# Patient Record
Sex: FEMALE | Race: WHITE | NOT HISPANIC OR LATINO
[De-identification: names, ages, dates, MRNs, and addresses within clinical notes are randomized per-mention and may not be internally consistent; named-entity substitution may affect disease eponyms.]

---

## 2017-05-01 ENCOUNTER — TRANSCRIPTION ENCOUNTER (OUTPATIENT)
Age: 67
End: 2017-05-01

## 2018-06-21 VITALS
RESPIRATION RATE: 18 BRPM | TEMPERATURE: 98 F | OXYGEN SATURATION: 80 % | DIASTOLIC BLOOD PRESSURE: 82 MMHG | HEART RATE: 108 BPM | SYSTOLIC BLOOD PRESSURE: 120 MMHG

## 2018-06-21 LAB
ALBUMIN SERPL ELPH-MCNC: 2.5 G/DL — LOW (ref 3.4–5)
ALP SERPL-CCNC: 62 U/L — SIGNIFICANT CHANGE UP (ref 40–120)
ALT FLD-CCNC: 39 U/L — SIGNIFICANT CHANGE UP (ref 12–42)
ANION GAP SERPL CALC-SCNC: 6 MMOL/L — LOW (ref 9–16)
APPEARANCE UR: CLEAR — SIGNIFICANT CHANGE UP
APTT BLD: 27.4 SEC — LOW (ref 27.5–36.5)
AST SERPL-CCNC: 34 U/L — SIGNIFICANT CHANGE UP (ref 15–37)
BASOPHILS NFR BLD AUTO: 0.7 % — SIGNIFICANT CHANGE UP (ref 0–2)
BILIRUB SERPL-MCNC: 0.3 MG/DL — SIGNIFICANT CHANGE UP (ref 0.2–1.2)
BILIRUB UR-MCNC: NEGATIVE — SIGNIFICANT CHANGE UP
BUN SERPL-MCNC: 12 MG/DL — SIGNIFICANT CHANGE UP (ref 7–23)
CALCIUM SERPL-MCNC: 8.5 MG/DL — SIGNIFICANT CHANGE UP (ref 8.5–10.5)
CHLORIDE SERPL-SCNC: 104 MMOL/L — SIGNIFICANT CHANGE UP (ref 96–108)
CO2 SERPL-SCNC: 29 MMOL/L — SIGNIFICANT CHANGE UP (ref 22–31)
COLOR SPEC: YELLOW — SIGNIFICANT CHANGE UP
CREAT SERPL-MCNC: 0.45 MG/DL — LOW (ref 0.5–1.3)
D DIMER BLD IA.RAPID-MCNC: 529 NG/ML DDU — HIGH
DIFF PNL FLD: NEGATIVE — SIGNIFICANT CHANGE UP
EOSINOPHIL NFR BLD AUTO: 2 % — SIGNIFICANT CHANGE UP (ref 0–6)
GLUCOSE SERPL-MCNC: 99 MG/DL — SIGNIFICANT CHANGE UP (ref 70–99)
GLUCOSE UR QL: NEGATIVE — SIGNIFICANT CHANGE UP
HCT VFR BLD CALC: 42.5 % — SIGNIFICANT CHANGE UP (ref 34.5–45)
HGB BLD-MCNC: 13.1 G/DL — SIGNIFICANT CHANGE UP (ref 11.5–15.5)
IMM GRANULOCYTES NFR BLD AUTO: 0.6 % — SIGNIFICANT CHANGE UP (ref 0–1.5)
INR BLD: 1 — SIGNIFICANT CHANGE UP (ref 0.88–1.16)
KETONES UR-MCNC: NEGATIVE — SIGNIFICANT CHANGE UP
LACTATE SERPL-SCNC: 0.4 MMOL/L — SIGNIFICANT CHANGE UP (ref 0.4–2)
LEUKOCYTE ESTERASE UR-ACNC: NEGATIVE — SIGNIFICANT CHANGE UP
LYMPHOCYTES # BLD AUTO: 22.8 % — SIGNIFICANT CHANGE UP (ref 13–44)
MAGNESIUM SERPL-MCNC: 1.6 MG/DL — SIGNIFICANT CHANGE UP (ref 1.6–2.6)
MCHC RBC-ENTMCNC: 27.8 PG — SIGNIFICANT CHANGE UP (ref 27–34)
MCHC RBC-ENTMCNC: 30.8 G/DL — LOW (ref 32–36)
MCV RBC AUTO: 90.2 FL — SIGNIFICANT CHANGE UP (ref 80–100)
MONOCYTES NFR BLD AUTO: 13.4 % — SIGNIFICANT CHANGE UP (ref 2–14)
NEUTROPHILS NFR BLD AUTO: 60.5 % — SIGNIFICANT CHANGE UP (ref 43–77)
NITRITE UR-MCNC: NEGATIVE — SIGNIFICANT CHANGE UP
NT-PROBNP SERPL-SCNC: 2867 PG/ML — HIGH
PCO2 BLDV: 56 MMHG — HIGH (ref 41–51)
PH BLDV: 7.4 — SIGNIFICANT CHANGE UP (ref 7.32–7.43)
PH UR: 6.5 — SIGNIFICANT CHANGE UP (ref 5–8)
PLATELET # BLD AUTO: 93 K/UL — LOW (ref 150–400)
PO2 BLDV: 60 MMHG — HIGH (ref 35–40)
POTASSIUM SERPL-MCNC: 4 MMOL/L — SIGNIFICANT CHANGE UP (ref 3.5–5.3)
POTASSIUM SERPL-SCNC: 4 MMOL/L — SIGNIFICANT CHANGE UP (ref 3.5–5.3)
PROT SERPL-MCNC: 5.8 G/DL — LOW (ref 6.4–8.2)
PROT UR-MCNC: NEGATIVE MG/DL — SIGNIFICANT CHANGE UP
PROTHROM AB SERPL-ACNC: 11 SEC — SIGNIFICANT CHANGE UP (ref 9.8–12.7)
RBC # BLD: 4.71 M/UL — SIGNIFICANT CHANGE UP (ref 3.8–5.2)
RBC # FLD: 14.6 % — SIGNIFICANT CHANGE UP (ref 10.3–16.9)
SAO2 % BLDV: 92 % — SIGNIFICANT CHANGE UP
SODIUM SERPL-SCNC: 139 MMOL/L — SIGNIFICANT CHANGE UP (ref 132–145)
SP GR SPEC: 1.01 — SIGNIFICANT CHANGE UP (ref 1–1.03)
TROPONIN I SERPL-MCNC: 0.09 NG/ML — HIGH (ref 0.02–0.06)
TROPONIN I SERPL-MCNC: 0.09 NG/ML — HIGH (ref 0.02–0.06)
UROBILINOGEN FLD QL: 0.2 E.U./DL — SIGNIFICANT CHANGE UP
WBC # BLD: 8.5 K/UL — SIGNIFICANT CHANGE UP (ref 3.8–10.5)
WBC # FLD AUTO: 8.5 K/UL — SIGNIFICANT CHANGE UP (ref 3.8–10.5)

## 2018-06-21 PROCEDURE — 93010 ELECTROCARDIOGRAM REPORT: CPT

## 2018-06-21 PROCEDURE — 71045 X-RAY EXAM CHEST 1 VIEW: CPT | Mod: 26

## 2018-06-21 PROCEDURE — 99291 CRITICAL CARE FIRST HOUR: CPT

## 2018-06-21 RX ORDER — ACETAMINOPHEN 500 MG
975 TABLET ORAL ONCE
Qty: 0 | Refills: 0 | Status: COMPLETED | OUTPATIENT
Start: 2018-06-21 | End: 2018-06-21

## 2018-06-21 RX ORDER — ALBUTEROL 90 UG/1
2.5 AEROSOL, METERED ORAL
Qty: 0 | Refills: 0 | Status: DISCONTINUED | OUTPATIENT
Start: 2018-06-21 | End: 2018-06-22

## 2018-06-21 RX ORDER — FUROSEMIDE 40 MG
40 TABLET ORAL ONCE
Qty: 0 | Refills: 0 | Status: COMPLETED | OUTPATIENT
Start: 2018-06-21 | End: 2018-06-21

## 2018-06-21 RX ORDER — ONDANSETRON 8 MG/1
4 TABLET, FILM COATED ORAL ONCE
Qty: 0 | Refills: 0 | Status: COMPLETED | OUTPATIENT
Start: 2018-06-21 | End: 2018-06-21

## 2018-06-21 RX ORDER — MORPHINE SULFATE 50 MG/1
4 CAPSULE, EXTENDED RELEASE ORAL ONCE
Qty: 0 | Refills: 0 | Status: DISCONTINUED | OUTPATIENT
Start: 2018-06-21 | End: 2018-06-21

## 2018-06-21 RX ORDER — VANCOMYCIN HCL 1 G
1000 VIAL (EA) INTRAVENOUS ONCE
Qty: 0 | Refills: 0 | Status: COMPLETED | OUTPATIENT
Start: 2018-06-21 | End: 2018-06-21

## 2018-06-21 RX ORDER — AZITHROMYCIN 500 MG/1
500 TABLET, FILM COATED ORAL ONCE
Qty: 0 | Refills: 0 | Status: COMPLETED | OUTPATIENT
Start: 2018-06-21 | End: 2018-06-21

## 2018-06-21 RX ORDER — CEFTRIAXONE 500 MG/1
1 INJECTION, POWDER, FOR SOLUTION INTRAMUSCULAR; INTRAVENOUS ONCE
Qty: 0 | Refills: 0 | Status: COMPLETED | OUTPATIENT
Start: 2018-06-21 | End: 2018-06-21

## 2018-06-21 RX ORDER — IPRATROPIUM/ALBUTEROL SULFATE 18-103MCG
3 AEROSOL WITH ADAPTER (GRAM) INHALATION ONCE
Qty: 0 | Refills: 0 | Status: COMPLETED | OUTPATIENT
Start: 2018-06-21 | End: 2018-06-21

## 2018-06-21 RX ORDER — ENOXAPARIN SODIUM 100 MG/ML
80 INJECTION SUBCUTANEOUS ONCE
Qty: 0 | Refills: 0 | Status: COMPLETED | OUTPATIENT
Start: 2018-06-21 | End: 2018-06-21

## 2018-06-21 RX ADMIN — ONDANSETRON 4 MILLIGRAM(S): 8 TABLET, FILM COATED ORAL at 22:48

## 2018-06-21 RX ADMIN — AZITHROMYCIN 500 MILLIGRAM(S): 500 TABLET, FILM COATED ORAL at 22:46

## 2018-06-21 RX ADMIN — MORPHINE SULFATE 4 MILLIGRAM(S): 50 CAPSULE, EXTENDED RELEASE ORAL at 22:48

## 2018-06-21 RX ADMIN — Medication 125 MILLIGRAM(S): at 19:03

## 2018-06-21 RX ADMIN — CEFTRIAXONE 1 GRAM(S): 500 INJECTION, POWDER, FOR SOLUTION INTRAMUSCULAR; INTRAVENOUS at 22:00

## 2018-06-21 RX ADMIN — Medication 250 MILLIGRAM(S): at 23:55

## 2018-06-21 RX ADMIN — CEFTRIAXONE 100 GRAM(S): 500 INJECTION, POWDER, FOR SOLUTION INTRAMUSCULAR; INTRAVENOUS at 22:48

## 2018-06-21 RX ADMIN — Medication 3 MILLILITER(S): at 19:03

## 2018-06-21 RX ADMIN — Medication 975 MILLIGRAM(S): at 19:02

## 2018-06-21 RX ADMIN — Medication 40 MILLIGRAM(S): at 19:02

## 2018-06-21 NOTE — ED PROVIDER NOTE - DIAGNOSTIC INTERPRETATION
Interpreted by ED Physician:  CXR (1 view): no acute abnormality: no infiltrates, bones appear intact, cardiac silhouette slightly enlarged

## 2018-06-21 NOTE — ED PROVIDER NOTE - OBJECTIVE STATEMENT
67 F visiting from [place], PMHx pneumonia (last in January 2018), COPD, iron deficiency, receives Nplate injections, PSHx: spinal cord stimulator insertion surgery, left knee replacement, recently hospitalized (March 2018, [place]), allergy to iodine, CT contrast, presents to ED from urgent care for SOB and left leg pain and swelling. Pt reports cellulitis and pain in her left leg that began last week (prior to recent travel) as well as SOB with associated back pain today. States she has been traveling by plane frequently between [place] and [place] due to her niece's recent death. Pt presented to urgent care today, where she was told her temperature was 99 degrees F (usually runs at 95 degrees F). Notes she has taken Lasix in the past for leg swelling. Former smoker of 20+ years, but states she has used a vape recently. Denies chest pain, abdominal pain, nausea/vomiting. 67 F visiting from [place], PMHx pneumonia (last in January 2018), COPD, iron deficiency, receives Nplate injections, PSHx: spinal cord stimulator insertion surgery, left knee replacement, recently hospitalized (March 2018, [place]), allergy to iodine, CT contrast, presents to ED from urgent care for SOB and left leg pain and swelling. Pt reports cellulitis and pain in her left leg that began last week (prior to recent travel) as well as SOB with associated back pain today. States she has been traveling by plane frequently between [place] and [place] due to her niece's recent death. Pt presented to urgent care today, where she was told her temperature was 99 degrees F (usually runs at 95 degrees F). Notes she has taken Lasix in the past for leg swelling. Former smoker of 20+ years, but states she has used a vape recently. Denies chest pain, abdominal pain, nausea/vomiting.    Also says that she has had some SOB that has been worsening over the past month. + orthopnea. 10ln weight gain this week with leg edema. Says that legs feel painful from tightness/swelling. L > R but swelling is symmetrical. Slightly more red left lower leg. She also has a lung nodule (that she was told looks benign) that is pending biopsy once platelets are in a better range.

## 2018-06-21 NOTE — ED PROVIDER NOTE - RESPIRATORY, MLM
Markedly decreased air entry bilaterally. Bilateral inspiratory and expiratory wheezes. Markedly decreased air entry bilaterally. Bilateral inspiratory and expiratory wheezes. Moderate resp distress.

## 2018-06-21 NOTE — ED PROVIDER NOTE - MUSCULOSKELETAL, MLM
Spine appears normal, range of motion is not limited, no muscle or joint tenderness Spine appears normal, b/l LE edema to thighs, 1+ pitting.

## 2018-06-21 NOTE — ED PROVIDER NOTE - CRITICAL CARE PROVIDED
direct patient care (not related to procedure)/interpretation of diagnostic studies/consultation with other physicians/conducted a detailed discussion of DNR status/documentation/additional history taking/consult w/ pt's family directly relating to pts condition

## 2018-06-21 NOTE — ED ADULT TRIAGE NOTE - CHIEF COMPLAINT QUOTE
pt c/o left calf pain, site of prior cellulitis. sent by urgent care for US. chronic smoker with copd 20+ years has usual o2 sat 94 on room air today presents 80% but without resp distress.  pt c/o left calf pain, site of prior cellulitis. sent by urgent care for US. chronic smoker with copd 20+ years has usual o2 sat 94 on room air today presents 80% but without resp distress. recurring bouts of pna, last illness jan 2018.

## 2018-06-21 NOTE — ED ADULT NURSE NOTE - CHIEF COMPLAINT QUOTE
pt c/o left calf pain, site of prior cellulitis. sent by urgent care for US. chronic smoker with copd 20+ years has usual o2 sat 94 on room air today presents 80% but without resp distress. recurring bouts of pna, last illness jan 2018.

## 2018-06-21 NOTE — ED PROVIDER NOTE - MEDICAL DECISION MAKING DETAILS
Patient presenting with sob/soboe and new b/l pedal edema/slight redness to LLE, low grade temp at UC and hx PNA, cellulitis and COPD. Wheezing and has peripheral edema on exam. Will send broad aguayo and tx for CHF/COPD. Anticipate admission. Also covered with abx cor CAP and Cellulitis.

## 2018-06-21 NOTE — ED PROVIDER NOTE - PMH
COPD (chronic obstructive pulmonary disease)    Pneumonia Cellulitis    COPD (chronic obstructive pulmonary disease)    Pneumonia    Thrombocytopenia

## 2018-06-21 NOTE — ED ADULT NURSE NOTE - OBJECTIVE STATEMENT
pt c/o b/l lower leg redness, swelling, tightness and warmth with h/o cellulitis. pt clinically upgraded to MD [place] due to low O2 sat in triage. pt placed on O2, in NAD, speaking in full sentences, and will continue to monitor. +edema +erythema. wheezing and diminished BS auscultated.

## 2018-06-21 NOTE — ED PROVIDER NOTE - CONSTITUTIONAL, MLM
normal... Awake, alert, oriented to person, place, time/situation and in no apparent distress. Awake, alert, oriented to person, place, time/situation and Moderate resp distress. Hard ro speak full sentances.

## 2018-06-21 NOTE — ED PROVIDER NOTE - PROGRESS NOTE DETAILS
Good urine output with Lasix, sats up to 89%. Lungs sound better. More comfortable. Trop 0.089, DDimer 500's which is not very elevated once age adjusted. BNP 2800, No hx CHF. Bedside sono done by me showed no obvious clot from groin to mi thigh but eval was very limited. Sats still 86% on 4L. Getting another round of Lasix (put out 1300cc) and nebs, also empiric Lovenox. Case discussed with Dr. Osorio. Will admit to SDU at [facility]. Patient agreeable. Also sending for NC CT Chest

## 2018-06-22 ENCOUNTER — INPATIENT (INPATIENT)
Facility: HOSPITAL | Age: 68
LOS: 9 days | Discharge: HOME | DRG: 291 | End: 2018-07-02
Payer: MEDICARE

## 2018-06-22 DIAGNOSIS — M25.562 PAIN IN LEFT KNEE: ICD-10-CM

## 2018-06-22 DIAGNOSIS — F31.9 BIPOLAR DISORDER, UNSPECIFIED: ICD-10-CM

## 2018-06-22 DIAGNOSIS — L03.119 CELLULITIS OF UNSPECIFIED PART OF LIMB: ICD-10-CM

## 2018-06-22 DIAGNOSIS — J44.1 CHRONIC OBSTRUCTIVE PULMONARY DISEASE WITH (ACUTE) EXACERBATION: ICD-10-CM

## 2018-06-22 DIAGNOSIS — D69.6 THROMBOCYTOPENIA, UNSPECIFIED: ICD-10-CM

## 2018-06-22 DIAGNOSIS — Z98.890 OTHER SPECIFIED POSTPROCEDURAL STATES: Chronic | ICD-10-CM

## 2018-06-22 DIAGNOSIS — R63.8 OTHER SYMPTOMS AND SIGNS CONCERNING FOOD AND FLUID INTAKE: ICD-10-CM

## 2018-06-22 DIAGNOSIS — Z96.652 PRESENCE OF LEFT ARTIFICIAL KNEE JOINT: Chronic | ICD-10-CM

## 2018-06-22 DIAGNOSIS — Z29.9 ENCOUNTER FOR PROPHYLACTIC MEASURES, UNSPECIFIED: ICD-10-CM

## 2018-06-22 DIAGNOSIS — F51.01 PRIMARY INSOMNIA: ICD-10-CM

## 2018-06-22 LAB
ALBUMIN SERPL ELPH-MCNC: 3.3 G/DL — SIGNIFICANT CHANGE UP (ref 3.3–5)
ALP SERPL-CCNC: 63 U/L — SIGNIFICANT CHANGE UP (ref 40–120)
ALT FLD-CCNC: 28 U/L — SIGNIFICANT CHANGE UP (ref 10–45)
ANION GAP SERPL CALC-SCNC: 9 MMOL/L — SIGNIFICANT CHANGE UP (ref 5–17)
AST SERPL-CCNC: 35 U/L — SIGNIFICANT CHANGE UP (ref 10–40)
BILIRUB SERPL-MCNC: 0.2 MG/DL — SIGNIFICANT CHANGE UP (ref 0.2–1.2)
BUN SERPL-MCNC: 13 MG/DL — SIGNIFICANT CHANGE UP (ref 7–23)
CALCIUM SERPL-MCNC: 8.8 MG/DL — SIGNIFICANT CHANGE UP (ref 8.4–10.5)
CHLORIDE SERPL-SCNC: 94 MMOL/L — LOW (ref 96–108)
CHOLEST SERPL-MCNC: 173 MG/DL — SIGNIFICANT CHANGE UP (ref 10–199)
CO2 SERPL-SCNC: 36 MMOL/L — HIGH (ref 22–31)
CREAT SERPL-MCNC: 0.6 MG/DL — SIGNIFICANT CHANGE UP (ref 0.5–1.3)
CULTURE RESULTS: NO GROWTH — SIGNIFICANT CHANGE UP
GLUCOSE BLDC GLUCOMTR-MCNC: 108 MG/DL — HIGH (ref 70–99)
GLUCOSE BLDC GLUCOMTR-MCNC: 171 MG/DL — HIGH (ref 70–99)
GLUCOSE BLDC GLUCOMTR-MCNC: 213 MG/DL — HIGH (ref 70–99)
GLUCOSE BLDC GLUCOMTR-MCNC: 271 MG/DL — HIGH (ref 70–99)
GLUCOSE SERPL-MCNC: 237 MG/DL — HIGH (ref 70–99)
HBA1C BLD-MCNC: 6.2 % — HIGH (ref 4–5.6)
HCT VFR BLD CALC: 44.3 % — SIGNIFICANT CHANGE UP (ref 34.5–45)
HDLC SERPL-MCNC: 78 MG/DL — SIGNIFICANT CHANGE UP (ref 40–125)
HGB BLD-MCNC: 13.4 G/DL — SIGNIFICANT CHANGE UP (ref 11.5–15.5)
LIPID PNL WITH DIRECT LDL SERPL: 75 MG/DL — SIGNIFICANT CHANGE UP
MAGNESIUM SERPL-MCNC: 1.6 MG/DL — SIGNIFICANT CHANGE UP (ref 1.6–2.6)
MCHC RBC-ENTMCNC: 26.7 PG — LOW (ref 27–34)
MCHC RBC-ENTMCNC: 30.2 G/DL — LOW (ref 32–36)
MCV RBC AUTO: 88.2 FL — SIGNIFICANT CHANGE UP (ref 80–100)
PHOSPHATE SERPL-MCNC: 3.4 MG/DL — SIGNIFICANT CHANGE UP (ref 2.5–4.5)
PLATELET # BLD AUTO: 102 K/UL — LOW (ref 150–400)
POTASSIUM SERPL-MCNC: 4.2 MMOL/L — SIGNIFICANT CHANGE UP (ref 3.5–5.3)
POTASSIUM SERPL-SCNC: 4.2 MMOL/L — SIGNIFICANT CHANGE UP (ref 3.5–5.3)
PROT SERPL-MCNC: 6.1 G/DL — SIGNIFICANT CHANGE UP (ref 6–8.3)
RBC # BLD: 5.02 M/UL — SIGNIFICANT CHANGE UP (ref 3.8–5.2)
RBC # FLD: 15.3 % — SIGNIFICANT CHANGE UP (ref 10.3–16.9)
SODIUM SERPL-SCNC: 139 MMOL/L — SIGNIFICANT CHANGE UP (ref 135–145)
SPECIMEN SOURCE: SIGNIFICANT CHANGE UP
T4 AB SER-ACNC: 5.67 UG/DL — SIGNIFICANT CHANGE UP (ref 3.17–11.72)
TOTAL CHOLESTEROL/HDL RATIO MEASUREMENT: 2.2 RATIO — LOW (ref 3.3–7.1)
TRIGL SERPL-MCNC: 100 MG/DL — SIGNIFICANT CHANGE UP (ref 10–149)
TROPONIN T SERPL-MCNC: <0.01 NG/ML — SIGNIFICANT CHANGE UP (ref 0–0.01)
TSH SERPL-MCNC: 0.28 UIU/ML — LOW (ref 0.35–4.94)
WBC # BLD: 6.2 K/UL — SIGNIFICANT CHANGE UP (ref 3.8–10.5)
WBC # FLD AUTO: 6.2 K/UL — SIGNIFICANT CHANGE UP (ref 3.8–10.5)

## 2018-06-22 PROCEDURE — 93010 ELECTROCARDIOGRAM REPORT: CPT

## 2018-06-22 PROCEDURE — 71250 CT THORAX DX C-: CPT | Mod: 26

## 2018-06-22 PROCEDURE — 78580 LUNG PERFUSION IMAGING: CPT | Mod: 26

## 2018-06-22 PROCEDURE — 93970 EXTREMITY STUDY: CPT | Mod: 26

## 2018-06-22 PROCEDURE — 93306 TTE W/DOPPLER COMPLETE: CPT | Mod: 26

## 2018-06-22 RX ORDER — ACETAMINOPHEN 500 MG
975 TABLET ORAL ONCE
Qty: 0 | Refills: 0 | Status: COMPLETED | OUTPATIENT
Start: 2018-06-22 | End: 2018-06-22

## 2018-06-22 RX ORDER — DEXTROSE 50 % IN WATER 50 %
25 SYRINGE (ML) INTRAVENOUS ONCE
Qty: 0 | Refills: 0 | Status: DISCONTINUED | OUTPATIENT
Start: 2018-06-22 | End: 2018-07-02

## 2018-06-22 RX ORDER — HYDROMORPHONE HYDROCHLORIDE 2 MG/ML
2 INJECTION INTRAMUSCULAR; INTRAVENOUS; SUBCUTANEOUS EVERY 6 HOURS
Qty: 0 | Refills: 0 | Status: DISCONTINUED | OUTPATIENT
Start: 2018-06-22 | End: 2018-06-28

## 2018-06-22 RX ORDER — VENLAFAXINE HCL 75 MG
75 CAPSULE, EXT RELEASE 24 HR ORAL DAILY
Qty: 0 | Refills: 0 | Status: DISCONTINUED | OUTPATIENT
Start: 2018-06-22 | End: 2018-06-22

## 2018-06-22 RX ORDER — POLYETHYLENE GLYCOL 3350 17 G/17G
17 POWDER, FOR SOLUTION ORAL DAILY
Qty: 0 | Refills: 0 | Status: DISCONTINUED | OUTPATIENT
Start: 2018-06-22 | End: 2018-07-02

## 2018-06-22 RX ORDER — VENLAFAXINE HCL 75 MG
0 CAPSULE, EXT RELEASE 24 HR ORAL
Qty: 90 | Refills: 0 | COMMUNITY

## 2018-06-22 RX ORDER — LAMOTRIGINE 25 MG/1
100 TABLET, ORALLY DISINTEGRATING ORAL DAILY
Qty: 0 | Refills: 0 | Status: DISCONTINUED | OUTPATIENT
Start: 2018-06-22 | End: 2018-06-22

## 2018-06-22 RX ORDER — GLUCAGON INJECTION, SOLUTION 0.5 MG/.1ML
1 INJECTION, SOLUTION SUBCUTANEOUS ONCE
Qty: 0 | Refills: 0 | Status: DISCONTINUED | OUTPATIENT
Start: 2018-06-22 | End: 2018-07-02

## 2018-06-22 RX ORDER — TRAZODONE HCL 50 MG
50 TABLET ORAL AT BEDTIME
Qty: 0 | Refills: 0 | Status: DISCONTINUED | OUTPATIENT
Start: 2018-06-22 | End: 2018-06-22

## 2018-06-22 RX ORDER — ACETAMINOPHEN 500 MG
975 TABLET ORAL EVERY 8 HOURS
Qty: 0 | Refills: 0 | Status: DISCONTINUED | OUTPATIENT
Start: 2018-06-22 | End: 2018-07-02

## 2018-06-22 RX ORDER — HYDROMORPHONE HYDROCHLORIDE 2 MG/ML
2 INJECTION INTRAMUSCULAR; INTRAVENOUS; SUBCUTANEOUS EVERY 6 HOURS
Qty: 0 | Refills: 0 | Status: DISCONTINUED | OUTPATIENT
Start: 2018-06-22 | End: 2018-06-22

## 2018-06-22 RX ORDER — TRAZODONE HCL 50 MG
100 TABLET ORAL ONCE
Qty: 0 | Refills: 0 | Status: COMPLETED | OUTPATIENT
Start: 2018-06-22 | End: 2018-06-22

## 2018-06-22 RX ORDER — HYDROMORPHONE HYDROCHLORIDE 2 MG/ML
0 INJECTION INTRAMUSCULAR; INTRAVENOUS; SUBCUTANEOUS
Qty: 120 | Refills: 0 | COMMUNITY

## 2018-06-22 RX ORDER — INSULIN LISPRO 100/ML
VIAL (ML) SUBCUTANEOUS
Qty: 0 | Refills: 0 | Status: DISCONTINUED | OUTPATIENT
Start: 2018-06-22 | End: 2018-06-22

## 2018-06-22 RX ORDER — DEXTROSE 50 % IN WATER 50 %
15 SYRINGE (ML) INTRAVENOUS ONCE
Qty: 0 | Refills: 0 | Status: DISCONTINUED | OUTPATIENT
Start: 2018-06-22 | End: 2018-07-02

## 2018-06-22 RX ORDER — INSULIN LISPRO 100/ML
VIAL (ML) SUBCUTANEOUS
Qty: 0 | Refills: 0 | Status: DISCONTINUED | OUTPATIENT
Start: 2018-06-22 | End: 2018-07-02

## 2018-06-22 RX ORDER — LAMOTRIGINE 25 MG/1
0 TABLET, ORALLY DISINTEGRATING ORAL
Qty: 30 | Refills: 0 | COMMUNITY

## 2018-06-22 RX ORDER — SENNA PLUS 8.6 MG/1
2 TABLET ORAL AT BEDTIME
Qty: 0 | Refills: 0 | Status: DISCONTINUED | OUTPATIENT
Start: 2018-06-22 | End: 2018-07-02

## 2018-06-22 RX ORDER — LAMOTRIGINE 25 MG/1
100 TABLET, ORALLY DISINTEGRATING ORAL DAILY
Qty: 0 | Refills: 0 | Status: DISCONTINUED | OUTPATIENT
Start: 2018-06-22 | End: 2018-07-02

## 2018-06-22 RX ORDER — MAGNESIUM SULFATE 500 MG/ML
2 VIAL (ML) INJECTION ONCE
Qty: 0 | Refills: 0 | Status: COMPLETED | OUTPATIENT
Start: 2018-06-22 | End: 2018-06-22

## 2018-06-22 RX ORDER — DEXTROSE 50 % IN WATER 50 %
12.5 SYRINGE (ML) INTRAVENOUS ONCE
Qty: 0 | Refills: 0 | Status: DISCONTINUED | OUTPATIENT
Start: 2018-06-22 | End: 2018-07-02

## 2018-06-22 RX ORDER — TRAZODONE HCL 50 MG
0 TABLET ORAL
Qty: 45 | Refills: 0 | COMMUNITY

## 2018-06-22 RX ORDER — TRAZODONE HCL 50 MG
100 TABLET ORAL AT BEDTIME
Qty: 0 | Refills: 0 | Status: DISCONTINUED | OUTPATIENT
Start: 2018-06-22 | End: 2018-07-02

## 2018-06-22 RX ORDER — IPRATROPIUM/ALBUTEROL SULFATE 18-103MCG
3 AEROSOL WITH ADAPTER (GRAM) INHALATION ONCE
Qty: 0 | Refills: 0 | Status: COMPLETED | OUTPATIENT
Start: 2018-06-22 | End: 2018-06-22

## 2018-06-22 RX ORDER — TRAZODONE HCL 50 MG
TABLET ORAL
Qty: 0 | Refills: 0 | Status: DISCONTINUED | OUTPATIENT
Start: 2018-06-22 | End: 2018-07-02

## 2018-06-22 RX ORDER — ENOXAPARIN SODIUM 100 MG/ML
80 INJECTION SUBCUTANEOUS EVERY 12 HOURS
Qty: 0 | Refills: 0 | Status: DISCONTINUED | OUTPATIENT
Start: 2018-06-22 | End: 2018-06-22

## 2018-06-22 RX ORDER — LAMOTRIGINE 25 MG/1
1 TABLET, ORALLY DISINTEGRATING ORAL
Qty: 0 | Refills: 0 | COMMUNITY

## 2018-06-22 RX ORDER — LAMOTRIGINE 25 MG/1
100 TABLET, ORALLY DISINTEGRATING ORAL ONCE
Qty: 0 | Refills: 0 | Status: COMPLETED | OUTPATIENT
Start: 2018-06-22 | End: 2018-06-22

## 2018-06-22 RX ORDER — LAMOTRIGINE 25 MG/1
200 TABLET, ORALLY DISINTEGRATING ORAL DAILY
Qty: 0 | Refills: 0 | Status: DISCONTINUED | OUTPATIENT
Start: 2018-06-22 | End: 2018-06-22

## 2018-06-22 RX ORDER — ALPRAZOLAM 0.25 MG
0 TABLET ORAL
Qty: 90 | Refills: 0 | COMMUNITY

## 2018-06-22 RX ORDER — SODIUM CHLORIDE 9 MG/ML
1000 INJECTION, SOLUTION INTRAVENOUS
Qty: 0 | Refills: 0 | Status: DISCONTINUED | OUTPATIENT
Start: 2018-06-22 | End: 2018-07-02

## 2018-06-22 RX ORDER — IPRATROPIUM/ALBUTEROL SULFATE 18-103MCG
3 AEROSOL WITH ADAPTER (GRAM) INHALATION EVERY 4 HOURS
Qty: 0 | Refills: 0 | Status: DISCONTINUED | OUTPATIENT
Start: 2018-06-22 | End: 2018-07-02

## 2018-06-22 RX ORDER — HYDROMORPHONE HYDROCHLORIDE 2 MG/ML
4 INJECTION INTRAMUSCULAR; INTRAVENOUS; SUBCUTANEOUS EVERY 6 HOURS
Qty: 0 | Refills: 0 | Status: DISCONTINUED | OUTPATIENT
Start: 2018-06-22 | End: 2018-06-29

## 2018-06-22 RX ORDER — FUROSEMIDE 40 MG
40 TABLET ORAL EVERY 12 HOURS
Qty: 0 | Refills: 0 | Status: DISCONTINUED | OUTPATIENT
Start: 2018-06-22 | End: 2018-06-24

## 2018-06-22 RX ORDER — VENLAFAXINE HCL 75 MG
75 CAPSULE, EXT RELEASE 24 HR ORAL DAILY
Qty: 0 | Refills: 0 | Status: DISCONTINUED | OUTPATIENT
Start: 2018-06-23 | End: 2018-06-29

## 2018-06-22 RX ORDER — HEPARIN SODIUM 5000 [USP'U]/ML
5000 INJECTION INTRAVENOUS; SUBCUTANEOUS EVERY 8 HOURS
Qty: 0 | Refills: 0 | Status: DISCONTINUED | OUTPATIENT
Start: 2018-06-22 | End: 2018-06-24

## 2018-06-22 RX ORDER — INSULIN LISPRO 100/ML
6 VIAL (ML) SUBCUTANEOUS ONCE
Qty: 0 | Refills: 0 | Status: COMPLETED | OUTPATIENT
Start: 2018-06-22 | End: 2018-06-22

## 2018-06-22 RX ADMIN — Medication 40 MILLIGRAM(S): at 01:18

## 2018-06-22 RX ADMIN — MORPHINE SULFATE 4 MILLIGRAM(S): 50 CAPSULE, EXTENDED RELEASE ORAL at 01:21

## 2018-06-22 RX ADMIN — Medication 50 GRAM(S): at 07:43

## 2018-06-22 RX ADMIN — HYDROMORPHONE HYDROCHLORIDE 2 MILLIGRAM(S): 2 INJECTION INTRAMUSCULAR; INTRAVENOUS; SUBCUTANEOUS at 03:49

## 2018-06-22 RX ADMIN — Medication 40 MILLIGRAM(S): at 05:49

## 2018-06-22 RX ADMIN — Medication 3 MILLILITER(S): at 17:10

## 2018-06-22 RX ADMIN — ENOXAPARIN SODIUM 80 MILLIGRAM(S): 100 INJECTION SUBCUTANEOUS at 01:18

## 2018-06-22 RX ADMIN — Medication 3 MILLILITER(S): at 22:04

## 2018-06-22 RX ADMIN — Medication 40 MILLIGRAM(S): at 06:53

## 2018-06-22 RX ADMIN — Medication 3 MILLILITER(S): at 02:52

## 2018-06-22 RX ADMIN — Medication 1000 MILLIGRAM(S): at 01:18

## 2018-06-22 RX ADMIN — HEPARIN SODIUM 5000 UNIT(S): 5000 INJECTION INTRAVENOUS; SUBCUTANEOUS at 22:03

## 2018-06-22 RX ADMIN — Medication 6 UNIT(S): at 12:42

## 2018-06-22 RX ADMIN — Medication 40 MILLIGRAM(S): at 19:08

## 2018-06-22 RX ADMIN — Medication 2: at 22:03

## 2018-06-22 RX ADMIN — Medication 75 MILLIGRAM(S): at 11:11

## 2018-06-22 RX ADMIN — Medication 100 MILLIGRAM(S): at 22:04

## 2018-06-22 RX ADMIN — LAMOTRIGINE 100 MILLIGRAM(S): 25 TABLET, ORALLY DISINTEGRATING ORAL at 11:11

## 2018-06-22 RX ADMIN — Medication 40 MILLIGRAM(S): at 11:10

## 2018-06-22 RX ADMIN — Medication 4: at 06:53

## 2018-06-22 RX ADMIN — Medication 3 MILLILITER(S): at 05:48

## 2018-06-22 RX ADMIN — POLYETHYLENE GLYCOL 3350 17 GRAM(S): 17 POWDER, FOR SOLUTION ORAL at 22:08

## 2018-06-22 RX ADMIN — HYDROMORPHONE HYDROCHLORIDE 4 MILLIGRAM(S): 2 INJECTION INTRAMUSCULAR; INTRAVENOUS; SUBCUTANEOUS at 17:10

## 2018-06-22 RX ADMIN — Medication 40 MILLIGRAM(S): at 02:52

## 2018-06-22 RX ADMIN — SENNA PLUS 2 TABLET(S): 8.6 TABLET ORAL at 22:03

## 2018-06-22 RX ADMIN — HYDROMORPHONE HYDROCHLORIDE 4 MILLIGRAM(S): 2 INJECTION INTRAMUSCULAR; INTRAVENOUS; SUBCUTANEOUS at 11:10

## 2018-06-22 RX ADMIN — ALBUTEROL 2.5 MILLIGRAM(S): 90 AEROSOL, METERED ORAL at 01:19

## 2018-06-22 RX ADMIN — Medication 40 MILLIGRAM(S): at 17:10

## 2018-06-22 RX ADMIN — Medication 3 MILLILITER(S): at 09:30

## 2018-06-22 RX ADMIN — Medication 975 MILLIGRAM(S): at 01:18

## 2018-06-22 RX ADMIN — ALBUTEROL 2.5 MILLIGRAM(S): 90 AEROSOL, METERED ORAL at 01:21

## 2018-06-22 RX ADMIN — LAMOTRIGINE 100 MILLIGRAM(S): 25 TABLET, ORALLY DISINTEGRATING ORAL at 17:10

## 2018-06-22 RX ADMIN — Medication 100 MILLIGRAM(S): at 03:49

## 2018-06-22 RX ADMIN — MORPHINE SULFATE 4 MILLIGRAM(S): 50 CAPSULE, EXTENDED RELEASE ORAL at 01:18

## 2018-06-22 NOTE — H&P ADULT - NSHPSOURCEINFOTX_GEN_ALL_CORE
Patient with tangental speech, history difficult to obtain, patient frequently interrupted by family member at bedside

## 2018-06-22 NOTE — PATIENT PROFILE ADULT. - NS TRANSFER PATIENT BELONGINGS
Cell Phone/PDA (specify)/one ring and 1 pair of ear rings/Clothing/Jewelry/Money (specify)/Wrist Watch

## 2018-06-22 NOTE — PROGRESS NOTE ADULT - SUBJECTIVE AND OBJECTIVE BOX
INTERVAL HPI/OVERNIGHT EVENTS: Admitted overnight for respiratory failure, treated for COPD, HF, and PE    Patient was seen and examined at bedside. Patient respiratory status stable  Patient denies: fever, chills, dizziness, weakness, HA, CP, palpitations, SOB, cough, N/V/D/C, dysuria, changes in bowel movements, LE edema.    ROS: as above    VITAL SIGNS:  T(F): 98.2 (18 @ 10:23)  HR: 90 (18 @ 09:28)  BP: 116/73 (18 @ 09:28)  RR: 18 (18 @ 09:28)  SpO2: 92% (18 @ 09:28)  Wt(kg): --    PHYSICAL EXAM:    Constitutional: NAD, on HFNC  Eyes: PERRL, EOMI, sclera non-icteric  Neck: supple, trachea midline, no masses, no JVD  Respiratory: mild wheezes, mild bibasilar crackles  Cardiovascular: RRR, normal S1S2, no M/R/G  Gastrointestinal: soft, obese, NTND, no masses palpable, BS normal  Extremities: Warm, well perfused, pulses equal bilateral upper and lower extremities, tender below the knee. Left knee with multiple scars, no erythema, Edematous  Neurological: AAOx3, CN Grossly intact  Skin: Normal temperature, warm, dry    MEDICATIONS  (STANDING):  ALBUTerol    0.083%.. 2.5 milliGRAM(s) Nebulizer every 20 minutes  ALBUTerol/ipratropium for Nebulization 3 milliLiter(s) Nebulizer every 4 hours  dextrose 5%. 1000 milliLiter(s) (50 mL/Hr) IV Continuous <Continuous>  dextrose 50% Injectable 12.5 Gram(s) IV Push once  dextrose 50% Injectable 25 Gram(s) IV Push once  dextrose 50% Injectable 25 Gram(s) IV Push once  enoxaparin Injectable 80 milliGRAM(s) SubCutaneous every 12 hours  furosemide   Injectable 40 milliGRAM(s) IV Push every 12 hours  insulin lispro (HumaLOG) corrective regimen sliding scale   SubCutaneous three times a day before meals  lamoTRIgine 100 milliGRAM(s) Oral daily  methylPREDNISolone sodium succinate Injectable 40 milliGRAM(s) IV Push every 6 hours  traZODone      traZODone 100 milliGRAM(s) Oral at bedtime  venlafaxine 75 milliGRAM(s) Oral daily    MEDICATIONS  (PRN):  acetaminophen   Tablet. 975 milliGRAM(s) Oral every 8 hours PRN Moderate Pain (4 - 6)  dextrose 40% Gel 15 Gram(s) Oral once PRN Blood Glucose LESS THAN 70 milliGRAM(s)/deciliter  glucagon  Injectable 1 milliGRAM(s) IntraMuscular once PRN Glucose LESS THAN 70 milligrams/deciliter  HYDROmorphone   Tablet 4 milliGRAM(s) Oral every 6 hours PRN Severe Pain (7 - 10)  HYDROmorphone   Tablet 2 milliGRAM(s) Oral every 6 hours PRN for breakthru pain      Allergies    iodine (Anaphylaxis)    Intolerances        LABS:                        13.4   6.2   )-----------( 102      ( 2018 05:53 )             44.3     -    139  |  94<L>  |  13  ----------------------------<  237<H>  4.2   |  36<H>  |  0.60    Ca    8.8      2018 05:53  Phos  3.4     -  Mg     1.6         TPro  6.1  /  Alb  3.3  /  TBili  0.2  /  DBili  x   /  AST  35  /  ALT  28  /  AlkPhos  63  -    PT/INR - ( 2018 18:37 )   PT: 11.0 sec;   INR: 1.00          PTT - ( 2018 18:37 )  PTT:27.4 sec  Urinalysis Basic - ( 2018 19:03 )    Color: Yellow / Appearance: Clear / S.015 / pH: x  Gluc: x / Ketone: NEGATIVE  / Bili: NEGATIVE / Urobili: 0.2 E.U./dL   Blood: x / Protein: NEGATIVE mg/dL / Nitrite: NEGATIVE   Leuk Esterase: NEGATIVE / RBC: x / WBC x   Sq Epi: x / Non Sq Epi: x / Bacteria: x        RADIOLOGY & ADDITIONAL TESTS:

## 2018-06-22 NOTE — H&P ADULT - NSHPREVIEWOFSYSTEMS_GEN_ALL_CORE
REVIEW OF SYSTEMS:    CONSTITUTIONAL: No weakness, fevers or chills  EYES/ENT: No visual changes;  No vertigo or throat pain   NECK: No pain or stiffness  RESPIRATORY: No cough, wheezing, hemoptysis; No shortness of breath  CARDIOVASCULAR: No chest pain or palpitations  GASTROINTESTINAL: No abdominal or epigastric pain. No nausea, vomiting, or hematemesis; No diarrhea or constipation. No melena or hematochezia.  GENITOURINARY: No dysuria, frequency or hematuria  NEUROLOGICAL: No numbness or weakness  SKIN: No itching, burning, rashes, or lesions   All other review of systems is negative unless indicated above. REVIEW OF SYSTEMS:  CONSTITUTIONAL: No weakness, fevers or chills  EYES/ENT: No visual changes;  No vertigo or throat pain   NECK: No pain or stiffness  RESPIRATORY: No cough, wheezing, hemoptysis; No shortness of breath  CARDIOVASCULAR: No chest pain or palpitations  GASTROINTESTINAL: No abdominal or epigastric pain. No nausea, vomiting, or hematemesis; No diarrhea or constipation. No melena or hematochezia.  GENITOURINARY: No dysuria, frequency or hematuria  NEUROLOGICAL: No numbness or weakness  SKIN: No itching, burning, rashes, or lesions   All other review of systems is negative unless indicated above.

## 2018-06-22 NOTE — PROGRESS NOTE ADULT - PROBLEM SELECTOR PLAN 1
-V/Q Scan in morning  -Pre-medicate with Benadryl and Steroids for possible need for CTA  -Will give Lovenox 80mg BID to treat for suspected PE until proven otherwise  -Echocardiogram  -LE Doppler  -Maintain O2 Sat above 88%  -Continue Lasix as needed for edema, patient without crackles on exam making right sided heart failure more likely than CHF -V/Q Scan in morning  -Will give Lovenox 80mg BID to treat for suspected PE until proven otherwise  -Echocardiogram  -LE Doppler  -Maintain O2 Sat above 88%, currently on HFNC  -Continue Lasix as needed for edema, patient without crackles on exam making right sided heart failure more likely than CHF

## 2018-06-22 NOTE — H&P ADULT - ASSESSMENT
67F with COPD, Iron deficiency anemia, thrombocytopenia requiring Nplate multiple knee surgeries and complications resulting in chronic pain syndrome, recent hospital admissions for cellulitis in March and PNA in Jan, who presented with 1 week of progressively worsening lower leg pain and 24h of worsening SOB.  Patient was initially hypoxic to 80s in ED, was placed on NC and improved to 89%. CTA was unable to be performed due to contrast allergy but patient was started on Lovenox. 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 Other possible causes could be a Cardiomyopathy, but lack of physical exam findings in lung make it less likely.

## 2018-06-22 NOTE — H&P ADULT - NSHPPHYSICALEXAM_GEN_ALL_CORE
VITALS  Vital Signs Last 24 Hrs  T(C): 37.4 (22 Jun 2018 00:49), Max: 37.4 (22 Jun 2018 00:49)  T(F): 99.3 (22 Jun 2018 00:49), Max: 99.3 (22 Jun 2018 00:49)  HR: 102 (22 Jun 2018 01:55) (100 - 108)  BP: 138/85 (22 Jun 2018 01:55) (118/78 - 138/85)  BP(mean): 106 (22 Jun 2018 01:55) (106 - 106)  RR: 18 (22 Jun 2018 01:55) (17 - 18)  SpO2: 96% (22 Jun 2018 01:55) (80% - 96%)    I&O's Summary    21 Jun 2018 07:01  -  22 Jun 2018 02:54  --------------------------------------------------------  IN: 0 mL / OUT: 2600 mL / NET: -2600 mL    CAPILLARY BLOOD GLUCOSE    PHYSICAL EXAM  General: Elderly female lying in bed, speaking in full sentences with NRB  Head: NC/AT; PERRL; EOMI; anicteric sclera  Neck: Supple; no JVD  Respiratory: trace wheezing heard at bases, otherwise good air movement throughout  Cardiovascular: Regular rhythm/rate; S1/S2; no gallops or murmurs auscultated  Gastrointestinal: Soft; NTND w/out rebound tenderness or guarding; bowel sounds normal  Extremities: Presence of multiple scars on LLE, +1 bilateral pitting edema, erythema over left shin, +Homans' Sign   Neurological:  CNII-XII grossly intact; no obvious focal deficits  Psych: Patient with tangental speech

## 2018-06-22 NOTE — PROGRESS NOTE ADULT - ASSESSMENT
67F with COPD, Iron deficiency anemia, thrombocytopenia requiring Nplate (Romiplostim) multiple knee surgeries and complications resulting in chronic pain syndrome, recent hospital admissions for cellulitis in March and PNA in Jan, who presented with 1 week of progressively worsening lower leg pain and 24h of worsening SOB.  Patient was initially hypoxic to 80s in ED, was placed on NC and improved to 89%. CTA was unable to be performed due to contrast allergy but patient was started on Lovenox. 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 Other possible causes could be a Cardiomyopathy, but lack of physical exam findings in lung make it less likely. 67F with COPD, Iron deficiency anemia, thrombocytopenia requiring Nplate (Romiplostim) multiple knee surgeries and complications resulting in chronic pain syndrome, recent hospital admissions for cellulitis in March and PNA in Jan, who presented with 1 week of progressively worsening lower leg pain and 24h of worsening SOB.  Patient was initially hypoxic to 80s in ED, was placed on NC and improved to 89%. CTA was unable to be performed due to contrast allergy but patient was started on therapeutic Lovenox. 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 Other possible causes could be a Cardiomyopathy, but lack of physical exam findings in lung make it less likely.

## 2018-06-22 NOTE — H&P ADULT - PROBLEM SELECTOR PROBLEM 3
Cellulitis of lower extremity, unspecified laterality Chronic obstructive pulmonary disease with acute exacerbation

## 2018-06-22 NOTE — H&P ADULT - PROBLEM SELECTOR PLAN 1
-V/Q Scan in morning  -Pre-medicate with Benadryl and Steroids for possible need for CTA  -Will give Lovenox 80mg BID to treat for suspected PE until proven otherwise  -Echocardiogram  -LE Doppler  -Maintain O2 Sat above 88% -V/Q Scan in morning  -Pre-medicate with Benadryl and Steroids for possible need for CTA  -Will give Lovenox 80mg BID to treat for suspected PE until proven otherwise  -Echocardiogram  -LE Doppler  -Maintain O2 Sat above 88%  -Continue Lasix as needed for edema, patient without crackles on exam making right sided heart failure more likely than CHF

## 2018-06-22 NOTE — H&P ADULT - HISTORY OF PRESENT ILLNESS
67F with COPD, TODD, Spinal Cord Stimulator insertion surgery, Left Knee Replacement who presented with SOB and left leg pain and swelling. She reports cellulitis and pain in her left leg of one week duration. In addition 67F with COPD, Iron deficiency anemia, thrombocytopenia requiring Nplate multiple knee surgeries and complications resulting in chronic pain syndrome, recent hospital admissions for cellulitis in March and PNA in Jan, who presented with 1 week of progressively worsening lower leg pain and 24h of worsening SOB.      In the ED T98.8, , /73, RR17 [id number]%. She was given 125mg Solumedrol, Duonebs, Ceftriaxone, Azithromycin, Vancomycin, Lovenox, Morphine, and Zofran.

## 2018-06-22 NOTE — PROGRESS NOTE ADULT - PROBLEM SELECTOR PLAN 7
-Tylenol 975mg q8h prn moderate pain  -Dilaudid 2mg q6h prn breakthrough pain  -I-STOP in the AM -Tylenol 975mg q8h prn moderate pain  -Dilaudid 2mg q6h prn breakthrough pain  -dilaudid 4mg q4h for severe pain

## 2018-06-22 NOTE — H&P ADULT - NSHPLABSRESULTS_GEN_ALL_CORE
LABS                        13.1   8.5   )-----------( 93       ( 2018 18:37 )             42.5     -    139  |  104  |  12  ----------------------------<  99  4.0   |  29  |  0.45<L>    Ca    8.5      2018 18:37  Mg     1.6         TPro  5.8<L>  /  Alb  2.5<L>  /  TBili  0.3  /  DBili  x   /  AST  34  /  ALT  39  /  AlkPhos  62  06-21    PT/INR - ( 2018 18:37 )   PT: 11.0 sec;   INR: 1.00          PTT - ( 2018 18:37 )  PTT:27.4 sec  Urinalysis Basic - ( 2018 19:03 )    Color: Yellow / Appearance: Clear / S.015 / pH: x  Gluc: x / Ketone: NEGATIVE  / Bili: NEGATIVE / Urobili: 0.2 E.U./dL   Blood: x / Protein: NEGATIVE mg/dL / Nitrite: NEGATIVE   Leuk Esterase: NEGATIVE / RBC: x / WBC x   Sq Epi: x / Non Sq Epi: x / Bacteria: x      CARDIAC MARKERS ( 2018 22:25 )  0.085 ng/mL / x     / x     / x     / x      CARDIAC MARKERS ( 2018 18:37 )  0.089 ng/mL / x     / x     / x     / x

## 2018-06-23 DIAGNOSIS — J18.9 PNEUMONIA, UNSPECIFIED ORGANISM: ICD-10-CM

## 2018-06-23 DIAGNOSIS — I27.20 PULMONARY HYPERTENSION, UNSPECIFIED: ICD-10-CM

## 2018-06-23 DIAGNOSIS — R06.02 SHORTNESS OF BREATH: ICD-10-CM

## 2018-06-23 DIAGNOSIS — J44.9 CHRONIC OBSTRUCTIVE PULMONARY DISEASE, UNSPECIFIED: ICD-10-CM

## 2018-06-23 LAB
ANION GAP SERPL CALC-SCNC: 7 MMOL/L — SIGNIFICANT CHANGE UP (ref 5–17)
ANISOCYTOSIS BLD QL: SLIGHT — SIGNIFICANT CHANGE UP
BUN SERPL-MCNC: 17 MG/DL — SIGNIFICANT CHANGE UP (ref 7–23)
CALCIUM SERPL-MCNC: 9.1 MG/DL — SIGNIFICANT CHANGE UP (ref 8.4–10.5)
CHLORIDE SERPL-SCNC: 96 MMOL/L — SIGNIFICANT CHANGE UP (ref 96–108)
CO2 SERPL-SCNC: 37 MMOL/L — HIGH (ref 22–31)
CREAT SERPL-MCNC: 0.53 MG/DL — SIGNIFICANT CHANGE UP (ref 0.5–1.3)
GLUCOSE BLDC GLUCOMTR-MCNC: 102 MG/DL — HIGH (ref 70–99)
GLUCOSE BLDC GLUCOMTR-MCNC: 153 MG/DL — HIGH (ref 70–99)
GLUCOSE BLDC GLUCOMTR-MCNC: 173 MG/DL — HIGH (ref 70–99)
GLUCOSE BLDC GLUCOMTR-MCNC: 202 MG/DL — HIGH (ref 70–99)
GLUCOSE BLDC GLUCOMTR-MCNC: 253 MG/DL — HIGH (ref 70–99)
GLUCOSE SERPL-MCNC: 205 MG/DL — HIGH (ref 70–99)
HCT VFR BLD CALC: 46.4 % — HIGH (ref 34.5–45)
HGB BLD-MCNC: 13.7 G/DL — SIGNIFICANT CHANGE UP (ref 11.5–15.5)
LG PLATELETS BLD QL AUTO: PRESENT — SIGNIFICANT CHANGE UP
MACROCYTES BLD QL: SLIGHT — SIGNIFICANT CHANGE UP
MAGNESIUM SERPL-MCNC: 2.1 MG/DL — SIGNIFICANT CHANGE UP (ref 1.6–2.6)
MCHC RBC-ENTMCNC: 26.9 PG — LOW (ref 27–34)
MCHC RBC-ENTMCNC: 29.5 G/DL — LOW (ref 32–36)
MCV RBC AUTO: 91 FL — SIGNIFICANT CHANGE UP (ref 80–100)
MICROCYTES BLD QL: SLIGHT — SIGNIFICANT CHANGE UP
PLAT MORPH BLD: ABNORMAL
PLATELET # BLD AUTO: 66 K/UL — LOW (ref 150–400)
POLYCHROMASIA BLD QL SMEAR: SLIGHT — SIGNIFICANT CHANGE UP
POTASSIUM SERPL-MCNC: 4.5 MMOL/L — SIGNIFICANT CHANGE UP (ref 3.5–5.3)
POTASSIUM SERPL-SCNC: 4.5 MMOL/L — SIGNIFICANT CHANGE UP (ref 3.5–5.3)
RBC # BLD: 5.1 M/UL — SIGNIFICANT CHANGE UP (ref 3.8–5.2)
RBC # FLD: 15.9 % — SIGNIFICANT CHANGE UP (ref 10.3–16.9)
RBC BLD AUTO: ABNORMAL
SODIUM SERPL-SCNC: 140 MMOL/L — SIGNIFICANT CHANGE UP (ref 135–145)
WBC # BLD: 9.6 K/UL — SIGNIFICANT CHANGE UP (ref 3.8–10.5)
WBC # FLD AUTO: 9.6 K/UL — SIGNIFICANT CHANGE UP (ref 3.8–10.5)

## 2018-06-23 PROCEDURE — 99233 SBSQ HOSP IP/OBS HIGH 50: CPT | Mod: GC

## 2018-06-23 PROCEDURE — 71045 X-RAY EXAM CHEST 1 VIEW: CPT | Mod: 26

## 2018-06-23 PROCEDURE — 99222 1ST HOSP IP/OBS MODERATE 55: CPT

## 2018-06-23 PROCEDURE — 93010 ELECTROCARDIOGRAM REPORT: CPT

## 2018-06-23 RX ORDER — ALPRAZOLAM 0.25 MG
0.5 TABLET ORAL
Qty: 0 | Refills: 0 | Status: DISCONTINUED | OUTPATIENT
Start: 2018-06-23 | End: 2018-06-30

## 2018-06-23 RX ADMIN — Medication 3 MILLILITER(S): at 22:29

## 2018-06-23 RX ADMIN — HEPARIN SODIUM 5000 UNIT(S): 5000 INJECTION INTRAVENOUS; SUBCUTANEOUS at 06:34

## 2018-06-23 RX ADMIN — Medication 75 MILLIGRAM(S): at 13:06

## 2018-06-23 RX ADMIN — HYDROMORPHONE HYDROCHLORIDE 2 MILLIGRAM(S): 2 INJECTION INTRAMUSCULAR; INTRAVENOUS; SUBCUTANEOUS at 21:45

## 2018-06-23 RX ADMIN — Medication 40 MILLIGRAM(S): at 06:34

## 2018-06-23 RX ADMIN — LAMOTRIGINE 100 MILLIGRAM(S): 25 TABLET, ORALLY DISINTEGRATING ORAL at 13:05

## 2018-06-23 RX ADMIN — Medication 3 MILLILITER(S): at 19:21

## 2018-06-23 RX ADMIN — HYDROMORPHONE HYDROCHLORIDE 4 MILLIGRAM(S): 2 INJECTION INTRAMUSCULAR; INTRAVENOUS; SUBCUTANEOUS at 15:25

## 2018-06-23 RX ADMIN — Medication 0.5 MILLIGRAM(S): at 17:57

## 2018-06-23 RX ADMIN — HEPARIN SODIUM 5000 UNIT(S): 5000 INJECTION INTRAVENOUS; SUBCUTANEOUS at 17:56

## 2018-06-23 RX ADMIN — Medication 6: at 13:04

## 2018-06-23 RX ADMIN — Medication 40 MILLIGRAM(S): at 18:03

## 2018-06-23 RX ADMIN — Medication 0.5 MILLIGRAM(S): at 09:06

## 2018-06-23 RX ADMIN — HYDROMORPHONE HYDROCHLORIDE 2 MILLIGRAM(S): 2 INJECTION INTRAMUSCULAR; INTRAVENOUS; SUBCUTANEOUS at 20:45

## 2018-06-23 RX ADMIN — Medication 3 MILLILITER(S): at 05:56

## 2018-06-23 RX ADMIN — HYDROMORPHONE HYDROCHLORIDE 4 MILLIGRAM(S): 2 INJECTION INTRAMUSCULAR; INTRAVENOUS; SUBCUTANEOUS at 17:23

## 2018-06-23 RX ADMIN — Medication 4: at 22:27

## 2018-06-23 RX ADMIN — Medication 100 MILLIGRAM(S): at 21:10

## 2018-06-23 RX ADMIN — Medication 2: at 07:53

## 2018-06-23 RX ADMIN — Medication 40 MILLIGRAM(S): at 13:06

## 2018-06-23 RX ADMIN — SENNA PLUS 2 TABLET(S): 8.6 TABLET ORAL at 21:10

## 2018-06-23 RX ADMIN — Medication 3 MILLILITER(S): at 11:18

## 2018-06-23 RX ADMIN — Medication 3 MILLILITER(S): at 01:00

## 2018-06-23 RX ADMIN — POLYETHYLENE GLYCOL 3350 17 GRAM(S): 17 POWDER, FOR SOLUTION ORAL at 13:10

## 2018-06-23 RX ADMIN — Medication 3 MILLILITER(S): at 13:08

## 2018-06-23 RX ADMIN — Medication 40 MILLIGRAM(S): at 01:00

## 2018-06-23 RX ADMIN — Medication 40 MILLIGRAM(S): at 18:07

## 2018-06-23 NOTE — PROGRESS NOTE ADULT - SUBJECTIVE AND OBJECTIVE BOX
INTERVAL HPI/OVERNIGHT EVENTS:  Feeling better today; Respiratory status is stable; However still requiring nasal high flow; Cardiology to see patient       MEDICATIONS  (STANDING):  ALBUTerol/ipratropium for Nebulization 3 milliLiter(s) Nebulizer every 4 hours  ALPRAZolam 0.5 milliGRAM(s) Oral two times a day  dextrose 5%. 1000 milliLiter(s) (50 mL/Hr) IV Continuous <Continuous>  dextrose 50% Injectable 12.5 Gram(s) IV Push once  dextrose 50% Injectable 25 Gram(s) IV Push once  dextrose 50% Injectable 25 Gram(s) IV Push once  furosemide   Injectable 40 milliGRAM(s) IV Push every 12 hours  heparin  Injectable 5000 Unit(s) SubCutaneous every 8 hours  insulin lispro (HumaLOG) corrective regimen sliding scale   SubCutaneous Before meals and at bedtime  lamoTRIgine 100 milliGRAM(s) Oral daily  methylPREDNISolone sodium succinate Injectable 40 milliGRAM(s) IV Push every 12 hours  polyethylene glycol 3350 17 Gram(s) Oral daily  senna 2 Tablet(s) Oral at bedtime  traZODone      traZODone 100 milliGRAM(s) Oral at bedtime  venlafaxine XR. 75 milliGRAM(s) Oral daily    MEDICATIONS  (PRN):  acetaminophen   Tablet. 975 milliGRAM(s) Oral every 8 hours PRN Moderate Pain (4 - 6)  dextrose 40% Gel 15 Gram(s) Oral once PRN Blood Glucose LESS THAN 70 milliGRAM(s)/deciliter  glucagon  Injectable 1 milliGRAM(s) IntraMuscular once PRN Glucose LESS THAN 70 milligrams/deciliter  HYDROmorphone   Tablet 4 milliGRAM(s) Oral every 6 hours PRN Severe Pain (7 - 10)  HYDROmorphone   Tablet 2 milliGRAM(s) Oral every 6 hours PRN for breakthru pain      Allergies    iodine (Anaphylaxis)    Intolerances        Vital Signs Last 24 Hrs  T(C): 36.4 (2018 13:53), Max: 37.3 (2018 19:23)  T(F): 97.6 (2018 13:53), Max: 99.2 (2018 21:49)  HR: 108 (2018 14:01) (98 - 108)  BP: 140/90 (2018 13:55) (111/59 - 142/88)  BP(mean): 104 (2018 13:55) (80 - 109)  RR: 18 (2018 12:13) (10 - 20)  SpO2: 93% (2018 14:01) (92% - 95%)          Constitutional:  Awake    Eyes: MILA    ENMT: Negative    Neck: Supple    Back:  no tenderness     Respiratory:  clear    Cardiovascular: S1 S2    Gastrointestinal: soft    Genitourinary:    Extremities: no edema    Vascular:    Neurological:    Skin:    Lymph Nodes:             @ 07:01  -   @ 07:00  --------------------------------------------------------  IN: 100 mL / OUT: 1080 mL / NET: -980 mL     @ 07:01   @ 15:31  --------------------------------------------------------  IN: 0 mL / OUT: 1350 mL / NET: -1350 mL      LABS:                        13.7   9.6   )-----------( 66       ( 2018 06:31 )             46.4     -23    140  |  96  |  17  ----------------------------<  205<H>  4.5   |  37<H>  |  0.53    Ca    9.1      2018 06:31  Phos  3.4     06-  Mg     2.1         TPro  6.1  /  Alb  3.3  /  TBili  0.2  /  DBili  x   /  AST  35  /  ALT  28  /  AlkPhos  63  06-22    PT/INR - ( 2018 18:37 )   PT: 11.0 sec;   INR: 1.00          PTT - ( 2018 18:37 )  PTT:27.4 sec  Urinalysis Basic - ( 2018 19:03 )    Color: Yellow / Appearance: Clear / S.015 / pH: x  Gluc: x / Ketone: NEGATIVE  / Bili: NEGATIVE / Urobili: 0.2 E.U./dL   Blood: x / Protein: NEGATIVE mg/dL / Nitrite: NEGATIVE   Leuk Esterase: NEGATIVE / RBC: x / WBC x   Sq Epi: x / Non Sq Epi: x / Bacteria: x        RADIOLOGY & ADDITIONAL TESTS:

## 2018-06-23 NOTE — PROGRESS NOTE ADULT - PROBLEM SELECTOR PLAN 7
-Tylenol 975mg q8h prn moderate pain  -Dilaudid 2mg q6h prn breakthrough pain  -dilaudid 4mg q6h for severe pain

## 2018-06-23 NOTE — CONSULT NOTE ADULT - ATTENDING COMMENTS
Assessment: Patient personally seen and examined myself during rounds with the Physician Assistant/House Staff/Nurse Practitioner   ON DATE 6/23/18  Physician Assistant/House Staff/Nurse Practitioner note read, including vitals, physical findings, laboratory data, and radiological reports.   Revisions included below.   Direct personal management at bed side and extensive interpretation of the data.    Plan was outlined and discussed in details with the Physician Assistant/House Staff/Nurse Practitioner.    Decision making of high complexity   Risk high of complications, morbidity, and/or mortality  Assessment and Action taken for acute disease activity to reflect the level of care provided:  -Hemodynamic evaluation and support  -Medication reconciliation  -Review laboratory data  -EKG reviewed   -Echo reviewed   -ACS assessment and treatment as applicable  -Heart failure assessment and treatment as applicable  -Cardiac Telemetry reviewed  -Interdisciplinary discussion with IC / EP / HF / CTS teams as needed  TIME SPENT in evaluation and management, reassessments, review and interpretation of labs and x-rays, and hemodynamic management, formulating a plan and coordinating care: ___50____ MIN.  Time does not include procedural time.

## 2018-06-23 NOTE — PROGRESS NOTE ADULT - ASSESSMENT
67F with COPD, Iron deficiency anemia, thrombocytopenia requiring Nplate (Romiplostim) multiple knee surgeries and complications resulting in chronic pain syndrome, recent hospital admissions for cellulitis in March and PNA in Jan, who presented with 1 week of progressively worsening lower leg pain and 24h of worsening SOB.  Patient was initially hypoxic to 80s in ED, was placed on NC and improved to 89%. CTA was unable to be performed due to contrast allergy but patient was started on therapeutic Lovenox. 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 Other possible causes could be a Cardiomyopathy, but lack of physical exam findings in lung make it less likely.

## 2018-06-23 NOTE — PROGRESS NOTE ADULT - SUBJECTIVE AND OBJECTIVE BOX
INTERVAL HPI/OVERNIGHT EVENTS: YUNIOR    Patient was seen and examined at bedside. As per nurse and patient, no o/n events, patient resting comfortably. Patient still with shortness of breath, anxious about her health. Patient denies: fever, chills, dizziness, weakness, HA, CP, palpitations, SOB, cough, N/V/D/C, dysuria, changes in bowel movements, LE edema.    ROS: as above    VITAL SIGNS:  T(F): 99.8 (06-23-18 @ 21:23)  HR: 88 (06-23-18 @ 21:23)  BP: 142/76 (06-23-18 @ 21:23)  RR: 18 (06-23-18 @ 21:23)  SpO2: 92% (06-23-18 @ 21:23)  Wt(kg): --    PHYSICAL EXAM:    Constitutional: NAD, on HFNC  Eyes: PERRL, EOMI, sclera non-icteric  Neck: supple, trachea midline, no masses, no JVD  Respiratory: mild wheezes, mild bibasilar crackles  Cardiovascular: RRR, normal S1S2, no M/R/G  Gastrointestinal: soft, obese, NTND, no masses palpable, BS normal  Extremities: Warm, well perfused, pulses equal bilateral upper and lower extremities, tender below the knee. Left knee with multiple scars, no erythema, Edematous  Neurological: AAOx3, CN Grossly intact  Skin: Normal temperature, warm, dry    MEDICATIONS  (STANDING):  ALBUTerol/ipratropium for Nebulization 3 milliLiter(s) Nebulizer every 4 hours  ALPRAZolam 0.5 milliGRAM(s) Oral two times a day  dextrose 5%. 1000 milliLiter(s) (50 mL/Hr) IV Continuous <Continuous>  dextrose 50% Injectable 12.5 Gram(s) IV Push once  dextrose 50% Injectable 25 Gram(s) IV Push once  dextrose 50% Injectable 25 Gram(s) IV Push once  furosemide   Injectable 40 milliGRAM(s) IV Push every 12 hours  heparin  Injectable 5000 Unit(s) SubCutaneous every 8 hours  insulin lispro (HumaLOG) corrective regimen sliding scale   SubCutaneous Before meals and at bedtime  lamoTRIgine 100 milliGRAM(s) Oral daily  methylPREDNISolone sodium succinate Injectable 40 milliGRAM(s) IV Push every 12 hours  polyethylene glycol 3350 17 Gram(s) Oral daily  senna 2 Tablet(s) Oral at bedtime  traZODone      traZODone 100 milliGRAM(s) Oral at bedtime  venlafaxine XR. 75 milliGRAM(s) Oral daily    MEDICATIONS  (PRN):  acetaminophen   Tablet. 975 milliGRAM(s) Oral every 8 hours PRN Moderate Pain (4 - 6)  dextrose 40% Gel 15 Gram(s) Oral once PRN Blood Glucose LESS THAN 70 milliGRAM(s)/deciliter  glucagon  Injectable 1 milliGRAM(s) IntraMuscular once PRN Glucose LESS THAN 70 milligrams/deciliter  HYDROmorphone   Tablet 4 milliGRAM(s) Oral every 6 hours PRN Severe Pain (7 - 10)  HYDROmorphone   Tablet 2 milliGRAM(s) Oral every 6 hours PRN for breakthru pain      Allergies    iodine (Anaphylaxis)    Intolerances        LABS:                        13.7   9.6   )-----------( 66       ( 23 Jun 2018 06:31 )             46.4     06-23    140  |  96  |  17  ----------------------------<  205<H>  4.5   |  37<H>  |  0.53    Ca    9.1      23 Jun 2018 06:31  Phos  3.4     06-22  Mg     2.1     06-23    TPro  6.1  /  Alb  3.3  /  TBili  0.2  /  DBili  x   /  AST  35  /  ALT  28  /  AlkPhos  63  06-22          RADIOLOGY & ADDITIONAL TESTS:

## 2018-06-23 NOTE — PROGRESS NOTE ADULT - PROBLEM SELECTOR PLAN 1
-V/Q Scan negative  -likely chronic COPD leading to pulm HTN and COPD. Improving with lasix and steroids  -Echocardiogram showing pulm HTN and RA, RV dilation and hypokinesis of the R free wall  -LE Doppler negative  -Maintain O2 Sat above 88%, currently on HFNC  -Continue Lasix as needed for edema, patient without crackles on exam making right sided heart failure more likely than CHF

## 2018-06-23 NOTE — CONSULT NOTE ADULT - SUBJECTIVE AND OBJECTIVE BOX
67F with COPD, Iron deficiency anemia, thrombocytopenia requiring Nplate, multiple knee surgeries and complications resulting in chronic pain syndrome, recent hospital admissions for cellulitis in March and PNA in Jan, who presented with progressive SOB, dyspnea on exertion over last few weeks.  She also states that she feels pleuritic type chest pain intermittently. Patient feels over the last week the symptoms became progressively worse.    Pt is current "vape" daily smoker.  She is not always compliant with her COPD medications.    Unable to tolerate Ventilation component of VQ scan, but perfusion aspect was unremarkable    After steroids, nebulizers and diuresis, patient subjectively feels better than when she arrived.      PAST MEDICAL & SURGICAL HISTORY:  COPD (chronic obstructive pulmonary disease)  Pneumonia  Thrombocytopenia  Cellulitis  S/P insertion of spinal cord stimulator  History of left knee replacement    FAMILY HISTORY:  No pertinent family history in first degree relatives    Social:  Allergies    iodine (Anaphylaxis)    Intolerances    	  MEDICATIONS:  furosemide   Injectable 40 milliGRAM(s) IV Push every 12 hours      ALBUTerol/ipratropium for Nebulization 3 milliLiter(s) Nebulizer every 4 hours    acetaminophen   Tablet. 975 milliGRAM(s) Oral every 8 hours PRN  ALPRAZolam 0.5 milliGRAM(s) Oral two times a day  HYDROmorphone   Tablet 4 milliGRAM(s) Oral every 6 hours PRN  HYDROmorphone   Tablet 2 milliGRAM(s) Oral every 6 hours PRN  lamoTRIgine 100 milliGRAM(s) Oral daily  traZODone      traZODone 100 milliGRAM(s) Oral at bedtime  venlafaxine XR. 75 milliGRAM(s) Oral daily    polyethylene glycol 3350 17 Gram(s) Oral daily  senna 2 Tablet(s) Oral at bedtime    dextrose 40% Gel 15 Gram(s) Oral once PRN  dextrose 50% Injectable 12.5 Gram(s) IV Push once  dextrose 50% Injectable 25 Gram(s) IV Push once  dextrose 50% Injectable 25 Gram(s) IV Push once  glucagon  Injectable 1 milliGRAM(s) IntraMuscular once PRN  insulin lispro (HumaLOG) corrective regimen sliding scale   SubCutaneous Before meals and at bedtime  methylPREDNISolone sodium succinate Injectable 40 milliGRAM(s) IV Push every 12 hours    dextrose 5%. 1000 milliLiter(s) IV Continuous <Continuous>  heparin  Injectable 5000 Unit(s) SubCutaneous every 8 hours            PHYSICAL EXAM:  T(C): 36.4 (06-23-18 @ 13:53), Max: 37.3 (06-22-18 @ 19:23)  HR: 108 (06-23-18 @ 14:01) (98 - 108)  BP: 140/90 (06-23-18 @ 13:55) (111/59 - 142/88)  RR: 18 (06-23-18 @ 12:13) (10 - 20)  SpO2: 93% (06-23-18 @ 14:01) (92% - 95%)  Wt(kg): --  I&O's Summary    22 Jun 2018 07:01  -  23 Jun 2018 07:00  --------------------------------------------------------  IN: 100 mL / OUT: 1080 mL / NET: -980 mL    23 Jun 2018 07:01  -  23 Jun 2018 14:57  --------------------------------------------------------  IN: 0 mL / OUT: 1350 mL / NET: -1350 mL          Gen: Comfortable, anxious  Neck: + JVD 6mm  Cardiovascular: Normal S1 S2, tachycardic, systolic murmur   Respiratory:  mild inspiratory rales  Gastrointestinal:  soft, no rebound,    Ext: warm, b/l pitting edema  Neuro: no  gross focal deficits. answering questions appropriately  Vascular: Peripheral pulses palpable 2+ bilaterally    TELEMETRY:  boarderline sinus tachycardia  ECG:  	sinus, ESTUARDO, poor Rwave progressive, non specific st abnormalities   RADIOLOGY:   DIAGNOSTIC TESTING:  [ ] Echocardiogram:  < from: Echocardiogram (06.22.18 @ 12:06) >  Left Ventricle  Normal left ventricular size and wall thickness.  Abnormal (paradoxical) septal motion consistent with elevated RV  end-diastolic pressure  The left ventricular ejection fraction is estimated to be 60-65%  Left Atrium  The mitral inflow pattern is consistent with impaired left ventricular  relaxation with mildly elevated left atrial pressure (8-14mmHg).  The left atrial size is normal.  Right Atrium  The right atrium is dilated.  Right Ventricle  The right ventricle is severely dilated.  The right ventricular free wall is hypokintic.  Aortic Valve  There is mild aortic valve thickening.  No aortic regurgitation noted.  Mitral Valve  Structurally normal mitral valve.  No mitral regurgitation noted.  Tricuspid Valve  Structurally normal tricuspid valve.  There is mild to moderate tricuspid regurgitation.  The pulmonary artery systolic pressure is estimated to be 58 mmHg.  There is moderate pulmonary hypertension.  Pulmonic Valve  Structurally normal pulmonic valve.  No pulmonic regurgitation noted.  Arteries and Venous System  No aortic root dilatation.  The IVC is dilated (>2.1 cm) however with normal inspiratory collapse   (>50%)  consistent with mildly elevated right atrial pressure (  8mmHg, range  5-10mmHg).    < end of copied text >    [ ]  Catheterization:  [ ] Stress Test:    OTHER: 	    LABS:	 	    CARDIAC MARKERS:  Troponin T, Serum in AM (06.22.18 @ 05:53)    Troponin T, Serum: <0.01: Reference interval for troponin T is </= 0.01 ng/mL which includes the  99th percentile of a healthy population. Troponin T results are not  interchangeable with troponin I results. ng/mL                                      13.7   9.6   )-----------( 66       ( 23 Jun 2018 06:31 )             46.4     06-23    140  |  96  |  17  ----------------------------<  205<H>  4.5   |  37<H>  |  0.53    Ca    9.1      23 Jun 2018 06:31  Phos  3.4     06-22  Mg     2.1     06-23    TPro  6.1  /  Alb  3.3  /  TBili  0.2  /  DBili  x   /  AST  35  /  ALT  28  /  AlkPhos  63  06-22    proBNP:   Lipid Profile:   HgA1c:   TSH:     ASSESSMENT/PLAN: 	    67 y.o female pmh of COPD, 67F with COPD, Iron deficiency anemia, thrombocytopenia requiring Nplate, multiple knee surgeries and complications resulting in chronic pain syndrome, recent hospital admissions for cellulitis in March and PNA in Jan, who presented with progressive SOB, dyspnea on exertion over last few weeks.  She also states that she feels pleuritic type chest pain intermittently. Patient feels over the last week the symptoms became progressively worse.  Pt reports she sleeps on 1 pillow, does not get SOB while laying completely flat.  Pt is current "vape" daily smoker.  She is not always compliant with her COPD medications.    Unable to tolerate Ventilation component of VQ scan, but perfusion aspect was unremarkable    After steroids, nebulizers and diuresis, patient subjectively feels better than when she arrived.  She reports continued widespread pain she attributed to her chronic pain syndrome.    PAST MEDICAL & SURGICAL HISTORY:  COPD (chronic obstructive pulmonary disease)  Pneumonia  Thrombocytopenia  Cellulitis  S/P insertion of spinal cord stimulator  History of left knee replacement    FAMILY HISTORY:  No pertinent family history in first degree relatives    Social:  Allergies    iodine (Anaphylaxis)    Intolerances    	  MEDICATIONS:  furosemide   Injectable 40 milliGRAM(s) IV Push every 12 hours      ALBUTerol/ipratropium for Nebulization 3 milliLiter(s) Nebulizer every 4 hours    acetaminophen   Tablet. 975 milliGRAM(s) Oral every 8 hours PRN  ALPRAZolam 0.5 milliGRAM(s) Oral two times a day  HYDROmorphone   Tablet 4 milliGRAM(s) Oral every 6 hours PRN  HYDROmorphone   Tablet 2 milliGRAM(s) Oral every 6 hours PRN  lamoTRIgine 100 milliGRAM(s) Oral daily  traZODone      traZODone 100 milliGRAM(s) Oral at bedtime  venlafaxine XR. 75 milliGRAM(s) Oral daily    polyethylene glycol 3350 17 Gram(s) Oral daily  senna 2 Tablet(s) Oral at bedtime    dextrose 40% Gel 15 Gram(s) Oral once PRN  dextrose 50% Injectable 12.5 Gram(s) IV Push once  dextrose 50% Injectable 25 Gram(s) IV Push once  dextrose 50% Injectable 25 Gram(s) IV Push once  glucagon  Injectable 1 milliGRAM(s) IntraMuscular once PRN  insulin lispro (HumaLOG) corrective regimen sliding scale   SubCutaneous Before meals and at bedtime  methylPREDNISolone sodium succinate Injectable 40 milliGRAM(s) IV Push every 12 hours    dextrose 5%. 1000 milliLiter(s) IV Continuous <Continuous>  heparin  Injectable 5000 Unit(s) SubCutaneous every 8 hours            PHYSICAL EXAM:  T(C): 36.4 (06-23-18 @ 13:53), Max: 37.3 (06-22-18 @ 19:23)  HR: 108 (06-23-18 @ 14:01) (98 - 108)  BP: 140/90 (06-23-18 @ 13:55) (111/59 - 142/88)  RR: 18 (06-23-18 @ 12:13) (10 - 20)  SpO2: 93% (06-23-18 @ 14:01) (92% - 95%)  Wt(kg): --  I&O's Summary    22 Jun 2018 07:01  -  23 Jun 2018 07:00  --------------------------------------------------------  IN: 100 mL / OUT: 1080 mL / NET: -980 mL    23 Jun 2018 07:01  -  23 Jun 2018 14:57  --------------------------------------------------------  IN: 0 mL / OUT: 1350 mL / NET: -1350 mL          Gen: Comfortable, patient is extremely anxious  Neck: + JVD 6mm  Cardiovascular: Normal S1 S2, tachycardic, systolic murmur   Respiratory:  mild inspiratory rales  Gastrointestinal:  soft, no rebound,    Ext: warm, b/l pitting edema  Neuro: no  gross focal deficits. answering questions appropriately  Vascular: Peripheral pulses palpable 2+ bilaterally    TELEMETRY:  boarderline sinus tachycardia  ECG:  	sinus, ESTUARDO, poor Rwave progressive, non specific st abnormalities   RADIOLOGY:   DIAGNOSTIC TESTING:  [ ] Echocardiogram:  < from: Echocardiogram (06.22.18 @ 12:06) >  Left Ventricle  Normal left ventricular size and wall thickness.  Abnormal (paradoxical) septal motion consistent with elevated RV  end-diastolic pressure  The left ventricular ejection fraction is estimated to be 60-65%  Left Atrium  The mitral inflow pattern is consistent with impaired left ventricular  relaxation with mildly elevated left atrial pressure (8-14mmHg).  The left atrial size is normal.  Right Atrium  The right atrium is dilated.  Right Ventricle  The right ventricle is severely dilated.  The right ventricular free wall is hypokintic.  Aortic Valve  There is mild aortic valve thickening.  No aortic regurgitation noted.  Mitral Valve  Structurally normal mitral valve.  No mitral regurgitation noted.  Tricuspid Valve  Structurally normal tricuspid valve.  There is mild to moderate tricuspid regurgitation.  The pulmonary artery systolic pressure is estimated to be 58 mmHg.  There is moderate pulmonary hypertension.  Pulmonic Valve  Structurally normal pulmonic valve.  No pulmonic regurgitation noted.  Arteries and Venous System  No aortic root dilatation.  The IVC is dilated (>2.1 cm) however with normal inspiratory collapse   (>50%)  consistent with mildly elevated right atrial pressure (  8mmHg, range  5-10mmHg).    < end of copied text >    [ ]  Catheterization:  [ ] Stress Test:    OTHER: 	    LABS:	 	    CARDIAC MARKERS:  Troponin T, Serum in AM (06.22.18 @ 05:53)    Troponin T, Serum: <0.01: Reference interval for troponin T is </= 0.01 ng/mL which includes the  99th percentile of a healthy population. Troponin T results are not  interchangeable with troponin I results. ng/mL                                      13.7   9.6   )-----------( 66       ( 23 Jun 2018 06:31 )             46.4     06-23    140  |  96  |  17  ----------------------------<  205<H>  4.5   |  37<H>  |  0.53    Ca    9.1      23 Jun 2018 06:31  Phos  3.4     06-22  Mg     2.1     06-23    TPro  6.1  /  Alb  3.3  /  TBili  0.2  /  DBili  x   /  AST  35  /  ALT  28  /  AlkPhos  63  06-22    proBNP:   Lipid Profile:   HgA1c:   TSH:     ASSESSMENT/PLAN: 	    67 y.o female pmh of COPD, current smoker, anemia, thrombocytopenia, chronic pain syndrome presents with progressive SOB with symptoms of Right sided heart failure    Right sided Heart Failure - Etiology not fully clear  Patient with evidence of elevated pulmonary pressures found on echo in addition to RA enlargement, severe RV dilation, RV free wall hypokinesis.  Inferior wall is not found to be hypokinetic which along with patients EKG findings and negative cardiac enzymes, makes RV infarction less likely.  In addition, patient denies orthopneic symptoms, is able to lay completely flat on exam, with grossly normal LV function, no LA dilatation, CT scan with out pulmonary edema or effusions makes left sided CHF less likely the cause of her SOB/Right sided HF.  Patient did not have adequate VQ scan as unable to tolerate full Ventilation component of her test, and cardiology team cannot comment on sensitivity or specificity of diagnosing VTE in a suboptimal study.    Etiology and chronicity of her elevated pulmonary pressures should be further discussed in this current smoker.  I have asked the family to get echo results from her last hospitalization in [place] which will give insight into the chronicity of her elevated pulmonary pressures and will help for further evaluation of her elevated pulmonary pressures.  OK with diuresis as patient is still full body overloaded and subjectively feels better.  Consider re-evaluating VQ scan if primary team deems initial scan inadequate  If considered adequate would consider alternative pulmonary or primary etiologies of her elevated pulmonary pressures.    Case to be discussed with Attending Cardiologist 67F with COPD, Iron deficiency anemia, thrombocytopenia requiring Nplate, multiple knee surgeries and complications resulting in chronic pain syndrome, recent hospital admissions for cellulitis in March and PNA in Jan, who presented with progressive SOB, dyspnea on exertion over last few weeks.  She also states that she feels pleuritic type chest pain intermittently. Patient feels over the last week the symptoms became progressively worse.  Pt reports she sleeps on 1 pillow, does not get SOB while laying completely flat.  Pt is current "vape" daily smoker.  She is not always compliant with her COPD medications.    Unable to tolerate Ventilation component of VQ scan, but perfusion aspect was unremarkable    After steroids, nebulizers and diuresis, patient subjectively feels better than when she arrived.  She reports continued widespread pain she attributed to her chronic pain syndrome.    PAST MEDICAL & SURGICAL HISTORY:  COPD (chronic obstructive pulmonary disease)  Pneumonia  Thrombocytopenia  Cellulitis  S/P insertion of spinal cord stimulator  History of left knee replacement    FAMILY HISTORY:  No pertinent family history in first degree relatives    Social:  Allergies    iodine (Anaphylaxis)    Intolerances    	  MEDICATIONS:  furosemide   Injectable 40 milliGRAM(s) IV Push every 12 hours      ALBUTerol/ipratropium for Nebulization 3 milliLiter(s) Nebulizer every 4 hours    acetaminophen   Tablet. 975 milliGRAM(s) Oral every 8 hours PRN  ALPRAZolam 0.5 milliGRAM(s) Oral two times a day  HYDROmorphone   Tablet 4 milliGRAM(s) Oral every 6 hours PRN  HYDROmorphone   Tablet 2 milliGRAM(s) Oral every 6 hours PRN  lamoTRIgine 100 milliGRAM(s) Oral daily  traZODone      traZODone 100 milliGRAM(s) Oral at bedtime  venlafaxine XR. 75 milliGRAM(s) Oral daily    polyethylene glycol 3350 17 Gram(s) Oral daily  senna 2 Tablet(s) Oral at bedtime    dextrose 40% Gel 15 Gram(s) Oral once PRN  dextrose 50% Injectable 12.5 Gram(s) IV Push once  dextrose 50% Injectable 25 Gram(s) IV Push once  dextrose 50% Injectable 25 Gram(s) IV Push once  glucagon  Injectable 1 milliGRAM(s) IntraMuscular once PRN  insulin lispro (HumaLOG) corrective regimen sliding scale   SubCutaneous Before meals and at bedtime  methylPREDNISolone sodium succinate Injectable 40 milliGRAM(s) IV Push every 12 hours    dextrose 5%. 1000 milliLiter(s) IV Continuous <Continuous>  heparin  Injectable 5000 Unit(s) SubCutaneous every 8 hours            PHYSICAL EXAM:  T(C): 36.4 (06-23-18 @ 13:53), Max: 37.3 (06-22-18 @ 19:23)  HR: 108 (06-23-18 @ 14:01) (98 - 108)  BP: 140/90 (06-23-18 @ 13:55) (111/59 - 142/88)  RR: 18 (06-23-18 @ 12:13) (10 - 20)  SpO2: 93% (06-23-18 @ 14:01) (92% - 95%)  Wt(kg): --  I&O's Summary    22 Jun 2018 07:01  -  23 Jun 2018 07:00  --------------------------------------------------------  IN: 100 mL / OUT: 1080 mL / NET: -980 mL    23 Jun 2018 07:01  -  23 Jun 2018 14:57  --------------------------------------------------------  IN: 0 mL / OUT: 1350 mL / NET: -1350 mL          Gen: Comfortable, patient is extremely anxious  Neck: + JVD 6mm  Cardiovascular: Normal S1 S2, tachycardic, systolic murmur   Respiratory:  mild inspiratory rales  Gastrointestinal:  soft, no rebound,    Ext: warm, b/l pitting edema  Neuro: no  gross focal deficits. answering questions appropriately  Vascular: Peripheral pulses palpable 2+ bilaterally    TELEMETRY:  boarderline sinus tachycardia  ECG:  	sinus, ESTUARDO, poor Rwave progressive, non specific st abnormalities   RADIOLOGY:   DIAGNOSTIC TESTING:  [ ] Echocardiogram:  < from: Echocardiogram (06.22.18 @ 12:06) >  Left Ventricle  Normal left ventricular size and wall thickness.  Abnormal (paradoxical) septal motion consistent with elevated RV  end-diastolic pressure  The left ventricular ejection fraction is estimated to be 60-65%  Left Atrium  The mitral inflow pattern is consistent with impaired left ventricular  relaxation with mildly elevated left atrial pressure (8-14mmHg).  The left atrial size is normal.  Right Atrium  The right atrium is dilated.  Right Ventricle  The right ventricle is severely dilated.  The right ventricular free wall is hypokintic.  Aortic Valve  There is mild aortic valve thickening.  No aortic regurgitation noted.  Mitral Valve  Structurally normal mitral valve.  No mitral regurgitation noted.  Tricuspid Valve  Structurally normal tricuspid valve.  There is mild to moderate tricuspid regurgitation.  The pulmonary artery systolic pressure is estimated to be 58 mmHg.  There is moderate pulmonary hypertension.  Pulmonic Valve  Structurally normal pulmonic valve.  No pulmonic regurgitation noted.  Arteries and Venous System  No aortic root dilatation.  The IVC is dilated (>2.1 cm) however with normal inspiratory collapse   (>50%)  consistent with mildly elevated right atrial pressure (  8mmHg, range  5-10mmHg).    < end of copied text >    [ ]  Catheterization:  [ ] Stress Test:    OTHER: 	    LABS:	 	    CARDIAC MARKERS:  Troponin T, Serum in AM (06.22.18 @ 05:53)    Troponin T, Serum: <0.01: Reference interval for troponin T is </= 0.01 ng/mL which includes the  99th percentile of a healthy population. Troponin T results are not  interchangeable with troponin I results. ng/mL                                      13.7   9.6   )-----------( 66       ( 23 Jun 2018 06:31 )             46.4     06-23    140  |  96  |  17  ----------------------------<  205<H>  4.5   |  37<H>  |  0.53    Ca    9.1      23 Jun 2018 06:31  Phos  3.4     06-22  Mg     2.1     06-23    TPro  6.1  /  Alb  3.3  /  TBili  0.2  /  DBili  x   /  AST  35  /  ALT  28  /  AlkPhos  63  06-22    proBNP:   Lipid Profile:   HgA1c:   TSH:     ASSESSMENT/PLAN: 	    67 y.o female pmh of COPD, current smoker, anemia, thrombocytopenia, chronic pain syndrome presents with progressive SOB with symptoms of Right sided heart failure    Right sided Heart Failure - Etiology not fully clear  Patient with evidence of elevated pulmonary pressures found on echo in addition to RA enlargement, severe RV dilation, RV free wall hypokinesis.  Inferior wall is not found to be hypokinetic which along with patients EKG findings and negative cardiac enzymes, makes RV infarction less likely.  In addition, patient denies orthopneic symptoms, is able to lay completely flat on exam, with grossly normal LV function, no LA dilatation, CT scan with out pulmonary edema or effusions makes left sided CHF less likely the cause of her SOB/Right sided HF.  Patient did not have adequate VQ scan as unable to tolerate full Ventilation component of her test, and cardiology team cannot comment on sensitivity or specificity of diagnosing VTE in a suboptimal study.    Etiology and chronicity of her elevated pulmonary pressures should be further discussed in this current smoker.  I have asked the family to get echo results from her last hospitalization in [place] which will give insight into the chronicity of her elevated pulmonary pressures and will help for further evaluation of her elevated pulmonary pressures.  OK with diuresis as patient is still full body overloaded, however would monitor BMP as bicarb rising, would decreased diuretics.  Consider re-evaluating VQ scan if primary team deems initial scan inadequate.  If considered adequate would consider alternative pulmonary or primary etiologies of her elevated pulmonary pressures.      Case discussed with Cardiology Attending and Primary Team 67F with COPD, Iron deficiency anemia, thrombocytopenia requiring Nplate, multiple knee surgeries and complications resulting in chronic pain syndrome, recent hospital admissions for cellulitis in March and PNA in Jan, who presented with progressive SOB, dyspnea on exertion over last few weeks.  She also states that she feels pleuritic type chest pain intermittently. Patient feels over the last week the symptoms became progressively worse.  Pt reports she sleeps on 1 pillow, does not get SOB while laying completely flat.  Pt is current "vape" daily smoker.  She is not always compliant with her COPD medications.    Unable to tolerate Ventilation component of VQ scan, but perfusion aspect was unremarkable    After steroids, nebulizers and diuresis, patient subjectively feels better than when she arrived.  She reports continued widespread pain she attributed to her chronic pain syndrome.    PAST MEDICAL & SURGICAL HISTORY:  COPD (chronic obstructive pulmonary disease)  Pneumonia  Thrombocytopenia  Cellulitis  S/P insertion of spinal cord stimulator  History of left knee replacement    FAMILY HISTORY:  No pertinent family history in first degree relatives    Social:  Allergies    iodine (Anaphylaxis)    Intolerances    	  MEDICATIONS:  furosemide   Injectable 40 milliGRAM(s) IV Push every 12 hours      ALBUTerol/ipratropium for Nebulization 3 milliLiter(s) Nebulizer every 4 hours    acetaminophen   Tablet. 975 milliGRAM(s) Oral every 8 hours PRN  ALPRAZolam 0.5 milliGRAM(s) Oral two times a day  HYDROmorphone   Tablet 4 milliGRAM(s) Oral every 6 hours PRN  HYDROmorphone   Tablet 2 milliGRAM(s) Oral every 6 hours PRN  lamoTRIgine 100 milliGRAM(s) Oral daily  traZODone      traZODone 100 milliGRAM(s) Oral at bedtime  venlafaxine XR. 75 milliGRAM(s) Oral daily    polyethylene glycol 3350 17 Gram(s) Oral daily  senna 2 Tablet(s) Oral at bedtime    dextrose 40% Gel 15 Gram(s) Oral once PRN  dextrose 50% Injectable 12.5 Gram(s) IV Push once  dextrose 50% Injectable 25 Gram(s) IV Push once  dextrose 50% Injectable 25 Gram(s) IV Push once  glucagon  Injectable 1 milliGRAM(s) IntraMuscular once PRN  insulin lispro (HumaLOG) corrective regimen sliding scale   SubCutaneous Before meals and at bedtime  methylPREDNISolone sodium succinate Injectable 40 milliGRAM(s) IV Push every 12 hours    dextrose 5%. 1000 milliLiter(s) IV Continuous <Continuous>  heparin  Injectable 5000 Unit(s) SubCutaneous every 8 hours            PHYSICAL EXAM:  T(C): 36.4 (06-23-18 @ 13:53), Max: 37.3 (06-22-18 @ 19:23)  HR: 108 (06-23-18 @ 14:01) (98 - 108)  BP: 140/90 (06-23-18 @ 13:55) (111/59 - 142/88)  RR: 18 (06-23-18 @ 12:13) (10 - 20)  SpO2: 93% (06-23-18 @ 14:01) (92% - 95%)  Wt(kg): --  I&O's Summary    22 Jun 2018 07:01  -  23 Jun 2018 07:00  --------------------------------------------------------  IN: 100 mL / OUT: 1080 mL / NET: -980 mL    23 Jun 2018 07:01  -  23 Jun 2018 14:57  --------------------------------------------------------  IN: 0 mL / OUT: 1350 mL / NET: -1350 mL          Gen: Comfortable, patient is extremely anxious  Neck: + JVD 6mm  Cardiovascular: Normal S1 S2, tachycardic, systolic murmur   Respiratory:  mild inspiratory rales  Gastrointestinal:  soft, no rebound,    Ext: warm, b/l pitting edema  Neuro: no  gross focal deficits. answering questions appropriately  Vascular: Peripheral pulses palpable 2+ bilaterally    TELEMETRY:  boarderline sinus tachycardia  ECG:  	sinus, ESTUARDO, poor Rwave progressive, non specific st abnormalities   RADIOLOGY:   DIAGNOSTIC TESTING:  [ ] Echocardiogram:  < from: Echocardiogram (06.22.18 @ 12:06) >  Left Ventricle  Normal left ventricular size and wall thickness.  Abnormal (paradoxical) septal motion consistent with elevated RV  end-diastolic pressure  The left ventricular ejection fraction is estimated to be 60-65%  Left Atrium  The mitral inflow pattern is consistent with impaired left ventricular  relaxation with mildly elevated left atrial pressure (8-14mmHg).  The left atrial size is normal.  Right Atrium  The right atrium is dilated.  Right Ventricle  The right ventricle is severely dilated.  The right ventricular free wall is hypokintic.  Aortic Valve  There is mild aortic valve thickening.  No aortic regurgitation noted.  Mitral Valve  Structurally normal mitral valve.  No mitral regurgitation noted.  Tricuspid Valve  Structurally normal tricuspid valve.  There is mild to moderate tricuspid regurgitation.  The pulmonary artery systolic pressure is estimated to be 58 mmHg.  There is moderate pulmonary hypertension.  Pulmonic Valve  Structurally normal pulmonic valve.  No pulmonic regurgitation noted.  Arteries and Venous System  No aortic root dilatation.  The IVC is dilated (>2.1 cm) however with normal inspiratory collapse   (>50%)  consistent with mildly elevated right atrial pressure (  8mmHg, range  5-10mmHg).    < end of copied text >    [ ]  Catheterization:  [ ] Stress Test:    OTHER: 	    LABS:	 	    CARDIAC MARKERS:  Troponin T, Serum in AM (06.22.18 @ 05:53)    Troponin T, Serum: <0.01: Reference interval for troponin T is </= 0.01 ng/mL which includes the  99th percentile of a healthy population. Troponin T results are not  interchangeable with troponin I results. ng/mL                                      13.7   9.6   )-----------( 66       ( 23 Jun 2018 06:31 )             46.4     06-23    140  |  96  |  17  ----------------------------<  205<H>  4.5   |  37<H>  |  0.53    Ca    9.1      23 Jun 2018 06:31  Phos  3.4     06-22  Mg     2.1     06-23    TPro  6.1  /  Alb  3.3  /  TBili  0.2  /  DBili  x   /  AST  35  /  ALT  28  /  AlkPhos  63  06-22    proBNP:   Lipid Profile:   HgA1c:   TSH:     ASSESSMENT/PLAN: 	    67 y.o female pmh of COPD, current smoker, anemia, thrombocytopenia, chronic pain syndrome presents with progressive SOB with symptoms of Right sided heart failure    Right sided Heart Failure - Etiology not fully clear  Patient with evidence of elevated pulmonary pressures found on echo in addition to RA enlargement, severe RV dilation, RV free wall hypokinesis.  Inferior wall is not found to be hypokinetic which along with patients EKG findings and negative cardiac enzymes, makes RV infarction less likely.  In addition, patient denies orthopneic symptoms, is able to lay completely flat on exam, with grossly normal LV function, no LA dilatation, CT scan with out pulmonary edema or effusions makes left sided CHF less likely the cause of her SOB/Right sided HF.  Patient did not have adequate VQ scan as unable to tolerate full Ventilation component of her test, and cardiology team cannot comment on sensitivity or specificity of diagnosing VTE in a suboptimal study.    Etiology and chronicity of her elevated pulmonary pressures should be further discussed in this current smoker.  I have asked the family to obtain echo results from her last hospitalization in [place] which will give insight into the chronicity of her elevated pulmonary pressures and will help for further evaluation of her elevated pulmonary pressures.  OK with diuresis as patient is still full body overloaded, however would monitor BMP as bicarb rising, would decrease diuretics.  Consider re-evaluating VQ scan if primary team deems initial scan inadequate.  If considered adequate would consider alternative pulmonary or primary etiologies of her elevated pulmonary pressures.      Case discussed with Cardiology Attending and Primary Team

## 2018-06-24 LAB
ANION GAP SERPL CALC-SCNC: 6 MMOL/L — SIGNIFICANT CHANGE UP (ref 5–17)
BUN SERPL-MCNC: 26 MG/DL — HIGH (ref 7–23)
CALCIUM SERPL-MCNC: 9.2 MG/DL — SIGNIFICANT CHANGE UP (ref 8.4–10.5)
CHLORIDE SERPL-SCNC: 96 MMOL/L — SIGNIFICANT CHANGE UP (ref 96–108)
CO2 SERPL-SCNC: 40 MMOL/L — HIGH (ref 22–31)
CREAT SERPL-MCNC: 0.5 MG/DL — SIGNIFICANT CHANGE UP (ref 0.5–1.3)
GLUCOSE BLDC GLUCOMTR-MCNC: 110 MG/DL — HIGH (ref 70–99)
GLUCOSE BLDC GLUCOMTR-MCNC: 140 MG/DL — HIGH (ref 70–99)
GLUCOSE BLDC GLUCOMTR-MCNC: 179 MG/DL — HIGH (ref 70–99)
GLUCOSE BLDC GLUCOMTR-MCNC: 97 MG/DL — SIGNIFICANT CHANGE UP (ref 70–99)
GLUCOSE SERPL-MCNC: 108 MG/DL — HIGH (ref 70–99)
HCT VFR BLD CALC: 43.4 % — SIGNIFICANT CHANGE UP (ref 34.5–45)
HGB BLD-MCNC: 13.1 G/DL — SIGNIFICANT CHANGE UP (ref 11.5–15.5)
LAMOTRIGINE SERPL-MCNC: 1.7 MCG/ML — LOW (ref 2.5–15)
MAGNESIUM SERPL-MCNC: 2.1 MG/DL — SIGNIFICANT CHANGE UP (ref 1.6–2.6)
MCHC RBC-ENTMCNC: 27.5 PG — SIGNIFICANT CHANGE UP (ref 27–34)
MCHC RBC-ENTMCNC: 30.2 G/DL — LOW (ref 32–36)
MCV RBC AUTO: 91 FL — SIGNIFICANT CHANGE UP (ref 80–100)
PHOSPHATE SERPL-MCNC: 4.1 MG/DL — SIGNIFICANT CHANGE UP (ref 2.5–4.5)
PLATELET # BLD AUTO: 42 K/UL — LOW (ref 150–400)
POTASSIUM SERPL-MCNC: 4.3 MMOL/L — SIGNIFICANT CHANGE UP (ref 3.5–5.3)
POTASSIUM SERPL-SCNC: 4.3 MMOL/L — SIGNIFICANT CHANGE UP (ref 3.5–5.3)
RBC # BLD: 4.77 M/UL — SIGNIFICANT CHANGE UP (ref 3.8–5.2)
RBC # FLD: 15.9 % — SIGNIFICANT CHANGE UP (ref 10.3–16.9)
SODIUM SERPL-SCNC: 142 MMOL/L — SIGNIFICANT CHANGE UP (ref 135–145)
WBC # BLD: 10.5 K/UL — SIGNIFICANT CHANGE UP (ref 3.8–10.5)
WBC # FLD AUTO: 10.5 K/UL — SIGNIFICANT CHANGE UP (ref 3.8–10.5)

## 2018-06-24 PROCEDURE — 99233 SBSQ HOSP IP/OBS HIGH 50: CPT | Mod: GC

## 2018-06-24 RX ORDER — FUROSEMIDE 40 MG
80 TABLET ORAL
Qty: 0 | Refills: 0 | Status: DISCONTINUED | OUTPATIENT
Start: 2018-06-24 | End: 2018-06-25

## 2018-06-24 RX ADMIN — Medication 3 MILLILITER(S): at 17:12

## 2018-06-24 RX ADMIN — HYDROMORPHONE HYDROCHLORIDE 4 MILLIGRAM(S): 2 INJECTION INTRAMUSCULAR; INTRAVENOUS; SUBCUTANEOUS at 09:00

## 2018-06-24 RX ADMIN — Medication 40 MILLIGRAM(S): at 06:21

## 2018-06-24 RX ADMIN — SENNA PLUS 2 TABLET(S): 8.6 TABLET ORAL at 21:46

## 2018-06-24 RX ADMIN — Medication 3 MILLILITER(S): at 03:00

## 2018-06-24 RX ADMIN — Medication 2: at 22:18

## 2018-06-24 RX ADMIN — Medication 3 MILLILITER(S): at 10:18

## 2018-06-24 RX ADMIN — HYDROMORPHONE HYDROCHLORIDE 4 MILLIGRAM(S): 2 INJECTION INTRAMUSCULAR; INTRAVENOUS; SUBCUTANEOUS at 08:24

## 2018-06-24 RX ADMIN — LAMOTRIGINE 100 MILLIGRAM(S): 25 TABLET, ORALLY DISINTEGRATING ORAL at 11:08

## 2018-06-24 RX ADMIN — Medication 100 MILLIGRAM(S): at 21:46

## 2018-06-24 RX ADMIN — Medication 80 MILLIGRAM(S): at 17:57

## 2018-06-24 RX ADMIN — Medication 0.5 MILLIGRAM(S): at 17:56

## 2018-06-24 RX ADMIN — POLYETHYLENE GLYCOL 3350 17 GRAM(S): 17 POWDER, FOR SOLUTION ORAL at 11:08

## 2018-06-24 RX ADMIN — Medication 40 MILLIGRAM(S): at 17:53

## 2018-06-24 RX ADMIN — HEPARIN SODIUM 5000 UNIT(S): 5000 INJECTION INTRAVENOUS; SUBCUTANEOUS at 09:56

## 2018-06-24 RX ADMIN — Medication 3 MILLILITER(S): at 13:54

## 2018-06-24 RX ADMIN — Medication 3 MILLILITER(S): at 06:21

## 2018-06-24 RX ADMIN — Medication 0.5 MILLIGRAM(S): at 06:24

## 2018-06-24 RX ADMIN — Medication 75 MILLIGRAM(S): at 11:08

## 2018-06-24 RX ADMIN — Medication 3 MILLILITER(S): at 21:47

## 2018-06-24 NOTE — PROGRESS NOTE ADULT - SUBJECTIVE AND OBJECTIVE BOX
OVERNIGHT EVENTS: some difficulty urinating, was scanned and had 300cc in bladder, straight cath with 480cc output    SUBJECTIVE / INTERVAL HPI: Patient seen and examined at bedside. Patient notes that she feels like she has to urinate but when she does, she doesn't feel like she completely empties her bladder. Martínez catheter was removed yesterday per the patient. Patient also notes she is constipated as well and feels like she needs more laxatives to help her go to the bathroom. Patient otherwise denies any fevers, chills, chest pain, shortness of breath, cough, nausea/vomiting. Remainder of ROS negative.     VITAL SIGNS:  Vital Signs Last 24 Hrs  T(C): 37.5 (24 Jun 2018 10:00), Max: 37.7 (23 Jun 2018 21:23)  T(F): 99.5 (24 Jun 2018 10:00), Max: 99.8 (23 Jun 2018 21:23)  HR: 103 (24 Jun 2018 13:17) (88 - 108)  BP: 163/92 (24 Jun 2018 12:30) (114/63 - 164/103)  BP(mean): 121 (24 Jun 2018 12:30) (82 - 126)  RR: 22 (24 Jun 2018 13:17) (11 - 25)  SpO2: 95% (24 Jun 2018 13:17) (91% - 96%)    PHYSICAL EXAM:    Constitutional: NAD, on NC  Eyes: PERRL, EOMI, sclera non-icteric  Neck: supple, trachea midline, no masses, no JVD  Respiratory: CTA b/l, no w/r/r appreciated, non-labored breathing  Cardiovascular: RRR, normal S1S2, no M/R/G  Gastrointestinal: soft, obese, NTND, no masses palpable, BS normal  Extremities: Warm, well perfused, pulses equal bilateral upper and lower extremities, tender below the knee. Left knee with multiple scars, no erythema, Edematous  Neurological: AAOx3, CN Grossly intact  Skin: Normal temperature, warm, dry    MEDICATIONS:  MEDICATIONS  (STANDING):  ALBUTerol/ipratropium for Nebulization 3 milliLiter(s) Nebulizer every 4 hours  ALPRAZolam 0.5 milliGRAM(s) Oral two times a day  dextrose 5%. 1000 milliLiter(s) (50 mL/Hr) IV Continuous <Continuous>  dextrose 50% Injectable 12.5 Gram(s) IV Push once  dextrose 50% Injectable 25 Gram(s) IV Push once  dextrose 50% Injectable 25 Gram(s) IV Push once  furosemide   Injectable 40 milliGRAM(s) IV Push every 12 hours  insulin lispro (HumaLOG) corrective regimen sliding scale   SubCutaneous Before meals and at bedtime  lamoTRIgine 100 milliGRAM(s) Oral daily  methylPREDNISolone sodium succinate Injectable 40 milliGRAM(s) IV Push every 12 hours  polyethylene glycol 3350 17 Gram(s) Oral daily  senna 2 Tablet(s) Oral at bedtime  traZODone      traZODone 100 milliGRAM(s) Oral at bedtime  venlafaxine XR. 75 milliGRAM(s) Oral daily    MEDICATIONS  (PRN):  acetaminophen   Tablet. 975 milliGRAM(s) Oral every 8 hours PRN Moderate Pain (4 - 6)  dextrose 40% Gel 15 Gram(s) Oral once PRN Blood Glucose LESS THAN 70 milliGRAM(s)/deciliter  glucagon  Injectable 1 milliGRAM(s) IntraMuscular once PRN Glucose LESS THAN 70 milligrams/deciliter  HYDROmorphone   Tablet 4 milliGRAM(s) Oral every 6 hours PRN Severe Pain (7 - 10)  HYDROmorphone   Tablet 2 milliGRAM(s) Oral every 6 hours PRN for breakthru pain      ALLERGIES:  Allergies    iodine (Anaphylaxis)    Intolerances        LABS:                        13.1   10.5  )-----------( 42       ( 24 Jun 2018 05:53 )             43.4     06-24    142  |  96  |  26<H>  ----------------------------<  108<H>  4.3   |  40<H>  |  0.50    Ca    9.2      24 Jun 2018 05:53  Phos  4.1     06-24  Mg     2.1     06-24          CAPILLARY BLOOD GLUCOSE      POCT Blood Glucose.: 140 mg/dL (24 Jun 2018 11:56)      RADIOLOGY & ADDITIONAL TESTS: Reviewed.    ASSESSMENT:    PLAN:

## 2018-06-24 NOTE — PROGRESS NOTE ADULT - PROBLEM SELECTOR PLAN 4
-Continue to trend PLT, patient previously on Nplate Platelets continue to downtrend, per patient platelets first noted to be low in Dec 2017, follows with outpatient physician for this and previously on Nplate  -Unlikely HIT however will discontinue HepSubQ today, 6/24  -Continue to monitor PLTs with daily CBCs

## 2018-06-24 NOTE — PROGRESS NOTE ADULT - PROBLEM SELECTOR PLAN 3
-Solumedrol 40mg q12   -Duonebs q4h   -COPD bundle -Transition from Solumedrol 40mg q12 to Prednisone 40mg daily starting today  -Duonebs q4h   -COPD bundle

## 2018-06-24 NOTE — PROGRESS NOTE ADULT - PROBLEM SELECTOR PLAN 2
-LE Doppler negative  -Lovenox 80mg BID Patient with LE pain with some asymetry noted on exam, LE Doppler negative, discontinued therapeutic Lovenox as PE/DVT workup negative

## 2018-06-24 NOTE — PROGRESS NOTE ADULT - PROBLEM SELECTOR PLAN 1
-V/Q Scan negative  -likely chronic COPD leading to pulm HTN and COPD. Improving with lasix and steroids  -Echocardiogram showing pulm HTN and RA, RV dilation and hypokinesis of the R free wall  -LE Doppler negative  -Maintain O2 Sat above 88%, currently on HFNC  -Continue Lasix as needed for edema, patient without crackles on exam making right sided heart failure more likely than CHF Patient presented to [facility] with signs/symptoms suggestive of possible PE, V/Q Scan negative however suboptimal exam as patient unable to complete perfusion portion of study. LE dopplers negative. Symptoms likely chronic COPD leading to PH and COPD. Improving with lasix and steroids  -Echocardiogram showing pulm HTN and RA, RV dilation and hypokinesis of the R free wall  -Maintain O2 Sat above 88%, currently on NC and tolerating well, wean off as tolerated  -Continue Lasix for edema, will switch to PO Lasix 80 mg BID (from Lasix 40 mg IVP BID)  -Cardio following given concern for R sided heart failure, recommending family obtain outside ECHO records to compare with inpatient one

## 2018-06-24 NOTE — PROVIDER CONTACT NOTE (OTHER) - SITUATION
MD made aware patient has difficulty urinating and has urinated only 100ml, during this shift. Bladder scan showed approximately 300ml.   Straight cath was 500ml. MD notified pt c/o not feeling

## 2018-06-25 LAB
ANION GAP SERPL CALC-SCNC: 4 MMOL/L — LOW (ref 5–17)
APTT BLD: 25.8 SEC — LOW (ref 27.5–37.4)
BUN SERPL-MCNC: 22 MG/DL — SIGNIFICANT CHANGE UP (ref 7–23)
CALCIUM SERPL-MCNC: 9.5 MG/DL — SIGNIFICANT CHANGE UP (ref 8.4–10.5)
CHLORIDE SERPL-SCNC: 92 MMOL/L — LOW (ref 96–108)
CO2 SERPL-SCNC: 44 MMOL/L — HIGH (ref 22–31)
CREAT SERPL-MCNC: 0.52 MG/DL — SIGNIFICANT CHANGE UP (ref 0.5–1.3)
GLUCOSE BLDC GLUCOMTR-MCNC: 117 MG/DL — HIGH (ref 70–99)
GLUCOSE BLDC GLUCOMTR-MCNC: 138 MG/DL — HIGH (ref 70–99)
GLUCOSE BLDC GLUCOMTR-MCNC: 165 MG/DL — HIGH (ref 70–99)
GLUCOSE BLDC GLUCOMTR-MCNC: 90 MG/DL — SIGNIFICANT CHANGE UP (ref 70–99)
GLUCOSE SERPL-MCNC: 124 MG/DL — HIGH (ref 70–99)
HCT VFR BLD CALC: 46.9 % — HIGH (ref 34.5–45)
HGB BLD-MCNC: 14.3 G/DL — SIGNIFICANT CHANGE UP (ref 11.5–15.5)
INR BLD: 0.94 — SIGNIFICANT CHANGE UP (ref 0.88–1.16)
MAGNESIUM SERPL-MCNC: 2.1 MG/DL — SIGNIFICANT CHANGE UP (ref 1.6–2.6)
MCHC RBC-ENTMCNC: 27.2 PG — SIGNIFICANT CHANGE UP (ref 27–34)
MCHC RBC-ENTMCNC: 30.5 G/DL — LOW (ref 32–36)
MCV RBC AUTO: 89.3 FL — SIGNIFICANT CHANGE UP (ref 80–100)
PLATELET # BLD AUTO: 48 K/UL — LOW (ref 150–400)
POTASSIUM SERPL-MCNC: 5 MMOL/L — SIGNIFICANT CHANGE UP (ref 3.5–5.3)
POTASSIUM SERPL-SCNC: 5 MMOL/L — SIGNIFICANT CHANGE UP (ref 3.5–5.3)
PROTHROM AB SERPL-ACNC: 10.4 SEC — SIGNIFICANT CHANGE UP (ref 9.8–12.7)
RBC # BLD: 5.25 M/UL — HIGH (ref 3.8–5.2)
RBC # FLD: 15.5 % — SIGNIFICANT CHANGE UP (ref 10.3–16.9)
SODIUM SERPL-SCNC: 140 MMOL/L — SIGNIFICANT CHANGE UP (ref 135–145)
WBC # BLD: 9.9 K/UL — SIGNIFICANT CHANGE UP (ref 3.8–10.5)
WBC # FLD AUTO: 9.9 K/UL — SIGNIFICANT CHANGE UP (ref 3.8–10.5)

## 2018-06-25 PROCEDURE — 93306 TTE W/DOPPLER COMPLETE: CPT | Mod: 26

## 2018-06-25 PROCEDURE — 99232 SBSQ HOSP IP/OBS MODERATE 35: CPT

## 2018-06-25 PROCEDURE — 99233 SBSQ HOSP IP/OBS HIGH 50: CPT | Mod: GC

## 2018-06-25 RX ORDER — HEPARIN SODIUM 5000 [USP'U]/ML
1400 INJECTION INTRAVENOUS; SUBCUTANEOUS
Qty: 25000 | Refills: 0 | Status: DISCONTINUED | OUTPATIENT
Start: 2018-06-25 | End: 2018-06-26

## 2018-06-25 RX ORDER — TIOTROPIUM BROMIDE AND OLODATEROL 3.124; 2.736 UG/1; UG/1
2 SPRAY, METERED RESPIRATORY (INHALATION) DAILY
Qty: 0 | Refills: 0 | Status: DISCONTINUED | OUTPATIENT
Start: 2018-06-25 | End: 2018-07-02

## 2018-06-25 RX ADMIN — Medication 3 MILLILITER(S): at 06:36

## 2018-06-25 RX ADMIN — Medication 3 MILLILITER(S): at 09:21

## 2018-06-25 RX ADMIN — LAMOTRIGINE 100 MILLIGRAM(S): 25 TABLET, ORALLY DISINTEGRATING ORAL at 11:58

## 2018-06-25 RX ADMIN — Medication 40 MILLIGRAM(S): at 11:52

## 2018-06-25 RX ADMIN — Medication 2: at 17:36

## 2018-06-25 RX ADMIN — HYDROMORPHONE HYDROCHLORIDE 4 MILLIGRAM(S): 2 INJECTION INTRAMUSCULAR; INTRAVENOUS; SUBCUTANEOUS at 11:57

## 2018-06-25 RX ADMIN — Medication 100 MILLIGRAM(S): at 21:54

## 2018-06-25 RX ADMIN — Medication 0.5 MILLIGRAM(S): at 17:37

## 2018-06-25 RX ADMIN — TIOTROPIUM BROMIDE AND OLODATEROL 2 PUFF(S): 3.124; 2.736 SPRAY, METERED RESPIRATORY (INHALATION) at 21:54

## 2018-06-25 RX ADMIN — HYDROMORPHONE HYDROCHLORIDE 4 MILLIGRAM(S): 2 INJECTION INTRAMUSCULAR; INTRAVENOUS; SUBCUTANEOUS at 12:47

## 2018-06-25 RX ADMIN — POLYETHYLENE GLYCOL 3350 17 GRAM(S): 17 POWDER, FOR SOLUTION ORAL at 11:53

## 2018-06-25 RX ADMIN — Medication 80 MILLIGRAM(S): at 06:36

## 2018-06-25 RX ADMIN — Medication 3 MILLILITER(S): at 12:47

## 2018-06-25 RX ADMIN — Medication 75 MILLIGRAM(S): at 11:58

## 2018-06-25 RX ADMIN — Medication 3 MILLILITER(S): at 21:54

## 2018-06-25 RX ADMIN — Medication 3 MILLILITER(S): at 17:36

## 2018-06-25 RX ADMIN — Medication 0.5 MILLIGRAM(S): at 06:36

## 2018-06-25 RX ADMIN — HEPARIN SODIUM 14 UNIT(S)/HR: 5000 INJECTION INTRAVENOUS; SUBCUTANEOUS at 22:31

## 2018-06-25 NOTE — PROGRESS NOTE ADULT - SUBJECTIVE AND OBJECTIVE BOX
INTERVAL HPI/OVERNIGHT EVENTS: YUNIOR    Patient was seen and examined at bedside. As per nurse and patient, no o/n events, patient resting comfortably. Patient breathing is comfortable, no other complaints. Patient denies: fever, chills, dizziness, weakness, HA, CP, palpitations, SOB, N/V/D/C, dysuria, changes in bowel movements, LE edema.    ROS: as above    VITAL SIGNS:  T(F): 98.2 (06-25-18 @ 14:23)  HR: 102 (06-25-18 @ 11:50)  BP: 160/97 (06-25-18 @ 11:50)  RR: 18 (06-25-18 @ 11:50)  SpO2: 89% (06-25-18 @ 11:50)  Wt(kg): --    PHYSICAL EXAM:    Constitutional: NAD, on NC  Eyes: PERRL, EOMI, sclera non-icteric  Neck: supple, trachea midline, no masses, no JVD  Respiratory: CTA b/l, no w/r/r appreciated, non-labored breathing  Cardiovascular: RRR, normal S1S2, no M/R/G  Gastrointestinal: soft, obese, NTND, no masses palpable, BS normal  Extremities: Warm, well perfused, pulses equal bilateral upper and lower extremities, tender below the knee. Left knee with multiple scars, no erythema, Edematous  Neurological: AAOx3, CN Grossly intact  Skin: Normal temperature, warm, dry    MEDICATIONS  (STANDING):  ALBUTerol/ipratropium for Nebulization 3 milliLiter(s) Nebulizer every 4 hours  ALPRAZolam 0.5 milliGRAM(s) Oral two times a day  dextrose 5%. 1000 milliLiter(s) (50 mL/Hr) IV Continuous <Continuous>  dextrose 50% Injectable 12.5 Gram(s) IV Push once  dextrose 50% Injectable 25 Gram(s) IV Push once  dextrose 50% Injectable 25 Gram(s) IV Push once  insulin lispro (HumaLOG) corrective regimen sliding scale   SubCutaneous Before meals and at bedtime  lamoTRIgine 100 milliGRAM(s) Oral daily  polyethylene glycol 3350 17 Gram(s) Oral daily  predniSONE   Tablet 40 milliGRAM(s) Oral daily  senna 2 Tablet(s) Oral at bedtime  tiotropium 2.5 MICROgram(s)/olodaterol 2.5 MICROgram(s) Inhaler 2 Puff(s) Inhalation daily  traZODone      traZODone 100 milliGRAM(s) Oral at bedtime  venlafaxine XR. 75 milliGRAM(s) Oral daily    MEDICATIONS  (PRN):  acetaminophen   Tablet. 975 milliGRAM(s) Oral every 8 hours PRN Moderate Pain (4 - 6)  bisacodyl Suppository 10 milliGRAM(s) Rectal daily PRN Constipation  dextrose 40% Gel 15 Gram(s) Oral once PRN Blood Glucose LESS THAN 70 milliGRAM(s)/deciliter  glucagon  Injectable 1 milliGRAM(s) IntraMuscular once PRN Glucose LESS THAN 70 milligrams/deciliter  HYDROmorphone   Tablet 4 milliGRAM(s) Oral every 6 hours PRN Severe Pain (7 - 10)  HYDROmorphone   Tablet 2 milliGRAM(s) Oral every 6 hours PRN for breakthru pain      Allergies    iodine (Anaphylaxis)    Intolerances        LABS:                        14.3   9.9   )-----------( 48       ( 25 Jun 2018 06:21 )             46.9     06-25    140  |  92<L>  |  22  ----------------------------<  124<H>  5.0   |  44<H>  |  0.52    Ca    9.5      25 Jun 2018 06:21  Phos  4.1     06-24  Mg     2.1     06-25            RADIOLOGY & ADDITIONAL TESTS:

## 2018-06-25 NOTE — PROGRESS NOTE ADULT - PROBLEM SELECTOR PLAN 3
-Transition from Solumedrol 40mg q12 to Prednisone 40mg daily starting today  -Duonebs q4h   -COPD bundle

## 2018-06-25 NOTE — PROGRESS NOTE ADULT - SUBJECTIVE AND OBJECTIVE BOX
No events overnight    PAST MEDICAL & SURGICAL HISTORY:  COPD (chronic obstructive pulmonary disease)  Pneumonia  Thrombocytopenia  Cellulitis  S/P insertion of spinal cord stimulator  History of left knee replacement    FAMILY HISTORY:  No pertinent family history in first degree relatives    Social:  Allergies    iodine (Anaphylaxis)    Intolerances    	  MEDICATIONS:      ALBUTerol/ipratropium for Nebulization 3 milliLiter(s) Nebulizer every 4 hours  tiotropium 2.5 MICROgram(s)/olodaterol 2.5 MICROgram(s) Inhaler 2 Puff(s) Inhalation daily    acetaminophen   Tablet. 975 milliGRAM(s) Oral every 8 hours PRN  ALPRAZolam 0.5 milliGRAM(s) Oral two times a day  HYDROmorphone   Tablet 4 milliGRAM(s) Oral every 6 hours PRN  HYDROmorphone   Tablet 2 milliGRAM(s) Oral every 6 hours PRN  lamoTRIgine 100 milliGRAM(s) Oral daily  traZODone      traZODone 100 milliGRAM(s) Oral at bedtime  venlafaxine XR. 75 milliGRAM(s) Oral daily    bisacodyl Suppository 10 milliGRAM(s) Rectal daily PRN  polyethylene glycol 3350 17 Gram(s) Oral daily  senna 2 Tablet(s) Oral at bedtime    dextrose 40% Gel 15 Gram(s) Oral once PRN  dextrose 50% Injectable 12.5 Gram(s) IV Push once  dextrose 50% Injectable 25 Gram(s) IV Push once  dextrose 50% Injectable 25 Gram(s) IV Push once  glucagon  Injectable 1 milliGRAM(s) IntraMuscular once PRN  insulin lispro (HumaLOG) corrective regimen sliding scale   SubCutaneous Before meals and at bedtime  predniSONE   Tablet 40 milliGRAM(s) Oral daily    dextrose 5%. 1000 milliLiter(s) IV Continuous <Continuous>            PHYSICAL EXAM:  T(C): 37.1 (06-25-18 @ 09:35), Max: 37.2 (06-24-18 @ 21:30)  HR: 102 (06-25-18 @ 11:50) (82 - 108)  BP: 160/97 (06-25-18 @ 11:50) (141/91 - 165/99)  RR: 18 (06-25-18 @ 11:50) (14 - 24)  SpO2: 89% (06-25-18 @ 11:50) (89% - 97%)  Wt(kg): --  I&O's Summary    24 Jun 2018 07:01  -  25 Jun 2018 07:00  --------------------------------------------------------  IN: 0 mL / OUT: 2700 mL / NET: -2700 mL    25 Jun 2018 07:01  -  25 Jun 2018 13:32  --------------------------------------------------------  IN: 0 mL / OUT: 600 mL / NET: -600 mL          Gen: aaox3, anxious  Neck: +JVD  Cardiovascular: Normal S1 S2, No murmurs,    Respiratory: mild inspiratory rales b/l  Gastrointestinal:  Soft, Non-tender, + BS	   Ext: b/l LE edema  Neuro: no focal deficits.  Vascular: Peripheral pulses palpable 2+ bilaterally    TELEMETRY:   sinus/sinus tach 	    ECG:  	    Echo:           < from: Echocardiogram (06.22.18 @ 12:06) >  Study Quality: Good.  Left Ventricle  Normal left ventricular size and wall thickness.  Abnormal (paradoxical) septal motion consistent with elevated RV  end-diastolic pressure  The left ventricular ejection fraction is estimated to be 60-65%  Left Atrium  The mitral inflow pattern is consistent with impaired left ventricular  relaxation with mildly elevated left atrial pressure (8-14mmHg).  The left atrial size is normal.  Right Atrium  The right atrium is dilated.  Right Ventricle  The right ventricle is severely dilated.  The right ventricular free wall is hypokintic.  Aortic Valve  There is mild aortic valve thickening.  No aortic regurgitation noted.  Mitral Valve  Structurally normal mitral valve.  No mitral regurgitation noted.  Tricuspid Valve  Structurally normal tricuspid valve.  There is mild to moderate tricuspid regurgitation.  The pulmonary artery systolic pressure is estimated to be 58 mmHg.  There is moderate pulmonary hypertension.  Pulmonic Valve  Structurally normal pulmonic valve.  No pulmonic regurgitation noted.  Arteries and Venous System  No aortic root dilatation.  The IVC is dilated (>2.1 cm) however with normal inspiratory collapse   (>50%)  consistent with mildly elevated right atrial pressure (  8mmHg, range  5-10mmHg).    < end of copied text >             14.3   9.9   )-----------( 48       ( 25 Jun 2018 06:21 )             46.9     06-25    140  |  92<L>  |  22  ----------------------------<  124<H>  5.0   |  44<H>  |  0.52    Ca    9.5      25 Jun 2018 06:21  Phos  4.1     06-24  Mg     2.1     06-25      proBNP:   Lipid Profile:   HgA1c:   TSH:     ASSESSMENT/PLAN: 	  Right sided Heart Failure - Etiology likely 2/2 Primary lung pathology/Pulmonary Hypertension and less likely from RV infarct or left sided CHF as outlined in prior note  Would check focused TTE with bubble for eval of shunt  Recheck CXR in setting of hypoxemia, incentive spirometry and encourage patient to get out of bed.  Hold diuresis in setting of rising bicarb.   If consider acetazolamide, would check ABG for eval of acid base status and A-a gradient  Consider repeating VQ scan after explaining the importance of test to patient and how she would need to attempt to complete ventilation component  Would not recommend Nuclear stress test in setting of acute decompensated respiratory status  Please obtain prior echo report from outside hospital  Case discussed with Cardiology Attending and Primary team

## 2018-06-25 NOTE — PROGRESS NOTE ADULT - PROBLEM SELECTOR PLAN 1
Patient presented to [facility] with signs/symptoms suggestive of possible PE, V/Q Scan negative however suboptimal exam as patient unable to complete perfusion portion of study. LE dopplers negative. Symptoms likely chronic COPD leading to PH and COPD. Improving with lasix and steroids  -Echocardiogram showing pulm HTN and RA, RV dilation and hypokinesis of the R free wall  -Maintain O2 Sat above 88%, currently on NC and tolerating well, wean off as tolerated  -Continue Lasix for edema, will switch to PO Lasix 80 mg BID (from Lasix 40 mg IVP BID)  -Cardio following given concern for R sided heart failure, recommending family obtain outside ECHO records to compare with inpatient one

## 2018-06-25 NOTE — PROGRESS NOTE ADULT - PROBLEM SELECTOR PLAN 2
Patient with LE pain with some asymetry noted on exam, LE Doppler negative, discontinued therapeutic Lovenox as PE/DVT workup negative

## 2018-06-25 NOTE — PROGRESS NOTE ADULT - PROBLEM SELECTOR PLAN 4
Platelets continue to downtrend, per patient platelets first noted to be low in Dec 2017, follows with outpatient physician for this and previously on Nplate  -Unlikely HIT however will discontinue HepSubQ today, 6/24  -Continue to monitor PLTs with daily CBCs

## 2018-06-26 DIAGNOSIS — I50.810 RIGHT HEART FAILURE, UNSPECIFIED: ICD-10-CM

## 2018-06-26 LAB
ANION GAP SERPL CALC-SCNC: 9 MMOL/L — SIGNIFICANT CHANGE UP (ref 5–17)
APTT BLD: 128.9 SEC — CRITICAL HIGH (ref 27.5–37.4)
APTT BLD: 33.4 SEC — SIGNIFICANT CHANGE UP (ref 27.5–37.4)
APTT BLD: 72.2 SEC — HIGH (ref 27.5–37.4)
BASOPHILS NFR BLD AUTO: 0.1 % — SIGNIFICANT CHANGE UP (ref 0–2)
BUN SERPL-MCNC: 19 MG/DL — SIGNIFICANT CHANGE UP (ref 7–23)
CALCIUM SERPL-MCNC: 9.3 MG/DL — SIGNIFICANT CHANGE UP (ref 8.4–10.5)
CHLORIDE SERPL-SCNC: 93 MMOL/L — LOW (ref 96–108)
CO2 SERPL-SCNC: 38 MMOL/L — HIGH (ref 22–31)
CREAT SERPL-MCNC: 0.52 MG/DL — SIGNIFICANT CHANGE UP (ref 0.5–1.3)
CRP SERPL-MCNC: 0.22 MG/DL — SIGNIFICANT CHANGE UP (ref 0–0.4)
DAT POLY-SP REAG RBC QL: NEGATIVE — SIGNIFICANT CHANGE UP
EOSINOPHIL NFR BLD AUTO: 1.1 % — SIGNIFICANT CHANGE UP (ref 0–6)
ERYTHROCYTE [SEDIMENTATION RATE] IN BLOOD: 2 MM/HR — SIGNIFICANT CHANGE UP
FERRITIN SERPL-MCNC: 51 NG/ML — SIGNIFICANT CHANGE UP (ref 15–150)
FIBRINOGEN PPP-MCNC: 315 MG/DL — SIGNIFICANT CHANGE UP (ref 258–438)
GLUCOSE BLDC GLUCOMTR-MCNC: 103 MG/DL — HIGH (ref 70–99)
GLUCOSE BLDC GLUCOMTR-MCNC: 112 MG/DL — HIGH (ref 70–99)
GLUCOSE BLDC GLUCOMTR-MCNC: 141 MG/DL — HIGH (ref 70–99)
GLUCOSE BLDC GLUCOMTR-MCNC: 150 MG/DL — HIGH (ref 70–99)
GLUCOSE SERPL-MCNC: 96 MG/DL — SIGNIFICANT CHANGE UP (ref 70–99)
HAV IGM SER-ACNC: SIGNIFICANT CHANGE UP
HBV CORE IGM SER-ACNC: SIGNIFICANT CHANGE UP
HBV SURFACE AG SER-ACNC: SIGNIFICANT CHANGE UP
HCT VFR BLD CALC: 48.5 % — HIGH (ref 34.5–45)
HCV AB S/CO SERPL IA: 0.06 S/CO — SIGNIFICANT CHANGE UP
HCV AB SERPL-IMP: SIGNIFICANT CHANGE UP
HGB BLD-MCNC: 15.1 G/DL — SIGNIFICANT CHANGE UP (ref 11.5–15.5)
HIV 1+2 AB+HIV1 P24 AG SERPL QL IA: SIGNIFICANT CHANGE UP
IRON SATN MFR SERPL: 391 UG/DL — HIGH (ref 30–160)
IRON SATN MFR SERPL: 93 % — HIGH (ref 14–50)
LYMPHOCYTES # BLD AUTO: 19.3 % — SIGNIFICANT CHANGE UP (ref 13–44)
MAGNESIUM SERPL-MCNC: 2.1 MG/DL — SIGNIFICANT CHANGE UP (ref 1.6–2.6)
MCHC RBC-ENTMCNC: 27.3 PG — SIGNIFICANT CHANGE UP (ref 27–34)
MCHC RBC-ENTMCNC: 31.1 G/DL — LOW (ref 32–36)
MCV RBC AUTO: 87.5 FL — SIGNIFICANT CHANGE UP (ref 80–100)
MONOCYTES NFR BLD AUTO: 14.2 % — HIGH (ref 2–14)
NEUTROPHILS NFR BLD AUTO: 65.3 % — SIGNIFICANT CHANGE UP (ref 43–77)
PLATELET # BLD AUTO: 46 K/UL — LOW (ref 150–400)
POTASSIUM SERPL-MCNC: 3.9 MMOL/L — SIGNIFICANT CHANGE UP (ref 3.5–5.3)
POTASSIUM SERPL-SCNC: 3.9 MMOL/L — SIGNIFICANT CHANGE UP (ref 3.5–5.3)
RBC # BLD: 5.54 M/UL — HIGH (ref 3.8–5.2)
RBC # FLD: 15.5 % — SIGNIFICANT CHANGE UP (ref 10.3–16.9)
SODIUM SERPL-SCNC: 140 MMOL/L — SIGNIFICANT CHANGE UP (ref 135–145)
TIBC SERPL-MCNC: 419 UG/DL — SIGNIFICANT CHANGE UP (ref 220–430)
UIBC SERPL-MCNC: 28 UG/DL — LOW (ref 110–370)
WBC # BLD: 10.9 K/UL — HIGH (ref 3.8–10.5)
WBC # FLD AUTO: 10.9 K/UL — HIGH (ref 3.8–10.5)

## 2018-06-26 PROCEDURE — 94010 BREATHING CAPACITY TEST: CPT | Mod: 26

## 2018-06-26 PROCEDURE — 99223 1ST HOSP IP/OBS HIGH 75: CPT

## 2018-06-26 PROCEDURE — 78582 LUNG VENTILAT&PERFUS IMAGING: CPT | Mod: 26

## 2018-06-26 PROCEDURE — 99232 SBSQ HOSP IP/OBS MODERATE 35: CPT

## 2018-06-26 PROCEDURE — 99233 SBSQ HOSP IP/OBS HIGH 50: CPT | Mod: GC

## 2018-06-26 RX ORDER — HEPARIN SODIUM 5000 [USP'U]/ML
1200 INJECTION INTRAVENOUS; SUBCUTANEOUS
Qty: 25000 | Refills: 0 | Status: DISCONTINUED | OUTPATIENT
Start: 2018-06-26 | End: 2018-06-26

## 2018-06-26 RX ORDER — HEPARIN SODIUM 5000 [USP'U]/ML
12 INJECTION INTRAVENOUS; SUBCUTANEOUS
Qty: 25000 | Refills: 0 | Status: DISCONTINUED | OUTPATIENT
Start: 2018-06-26 | End: 2018-06-26

## 2018-06-26 RX ADMIN — HYDROMORPHONE HYDROCHLORIDE 4 MILLIGRAM(S): 2 INJECTION INTRAMUSCULAR; INTRAVENOUS; SUBCUTANEOUS at 06:27

## 2018-06-26 RX ADMIN — HYDROMORPHONE HYDROCHLORIDE 4 MILLIGRAM(S): 2 INJECTION INTRAMUSCULAR; INTRAVENOUS; SUBCUTANEOUS at 08:36

## 2018-06-26 RX ADMIN — HYDROMORPHONE HYDROCHLORIDE 4 MILLIGRAM(S): 2 INJECTION INTRAMUSCULAR; INTRAVENOUS; SUBCUTANEOUS at 12:39

## 2018-06-26 RX ADMIN — SENNA PLUS 2 TABLET(S): 8.6 TABLET ORAL at 21:23

## 2018-06-26 RX ADMIN — Medication 40 MILLIGRAM(S): at 06:20

## 2018-06-26 RX ADMIN — Medication 0.5 MILLIGRAM(S): at 06:20

## 2018-06-26 RX ADMIN — POLYETHYLENE GLYCOL 3350 17 GRAM(S): 17 POWDER, FOR SOLUTION ORAL at 11:53

## 2018-06-26 RX ADMIN — Medication 3 MILLILITER(S): at 19:09

## 2018-06-26 RX ADMIN — Medication 3 MILLILITER(S): at 06:21

## 2018-06-26 RX ADMIN — Medication 3 MILLILITER(S): at 10:34

## 2018-06-26 RX ADMIN — Medication 100 MILLIGRAM(S): at 21:20

## 2018-06-26 RX ADMIN — Medication 3 MILLILITER(S): at 14:28

## 2018-06-26 RX ADMIN — LAMOTRIGINE 100 MILLIGRAM(S): 25 TABLET, ORALLY DISINTEGRATING ORAL at 11:51

## 2018-06-26 RX ADMIN — HYDROMORPHONE HYDROCHLORIDE 4 MILLIGRAM(S): 2 INJECTION INTRAMUSCULAR; INTRAVENOUS; SUBCUTANEOUS at 13:26

## 2018-06-26 RX ADMIN — Medication 3 MILLILITER(S): at 21:20

## 2018-06-26 RX ADMIN — Medication 0.5 MILLIGRAM(S): at 19:09

## 2018-06-26 RX ADMIN — Medication 75 MILLIGRAM(S): at 11:51

## 2018-06-26 NOTE — CONSULT NOTE ADULT - PROBLEM SELECTOR RECOMMENDATION 9
- Per Dr. Chao (fellow working with Dr. Horta), recommending CT to r/o PE.  - Must r/o PE prior to RHC. Consider pre-medicating with steroids and Benadryl prior to test, consider allergy consult.   - Bubble study positive for PFO with right to left shunt.   - RHC needed to further work up pulmonary hypertension and to provide further recs.  - Will continue to follow. Please feel free to call [phone] with any further questions. - Per Dr. Chao (fellow working with Dr. Horta), recommending CT to r/o PE.  - Must r/o PE prior to RHC. Consider pre-medicating with steroids and Benadryl prior to test, consider allergy consult.    - Bubble study positive for PFO with right to left shunt.   - RHC needed to further work up pulmonary hypertension and to provide further recs. Would not do RHC until PE is r/o, and patient exhibiting thrombocytopenia.  - Would recommend getting records from prior hospitalization in [place] in December for her SOB and PNA.  - Will continue to follow. Please feel free to call [phone] with any further questions.

## 2018-06-26 NOTE — PROGRESS NOTE ADULT - SUBJECTIVE AND OBJECTIVE BOX
Patient is a 67y old  Female who presents with a chief complaint of Acute Hypoxic Respiratory Failure (22 Jun 2018 01:43)        HPI:  67F with COPD, Iron deficiency anemia, thrombocytopenia requiring Nplate multiple knee surgeries and complications resulting in chronic pain syndrome, recent hospital admissions for cellulitis in March and PNA in Jan, who presented with 1 week of progressively worsening lower leg pain and 24h of worsening SOB.      In the ED T98.8, , /73, RR17 C9Ost22%. She was given 125mg Solumedrol, Duonebs, Ceftriaxone, Azithromycin, Vancomycin, Lovenox, Morphine, and Zofran. (22 Jun 2018 01:43)    PAST MEDICAL & SURGICAL HISTORY:  COPD (chronic obstructive pulmonary disease)  Pneumonia  Thrombocytopenia  Cellulitis  S/P insertion of spinal cord stimulator  History of left knee replacement    FAMILY HISTORY:  No pertinent family history in first degree relatives    Social:  Allergies    iodine (Anaphylaxis)    Intolerances    	  MEDICATIONS:      ALBUTerol/ipratropium for Nebulization 3 milliLiter(s) Nebulizer every 4 hours  tiotropium 2.5 MICROgram(s)/olodaterol 2.5 MICROgram(s) Inhaler 2 Puff(s) Inhalation daily    acetaminophen   Tablet. 975 milliGRAM(s) Oral every 8 hours PRN  ALPRAZolam 0.5 milliGRAM(s) Oral two times a day  HYDROmorphone   Tablet 4 milliGRAM(s) Oral every 6 hours PRN  HYDROmorphone   Tablet 2 milliGRAM(s) Oral every 6 hours PRN  lamoTRIgine 100 milliGRAM(s) Oral daily  traZODone      traZODone 100 milliGRAM(s) Oral at bedtime  venlafaxine XR. 75 milliGRAM(s) Oral daily    bisacodyl Suppository 10 milliGRAM(s) Rectal daily PRN  polyethylene glycol 3350 17 Gram(s) Oral daily  senna 2 Tablet(s) Oral at bedtime    dextrose 40% Gel 15 Gram(s) Oral once PRN  dextrose 50% Injectable 12.5 Gram(s) IV Push once  dextrose 50% Injectable 25 Gram(s) IV Push once  dextrose 50% Injectable 25 Gram(s) IV Push once  glucagon  Injectable 1 milliGRAM(s) IntraMuscular once PRN  insulin lispro (HumaLOG) corrective regimen sliding scale   SubCutaneous Before meals and at bedtime  predniSONE   Tablet 40 milliGRAM(s) Oral daily    dextrose 5%. 1000 milliLiter(s) IV Continuous <Continuous>  heparin  Infusion 1200 Unit(s)/Hr IV Continuous <Continuous>            PHYSICAL EXAM:  T(C): 37.8 (06-26-18 @ 08:58), Max: 37.8 (06-26-18 @ 08:58)  HR: 92 (06-26-18 @ 08:33) (82 - 106)  BP: 156/80 (06-26-18 @ 08:32) (112/59 - 160/97)  RR: 12 (06-26-18 @ 08:33) (12 - 21)  SpO2: 93% (06-26-18 @ 08:33) (89% - 97%)  Wt(kg): --  I&O's Summary    25 Jun 2018 07:01  -  26 Jun 2018 07:00  --------------------------------------------------------  IN: 110 mL / OUT: 600 mL / NET: -490 mL          Gen: NAD, AAOx3  Neck: no JVD  Cardiovascular: Normal S1 S2, No murmurs,    Respiratory: Lungs clear to auscultation	  Gastrointestinal:  Soft, Non-tender, + BS	   Ext: no edema  Neuro: no focal deficits.  Vascular: Peripheral pulses palpable 2+ bilaterally      Gen: aaox3, anxious  Neck: +JVD  Cardiovascular: Normal S1 S2, No murmurs,    Respiratory: mild inspiratory rales b/l  Gastrointestinal:  Soft, Non-tender, + BS	   Ext: b/l LE edema  Neuro: no focal deficits.  Vascular: Peripheral pulses palpable 2+ bilaterally    TELEMETRY:   sinus/sinus tach 	    ECG:  	    Echo:           < from: Echocardiogram (06.22.18 @ 12:06) >  Study Quality: Good.  Left Ventricle  Normal left ventricular size and wall thickness.  Abnormal (paradoxical) septal motion consistent with elevated RV  end-diastolic pressure  The left ventricular ejection fraction is estimated to be 60-65%  Left Atrium  The mitral inflow pattern is consistent with impaired left ventricular  relaxation with mildly elevated left atrial pressure (8-14mmHg).  The left atrial size is normal.  Right Atrium  The right atrium is dilated.  Right Ventricle  The right ventricle is severely dilated.  The right ventricular free wall is hypokintic.  Aortic Valve  There is mild aortic valve thickening.  No aortic regurgitation noted.  Mitral Valve  Structurally normal mitral valve.  No mitral regurgitation noted.  Tricuspid Valve  Structurally normal tricuspid valve.  There is mild to moderate tricuspid regurgitation.  The pulmonary artery systolic pressure is estimated to be 58 mmHg.  There is moderate pulmonary hypertension.  Pulmonic Valve  Structurally normal pulmonic valve.  No pulmonic regurgitation noted.  Arteries and Venous System  No aortic root dilatation.  The IVC is dilated (>2.1 cm) however with normal inspiratory collapse   (>50%)  consistent with mildly elevated right atrial pressure (  8mmHg, range  5-10mmHg).                              15.1   10.9  )-----------( 46       ( 26 Jun 2018 06:36 )             48.5     06-26    140  |  93<L>  |  19  ----------------------------<  96  3.9   |  38<H>  |  0.52    Ca    9.3      26 Jun 2018 06:36  Mg     2.1     06-26      proBNP:   Lipid Profile:   HgA1c:   TSH:     ASSESSMENT/PLAN: 	    ASSESSMENT/PLAN: 	  Right sided Heart Failure - Etiology likely 2/2 Primary lung pathology/Pulmonary Hypertension and less likely from RV infarct or left sided CHF as outlined in prior note  Bubble study + for PFO with R to L shunt which is likely contributing to hypoxia.  However main focus should be on diagnosing eitiology of elevated pulmonary pressures.  Acute PE should be ruled out prior to RHC.  Agree with Allergy consultation with possible history of allergy to contrast.  Recheck CXR in setting of hypoxemia, incentive spirometry and encourage patient to get out of bed.  Hold diuresis in setting of rising bicarb.   If consider acetazolamide, would check ABG for eval of acid base status and A-a gradient  Consider repeating VQ scan after explaining the importance of test to patient and how she would need to attempt to complete ventilation component  Would not recommend Nuclear stress test in setting of acute decompensated respiratory status  Please obtain prior echo and radiographic imaging reports from outside hospital  Case discussed with Cardiology Attending and Primary team

## 2018-06-26 NOTE — PROGRESS NOTE ADULT - PROBLEM SELECTOR PLAN 7
Hx of chronci L knee pain. With current regimen: Tylenol 975mg q8h prn moderate pain, Dilaudid 2mg q6h prn breakthrough pain, dilaudid 4mg q6h for severe pain

## 2018-06-26 NOTE — PROGRESS NOTE ADULT - PROBLEM SELECTOR PLAN 9
VTE: Hep gtt    DNR/DNI    Dispo:Admitted to [facility] for further management of RHF

## 2018-06-26 NOTE — PROGRESS NOTE ADULT - PROBLEM SELECTOR PLAN 4
Noted thrombocytopenia for which heme/ onc consulted. Smear with large platelets consistent with platelet destructive process. Heme/ onc recommending autoimmune work up. Noted thrombocytopenia for which heme/ onc consulted. Smear with large platelets consistent with platelet destructive process. Heme/ onc recommending autoimmune and HIT work up.  -f/u heme/onc recs and work up

## 2018-06-26 NOTE — PROGRESS NOTE ADULT - PROBLEM SELECTOR PLAN 2
p/w shortness of breath.  V/Q Scan negative however suboptimal exam as patient unable to complete perfusion portion of study. LE dopplers negative. Concern for PFO on echocardiogram. Pending repeat V/Q.   -Echocardiogram showing pulm HTN and RA, RV dilation and hypokinesis of the R free wall  -Currently on NC, c/w monitoring respiratory status.   -Holding lasix in setting of rising bicarb and not currently appearing overloaded.   -As above, cardio following in setting of R side heart failure, but likely pulmonary etiology

## 2018-06-26 NOTE — PROGRESS NOTE ADULT - PROBLEM SELECTOR PLAN 1
P/w RHF with noted LE edema, abdominal distension and JVD on presentation s/p diuresis.  -Echocardiogram notable pulm HTN and RA, RV dilation and hypokinesis of the R free wall  -Lasix held in setting of rising bicarb and patient not significantly overloaded on exam.  -F/u Cardio recs.

## 2018-06-26 NOTE — PROGRESS NOTE ADULT - ASSESSMENT
67F with COPD, Iron deficiency anemia, thrombocytopenia requiring Nplate (Romiplostim) multiple knee surgeries and complications resulting in chronic pain syndrome, recent hospital admissions for cellulitis in March and PNA in Jan, who p/w LE edema and shortness of breath. Admitted to [facility] in a

## 2018-06-26 NOTE — CONSULT NOTE ADULT - ASSESSMENT
67 year old Female with COPD, Iron deficiency anemia, thrombocytopenia requiring Nplate, multiple knee surgeries and complications resulting in chronic pain syndrome, recent hospital admissions for cellulitis in March and PNA in December, who presented with progressive SOB, dyspnea on exertion over last few weeks with accompanied chest pressure and lower extremity swelling and pain. Patient did not have adequate VQ scan secondary to not being able to tolerate full ventilation component. Echo showing severely dilated right ventricle and dilated right atrium, moderate pulmonary hypertension with pulmonary artery systolic pressure estimated to be 57mmHg, and a large patent foramen ovale.

## 2018-06-26 NOTE — CONSULT NOTE ADULT - SUBJECTIVE AND OBJECTIVE BOX
Surgeon: Dr. Horta     Requesting Physician: Dr. Matthews    HISTORY OF PRESENT ILLNESS:  67 year old Female with COPD, Iron deficiency anemia, thrombocytopenia requiring Nplate, multiple knee surgeries and complications resulting in chronic pain syndrome, recent hospital admissions for cellulitis in March and PNA in December, who presented with progressive SOB, dyspnea on exertion over last few weeks with accompanied chest pressure and lower extremity swelling and pain.  Patient feels over the last week the symptoms became progressively worse.  Pt reports she sleeps on 1 pillow, does not get SOB while laying completely flat.   Pt is current "vape" daily smoker.  She is not always compliant with her COPD medications. Denies chest pain, palpitations, hemopytsis, N/V/D, abdominal pain, dizziness, fever or chills. Patient has never seen a cardiologist.     Unable to tolerate Ventilation component of VQ scan, but perfusion aspect was unremarkable    After steroids, nebulizers and diuresis, patient subjectively feels better than when she arrived.  She reports continued widespread pain she attributed to her chronic pain syndrome.      PAST MEDICAL & SURGICAL HISTORY:  COPD (chronic obstructive pulmonary disease)  Pneumonia  Thrombocytopenia  Cellulitis  S/P insertion of spinal cord stimulator  History of left knee replacement      MEDICATIONS  (STANDING):  ALBUTerol/ipratropium for Nebulization 3 milliLiter(s) Nebulizer every 4 hours  ALPRAZolam 0.5 milliGRAM(s) Oral two times a day  dextrose 5%. 1000 milliLiter(s) (50 mL/Hr) IV Continuous <Continuous>  dextrose 50% Injectable 12.5 Gram(s) IV Push once  dextrose 50% Injectable 25 Gram(s) IV Push once  dextrose 50% Injectable 25 Gram(s) IV Push once  heparin  Infusion 1200 Unit(s)/Hr (12 mL/Hr) IV Continuous <Continuous>  insulin lispro (HumaLOG) corrective regimen sliding scale   SubCutaneous Before meals and at bedtime  lamoTRIgine 100 milliGRAM(s) Oral daily  polyethylene glycol 3350 17 Gram(s) Oral daily  predniSONE   Tablet 40 milliGRAM(s) Oral daily  senna 2 Tablet(s) Oral at bedtime  tiotropium 2.5 MICROgram(s)/olodaterol 2.5 MICROgram(s) Inhaler 2 Puff(s) Inhalation daily  traZODone      traZODone 100 milliGRAM(s) Oral at bedtime  venlafaxine XR. 75 milliGRAM(s) Oral daily    MEDICATIONS  (PRN):  acetaminophen   Tablet. 975 milliGRAM(s) Oral every 8 hours PRN Moderate Pain (4 - 6)  bisacodyl Suppository 10 milliGRAM(s) Rectal daily PRN Constipation  dextrose 40% Gel 15 Gram(s) Oral once PRN Blood Glucose LESS THAN 70 milliGRAM(s)/deciliter  glucagon  Injectable 1 milliGRAM(s) IntraMuscular once PRN Glucose LESS THAN 70 milligrams/deciliter  HYDROmorphone   Tablet 4 milliGRAM(s) Oral every 6 hours PRN Severe Pain (7 - 10)  HYDROmorphone   Tablet 2 milliGRAM(s) Oral every 6 hours PRN for breakthru pain      Allergies    iodine (Anaphylaxis)    Intolerances        SOCIAL HISTORY:  Smoker:  YES, uses e-cigarettes currently.  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 parathesias, syncope, confusion                                            POSITIVE  EYES:  Blurry vision, discharge, pain, loss of vision                                                                                  NEGATIVE  ENMT:  Difficulty hearing, vertigo, dysphagia, epistaxis, recent dental work                                     NEGATIVE  CV:  +Chest pressure, BIRD, denies palpitations, orthopnea                                                                             POSITIVE  RESPIRATORY: + SOB, denies cough / sputum, hemoptysis                                                            POSITIVE  GI:  Nausea, vomiting, diarrhea, constipation, melena                                                                       NEGATIVE  : Hematuria, dysuria, urgency, incontinence                                                                                       NEGATIVE  MUSKULOSKELETAL: +Left knee and leg pain after knee surgery, denies arthritis, joint swelling, muscle weakness   POSITIVE                                          SKIN/BREAST:  rash, itching, hair loss, masses                                                                                            NEGATIVE  PSYCH: +anxiety, denies depression, suicidal ideation                                                                POSITIVE  HEME/LYMPH:  bruises easily, enlarged lymph nodes, tender lymph nodes                                        NEGATIVE  ENDOCRINE:  cold intolerance, heat intolerance, polydipsia                                                                   NEGATIVE    PHYSICAL EXAM  Vital Signs Last 24 Hrs  T(C): 37.1 (26 Jun 2018 13:41), Max: 37.8 (26 Jun 2018 08:58)  T(F): 98.7 (26 Jun 2018 13:41), Max: 100.1 (26 Jun 2018 08:58)  HR: 96 (26 Jun 2018 12:00) (82 - 106)  BP: 142/96 (26 Jun 2018 12:00) (112/59 - 156/80)  BP(mean): 114 (26 Jun 2018 12:00) (81 - 117)  RR: 20 (26 Jun 2018 12:00) (12 - 21)  SpO2: 92% (26 Jun 2018 12:00) (90% - 97%)      CONSTITUTIONAL: Patient sitting in chair, with nasal canula, eating lunch, with daughter Bisi at bedside, tearful at times, circumlocution, no acute distress.  NEURO: A&Ox3, no focal deficits.                    CV: S1S2, RRR, no murmurs appreciated on exam  RESPIRATORY: Clear to auscultation b/l  GI: Abdomen soft, nontender, non distended  : deferred  EXTREMITIES: +1 pitting peripheral edema, warm and well perfused, left knee with old healed scars, +calf tenderness to palpation. Peripheral pulses 2+ b/l. Strength 5/5 bilaterally lower and upper extremities.                                                           LABS:                        15.1   10.9  )-----------( 46       ( 26 Jun 2018 06:36 )             48.5     06-26    140  |  93<L>  |  19  ----------------------------<  96  3.9   |  38<H>  |  0.52    Ca    9.3      26 Jun 2018 06:36  Mg     2.1     06-26      PT/INR - ( 25 Jun 2018 20:39 )   PT: 10.4 sec;   INR: 0.94          PTT - ( 26 Jun 2018 12:41 )  PTT:72.2 sec            RADIOLOGY & ADDITIONAL STUDIES:  CAROTID U/S:     CXR: < from: Xray Chest 1 View- PORTABLE-Routine (06.23.18 @ 06:01) >      INTERPRETATION:  CLINICAL INFORMATION:  Shortness of Breath.    TECHNIQUE: A frontal view of the chest is dated 6/23/2018 6:01 AM.    COMPARISON:  Radiographs of the chest dated 6/21/2018    FINDINGS: There is mild pulmonary vascular congestion..  There is no   focal consolidation, pneumothorax, or pleural effusion.  Although   evaluation is limited on AP view, the cardiac silhouette appears   unchanged.      IMPRESSION: Mild pulmonary vascular congestion.      < end of copied text >      CT Scan: < from: CT Chest No Cont (06.22.18 @ 00:49) >    INTERPRETATION:  CT of the CHEST without intravenous contrast    INDICATION: Shortness of breath    TECHNIQUE: CT of the chest was performed without intravenous contrast.   Axial, sagittal and coronal images were produced and reviewed. MIP images   of the chest were also obtained.    PRIOR STUDIES: None.    FINDINGS: The heart is normal in size.  There is a small pericardial   effusion. The main pulmonary artery is mildly dilated measuring 3.4 cm.   There is mild calcified plaque in the aorta. There is a prominent   precarinal node. No pathologically enlarged mediastinal nodes are seen.   No hilar or axillary lymphadenopathy is identified.    Evaluation of the pulmonary parenchyma demonstrates mild paraseptal   emphysema in the upper lobes. There is a 14 mm nodule in the posterior   segment of the right upper lobe. No consolidation. No pleural effusions   are seen.    Limited evaluation of the upper abdomen demonstrates a subcentimeter   hypodensity in the left hepatic lobe, hepatic segment 2, too small to   characterize. There is a 1.7 cm round hypodensity in the upper pole of   the right kidney, most likely a partially imaged parapelvic cyst.    A flat metallic device is noted within the midthoracic spinal canal with   a wire entering the subcutaneous tissues of the posterior lower thorax.   Evaluation of the osseous structures demonstrates mild degenerative   changes of the spine.      IMPRESSION:    1. 14 mm nodule in the right upper lobe. Further evaluation with PET/CT,   CT, or tissue sampling is recommended. If prior outside studies available   for comparison that flow be of help.  2. Small pericardial effusion.  3. Mildly dilated main pulmonary artery suggestive of pulmonary   hypertension.    < end of copied text >      EKG: < from: 12 Lead ECG (06.22.18 @ 02:43) >    Ventricular Rate 96 BPM    Atrial Rate 96 BPM    P-R Interval 122 ms    QRS Duration 88 ms    Q-T Interval 374 ms    QTC Calculation(Bezet) 472 ms    P Axis 76 degrees    R Axis 21 degrees    T Axis 265 degrees    Diagnosis Line Normal sinus rhythm  Anterior infarct , age undetermined  T wave abnormality, consider inferolateral ischemia  Abnormal ECG    Confirmed by LACEY MATTHEWS MDCALVIN ([id number]) on 6/23/2018 2:06:58 PM    < end of copied text >      TTE / GURPREET: < from: Echocardiogram w/ Bubble and Doppler (06.25.18 @ 15:11) >      INTERPRETATION:  Patient Height: 162.0 cm  Patient Weight: 83.0 kg  Systolic Pressure: 130 mmHg  Diastolic Pressure: 70 mmHg  BSA: 1.9 m^2  Interpretation Summary  Limited TTE with bubbles to assess for an inter-atrial shunt. Agitated   saline bubbles reveals bubbles in the left heart, consistent with a large patent   foramen ovale with right to left shunting.  There is moderate pulmonary   hypertension.The pulmonary artery systolic pressure is estimated to be   57 mmHg.    < end of copied text >    < from: Echocardiogram (06.22.18 @ 12:06) >    EXAM:  ECHOCARDIOGRAM (CARDIOL)                          PROCEDURE DATE:  06/22/2018          INTERPRETATION:  Patient Height: 165.0 cm  Patient Weight: 81.0 kg  Systolic Pressure: 113 mmHg  Diastolic Pressure: 67 mmHg  BSA: 1.9 m^2  InterpretationSummary  Normal left ventricular size and wall thickness.Abnormal (paradoxical)   septal   motion consistent with elevated RV end-diastolic pressure The left   ventricular   ejection fraction is estimated to be 60-65%  The left atrial size is   normal.The mitral inflow pattern is consistent with impaired left   ventricular   relaxation with mildly elevated left atrial pressure (8-14mmHg).  The   right   atrium is dilated.The right ventricle is severely dilated. The right   ventricular free wall is hypokinetic.  There is mild aortic valve   thickening.There is mild to moderate tricuspid regurgitation.There is   moderate   pulmonary hypertension. The pulmonary artery systolic pressure is   estimated to   be 58 mmHg.  No aortic root dilatation.The IVC is dilated (>2.1 cm)   however   with normal inspiratory collapse (>50%) consistent with mildly elevated   right   atrial pressure (  8mmHg, range 5-10mmHg).  There is no pericardial   effusion.    < end of copied text >      Cardiac cath: not yet performed

## 2018-06-26 NOTE — PROGRESS NOTE ADULT - SUBJECTIVE AND OBJECTIVE BOX
PROGRESS NOTE    CC:  Overnight Events:  Interval History:   ROS:    OBJECTIVE  Vitals:  T(C): 37.1 (06-26-18 @ 13:41), Max: 37.8 (06-26-18 @ 08:58)  HR: 96 (06-26-18 @ 12:00) (82 - 106)  BP: 142/96 (06-26-18 @ 12:00) (112/59 - 156/80)  RR: 20 (06-26-18 @ 12:00) (12 - 21)  SpO2: 92% (06-26-18 @ 12:00) (90% - 97%)  Wt(kg): --    I/O:  I&O's Summary    25 Jun 2018 07:01  -  26 Jun 2018 07:00  --------------------------------------------------------  IN: 110 mL / OUT: 600 mL / NET: -490 mL    26 Jun 2018 07:01  -  26 Jun 2018 16:23  --------------------------------------------------------  IN: 120 mL / OUT: 225 mL / NET: -105 mL        PHYSICAL EXAM:  Appearance: NAD. Speaking in full sentences.   HEENT: PERRL. No pallor noted.  Conjunctiva clear b/l. Moist oral mucosa.  Cardiovascular: RRR with no murmurs.  Respiratory: Lungs CTAB.   Gastrointestinal:  Soft, nontender. Non-distended. Non-rigid.	  Extremities: No edema b/l. No erythema b/l. LE WWP b/l.  Vascular: DP present.  Neurologic:  Alert and awake. Moving all extremities. Following commands. Making eye contact.  	  LABS:                        15.1   10.9  )-----------( 46       ( 26 Jun 2018 06:36 )             48.5     06-26    140  |  93<L>  |  19  ----------------------------<  96  3.9   |  38<H>  |  0.52    Ca    9.3      26 Jun 2018 06:36  Mg     2.1     06-26      PT/INR - ( 25 Jun 2018 20:39 )   PT: 10.4 sec;   INR: 0.94          PTT - ( 26 Jun 2018 12:41 )  PTT:72.2 sec      RADIOLOGY & ADDITIONAL TESTS:  Reviewed .    MEDICATIONS  (STANDING):  ALBUTerol/ipratropium for Nebulization 3 milliLiter(s) Nebulizer every 4 hours  ALPRAZolam 0.5 milliGRAM(s) Oral two times a day  dextrose 5%. 1000 milliLiter(s) (50 mL/Hr) IV Continuous <Continuous>  dextrose 50% Injectable 12.5 Gram(s) IV Push once  dextrose 50% Injectable 25 Gram(s) IV Push once  dextrose 50% Injectable 25 Gram(s) IV Push once  heparin  Infusion 1200 Unit(s)/Hr (12 mL/Hr) IV Continuous <Continuous>  insulin lispro (HumaLOG) corrective regimen sliding scale   SubCutaneous Before meals and at bedtime  lamoTRIgine 100 milliGRAM(s) Oral daily  polyethylene glycol 3350 17 Gram(s) Oral daily  senna 2 Tablet(s) Oral at bedtime  tiotropium 2.5 MICROgram(s)/olodaterol 2.5 MICROgram(s) Inhaler 2 Puff(s) Inhalation daily  traZODone      traZODone 100 milliGRAM(s) Oral at bedtime  venlafaxine XR. 75 milliGRAM(s) Oral daily    MEDICATIONS  (PRN):  acetaminophen   Tablet. 975 milliGRAM(s) Oral every 8 hours PRN Moderate Pain (4 - 6)  bisacodyl Suppository 10 milliGRAM(s) Rectal daily PRN Constipation  dextrose 40% Gel 15 Gram(s) Oral once PRN Blood Glucose LESS THAN 70 milliGRAM(s)/deciliter  glucagon  Injectable 1 milliGRAM(s) IntraMuscular once PRN Glucose LESS THAN 70 milligrams/deciliter  HYDROmorphone   Tablet 4 milliGRAM(s) Oral every 6 hours PRN Severe Pain (7 - 10)  HYDROmorphone   Tablet 2 milliGRAM(s) Oral every 6 hours PRN for breakthru pain      ASSESSMENT:    PLAN: PROGRESS NOTE    CC: Shortness of breath, LE edema  Overnight Events: YUNIOR. On HFNC.   Interval History:  Improved shortness of breath. no chest pain. No palpitations.   ROS: Neg as above.    OBJECTIVE  Vitals:  T(C): 37.1 (06-26-18 @ 13:41), Max: 37.8 (06-26-18 @ 08:58)  HR: 96 (06-26-18 @ 12:00) (82 - 106)  BP: 142/96 (06-26-18 @ 12:00) (112/59 - 156/80)  RR: 20 (06-26-18 @ 12:00) (12 - 21)  SpO2: 92% (06-26-18 @ 12:00) (90% - 97%)  Wt(kg): --    I/O:  I&O's Summary    25 Jun 2018 07:01  -  26 Jun 2018 07:00  --------------------------------------------------------  IN: 110 mL / OUT: 600 mL / NET: -490 mL    26 Jun 2018 07:01  -  26 Jun 2018 16:23  --------------------------------------------------------  IN: 120 mL / OUT: 225 mL / NET: -105 mL        PHYSICAL EXAM:  Appearance: NAD. Speaking in full sentences.   HEENT: PERRL. No pallor noted.  Conjunctiva clear b/l. Moist oral mucosa.  Neck: JVP 7-8  Cardiovascular: RRR, S1, S2 with no murmurs.  Respiratory: Lungs CTAB.   Gastrointestinal:  Soft, nontender. Non-distended. Non-rigid.	  Extremities: Trace edema b/l. No erythema b/l. LE WWP b/l.  Vascular: DP present.  Neurologic:  Alert and awake. Moving all extremities. Following commands. Making eye contact.  	  LABS:                        15.1   10.9  )-----------( 46       ( 26 Jun 2018 06:36 )             48.5     06-26    140  |  93<L>  |  19  ----------------------------<  96  3.9   |  38<H>  |  0.52    Ca    9.3      26 Jun 2018 06:36  Mg     2.1     06-26      PT/INR - ( 25 Jun 2018 20:39 )   PT: 10.4 sec;   INR: 0.94          PTT - ( 26 Jun 2018 12:41 )  PTT:72.2 sec      RADIOLOGY & ADDITIONAL TESTS:  Reviewed .    MEDICATIONS  (STANDING):  ALBUTerol/ipratropium for Nebulization 3 milliLiter(s) Nebulizer every 4 hours  ALPRAZolam 0.5 milliGRAM(s) Oral two times a day  dextrose 5%. 1000 milliLiter(s) (50 mL/Hr) IV Continuous <Continuous>  dextrose 50% Injectable 12.5 Gram(s) IV Push once  dextrose 50% Injectable 25 Gram(s) IV Push once  dextrose 50% Injectable 25 Gram(s) IV Push once  heparin  Infusion 1200 Unit(s)/Hr (12 mL/Hr) IV Continuous <Continuous>  insulin lispro (HumaLOG) corrective regimen sliding scale   SubCutaneous Before meals and at bedtime  lamoTRIgine 100 milliGRAM(s) Oral daily  polyethylene glycol 3350 17 Gram(s) Oral daily  senna 2 Tablet(s) Oral at bedtime  tiotropium 2.5 MICROgram(s)/olodaterol 2.5 MICROgram(s) Inhaler 2 Puff(s) Inhalation daily  traZODone      traZODone 100 milliGRAM(s) Oral at bedtime  venlafaxine XR. 75 milliGRAM(s) Oral daily    MEDICATIONS  (PRN):  acetaminophen   Tablet. 975 milliGRAM(s) Oral every 8 hours PRN Moderate Pain (4 - 6)  bisacodyl Suppository 10 milliGRAM(s) Rectal daily PRN Constipation  dextrose 40% Gel 15 Gram(s) Oral once PRN Blood Glucose LESS THAN 70 milliGRAM(s)/deciliter  glucagon  Injectable 1 milliGRAM(s) IntraMuscular once PRN Glucose LESS THAN 70 milligrams/deciliter  HYDROmorphone   Tablet 4 milliGRAM(s) Oral every 6 hours PRN Severe Pain (7 - 10)  HYDROmorphone   Tablet 2 milliGRAM(s) Oral every 6 hours PRN for breakthru pain      ASSESSMENT:    PLAN:

## 2018-06-26 NOTE — CONSULT NOTE ADULT - SUBJECTIVE AND OBJECTIVE BOX
Hematology Oncology Consult Note (Dr. Costello )    67F with COPD, tobacco dependence, Iron deficiency anemia, bipolar, thrombocytopenia requiring Nplate (Romiplostim) multiple knee surgeries and complications resulting in chronic pain syndrome, recent hospital admissions for cellulitis in March and PNA in Jan, who presented with 1 week of progressively worsening lower leg pain and 24h of worsening SOB.  Patient was initially hypoxic to 80s in ED, was placed on NC and improved to 89%. CTA was unable to be performed due to contrast allergy but patient was started on therapeutic Lovenox. 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 Recent hospital admissions for cellulitis in March and PNA in Jan. In the ED T98.8, , /73, RR17 Q1Beu97%. She was given 125mg Solumedrol, Duonebs, Ceftriaxone, Azithromycin, Vancomycin, Lovenox, Morphine, and Zofran. Patient w/ hx of negative mammograms and colonoscopy -per patient.       PAST MEDICAL & SURGICAL HISTORY:  COPD (chronic obstructive pulmonary disease)  Pneumonia  Thrombocytopenia  Cellulitis  S/P insertion of spinal cord stimulator  History of left knee replacement      Allergies:  iodine (Anaphylaxis)      Medications: MEDICATIONS  (STANDING):  ALBUTerol/ipratropium for Nebulization 3 milliLiter(s) Nebulizer every 4 hours  ALPRAZolam 0.5 milliGRAM(s) Oral two times a day  dextrose 5%. 1000 milliLiter(s) (50 mL/Hr) IV Continuous <Continuous>  dextrose 50% Injectable 12.5 Gram(s) IV Push once  dextrose 50% Injectable 25 Gram(s) IV Push once  dextrose 50% Injectable 25 Gram(s) IV Push once  heparin  Infusion 1200 Unit(s)/Hr (12 mL/Hr) IV Continuous <Continuous>  insulin lispro (HumaLOG) corrective regimen sliding scale   SubCutaneous Before meals and at bedtime  lamoTRIgine 100 milliGRAM(s) Oral daily  polyethylene glycol 3350 17 Gram(s) Oral daily  senna 2 Tablet(s) Oral at bedtime  tiotropium 2.5 MICROgram(s)/olodaterol 2.5 MICROgram(s) Inhaler 2 Puff(s) Inhalation daily  traZODone      traZODone 100 milliGRAM(s) Oral at bedtime  venlafaxine XR. 75 milliGRAM(s) Oral daily    MEDICATIONS  (PRN):  acetaminophen   Tablet. 975 milliGRAM(s) Oral every 8 hours PRN Moderate Pain (4 - 6)  bisacodyl Suppository 10 milliGRAM(s) Rectal daily PRN Constipation  dextrose 40% Gel 15 Gram(s) Oral once PRN Blood Glucose LESS THAN 70 milliGRAM(s)/deciliter  glucagon  Injectable 1 milliGRAM(s) IntraMuscular once PRN Glucose LESS THAN 70 milligrams/deciliter  HYDROmorphone   Tablet 4 milliGRAM(s) Oral every 6 hours PRN Severe Pain (7 - 10)  HYDROmorphone   Tablet 2 milliGRAM(s) Oral every 6 hours PRN for breakthru pain    heparin  Infusion 1200 Unit(s)/Hr IV Continuous <Continuous>        Social History: Current Smoker, uses e-cig currently  	Former EtOH, 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  Denies toxic substances     FAMILY HISTORY:  Niece breast cancer, brother lung cancer       PHYSICAL EXAM:    T(F): 98.7 (06-26-18 @ 13:41), Max: 100.1 (06-26-18 @ 08:58)  HR: 96 (06-26-18 @ 12:00) (82 - 106)  BP: 142/96 (06-26-18 @ 12:00) (112/59 - 156/80)  RR: 20 (06-26-18 @ 12:00) (12 - 21)  SpO2: 92% (06-26-18 @ 12:00) (90% - 97%)  Wt(kg): --    Daily     Daily     GEN: NAD   HEENT: AT/NC, EOMI, no oral lesions  NECK: supple  CVS: +S1S2 rrr  LUNG: CTA BL  ABD: +BS, NT  : no dimitri tenderness  EXT: mild tenderness b/, no edema  NEURO: AAox3         Labs:                          15.1   10.9  )-----------( 46       ( 26 Jun 2018 06:36 )             48.5     CBC Full  -  ( 26 Jun 2018 06:36 )  WBC Count : 10.9 K/uL  Hemoglobin : 15.1 g/dL  Hematocrit : 48.5 %  Platelet Count - Automated : 46 K/uL  Mean Cell Volume : 87.5 fL  Mean Cell Hemoglobin : 27.3 pg  Mean Cell Hemoglobin Concentration : 31.1 g/dL  Auto Neutrophil # : x  Auto Lymphocyte # : x  Auto Monocyte # : x  Auto Eosinophil # : x  Auto Basophil # : x  Auto Neutrophil % : x  Auto Lymphocyte % : x  Auto Monocyte % : x  Auto Eosinophil % : x  Auto Basophil % : x    PT/INR - ( 25 Jun 2018 20:39 )   PT: 10.4 sec;   INR: 0.94          PTT - ( 26 Jun 2018 12:41 )  PTT:72.2 sec    06-26    140  |  93<L>  |  19  ----------------------------<  96  3.9   |  38<H>  |  0.52    Ca    9.3      26 Jun 2018 06:36  Mg     2.1     06-26        Echo w/ bubble study - Interpretation Summary  Limited TTE with bubbles to assess for an inter-atrial shunt. Agitated   saline   bubbles reveals bubbles in the left heart, consistent with a large patent   foramen ovale with right to left shunting.  There is moderate pulmonary   hypertension.The pulmonary artery systolic pressure is estimated to be   57 mmHg.  Procedure Details  A two-dimensional transthoracic echocardiogram with color flow and Doppler  was performed in limited views only.  Study Quality: Good.  Contrast injection with agilated saline performed  Left Atrium  Agitated saline bubbles reveals bubbles in the left heart, consistent   with a  large patent foramen ovale with right to left shunting.  Tricuspid Valve  Structurally normal tricuspid valve.  There is moderate pulmonary hypertension.  The pulmonary artery systolic pressure is estimated to be 57 mmHg.  Pericardium / Pleura  There is no pericardial effusion.      Lower Ext U/S -   IMPRESSION:  No deep vein thrombosis seen.    Bilateral calf edema.      CXR -   IMPRESSION: Mild pulmonary vascular congestion.      VQ -   Findings:  No perfusion defects were seen in either lung    Impression: Normal perfusion lung scan.  There is no evidence of   pulmonary embolism.        CT Chest non con  IMPRESSION:    1. 14 mm nodule in the right upper lobe. Further evaluation with PET/CT,   CT, or tissue sampling is recommended. If prior outside studies available   for comparison that flow be of help.  2. Small pericardial effusion.  3. Mildly dilated main pulmonary artery suggestive of pulmonary   hypertension.

## 2018-06-26 NOTE — CONSULT NOTE ADULT - ASSESSMENT
67F with COPD, tobacco dependence, bipolar, thrombocytopenia requiring Nplate (Romiplostim) last admission in March for cellulitis, presenting now w/ COPD exacerbation w/ cor pulmonale, pulm HTN with progressive thrombocytopenia.       Peripheral smear reviewed - polysegmented neutrophils present, few hypersegmented neutrophils, red cells homogenous, no fragmented cells present, notable large platelets seen *** c/w plt destructive process, roughly 5 per HPF correlating to  platelet count     #Thrombocytopenia - history of ITP per patient & documentation, refractory to steroids, unclear if patient received IVIG/Rituximab previously, patient denies splenectomy and admits to therapy w/ Nplate, patient states she has received at least 8 doses in the past, non within the past 3 weeks. At this time no additional cytopenias present, no concern for TMA, normal renal function, and bilirubin. Clinically no signs of sepsis or significant coagulopathy, less likely antiphospholipid syndrome. Upon this admission patient refractory to steroids w/ downtrending platelets (given for resp failure). No signs of gross bleeding, ecchymosis on extremities.     -autoimmune w/u including MARISA, RF, SLE w/u   -hepatitis panel, HIV testing  -exposure to abx, including vancomycin - obtain misael test   -obtain B12, folate, iron levels (including ferritin)   -dedicated ultrasound/CT of abdomen to assess for splenomegaly  -given clinical concern for thrombosis likely component of consumption (elevated D-dimer) (no previous hx of thrombosis & age >50, would hold off hypercoag w/u during acute setting)   -will obtain hematologic collateral -[facility] -[phone] (Dr. Garcia/Felix)  -send pf4 ab, intermediate HIT score   -no documented thrombosis however per primary team, however clinical concern, if AC considered agree w/ hep gtt rapid onset/offset of action, ideally would hold AC w/ plts <50, consider rate reduction, weight risk/benefit mortality of thrombosis vs. cns or major bleed     To be discussed w/ Dr. Costello 67F with COPD, tobacco dependence, bipolar, thrombocytopenia requiring Nplate (Romiplostim) last admission in March for cellulitis, presenting now w/ COPD exacerbation w/ cor pulmonale, pulm HTN with progressive thrombocytopenia.       Peripheral smear reviewed - polysegmented neutrophils present, few hypersegmented neutrophils, red cells homogenous, no fragmented cells present, notable large platelets seen *** c/w plt destructive process, roughly 5 per HPF correlating to  platelet count     #Thrombocytopenia - history of ITP per patient & documentation, refractory to steroids, unclear if patient received IVIG/Rituximab previously, patient denies splenectomy and admits to therapy w/ Nplate, patient states she has received at least 8 doses in the past, non within the past 3 weeks. At this time no additional cytopenias present, no concern for TMA, normal renal function, and bilirubin. Clinically no signs of sepsis or significant coagulopathy, less likely antiphospholipid syndrome. Upon this admission patient refractory to steroids w/ downtrending platelets (given for resp failure). No signs of gross bleeding, ecchymosis on extremities.     -autoimmune w/u including MARISA, RF, SLE w/u   -hepatitis panel, HIV testing  -exposure to abx, including vancomycin - obtain misael test   -obtain B12, folate, iron levels (including ferritin)   -dedicated ultrasound/CT of abdomen to assess for splenomegaly  -given clinical concern for thrombosis likely component of consumption (elevated D-dimer) (no previous hx of thrombosis & age >50, would hold off hypercoag w/u during acute setting)   -will obtain hematologic collateral -[facility] -[phone] (Dr. Garcia/Felix)  -send pf4 ab, intermediate HIT score       To be discussed w/ Dr. Costello 67F with COPD, tobacco dependence, bipolar, thrombocytopenia requiring Nplate (Romiplostim) last admission in March for cellulitis, presenting now w/ COPD exacerbation w/ cor pulmonale, pulm HTN with progressive thrombocytopenia.       Peripheral smear reviewed - polysegmented neutrophils present, few hypersegmented neutrophils, red cells homogenous, no fragmented cells present, notable large platelets seen *** c/w plt destructive process, roughly 5 per HPF correlating to  platelet count     #Thrombocytopenia - history of ITP per patient & documentation, refractory to steroids, unclear if patient received IVIG/Rituximab previously, patient denies splenectomy and admits to therapy w/ Nplate, patient states she has received at least 8 doses in the past, non within the past 3 weeks. At this time no additional cytopenias present, no concern for TMA, normal renal function, and bilirubin. Clinically no signs of sepsis or significant coagulopathy, less likely antiphospholipid syndrome. Upon this admission patient refractory to steroids w/ downtrending platelets (given for resp failure). No signs of gross bleeding, ecchymosis on extremities.     -autoimmune w/u including MARISA, RF, SLE w/u   -send fibrinogen/factor VIII  -hepatitis panel, HIV testing  -exposure to abx, including vancomycin - obtain misael test   -obtain B12, folate, iron levels (including ferritin)   -dedicated ultrasound/CT of abdomen to assess for splenomegaly  -given clinical concern for thrombosis likely component of consumption (elevated D-dimer) (no previous hx of thrombosis & age >50, would hold off hypercoag w/u during acute setting)   -will obtain hematologic collateral -[facility] -[phone] (Dr. Garcia/Felix)  -send pf4 ab, intermediate HIT score       To be discussed w/ Dr. Costello 67F with COPD, tobacco dependence, bipolar, thrombocytopenia requiring Nplate (Romiplostim) last admission in March for cellulitis, presenting now w/ COPD exacerbation w/ cor pulmonale, pulm HTN with progressive thrombocytopenia.       Peripheral smear reviewed - polysegmented neutrophils present, few hypersegmented neutrophils, red cells homogenous, no fragmented cells present, notable large platelets seen *** c/w plt destructive process, roughly 5 per HPF correlating to  platelet count     #Thrombocytopenia - history of ITP per patient & documentation, refractory to steroids, unclear if patient received IVIG/Rituximab previously, patient denies splenectomy and admits to therapy w/ Nplate, patient states she has received at least 8 doses in the past, non within the past 3 weeks. At this time no additional cytopenias present, no concern for TMA, normal renal function, and bilirubin. Clinically no signs of sepsis or significant coagulopathy, less likely antiphospholipid syndrome. Upon this admission patient refractory to steroids w/ downtrending platelets (given for resp failure). No signs of gross bleeding, ecchymosis on extremities.     -autoimmune w/u including MARISA, RF, SLE w/u   -send fibrinogen/factor VIII  -hepatitis panel, HIV testing  -exposure to abx, including vancomycin - obtain misael test   -obtain B12, folate, iron levels (including ferritin)   -dedicated ultrasound/CT of abdomen to assess for splenomegaly  -given clinical concern for thrombosis likely component of consumption (elevated D-dimer) (no previous hx of thrombosis & age >50, would hold off hypercoag w/u during acute setting)   -will obtain hematologic collateral -[facility] -[phone] (Dr. Garcia/Felix)  -send pf4 ab, intermediate HIT score   -CT c/w 1.4 cm pulm lesion ,(outpt planned for bx)      To be discussed w/ Dr. Costello 67F with COPD, tobacco dependence, bipolar, thrombocytopenia requiring Nplate (Romiplostim) last admission in March for cellulitis, presenting now w/ COPD exacerbation w/ cor pulmonale, pulm HTN with progressive thrombocytopenia.       Peripheral smear reviewed - polysegmented neutrophils present, few hypersegmented neutrophils, red cells homogenous, no fragmented cells present, notable large platelets seen *** c/w plt destructive process, roughly 5 per HPF correlating to  platelet count     #Thrombocytopenia - history of ITP per patient & documentation, refractory to steroids, unclear if patient received IVIG/Rituximab previously, patient denies splenectomy and admits to therapy w/ Nplate, patient states she has received at least 8 doses in the past, non within the past 3 weeks. At this time no additional cytopenias present, no concern for TMA, normal renal function, and bilirubin. Clinically no signs of sepsis or significant coagulopathy, less likely antiphospholipid syndrome. Upon this admission patient refractory to steroids w/ downtrending platelets (given for resp failure). No signs of gross bleeding, ecchymosis on extremities.     -autoimmune w/u including MARISA, RF, SLE w/u   -send fibrinogen/factor VIII, ESR/CRP  -hepatitis panel, HIV testing  -exposure to abx, including vancomycin - obtain misael test   -obtain B12, folate, iron levels (including ferritin)   -dedicated ultrasound/CT of abdomen to assess for splenomegaly  -given clinical concern for thrombosis likely component of consumption (elevated D-dimer) (no previous hx of thrombosis & age >50, would hold off hypercoag w/u during acute setting)   -will obtain hematologic collateral -[facility] -[phone] (Dr. Garcia/Felix)  -send pf4 ab, intermediate HIT score   -CT c/w 1.4 cm pulm lesion ,(outpt planned for bx)      To be discussed w/ Dr. Costello

## 2018-06-27 LAB
ANION GAP SERPL CALC-SCNC: 9 MMOL/L — SIGNIFICANT CHANGE UP (ref 5–17)
BUN SERPL-MCNC: 29 MG/DL — HIGH (ref 7–23)
CALCIUM SERPL-MCNC: 9.2 MG/DL — SIGNIFICANT CHANGE UP (ref 8.4–10.5)
CHLORIDE SERPL-SCNC: 96 MMOL/L — SIGNIFICANT CHANGE UP (ref 96–108)
CO2 SERPL-SCNC: 34 MMOL/L — HIGH (ref 22–31)
CREAT SERPL-MCNC: 0.63 MG/DL — SIGNIFICANT CHANGE UP (ref 0.5–1.3)
CULTURE RESULTS: SIGNIFICANT CHANGE UP
CULTURE RESULTS: SIGNIFICANT CHANGE UP
FACT VIII ACT/NOR PPP: 178 % — HIGH (ref 51–138)
FOLATE SERPL-MCNC: 17.5 NG/ML — SIGNIFICANT CHANGE UP
GLUCOSE BLDC GLUCOMTR-MCNC: 104 MG/DL — HIGH (ref 70–99)
GLUCOSE BLDC GLUCOMTR-MCNC: 117 MG/DL — HIGH (ref 70–99)
GLUCOSE BLDC GLUCOMTR-MCNC: 126 MG/DL — HIGH (ref 70–99)
GLUCOSE BLDC GLUCOMTR-MCNC: 149 MG/DL — HIGH (ref 70–99)
GLUCOSE SERPL-MCNC: 117 MG/DL — HIGH (ref 70–99)
HCT VFR BLD CALC: 46.6 % — HIGH (ref 34.5–45)
HGB BLD-MCNC: 14.3 G/DL — SIGNIFICANT CHANGE UP (ref 11.5–15.5)
MAGNESIUM SERPL-MCNC: 2.2 MG/DL — SIGNIFICANT CHANGE UP (ref 1.6–2.6)
MCHC RBC-ENTMCNC: 27.3 PG — SIGNIFICANT CHANGE UP (ref 27–34)
MCHC RBC-ENTMCNC: 30.7 G/DL — LOW (ref 32–36)
MCV RBC AUTO: 88.9 FL — SIGNIFICANT CHANGE UP (ref 80–100)
PLATELET # BLD AUTO: 31 K/UL — LOW (ref 150–400)
POTASSIUM SERPL-MCNC: 4.4 MMOL/L — SIGNIFICANT CHANGE UP (ref 3.5–5.3)
POTASSIUM SERPL-SCNC: 4.4 MMOL/L — SIGNIFICANT CHANGE UP (ref 3.5–5.3)
RBC # BLD: 5.24 M/UL — HIGH (ref 3.8–5.2)
RBC # FLD: 15.5 % — SIGNIFICANT CHANGE UP (ref 10.3–16.9)
SODIUM SERPL-SCNC: 139 MMOL/L — SIGNIFICANT CHANGE UP (ref 135–145)
SPECIMEN SOURCE: SIGNIFICANT CHANGE UP
SPECIMEN SOURCE: SIGNIFICANT CHANGE UP
VIT B12 SERPL-MCNC: 1118 PG/ML — SIGNIFICANT CHANGE UP (ref 232–1245)
WBC # BLD: 8.4 K/UL — SIGNIFICANT CHANGE UP (ref 3.8–10.5)
WBC # FLD AUTO: 8.4 K/UL — SIGNIFICANT CHANGE UP (ref 3.8–10.5)

## 2018-06-27 PROCEDURE — 99232 SBSQ HOSP IP/OBS MODERATE 35: CPT

## 2018-06-27 PROCEDURE — 99233 SBSQ HOSP IP/OBS HIGH 50: CPT | Mod: GC

## 2018-06-27 RX ORDER — FUROSEMIDE 40 MG
40 TABLET ORAL DAILY
Qty: 0 | Refills: 0 | Status: DISCONTINUED | OUTPATIENT
Start: 2018-06-28 | End: 2018-07-02

## 2018-06-27 RX ORDER — DEXAMETHASONE 0.5 MG/5ML
40 ELIXIR ORAL EVERY 24 HOURS
Qty: 0 | Refills: 0 | Status: DISCONTINUED | OUTPATIENT
Start: 2018-06-27 | End: 2018-06-28

## 2018-06-27 RX ADMIN — Medication 3 MILLILITER(S): at 22:59

## 2018-06-27 RX ADMIN — LAMOTRIGINE 100 MILLIGRAM(S): 25 TABLET, ORALLY DISINTEGRATING ORAL at 12:17

## 2018-06-27 RX ADMIN — Medication 0.5 MILLIGRAM(S): at 05:58

## 2018-06-27 RX ADMIN — Medication 100 MILLIGRAM(S): at 23:00

## 2018-06-27 RX ADMIN — POLYETHYLENE GLYCOL 3350 17 GRAM(S): 17 POWDER, FOR SOLUTION ORAL at 12:17

## 2018-06-27 RX ADMIN — Medication 3 MILLILITER(S): at 11:17

## 2018-06-27 RX ADMIN — SENNA PLUS 2 TABLET(S): 8.6 TABLET ORAL at 23:00

## 2018-06-27 RX ADMIN — HYDROMORPHONE HYDROCHLORIDE 4 MILLIGRAM(S): 2 INJECTION INTRAMUSCULAR; INTRAVENOUS; SUBCUTANEOUS at 14:00

## 2018-06-27 RX ADMIN — Medication 3 MILLILITER(S): at 18:51

## 2018-06-27 RX ADMIN — Medication 3 MILLILITER(S): at 19:22

## 2018-06-27 RX ADMIN — TIOTROPIUM BROMIDE AND OLODATEROL 2 PUFF(S): 3.124; 2.736 SPRAY, METERED RESPIRATORY (INHALATION) at 12:15

## 2018-06-27 RX ADMIN — Medication 75 MILLIGRAM(S): at 12:17

## 2018-06-27 RX ADMIN — Medication 0.5 MILLIGRAM(S): at 19:23

## 2018-06-27 RX ADMIN — Medication 120 MILLIGRAM(S): at 19:43

## 2018-06-27 NOTE — DIETITIAN INITIAL EVALUATION ADULT. - PROBLEM SELECTOR PLAN 1
-V/Q Scan in morning  -Pre-medicate with Benadryl and Steroids for possible need for CTA  -Will give Lovenox 80mg BID to treat for suspected PE until proven otherwise  -Echocardiogram  -LE Doppler  -Maintain O2 Sat above 88%  -Continue Lasix as needed for edema, patient without crackles on exam making right sided heart failure more likely than CHF

## 2018-06-27 NOTE — DIETITIAN INITIAL EVALUATION ADULT. - ENERGY NEEDS
Height 64"; #; #; 153%IBW  BMI 31.4  Ideal body weight used for calculations as pt >120% of IBW.   Needs estimated for maintenance in older adults; fluids per team

## 2018-06-27 NOTE — PROGRESS NOTE ADULT - ASSESSMENT
67 year old Female with COPD, Iron deficiency anemia, thrombocytopenia (ITP) requiring Nplate in which patient stopped taking 2/2 insurance issues, multiple knee surgeries and complications resulting in chronic pain syndrome, who presented with progressive SOB, dyspnea on exertion over last few weeks with accompanied chest pressure and lower extremity swelling and pain. Patient did not have adequate VQ scan secondary to not being able to tolerate full ventilation component. However, repeat VQ on 6/26/18 suggested normal perfusion with grossly irregular ventilation.  Echo showing severely dilated right ventricle and dilated right atrium, moderate pulmonary hypertension with pulmonary artery systolic pressure estimated to be 57mmHg, and a large patent foramen ovale.

## 2018-06-27 NOTE — PROGRESS NOTE ADULT - PROBLEM SELECTOR PLAN 1
Case discussed with Dr. Horta  - Bubble study positive for PFO with right to left shunt.   - RHC needed to further work up pulmonary hypertension and to provide further recs.  Consider pre-medicating with steroids and Benadryl prior to test, consider allergy consult. Would not do RHC until medically optimized from a heme and pulm standpoint.  - Will continue to follow. Please feel free to call [phone] with any further questions.  - Recommending patient to follow up with Dr. Horta outpatient within 1-2 weeks of discharge. Please call our office at [phone] to schedule an appointment.

## 2018-06-27 NOTE — PROGRESS NOTE ADULT - SUBJECTIVE AND OBJECTIVE BOX
Patient discussed on morning rounds with Dr. Horta    SUBJECTIVE ASSESSMENT:  67y Female seen and examined bedside. Patient states that her breathing feels improved from yesterday. Denies chest pain, palpitations, hemopytsis, N/V/D, abdominal pain, dizziness, fever or chills. (Note from 6/27/18 due to Sunrise down).      Vital Signs Last 24 Hrs  T(C): 37.2 (27 Jun 2018 14:00), Max: 37.3 (27 Jun 2018 05:58)  T(F): 98.9 (27 Jun 2018 14:00), Max: 99.1 (27 Jun 2018 05:58)  HR: 86 (27 Jun 2018 12:12) (84 - 104)  BP: 126/73 (27 Jun 2018 12:12) (121/86 - 157/90)  BP(mean): 93 (27 Jun 2018 12:12) (85 - 115)  RR: 15 (27 Jun 2018 12:12) (11 - 26)  SpO2: 98% (27 Jun 2018 12:12) (90% - 100%)  I&O's Detail    26 Jun 2018 07:01  -  27 Jun 2018 07:00  --------------------------------------------------------  IN:    heparin Infusion: 132 mL  Total IN: 132 mL    OUT:    Voided: 225 mL  Total OUT: 225 mL    Total NET: -93 mL          PHYSICAL EXAM:    CONSTITUTIONAL: Patient lying in bed, with nasal canula, no acute distress.  NEURO: A&Ox3, no focal deficits.                    CV: S1S2, RRR, no murmurs appreciated on exam  RESPIRATORY: Clear to auscultation b/l  GI: Abdomen soft, nontender, non distended  EXTREMITIES: +1 pitting peripheral edema, warm and well perfused, left knee with old healed scars, +calf tenderness to palpation. Peripheral pulses 2+ b/l. Strength 5/5 bilaterally lower and upper extremities.         LABS:                        14.3   8.4   )-----------( 31       ( 27 Jun 2018 07:44 )             46.6         PT/INR - ( 25 Jun 2018 20:39 )   PT: 10.4 sec;   INR: 0.94          PTT - ( 26 Jun 2018 20:50 )  PTT:33.4 sec    06-27    139  |  96  |  29<H>  ----------------------------<  117<H>  4.4   |  34<H>  |  0.63    Ca    9.2      27 Jun 2018 07:44  Mg     2.2     06-27            MEDICATIONS  (STANDING):  ALBUTerol/ipratropium for Nebulization 3 milliLiter(s) Nebulizer every 4 hours  ALPRAZolam 0.5 milliGRAM(s) Oral two times a day  dextrose 5%. 1000 milliLiter(s) (50 mL/Hr) IV Continuous <Continuous>  dextrose 50% Injectable 12.5 Gram(s) IV Push once  dextrose 50% Injectable 25 Gram(s) IV Push once  dextrose 50% Injectable 25 Gram(s) IV Push once  insulin lispro (HumaLOG) corrective regimen sliding scale   SubCutaneous Before meals and at bedtime  lamoTRIgine 100 milliGRAM(s) Oral daily  polyethylene glycol 3350 17 Gram(s) Oral daily  senna 2 Tablet(s) Oral at bedtime  tiotropium 2.5 MICROgram(s)/olodaterol 2.5 MICROgram(s) Inhaler 2 Puff(s) Inhalation daily  traZODone      traZODone 100 milliGRAM(s) Oral at bedtime  venlafaxine XR. 75 milliGRAM(s) Oral daily    MEDICATIONS  (PRN):  acetaminophen   Tablet. 975 milliGRAM(s) Oral every 8 hours PRN Moderate Pain (4 - 6)  bisacodyl Suppository 10 milliGRAM(s) Rectal daily PRN Constipation  dextrose 40% Gel 15 Gram(s) Oral once PRN Blood Glucose LESS THAN 70 milliGRAM(s)/deciliter  glucagon  Injectable 1 milliGRAM(s) IntraMuscular once PRN Glucose LESS THAN 70 milligrams/deciliter  HYDROmorphone   Tablet 4 milliGRAM(s) Oral every 6 hours PRN Severe Pain (7 - 10)  HYDROmorphone   Tablet 2 milliGRAM(s) Oral every 6 hours PRN for breakthru pain        RADIOLOGY & ADDITIONAL TESTS:    < from: NM Pulmonary Ventilation/Perfusion Scan (06.26.18 @ 18:36) >  EXAM:  NM PULM VENTILATION PERFUS IMG                          PROCEDURE DATE:  06/26/2018          INTERPRETATION:    VENTILATION - PERFUSION LUNG SCAN    Indication: Shortness of breath; hypoxia.    Procedure:  The patient inhaled technetium 99m labeled DTPA aerosol from   a reservoir containing approximately 30 mCi and SPECT imaging was   performed with reconstruction in the coronal, sagittal and axial planes.    The patient was then injected with 4 mCi of technetium 99m labeled   macroaggregated albumin and SPECT imaging was repeated.  The images were   aligned using fusion software and analyzed concurrently.    Findings:  There is grossly irregular ventilation with large areas of   increased activity in the bronchial tree in large portions of both lungs,   particularly in the left upper lobe.    Perfusion imaging is normal.    The gross discrepancy between ventilation and perfusion is extremely   unusual since ventilatory disease normally creates matching defects with   vasoconstriction in the areas of poor ventilation.    The fact that this phenomenon does not occur in this patient raises the   possibility of a toxic reaction, possibly due to components of   e-cigarettes.    Impression: Normal perfusion with grossly irregular ventilation in parts   of both lungs. This phenomenon would explain hypoxia on the basis of   significant shunting of blood to areas of poor ventilation and raises the   possibility of a toxic reaction, possibly due to some component of   electronic cigarettes.        < end of copied text >      < from: Xray Chest 1 View- PORTABLE-Routine (06.23.18 @ 06:01) >    EXAM:  XR CHEST PORTABLE ROUTINE 1V                          PROCEDURE DATE:  06/23/2018          INTERPRETATION:  CLINICAL INFORMATION:  Shortness of Breath.    TECHNIQUE: A frontal view of the chest is dated 6/23/2018 6:01 AM.    COMPARISON:  Radiographs of the chest dated 6/21/2018    FINDINGS: There is mild pulmonary vascular congestion..  There is no   focal consolidation, pneumothorax, or pleural effusion.  Although   evaluation is limited on AP view, the cardiac silhouette appears   unchanged.      IMPRESSION: Mild pulmonary vascular congestion.        < end of copied text > Patient discussed on morning rounds with Dr. Horta    SUBJECTIVE ASSESSMENT:  67y Female seen and examined bedside. Patient states that her breathing feels improved from yesterday. Denies chest pain, palpitations, hemopytsis, N/V/D, abdominal pain, dizziness, fever or chills.      Vital Signs Last 24 Hrs  T(C): 37.2 (27 Jun 2018 14:00), Max: 37.3 (27 Jun 2018 05:58)  T(F): 98.9 (27 Jun 2018 14:00), Max: 99.1 (27 Jun 2018 05:58)  HR: 86 (27 Jun 2018 12:12) (84 - 104)  BP: 126/73 (27 Jun 2018 12:12) (121/86 - 157/90)  BP(mean): 93 (27 Jun 2018 12:12) (85 - 115)  RR: 15 (27 Jun 2018 12:12) (11 - 26)  SpO2: 98% (27 Jun 2018 12:12) (90% - 100%)  I&O's Detail    26 Jun 2018 07:01  -  27 Jun 2018 07:00  --------------------------------------------------------  IN:    heparin Infusion: 132 mL  Total IN: 132 mL    OUT:    Voided: 225 mL  Total OUT: 225 mL    Total NET: -93 mL          PHYSICAL EXAM:    CONSTITUTIONAL: Patient lying in bed, with nasal canula, no acute distress.  NEURO: A&Ox3, no focal deficits.                    CV: S1S2, RRR, no murmurs appreciated on exam  RESPIRATORY: Clear to auscultation b/l  GI: Abdomen soft, nontender, non distended  EXTREMITIES: +1 pitting peripheral edema, warm and well perfused, left knee with old healed scars, +calf tenderness to palpation. Peripheral pulses 2+ b/l. Strength 5/5 bilaterally lower and upper extremities.         LABS:                        14.3   8.4   )-----------( 31       ( 27 Jun 2018 07:44 )             46.6         PT/INR - ( 25 Jun 2018 20:39 )   PT: 10.4 sec;   INR: 0.94          PTT - ( 26 Jun 2018 20:50 )  PTT:33.4 sec    06-27    139  |  96  |  29<H>  ----------------------------<  117<H>  4.4   |  34<H>  |  0.63    Ca    9.2      27 Jun 2018 07:44  Mg     2.2     06-27            MEDICATIONS  (STANDING):  ALBUTerol/ipratropium for Nebulization 3 milliLiter(s) Nebulizer every 4 hours  ALPRAZolam 0.5 milliGRAM(s) Oral two times a day  dextrose 5%. 1000 milliLiter(s) (50 mL/Hr) IV Continuous <Continuous>  dextrose 50% Injectable 12.5 Gram(s) IV Push once  dextrose 50% Injectable 25 Gram(s) IV Push once  dextrose 50% Injectable 25 Gram(s) IV Push once  insulin lispro (HumaLOG) corrective regimen sliding scale   SubCutaneous Before meals and at bedtime  lamoTRIgine 100 milliGRAM(s) Oral daily  polyethylene glycol 3350 17 Gram(s) Oral daily  senna 2 Tablet(s) Oral at bedtime  tiotropium 2.5 MICROgram(s)/olodaterol 2.5 MICROgram(s) Inhaler 2 Puff(s) Inhalation daily  traZODone      traZODone 100 milliGRAM(s) Oral at bedtime  venlafaxine XR. 75 milliGRAM(s) Oral daily    MEDICATIONS  (PRN):  acetaminophen   Tablet. 975 milliGRAM(s) Oral every 8 hours PRN Moderate Pain (4 - 6)  bisacodyl Suppository 10 milliGRAM(s) Rectal daily PRN Constipation  dextrose 40% Gel 15 Gram(s) Oral once PRN Blood Glucose LESS THAN 70 milliGRAM(s)/deciliter  glucagon  Injectable 1 milliGRAM(s) IntraMuscular once PRN Glucose LESS THAN 70 milligrams/deciliter  HYDROmorphone   Tablet 4 milliGRAM(s) Oral every 6 hours PRN Severe Pain (7 - 10)  HYDROmorphone   Tablet 2 milliGRAM(s) Oral every 6 hours PRN for breakthru pain        RADIOLOGY & ADDITIONAL TESTS:    < from: NM Pulmonary Ventilation/Perfusion Scan (06.26.18 @ 18:36) >  EXAM:  NM PULM VENTILATION PERFUS IMG                          PROCEDURE DATE:  06/26/2018          INTERPRETATION:    VENTILATION - PERFUSION LUNG SCAN    Indication: Shortness of breath; hypoxia.    Procedure:  The patient inhaled technetium 99m labeled DTPA aerosol from   a reservoir containing approximately 30 mCi and SPECT imaging was   performed with reconstruction in the coronal, sagittal and axial planes.    The patient was then injected with 4 mCi of technetium 99m labeled   macroaggregated albumin and SPECT imaging was repeated.  The images were   aligned using fusion software and analyzed concurrently.    Findings:  There is grossly irregular ventilation with large areas of   increased activity in the bronchial tree in large portions of both lungs,   particularly in the left upper lobe.    Perfusion imaging is normal.    The gross discrepancy between ventilation and perfusion is extremely   unusual since ventilatory disease normally creates matching defects with   vasoconstriction in the areas of poor ventilation.    The fact that this phenomenon does not occur in this patient raises the   possibility of a toxic reaction, possibly due to components of   e-cigarettes.    Impression: Normal perfusion with grossly irregular ventilation in parts   of both lungs. This phenomenon would explain hypoxia on the basis of   significant shunting of blood to areas of poor ventilation and raises the   possibility of a toxic reaction, possibly due to some component of   electronic cigarettes.        < end of copied text >      < from: Xray Chest 1 View- PORTABLE-Routine (06.23.18 @ 06:01) >    EXAM:  XR CHEST PORTABLE ROUTINE 1V                          PROCEDURE DATE:  06/23/2018          INTERPRETATION:  CLINICAL INFORMATION:  Shortness of Breath.    TECHNIQUE: A frontal view of the chest is dated 6/23/2018 6:01 AM.    COMPARISON:  Radiographs of the chest dated 6/21/2018    FINDINGS: There is mild pulmonary vascular congestion..  There is no   focal consolidation, pneumothorax, or pleural effusion.  Although   evaluation is limited on AP view, the cardiac silhouette appears   unchanged.      IMPRESSION: Mild pulmonary vascular congestion.        < end of copied text >

## 2018-06-27 NOTE — PROGRESS NOTE ADULT - PROBLEM SELECTOR PLAN 1
P/w RHF with noted LE edema, abdominal distension and JVD on presentation s/p diuresis.  -Echocardiogram notable pulm HTN and RA, RV dilation and hypokinesis of the R free wall  -Lasix held in setting of rising bicarb and patient not significantly overloaded on exam.  -F/u Cardio recs. P/w RHF with noted LE edema, abdominal distension and JVD on presentation s/p diuresis.  -Echocardiogram notable pulm HTN and RA, RV dilation and hypokinesis of the R free wall  -Patient more euvolemic on exam-   -F/u Cardio recs. Found to have pulm HTN- 58mmHg- given work up with V/Q- perfusion WNL but with grossly irregular ventilation- more suggestive of pulmonary etiology. Echocardiogram notable for RA enlargement, severe RV dilation and free wall hypokinesis- based in on EKG findings, negative enzyme and no inferior wall involvement- makes RV infarct unlikely. Noted PFO on GURPREET with Right to left shunt..  - Pending RHC for which will be performed outpatient  - Will need home O2 and pending authorization/services

## 2018-06-27 NOTE — PROGRESS NOTE ADULT - SUBJECTIVE AND OBJECTIVE BOX
No acute complaints    PAST MEDICAL & SURGICAL HISTORY:  COPD (chronic obstructive pulmonary disease)  Pneumonia  Thrombocytopenia  Cellulitis  S/P insertion of spinal cord stimulator  History of left knee replacement    FAMILY HISTORY:  No pertinent family history in first degree relatives    Social:  Allergies    iodine (Anaphylaxis)    Intolerances    	  MEDICATIONS:      ALBUTerol/ipratropium for Nebulization 3 milliLiter(s) Nebulizer every 4 hours  tiotropium 2.5 MICROgram(s)/olodaterol 2.5 MICROgram(s) Inhaler 2 Puff(s) Inhalation daily    acetaminophen   Tablet. 975 milliGRAM(s) Oral every 8 hours PRN  ALPRAZolam 0.5 milliGRAM(s) Oral two times a day  HYDROmorphone   Tablet 4 milliGRAM(s) Oral every 6 hours PRN  HYDROmorphone   Tablet 2 milliGRAM(s) Oral every 6 hours PRN  lamoTRIgine 100 milliGRAM(s) Oral daily  traZODone      traZODone 100 milliGRAM(s) Oral at bedtime  venlafaxine XR. 75 milliGRAM(s) Oral daily    bisacodyl Suppository 10 milliGRAM(s) Rectal daily PRN  polyethylene glycol 3350 17 Gram(s) Oral daily  senna 2 Tablet(s) Oral at bedtime    dextrose 40% Gel 15 Gram(s) Oral once PRN  dextrose 50% Injectable 12.5 Gram(s) IV Push once  dextrose 50% Injectable 25 Gram(s) IV Push once  dextrose 50% Injectable 25 Gram(s) IV Push once  glucagon  Injectable 1 milliGRAM(s) IntraMuscular once PRN  insulin lispro (HumaLOG) corrective regimen sliding scale   SubCutaneous Before meals and at bedtime    dextrose 5%. 1000 milliLiter(s) IV Continuous <Continuous>            PHYSICAL EXAM:  T(C): 36.4 (06-27-18 @ 09:23), Max: 37.3 (06-27-18 @ 05:58)  HR: 104 (06-27-18 @ 08:16) (84 - 104)  BP: 157/90 (06-27-18 @ 08:16) (121/86 - 157/90)  RR: 12 (06-27-18 @ 10:06) (11 - 26)  SpO2: 97% (06-27-18 @ 10:06) (90% - 100%)  Wt(kg): --  I&O's Summary    26 Jun 2018 07:01  -  27 Jun 2018 07:00  --------------------------------------------------------  IN: 132 mL / OUT: 225 mL / NET: -93 mL          Gen: NAD, AAOx3  Neck: no JVD  Cardiovascular: Normal S1 S2, No murmurs,    Respiratory: Lungs clear to auscultation	  Gastrointestinal:  Soft, Non-tender, + BS	   Ext: + edema  Neuro: no focal deficits.  Vascular: Peripheral pulses palpable 2+ bilaterally      < from: Echocardiogram (06.22.18 @ 12:06) >  Study Quality: Good.  Left Ventricle  Normal left ventricular size and wall thickness.  Abnormal (paradoxical) septal motion consistent with elevated RV  end-diastolic pressure  The left ventricular ejection fraction is estimated to be 60-65%  Left Atrium  The mitral inflow pattern is consistent with impaired left ventricular  relaxation with mildly elevated left atrial pressure (8-14mmHg).  The left atrial size is normal.  Right Atrium  The right atrium is dilated.  Right Ventricle  The right ventricle is severely dilated.  The right ventricular free wall is hypokintic.  Aortic Valve  There is mild aortic valve thickening.  No aortic regurgitation noted.  Mitral Valve  Structurally normal mitral valve.  No mitral regurgitation noted.  Tricuspid Valve  Structurally normal tricuspid valve.  There is mild to moderate tricuspid regurgitation.  The pulmonary artery systolic pressure is estimated to be 58 mmHg.  There is moderate pulmonary hypertension.  Pulmonic Valve  Structurally normal pulmonic valve.  No pulmonic regurgitation noted.  Arteries and Venous System  No aortic root dilatation.  The IVC is dilated (>2.1 cm) however with normal inspiratory collapse   (>50%)  consistent with mildly elevated right atrial pressure (  8mmHg, range    < end of copied text >      LABS:	 	    CARDIAC MARKERS:                                  14.3   8.4   )-----------( 31       ( 27 Jun 2018 07:44 )             46.6     06-27    139  |  96  |  29<H>  ----------------------------<  117<H>  4.4   |  34<H>  |  0.63    Ca    9.2      27 Jun 2018 07:44  Mg     2.2     06-27        ASSESSMENT/PLAN: 	  67 y.o female pmh of COPD, current smoker, anemia, thrombocytopenia, chronic pain syndrome presents with progressive SOB with symptoms of Right sided heart failure    Right sided Heart Failure - Etiology likely 2/2 Primary lung pathology/Pulmonary Hypertension and less likely from RV infarct or left sided CHF as outlined in prior notes  Bubble study + for PFO with R to L shunt which is likely contributing to hypoxia.  However main focus should be on diagnosing etiology of elevated pulmonary pressures.  Acute PE should be ruled out prior to RHC.  Agree with Allergy consultation with possible history of allergy to contrast.  Recheck CXR in setting of hypoxemia, incentive spirometry and encourage patient to get out of bed.  V/Q Scan with normal perfusion but irregular ventilation.  Primary team to weigh in on differential for abnormality  Please obtain prior echo and radiographic imaging reports from outside hospital  Case discussed with Cardiology Attending and Primary team

## 2018-06-27 NOTE — PROGRESS NOTE ADULT - ASSESSMENT
67F with COPD, Iron deficiency anemia, thrombocytopenia requiring Nplate (Romiplostim) multiple knee surgeries and complications resulting in chronic pain syndrome, recent hospital admissions for cellulitis in March and PNA in Jan, who p/w LE edema and shortness of breath. Admitted to [facility] in a 67F with COPD, Iron deficiency anemia, thrombocytopenia requiring Nplate (Romiplostim) multiple knee surgeries and complications resulting in chronic pain syndrome, recent hospital admissions for cellulitis in March and PNA in Jan, who p/w LE edema and shortness of breath. Admitted to [facility] with noted RHF with noted pulmonary HTN. Noted PFO on echo and V/q without clot.

## 2018-06-27 NOTE — CHART NOTE - NSCHARTNOTEFT_GEN_A_CORE
PGY-1 Off service note    Hospital course PGY-1 Off service note    Hospital course    67F with COPD, Iron deficiency anemia, thrombocytopenia requiring Nplate multiple knee surgeries and complications resulting in chronic pain syndrome, recent hospital admissions for cellulitis in March and PNA in Jan, who presented with 1 week of progressively worsening lower leg pain and 24h of worsening SOB.  Patient was initially hypoxic to 80s in ED, was placed on NC and improved to 89%. CTA was unable to be performed due to contrast allergy but patient was started on Lovenox. 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 Other possible causes could be a Cardiomyopathy, but lack of physical exam findings in lung make it less likely. PGY-1 Off service note    Hospital course    67F with COPD, Iron deficiency anemia, thrombocytopenia requiring Nplate multiple knee surgeries and complications resulting in chronic pain syndrome, recent hospital admissions for cellulitis in March and PNA in Jan, who presented with 1 week of progressively worsening lower leg pain and 24h of worsening SOB.  Patient was initially hypoxic to 80s in ED, was placed on NC and improved to 89%. PGY-1 Off service note    Hospital course    67F with COPD, Iron deficiency anemia, thrombocytopenia requiring Nplate multiple knee surgeries and complications resulting in chronic pain syndrome presented with 1 week of progressively worsening lower leg pain and 24h of worsening SOB. Also noted pleuritic type chest pain intermittently. Patient feels over the last week the symptoms became progressively worse.  Pt reports she sleeps on 1 pillow, does not get SOB while laying completely flat. Patient was initially hypoxic to 80s in ED, was placed on NC and improved to 89%. Initial concern for PE for which CTA unable to be performed 2/2 to hx of allergy to contrast. Given Lovenox and admitted to [facility] for concern of hypoxic respiratory failure with noted RHF. Echocardiogram demonstrated preserved LV function, noted RV and RA dilation RV free wall hypokinesis.  Inferior wall is not found to be hypokinetic. In the setting of EKG without ischemic changes and neg cardiac enzymes low suspicion for ischemia. Noted on echocardiogram with bubble to have PFO. Patient undergoing work up for etiology of pulmonary hypertension/cor pulmonale. V/Q scan initially performed but patient unable to tolerate ventilation. Unable to allergy test for contrast given recent steroid use in setting of tx for COPD during course. Therefore V/Q repeated and noted perfusion intact but with ventilation irregularities- suggesting underlying pulmonary disease. Patient to have further work up with RHC. Patient also of note worked up for thrombocytopenia w/ heme/onc. 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 Undergoing autoimmune, HIT work up that is pending and initiated on decadron. Pending set up of home O2 and outpatient RHC.

## 2018-06-27 NOTE — DIETITIAN INITIAL EVALUATION ADULT. - OTHER INFO
68 yo/female with PMHx COPD, anemia, thrombocytopenia, current smoker, presents with LE pain and SOB. Admitted with COPD exacerbation vs. CHF. Negative for PE. Pt seen in room, awake, alert, pleasant. Endorses good appetite at home PTA; does not use salt, consumes lots of fruits. Currently w/good intake- ~75% intake per meal. No N/V/C/D reported at this time. Last BM 6/26. NKFA. No difficulty chewing or swallowing endorsed. No pain. Breathing comfortably on NC. Pending cardiac cath. Discussed DASH diet, pt understanding.

## 2018-06-27 NOTE — PROGRESS NOTE ADULT - PROBLEM SELECTOR PLAN 9
VTE: Hep gtt    DNR/DNI    Dispo:Admitted to [facility] for further management of RHF VTE: d/c hep gtt,     DNR/DNI    Dispo:Admitted to [facility] for further management of RHF and work up for pulmonary HTN. VTE: d/c hep gtt, Concern for HIT. SCDs.     DNR/DNI    Dispo:Admitted to [facility] for further management of RHF and work up for pulmonary HTN.

## 2018-06-27 NOTE — PROGRESS NOTE ADULT - SUBJECTIVE AND OBJECTIVE BOX
PROGRESS NOTE    CC:  Overnight Events:  Interval History:   ROS:    OBJECTIVE  Vitals:  T(C): 36.3 (06-27-18 @ 01:41), Max: 37.8 (06-26-18 @ 08:58)  HR: 104 (06-27-18 @ 04:15) (84 - 104)  BP: 151/69 (06-27-18 @ 04:15) (121/86 - 156/80)  RR: 11 (06-27-18 @ 04:15) (11 - 20)  SpO2: 94% (06-27-18 @ 04:15) (90% - 95%)  Wt(kg): --    I/O:  I&O's Summary    25 Jun 2018 07:01  -  26 Jun 2018 07:00  --------------------------------------------------------  IN: 110 mL / OUT: 600 mL / NET: -490 mL    26 Jun 2018 07:01  -  27 Jun 2018 05:48  --------------------------------------------------------  IN: 132 mL / OUT: 225 mL / NET: -93 mL        PHYSICAL EXAM:  Appearance: NAD. Speaking in full sentences.   HEENT: PERRL. No pallor noted.  Conjunctiva clear b/l. Moist oral mucosa.  Cardiovascular: RRR with no murmurs.  Respiratory: Lungs CTAB.   Gastrointestinal:  Soft, nontender. Non-distended. Non-rigid.	  Extremities: No edema b/l. No erythema b/l. LE WWP b/l.  Vascular: DP present.  Neurologic:  Alert and awake. Moving all extremities. Following commands. Making eye contact.  	  LABS:                        15.1   10.9  )-----------( 46       ( 26 Jun 2018 06:36 )             48.5     06-26    140  |  93<L>  |  19  ----------------------------<  96  3.9   |  38<H>  |  0.52    Ca    9.3      26 Jun 2018 06:36  Mg     2.1     06-26      PT/INR - ( 25 Jun 2018 20:39 )   PT: 10.4 sec;   INR: 0.94          PTT - ( 26 Jun 2018 20:50 )  PTT:33.4 sec      RADIOLOGY & ADDITIONAL TESTS:  Reviewed .    MEDICATIONS  (STANDING):  ALBUTerol/ipratropium for Nebulization 3 milliLiter(s) Nebulizer every 4 hours  ALPRAZolam 0.5 milliGRAM(s) Oral two times a day  dextrose 5%. 1000 milliLiter(s) (50 mL/Hr) IV Continuous <Continuous>  dextrose 50% Injectable 12.5 Gram(s) IV Push once  dextrose 50% Injectable 25 Gram(s) IV Push once  dextrose 50% Injectable 25 Gram(s) IV Push once  insulin lispro (HumaLOG) corrective regimen sliding scale   SubCutaneous Before meals and at bedtime  lamoTRIgine 100 milliGRAM(s) Oral daily  polyethylene glycol 3350 17 Gram(s) Oral daily  senna 2 Tablet(s) Oral at bedtime  tiotropium 2.5 MICROgram(s)/olodaterol 2.5 MICROgram(s) Inhaler 2 Puff(s) Inhalation daily  traZODone      traZODone 100 milliGRAM(s) Oral at bedtime  venlafaxine XR. 75 milliGRAM(s) Oral daily    MEDICATIONS  (PRN):  acetaminophen   Tablet. 975 milliGRAM(s) Oral every 8 hours PRN Moderate Pain (4 - 6)  bisacodyl Suppository 10 milliGRAM(s) Rectal daily PRN Constipation  dextrose 40% Gel 15 Gram(s) Oral once PRN Blood Glucose LESS THAN 70 milliGRAM(s)/deciliter  glucagon  Injectable 1 milliGRAM(s) IntraMuscular once PRN Glucose LESS THAN 70 milligrams/deciliter  HYDROmorphone   Tablet 4 milliGRAM(s) Oral every 6 hours PRN Severe Pain (7 - 10)  HYDROmorphone   Tablet 2 milliGRAM(s) Oral every 6 hours PRN for breakthru pain      ASSESSMENT:    PLAN: PROGRESS NOTE    CC: Shortness of breath, LE edema  Overnight Events: YUNIOR. Repeat V/Q performed.   Interval History:  Reports improved shortness of breath. REports some abdominal pain with deep breathing. Denies chest pain. Denies headaches, dizziness, nausea, vomiting.   ROS: As above.    OBJECTIVE  Vitals:  T(C): 36.3 (06-27-18 @ 01:41), Max: 37.8 (06-26-18 @ 08:58)  HR: 104 (06-27-18 @ 04:15) (84 - 104)  BP: 151/69 (06-27-18 @ 04:15) (121/86 - 156/80)  RR: 11 (06-27-18 @ 04:15) (11 - 20)  SpO2: 94% (06-27-18 @ 04:15) (90% - 95%)  Wt(kg): --    I/O:  I&O's Summary    25 Jun 2018 07:01  -  26 Jun 2018 07:00  --------------------------------------------------------  IN: 110 mL / OUT: 600 mL / NET: -490 mL    26 Jun 2018 07:01  -  27 Jun 2018 05:48  --------------------------------------------------------  IN: 132 mL / OUT: 225 mL / NET: -93 mL        PHYSICAL EXAM:  Appearance: NAD, no accessory muscle use. Speaking in full sentences.   HEENT: PERRL. No pallor noted.  Conjunctiva clear b/l. Moist oral mucosa.  Neck: JVP 6-8  Cardiovascular: Tachycardia with regular rhythm. S1, S2 with no murmurs.  Respiratory: Lungs CTAB.   Gastrointestinal:  Soft, nontender to palpation. Mild distension. Non-rigid.	  Extremities: Trace edema b/l. No erythema b/l. LE WWP b/l.  Vascular: DP present.  Neurologic:  Alert and awake. Moving all extremities. Following commands. Making eye contact.  	  LABS:                        15.1   10.9  )-----------( 46       ( 26 Jun 2018 06:36 )             48.5     06-26    140  |  93<L>  |  19  ----------------------------<  96  3.9   |  38<H>  |  0.52    Ca    9.3      26 Jun 2018 06:36  Mg     2.1     06-26      PT/INR - ( 25 Jun 2018 20:39 )   PT: 10.4 sec;   INR: 0.94          PTT - ( 26 Jun 2018 20:50 )  PTT:33.4 sec      RADIOLOGY & ADDITIONAL TESTS:  Reviewed .    MEDICATIONS  (STANDING):  ALBUTerol/ipratropium for Nebulization 3 milliLiter(s) Nebulizer every 4 hours  ALPRAZolam 0.5 milliGRAM(s) Oral two times a day  dextrose 5%. 1000 milliLiter(s) (50 mL/Hr) IV Continuous <Continuous>  dextrose 50% Injectable 12.5 Gram(s) IV Push once  dextrose 50% Injectable 25 Gram(s) IV Push once  dextrose 50% Injectable 25 Gram(s) IV Push once  insulin lispro (HumaLOG) corrective regimen sliding scale   SubCutaneous Before meals and at bedtime  lamoTRIgine 100 milliGRAM(s) Oral daily  polyethylene glycol 3350 17 Gram(s) Oral daily  senna 2 Tablet(s) Oral at bedtime  tiotropium 2.5 MICROgram(s)/olodaterol 2.5 MICROgram(s) Inhaler 2 Puff(s) Inhalation daily  traZODone      traZODone 100 milliGRAM(s) Oral at bedtime  venlafaxine XR. 75 milliGRAM(s) Oral daily    MEDICATIONS  (PRN):  acetaminophen   Tablet. 975 milliGRAM(s) Oral every 8 hours PRN Moderate Pain (4 - 6)  bisacodyl Suppository 10 milliGRAM(s) Rectal daily PRN Constipation  dextrose 40% Gel 15 Gram(s) Oral once PRN Blood Glucose LESS THAN 70 milliGRAM(s)/deciliter  glucagon  Injectable 1 milliGRAM(s) IntraMuscular once PRN Glucose LESS THAN 70 milligrams/deciliter  HYDROmorphone   Tablet 4 milliGRAM(s) Oral every 6 hours PRN Severe Pain (7 - 10)  HYDROmorphone   Tablet 2 milliGRAM(s) Oral every 6 hours PRN for breakthru pain

## 2018-06-27 NOTE — PROGRESS NOTE ADULT - PROBLEM SELECTOR PLAN 2
p/w shortness of breath.  V/Q Scan negative however suboptimal exam as patient unable to complete perfusion portion of study. LE dopplers negative. Concern for PFO on echocardiogram. Pending repeat V/Q.   -Echocardiogram showing pulm HTN and RA, RV dilation and hypokinesis of the R free wall  -Currently on NC, c/w monitoring respiratory status.   -Holding lasix in setting of rising bicarb and not currently appearing overloaded.   -As above, cardio following in setting of R side heart failure, but likely pulmonary etiology P/w RHF with noted LE edema, abdominal distension and JVD on presentation s/p diuresis.  -Echocardiogram notable pulm HTN and RA, RV dilation and hypokinesis of the R free wall  -Patient more euvolemic on exam- lasix remains held and bicarb downtrending- at 34 today (From 38 yesterday) will initiate PO before discharge  -F/u Cardio recs.  - Pending RHC, discussed with structural heart for which discussion if patient needs CTA in setting of negative V/Q for PE, also will need optimization for thrombocytopenia.

## 2018-06-27 NOTE — PROGRESS NOTE ADULT - PROBLEM SELECTOR PLAN 4
Noted thrombocytopenia for which heme/ onc consulted. Smear with large platelets consistent with platelet destructive process. Heme/ onc recommending autoimmune and HIT work up.  -f/u heme/onc recs and work up Noted thrombocytopenia for which heme/ onc consulted. Smear with large platelets consistent with platelet destructive process- noted hx of ITP for which has been refractory to steroids, IVIG/rituximab- has been on Nplate at home. Plt at 31 that has remained stable.  -f/u heme/onc recs and work up Noted thrombocytopenia for which heme/ onc consulted. Smear with large platelets consistent with platelet destructive process- noted hx of ITP for which has been refractory to steroids, IVIG/rituximab- has been on Nplate at home. Plt at 31 that has remained stable. hep held in setting of r/o hit and autoimmune work up.  -f/u heme/onc recs and work up with autoimmune, HIT work up

## 2018-06-28 DIAGNOSIS — I50.811 ACUTE RIGHT HEART FAILURE: ICD-10-CM

## 2018-06-28 LAB
ANION GAP SERPL CALC-SCNC: 11 MMOL/L — SIGNIFICANT CHANGE UP (ref 5–17)
BUN SERPL-MCNC: 21 MG/DL — SIGNIFICANT CHANGE UP (ref 7–23)
CALCIUM SERPL-MCNC: 9.8 MG/DL — SIGNIFICANT CHANGE UP (ref 8.4–10.5)
CHLORIDE SERPL-SCNC: 95 MMOL/L — LOW (ref 96–108)
CO2 SERPL-SCNC: 31 MMOL/L — SIGNIFICANT CHANGE UP (ref 22–31)
CREAT SERPL-MCNC: 0.46 MG/DL — LOW (ref 0.5–1.3)
DSDNA AB SER-ACNC: <12 IU/ML — SIGNIFICANT CHANGE UP
GLUCOSE BLDC GLUCOMTR-MCNC: 150 MG/DL — HIGH (ref 70–99)
GLUCOSE BLDC GLUCOMTR-MCNC: 166 MG/DL — HIGH (ref 70–99)
GLUCOSE BLDC GLUCOMTR-MCNC: 192 MG/DL — HIGH (ref 70–99)
GLUCOSE BLDC GLUCOMTR-MCNC: 85 MG/DL — SIGNIFICANT CHANGE UP (ref 70–99)
GLUCOSE SERPL-MCNC: 178 MG/DL — HIGH (ref 70–99)
HCT VFR BLD CALC: 46.6 % — HIGH (ref 34.5–45)
HGB BLD-MCNC: 14.4 G/DL — SIGNIFICANT CHANGE UP (ref 11.5–15.5)
MAGNESIUM SERPL-MCNC: 2.1 MG/DL — SIGNIFICANT CHANGE UP (ref 1.6–2.6)
MCHC RBC-ENTMCNC: 27.1 PG — SIGNIFICANT CHANGE UP (ref 27–34)
MCHC RBC-ENTMCNC: 30.9 G/DL — LOW (ref 32–36)
MCV RBC AUTO: 87.6 FL — SIGNIFICANT CHANGE UP (ref 80–100)
PF4 HEPARIN CMPLX AB SER-ACNC: NEGATIVE — SIGNIFICANT CHANGE UP
PF4 HEPARIN CMPLX AB SERPL QL IA: 0.12 ABS — SIGNIFICANT CHANGE UP
PLATELET # BLD AUTO: 39 K/UL — LOW (ref 150–400)
POTASSIUM SERPL-MCNC: 4.9 MMOL/L — SIGNIFICANT CHANGE UP (ref 3.5–5.3)
POTASSIUM SERPL-SCNC: 4.9 MMOL/L — SIGNIFICANT CHANGE UP (ref 3.5–5.3)
RBC # BLD: 5.32 M/UL — HIGH (ref 3.8–5.2)
RBC # FLD: 15.2 % — SIGNIFICANT CHANGE UP (ref 10.3–16.9)
SODIUM SERPL-SCNC: 137 MMOL/L — SIGNIFICANT CHANGE UP (ref 135–145)
WBC # BLD: 6.8 K/UL — SIGNIFICANT CHANGE UP (ref 3.8–10.5)
WBC # FLD AUTO: 6.8 K/UL — SIGNIFICANT CHANGE UP (ref 3.8–10.5)

## 2018-06-28 PROCEDURE — 94010 BREATHING CAPACITY TEST: CPT | Mod: 26

## 2018-06-28 PROCEDURE — 99232 SBSQ HOSP IP/OBS MODERATE 35: CPT

## 2018-06-28 PROCEDURE — 76700 US EXAM ABDOM COMPLETE: CPT | Mod: 26

## 2018-06-28 PROCEDURE — 99233 SBSQ HOSP IP/OBS HIGH 50: CPT | Mod: GC

## 2018-06-28 RX ORDER — DEXAMETHASONE 0.5 MG/5ML
40 ELIXIR ORAL EVERY 24 HOURS
Qty: 0 | Refills: 0 | Status: COMPLETED | OUTPATIENT
Start: 2018-06-28 | End: 2018-06-30

## 2018-06-28 RX ADMIN — LAMOTRIGINE 100 MILLIGRAM(S): 25 TABLET, ORALLY DISINTEGRATING ORAL at 12:09

## 2018-06-28 RX ADMIN — Medication 0.5 MILLIGRAM(S): at 07:18

## 2018-06-28 RX ADMIN — Medication 3 MILLILITER(S): at 16:17

## 2018-06-28 RX ADMIN — Medication 40 MILLIGRAM(S): at 05:27

## 2018-06-28 RX ADMIN — Medication 2: at 11:50

## 2018-06-28 RX ADMIN — Medication 3 MILLILITER(S): at 21:28

## 2018-06-28 RX ADMIN — HYDROMORPHONE HYDROCHLORIDE 2 MILLIGRAM(S): 2 INJECTION INTRAMUSCULAR; INTRAVENOUS; SUBCUTANEOUS at 05:27

## 2018-06-28 RX ADMIN — Medication 3 MILLILITER(S): at 09:37

## 2018-06-28 RX ADMIN — Medication 2: at 07:17

## 2018-06-28 RX ADMIN — Medication 75 MILLIGRAM(S): at 12:09

## 2018-06-28 RX ADMIN — Medication 3 MILLILITER(S): at 05:27

## 2018-06-28 RX ADMIN — Medication 100 MILLIGRAM(S): at 21:28

## 2018-06-28 RX ADMIN — Medication 0.5 MILLIGRAM(S): at 18:43

## 2018-06-28 RX ADMIN — HYDROMORPHONE HYDROCHLORIDE 4 MILLIGRAM(S): 2 INJECTION INTRAMUSCULAR; INTRAVENOUS; SUBCUTANEOUS at 20:01

## 2018-06-28 RX ADMIN — POLYETHYLENE GLYCOL 3350 17 GRAM(S): 17 POWDER, FOR SOLUTION ORAL at 12:09

## 2018-06-28 RX ADMIN — SENNA PLUS 2 TABLET(S): 8.6 TABLET ORAL at 21:28

## 2018-06-28 RX ADMIN — Medication 3 MILLILITER(S): at 18:43

## 2018-06-28 RX ADMIN — TIOTROPIUM BROMIDE AND OLODATEROL 2 PUFF(S): 3.124; 2.736 SPRAY, METERED RESPIRATORY (INHALATION) at 16:16

## 2018-06-28 RX ADMIN — Medication 40 MILLIGRAM(S): at 18:42

## 2018-06-28 RX ADMIN — HYDROMORPHONE HYDROCHLORIDE 4 MILLIGRAM(S): 2 INJECTION INTRAMUSCULAR; INTRAVENOUS; SUBCUTANEOUS at 15:32

## 2018-06-28 NOTE — PROGRESS NOTE ADULT - PROBLEM SELECTOR PLAN 2
P/w RHF with noted LE edema, abdominal distension and JVD on presentation s/p diuresis.  -Echocardiogram notable pulm HTN and RA, RV dilation and hypokinesis of the R free wall  -Patient remains euvolemic on exam- lasix remains held and bicarb return to normal at 31 from 34 yesterday.  Will initiate PO before discharge  -F/u Cardio recs.  - Pending RHC, discussed with structural heart for which discussion if patient needs CTA in setting of negative V/Q for PE, also will need optimization for thrombocytopenia. P/w RHF with noted LE edema, abdominal distension and JVD on presentation s/p diuresis.  -Echocardiogram notable pulm HTN and RA, RV dilation and hypokinesis of the R free wall  -Patient remains euvolemic on exam, bicarb return to normal at 31 from 34 yesterday.  -Lasix 40mg daily  -F/u Cardio recs.  - Pending RHC, discussed with structural heart for which discussion if patient needs CTA in setting of negative V/Q for PE, also will need optimization for thrombocytopenia.

## 2018-06-28 NOTE — GOALS OF CARE CONVERSATION - PERSONAL ADVANCE DIRECTIVE - CONVERSATION DETAILS
Goals of care discussed with patient. She would like to be fully resuscitated if need be. She is agreeable to chest compressions and intubations if necessary. DNR/ DNI status rescinded

## 2018-06-28 NOTE — PROGRESS NOTE ADULT - SUBJECTIVE AND OBJECTIVE BOX
Heme/Onc Progress Note (Dr. Costello)     Interval History: S/p 1st dose of decadron overnight. Patient with mild bruising of extremity, denies gross bleeding including brbpr, melena or hematuria. Patient denies acute shortness of breath off hiflow, denies chest pain, nausea, vomiting, bowel changes, fevers or chills.         ROS is otherwise negative.    iodine (Anaphylaxis)    Allergies    iodine (Anaphylaxis)    Intolerances        Medications:  MEDICATIONS  (STANDING):  ALBUTerol/ipratropium for Nebulization 3 milliLiter(s) Nebulizer every 4 hours  ALPRAZolam 0.5 milliGRAM(s) Oral two times a day  dextrose 5%. 1000 milliLiter(s) (50 mL/Hr) IV Continuous <Continuous>  dextrose 50% Injectable 12.5 Gram(s) IV Push once  dextrose 50% Injectable 25 Gram(s) IV Push once  dextrose 50% Injectable 25 Gram(s) IV Push once  furosemide    Tablet 40 milliGRAM(s) Oral daily  insulin lispro (HumaLOG) corrective regimen sliding scale   SubCutaneous Before meals and at bedtime  lamoTRIgine 100 milliGRAM(s) Oral daily  polyethylene glycol 3350 17 Gram(s) Oral daily  senna 2 Tablet(s) Oral at bedtime  tiotropium 2.5 MICROgram(s)/olodaterol 2.5 MICROgram(s) Inhaler 2 Puff(s) Inhalation daily  traZODone      traZODone 100 milliGRAM(s) Oral at bedtime  venlafaxine XR. 75 milliGRAM(s) Oral daily    MEDICATIONS  (PRN):  acetaminophen   Tablet. 975 milliGRAM(s) Oral every 8 hours PRN Moderate Pain (4 - 6)  bisacodyl Suppository 10 milliGRAM(s) Rectal daily PRN Constipation  dextrose 40% Gel 15 Gram(s) Oral once PRN Blood Glucose LESS THAN 70 milliGRAM(s)/deciliter  glucagon  Injectable 1 milliGRAM(s) IntraMuscular once PRN Glucose LESS THAN 70 milligrams/deciliter  HYDROmorphone   Tablet 4 milliGRAM(s) Oral every 6 hours PRN Severe Pain (7 - 10)  HYDROmorphone   Tablet 2 milliGRAM(s) Oral every 6 hours PRN for breakthru pain            PHYSICAL EXAM:    T(F): 99.2 (18 @ 09:35), Max: 99.2 (18 @ 09:35)  HR: 98 (18 @ 10:58) (80 - 98)  BP: 133/94 (18 @ 10:58) (120/69 - 165/76)  RR: 14 (18 @ 10:58) (14 - 21)  SpO2: 95% (18 @ 10:58) (93% - 96%)  Wt(kg): --    Daily     Daily Weight in k.4 (2018 15:49)      GEN: NAD   HEENT: AT/NC, EOMI, no oral lesions  NECK: supple  CVS: +S1S2 rrr  LUNG: CTA BL  ABD: +BS, NT  : no dimitri tenderness  EXT: mild tenderness, b/l ecchymosis, no edema  NEURO: AAox3         Labs:                          14.4   6.8   )-----------( 39       ( 2018 07:54 )             46.6     CBC Full  -  ( 2018 07:54 )  WBC Count : 6.8 K/uL  Hemoglobin : 14.4 g/dL  Hematocrit : 46.6 %  Platelet Count - Automated : 39 K/uL  Mean Cell Volume : 87.6 fL  Mean Cell Hemoglobin : 27.1 pg  Mean Cell Hemoglobin Concentration : 30.9 g/dL  Auto Neutrophil # : x  Auto Lymphocyte # : x  Auto Monocyte # : x  Auto Eosinophil # : x  Auto Basophil # : x  Auto Neutrophil % : x  Auto Lymphocyte % : x  Auto Monocyte % : x  Auto Eosinophil % : x  Auto Basophil % : x    PTT - ( 2018 20:50 )  PTT:33.4 sec        137  |  95<L>  |  21  ----------------------------<  178<H>  4.9   |  31  |  0.46<L>    Ca    9.8      2018 07:54  Mg     2.1             V/Q -   Impression: Normal perfusion with grossly irregular ventilation in parts   of both lungs. This phenomenon would explain hypoxia on the basis of   significant shunting of blood to areas of poor ventilation and raises the   possibility of a toxic reaction, possibly due to some component of   electronic cigarettes.

## 2018-06-28 NOTE — PROGRESS NOTE ADULT - SUBJECTIVE AND OBJECTIVE BOX
Patient discussed on morning rounds with Dr. Ribeiro    SUBJECTIVE ASSESSMENT:  67y Female with no current complaints.  Seen lying in bed. No CP, SOB, palpitations.     Vital Signs Last 24 Hrs  T(C): 36.4 (28 Jun 2018 14:05), Max: 37.3 (28 Jun 2018 09:35)  T(F): 97.6 (28 Jun 2018 14:05), Max: 99.2 (28 Jun 2018 09:35)  HR: 88 (28 Jun 2018 12:20) (80 - 98)  BP: 136/80 (28 Jun 2018 12:20) (120/69 - 165/76)  BP(mean): 101 (28 Jun 2018 12:20) (82 - 111)  RR: 18 (28 Jun 2018 12:20) (14 - 21)  SpO2: 95% (28 Jun 2018 12:20) (93% - 96%)  I&O's Detail    CONSTITUTIONAL: Patient sitting in bed, no acute distress.  NEURO: A&Ox3, no focal deficits.                    CV: S1S2, RRR, no murmurs appreciated on exam  RESPIRATORY: Clear to auscultation b/l  GI: soft, NTND  EXTREMITIES: +1 pitting peripheral edema, warm and well perfused, left knee with old healed scars, +calf tenderness to palpation. Peripheral pulses 2+ b/l. Strength 5/5 bilaterally lower and upper extremities.    LABS:             14.4   6.8   )-----------( 39       ( 28 Jun 2018 07:54 )             46.6     PTT - ( 26 Jun 2018 20:50 )  PTT:33.4 sec    06-28    137  |  95<L>  |  21  ----------------------------<  178<H>  4.9   |  31  |  0.46<L>    Ca    9.8      28 Jun 2018 07:54  Mg     2.1     06-28    MEDICATIONS  (STANDING):  ALBUTerol/ipratropium for Nebulization 3 milliLiter(s) Nebulizer every 4 hours  ALPRAZolam 0.5 milliGRAM(s) Oral two times a day  dextrose 5%. 1000 milliLiter(s) (50 mL/Hr) IV Continuous <Continuous>  dextrose 50% Injectable 12.5 Gram(s) IV Push once  dextrose 50% Injectable 25 Gram(s) IV Push once  dextrose 50% Injectable 25 Gram(s) IV Push once  furosemide    Tablet 40 milliGRAM(s) Oral daily  insulin lispro (HumaLOG) corrective regimen sliding scale   SubCutaneous Before meals and at bedtime  lamoTRIgine 100 milliGRAM(s) Oral daily  polyethylene glycol 3350 17 Gram(s) Oral daily  senna 2 Tablet(s) Oral at bedtime  tiotropium 2.5 MICROgram(s)/olodaterol 2.5 MICROgram(s) Inhaler 2 Puff(s) Inhalation daily  traZODone      traZODone 100 milliGRAM(s) Oral at bedtime  venlafaxine XR. 75 milliGRAM(s) Oral daily    MEDICATIONS  (PRN):  acetaminophen   Tablet. 975 milliGRAM(s) Oral every 8 hours PRN Moderate Pain (4 - 6)  bisacodyl Suppository 10 milliGRAM(s) Rectal daily PRN Constipation  dextrose 40% Gel 15 Gram(s) Oral once PRN Blood Glucose LESS THAN 70 milliGRAM(s)/deciliter  glucagon  Injectable 1 milliGRAM(s) IntraMuscular once PRN Glucose LESS THAN 70 milligrams/deciliter  HYDROmorphone   Tablet 4 milliGRAM(s) Oral every 6 hours PRN Severe Pain (7 - 10)  HYDROmorphone   Tablet 2 milliGRAM(s) Oral every 6 hours PRN for breakthru pain        RADIOLOGY & ADDITIONAL TESTS:

## 2018-06-28 NOTE — PROGRESS NOTE ADULT - ASSESSMENT
67 year old Female with COPD, Iron deficiency anemia, thrombocytopenia requiring Nplate, multiple knee surgeries and complications resulting in chronic pain syndrome, recent hospital admissions for cellulitis in March and PNA in December, who presented with progressive SOB, dyspnea on exertion over last few weeks with accompanied chest pressure and lower extremity swelling and pain. Patient did not have adequate VQ scan secondary to not being able to tolerate full ventilation component. Echo showing severely dilated right ventricle and dilated right atrium, moderate pulmonary hypertension with pulmonary artery systolic pressure estimated to be 57mmHg, and a large patent foramen ovale.      Problem/Recommendation - 1:  Problem: Pulmonary hypertension. Recommendation:   - Per Dr. Chao (fellow working with Dr. Horta), recommending CT to r/o PE.  - Must r/o PE prior to RHC, consider pre-medicating with steroids and Benadryl prior to test, consider allergy consult.    - Bubble study positive for PFO with right to left shunt.   - RHC needed to further work up pulmonary hypertension and to provide further recs. Would not do RHC until PE is r/o, and patient exhibiting thrombocytopenia.  - Would recommend getting records from prior hospitalization in [place] in December for her SOB and PNA.  - Will continue to follow. Please feel free to call [phone] with any further questions.

## 2018-06-28 NOTE — PROGRESS NOTE ADULT - ASSESSMENT
67F with COPD, tobacco dependence, bipolar, thrombocytopenia requiring Nplate (Romiplostim) last admission in March for cellulitis, presenting now w/ COPD exacerbation w/ cor pulmonale, pulm HTN with progressive thrombocytopenia.       Peripheral smear reviewed - polysegmented neutrophils present, few hypersegmented neutrophils, red cells homogenous, no fragmented cells present, notable large platelets seen *** c/w plt destructive process, roughly 5 per HPF correlating to  platelet count     #Thrombocytopenia - per outpatient records patient follows at [facility] since 2016, patient initially found to have platelet count of 63K and easy bruising. Regarding therapy for documented ITP, patient was receiving Nplate therapy weekly, which was changed to every other week due to cost of co-pay. Patient was also given prescription for Promacta however unable to be filled due to cost. Patient was recommended to reapply for assistance. Patient has not had bone marrow biopsy. Last dose of Nplate was 5/9 per primary oncologist. Primary oncologist stated patient was also treated w/ steroids however no therapy with IVIG or Ritux at this time, no hx of splenectomy. At this time, V/Q consistent with areas of poor ventilation however no documented thrombosis, patient remains off anticoagulation at this time. Initiated on high dose steriods at this time. Current auto immune, inflammatory, infectious w/u negative, B12/Folate supplemented.     -c/w Decadron 40mg x 4 days, if severe agitation (given mental dusty), will switch to IVIG   -off hep at this time, awaiting tai, pf4 keep venodynes for dvt ppx   -Iron panel c/w elevated sat% and total serum levels, however normal ferritin, consider HFE testing  -dedicated ultrasound/CT of abdomen to assess for splenomegaly  -CT c/w 1.4 cm pulm lesion ,(outpt planned for bx or once plts at least >50k, of note not active on outpatient PET/CT serially monitor)      Discussed w/ Dr. Costello   Will set up patient for outpatient follow up with Dr. Blount

## 2018-06-28 NOTE — PROGRESS NOTE ADULT - SUBJECTIVE AND OBJECTIVE BOX
OVERNIGHT EVENTS:    SUBJECTIVE:    Vital Signs Last 12 Hrs  T(F): 98.7 (06-28-18 @ 05:13), Max: 98.7 (06-28-18 @ 05:13)  HR: 80 (06-28-18 @ 04:58) (80 - 84)  BP: 165/76 (06-28-18 @ 04:58) (128/73 - 165/76)  BP(mean): 109 (06-28-18 @ 04:58) (82 - 111)  RR: 14 (06-28-18 @ 04:58) (14 - 18)  SpO2: 96% (06-28-18 @ 04:58) (95% - 96%)  I&O's Summary    26 Jun 2018 07:01  -  27 Jun 2018 07:00  --------------------------------------------------------  IN: 132 mL / OUT: 225 mL / NET: -93 mL        PHYSICAL EXAM:  Constitutional: NAD, comfortable in bed.  HEENT: NC/AT, PERRLA, EOMI, no conjunctival pallor or scleral icterus, MMM  Neck: Supple, no JVD  Respiratory: Normal rate, rhythm, depth, effort. CTAB. No w/r/r.   Cardiovascular: RRR, normal S1 and S2, no m/r/g.   Gastrointestinal: +BS, soft NTND, no guarding or rebound tenderness, no palpable masses   Extremities: wwp; no cyanosis, clubbing or edema.   Vascular: Pulses equal and strong throughout.   Neurological: AAOx3, no CN deficits, strength and sensation intact throughout.   Skin: No gross skin abnormalities or rashes        LABS:                        14.3   8.4   )-----------( 31       ( 27 Jun 2018 07:44 )             46.6     06-27    139  |  96  |  29<H>  ----------------------------<  117<H>  4.4   |  34<H>  |  0.63    Ca    9.2      27 Jun 2018 07:44  Mg     2.2     06-27      PTT - ( 26 Jun 2018 20:50 )  PTT:33.4 sec      RADIOLOGY & ADDITIONAL TESTS:    MEDICATIONS  (STANDING):  ALBUTerol/ipratropium for Nebulization 3 milliLiter(s) Nebulizer every 4 hours  ALPRAZolam 0.5 milliGRAM(s) Oral two times a day  dexamethasone  IVPB 40 milliGRAM(s) IV Intermittent every 24 hours  dextrose 5%. 1000 milliLiter(s) (50 mL/Hr) IV Continuous <Continuous>  dextrose 50% Injectable 12.5 Gram(s) IV Push once  dextrose 50% Injectable 25 Gram(s) IV Push once  dextrose 50% Injectable 25 Gram(s) IV Push once  furosemide    Tablet 40 milliGRAM(s) Oral daily  insulin lispro (HumaLOG) corrective regimen sliding scale   SubCutaneous Before meals and at bedtime  lamoTRIgine 100 milliGRAM(s) Oral daily  polyethylene glycol 3350 17 Gram(s) Oral daily  senna 2 Tablet(s) Oral at bedtime  tiotropium 2.5 MICROgram(s)/olodaterol 2.5 MICROgram(s) Inhaler 2 Puff(s) Inhalation daily  traZODone      traZODone 100 milliGRAM(s) Oral at bedtime  venlafaxine XR. 75 milliGRAM(s) Oral daily    MEDICATIONS  (PRN):  acetaminophen   Tablet. 975 milliGRAM(s) Oral every 8 hours PRN Moderate Pain (4 - 6)  bisacodyl Suppository 10 milliGRAM(s) Rectal daily PRN Constipation  dextrose 40% Gel 15 Gram(s) Oral once PRN Blood Glucose LESS THAN 70 milliGRAM(s)/deciliter  glucagon  Injectable 1 milliGRAM(s) IntraMuscular once PRN Glucose LESS THAN 70 milligrams/deciliter  HYDROmorphone   Tablet 4 milliGRAM(s) Oral every 6 hours PRN Severe Pain (7 - 10)  HYDROmorphone   Tablet 2 milliGRAM(s) Oral every 6 hours PRN for breakthru pain OVERNIGHT EVENTS:  No acute events overnight.    SUBJECTIVE:  Denies chest pain or shortness of breath.  Feels that ankles are mildly more swollen today    Vital Signs Last 12 Hrs  T(F): 98.7 (06-28-18 @ 05:13), Max: 98.7 (06-28-18 @ 05:13)  HR: 80 (06-28-18 @ 04:58) (80 - 84)  BP: 165/76 (06-28-18 @ 04:58) (128/73 - 165/76)  BP(mean): 109 (06-28-18 @ 04:58) (82 - 111)  RR: 14 (06-28-18 @ 04:58) (14 - 18)  SpO2: 96% (06-28-18 @ 04:58) (95% - 96%)  I&O's Summary    26 Jun 2018 07:01  -  27 Jun 2018 07:00  --------------------------------------------------------  IN: 132 mL / OUT: 225 mL / NET: -93 mL        PHYSICAL EXAM:  Constitutional: NAD, comfortable in bed.  HEENT: NC/AT, PERRLA, EOMI, no conjunctival pallor or scleral icterus, MMM  Neck: Supple   Respiratory: Normal rate, rhythm, depth, effort. CTAB. No w/r/r.   Cardiovascular: RRR, normal S1 and S2, no m/r/g.   Gastrointestinal: +BS, soft nontender, mild distention, no guarding or rebound tenderness, no palpable masses   Extremities: wwp; no cyanosis or clubbing.  Trace ankle edema bilaterally.  Vascular: Pulses equal and strong throughout.   Neurological: Alert and oriented, follows commands and 5/5 strength in all extremities.    Skin: No gross skin abnormalities or rashes        LABS:                        14.3   8.4   )-----------( 31 ( 27 Jun 2018 07:44 )             46.6     06-27    139  |  96  |  29<H>  ----------------------------<  117<H>  4.4   |  34<H>  |  0.63    Ca    9.2      27 Jun 2018 07:44  Mg     2.2     06-27      PTT - ( 26 Jun 2018 20:50 )  PTT:33.4 sec    RADIOLOGY & ADDITIONAL TESTS:    MEDICATIONS  (STANDING):  ALBUTerol/ipratropium for Nebulization 3 milliLiter(s) Nebulizer every 4 hours  ALPRAZolam 0.5 milliGRAM(s) Oral two times a day  dexamethasone  IVPB 40 milliGRAM(s) IV Intermittent every 24 hours  dextrose 5%. 1000 milliLiter(s) (50 mL/Hr) IV Continuous <Continuous>  dextrose 50% Injectable 12.5 Gram(s) IV Push once  dextrose 50% Injectable 25 Gram(s) IV Push once  dextrose 50% Injectable 25 Gram(s) IV Push once  furosemide    Tablet 40 milliGRAM(s) Oral daily  insulin lispro (HumaLOG) corrective regimen sliding scale   SubCutaneous Before meals and at bedtime  lamoTRIgine 100 milliGRAM(s) Oral daily  polyethylene glycol 3350 17 Gram(s) Oral daily  senna 2 Tablet(s) Oral at bedtime  tiotropium 2.5 MICROgram(s)/olodaterol 2.5 MICROgram(s) Inhaler 2 Puff(s) Inhalation daily  traZODone      traZODone 100 milliGRAM(s) Oral at bedtime  venlafaxine XR. 75 milliGRAM(s) Oral daily    MEDICATIONS  (PRN):  acetaminophen   Tablet. 975 milliGRAM(s) Oral every 8 hours PRN Moderate Pain (4 - 6)  bisacodyl Suppository 10 milliGRAM(s) Rectal daily PRN Constipation  dextrose 40% Gel 15 Gram(s) Oral once PRN Blood Glucose LESS THAN 70 milliGRAM(s)/deciliter  glucagon  Injectable 1 milliGRAM(s) IntraMuscular once PRN Glucose LESS THAN 70 milligrams/deciliter  HYDROmorphone   Tablet 4 milliGRAM(s) Oral every 6 hours PRN Severe Pain (7 - 10)  HYDROmorphone   Tablet 2 milliGRAM(s) Oral every 6 hours PRN for breakthru pain OVERNIGHT EVENTS:  No acute events overnight.    SUBJECTIVE:  Denies chest pain or shortness of breath.  Feels that ankles are mildly more swollen today    Vital Signs Last 12 Hrs  T(F): 98.7 (06-28-18 @ 05:13), Max: 98.7 (06-28-18 @ 05:13)  HR: 80 (06-28-18 @ 04:58) (80 - 84)  BP: 165/76 (06-28-18 @ 04:58) (128/73 - 165/76)  BP(mean): 109 (06-28-18 @ 04:58) (82 - 111)  RR: 14 (06-28-18 @ 04:58) (14 - 18)  SpO2: 96% (06-28-18 @ 04:58) (95% - 96%)  I&O's Summary    26 Jun 2018 07:01  -  27 Jun 2018 07:00  --------------------------------------------------------  IN: 132 mL / OUT: 225 mL / NET: -93 mL        PHYSICAL EXAM:  Constitutional: NAD, comfortable in bed.  HEENT: NC/AT, PERRLA, EOMI, no conjunctival pallor or scleral icterus, MMM  Neck: Supple   Respiratory: Normal rate, rhythm, depth, effort. CTAB. No w/r/r.   Cardiovascular: RRR, normal S1 and S2, no m/r/g.   Gastrointestinal: +BS, soft nontender, mild distention, no guarding or rebound tenderness, no palpable masses   Extremities: wwp; no cyanosis or clubbing.  Trace ankle edema bilaterally.  Vascular: Pulses equal and strong throughout.   Neurological: Alert and oriented, follows commands and 5/5 strength in all extremities.    Skin: No gross skin abnormalities or rashes        LABS:                        14.3   8.4   )-----------( 31 ( 27 Jun 2018 07:44 )             46.6     06-27    139  |  96  |  29<H>  ----------------------------<  117<H>  4.4   |  34<H>  |  0.63    Ca    9.2      27 Jun 2018 07:44  Mg     2.2     06-27      PTT - ( 26 Jun 2018 20:50 )  PTT:33.4 sec    RADIOLOGY & ADDITIONAL TESTS:   from: US Abdomen Complete (06.28.18 @ 16:22)   IMPRESSION:  Hepatomegaly. Normal size spleen.        MEDICATIONS  (STANDING):  ALBUTerol/ipratropium for Nebulization 3 milliLiter(s) Nebulizer every 4 hours  ALPRAZolam 0.5 milliGRAM(s) Oral two times a day  dexamethasone  IVPB 40 milliGRAM(s) IV Intermittent every 24 hours  dextrose 5%. 1000 milliLiter(s) (50 mL/Hr) IV Continuous <Continuous>  dextrose 50% Injectable 12.5 Gram(s) IV Push once  dextrose 50% Injectable 25 Gram(s) IV Push once  dextrose 50% Injectable 25 Gram(s) IV Push once  furosemide    Tablet 40 milliGRAM(s) Oral daily  insulin lispro (HumaLOG) corrective regimen sliding scale   SubCutaneous Before meals and at bedtime  lamoTRIgine 100 milliGRAM(s) Oral daily  polyethylene glycol 3350 17 Gram(s) Oral daily  senna 2 Tablet(s) Oral at bedtime  tiotropium 2.5 MICROgram(s)/olodaterol 2.5 MICROgram(s) Inhaler 2 Puff(s) Inhalation daily  traZODone      traZODone 100 milliGRAM(s) Oral at bedtime  venlafaxine XR. 75 milliGRAM(s) Oral daily    MEDICATIONS  (PRN):  acetaminophen   Tablet. 975 milliGRAM(s) Oral every 8 hours PRN Moderate Pain (4 - 6)  bisacodyl Suppository 10 milliGRAM(s) Rectal daily PRN Constipation  dextrose 40% Gel 15 Gram(s) Oral once PRN Blood Glucose LESS THAN 70 milliGRAM(s)/deciliter  glucagon  Injectable 1 milliGRAM(s) IntraMuscular once PRN Glucose LESS THAN 70 milligrams/deciliter  HYDROmorphone   Tablet 4 milliGRAM(s) Oral every 6 hours PRN Severe Pain (7 - 10)  HYDROmorphone   Tablet 2 milliGRAM(s) Oral every 6 hours PRN for breakthru pain HOSPITAL COURSE:    67F with COPD, Iron deficiency anemia, thrombocytopenia requiring Nplate multiple knee surgeries and complications resulting in chronic pain syndrome presented on 6/21 with 1 week of progressively worsening lower leg pain and 24h of worsening SOB. Also noted pleuritic type chest pain intermittently. Patient feels over the last week the symptoms became progressively worse.  Pt reports she sleeps on 1 pillow, does not get SOB while laying completely flat. Patient was initially hypoxic to 80s in ED, was placed on NC and improved to 89%. Initial concern for PE for which CTA unable to be performed 2/2 to hx of allergy to contrast. Given Lovenox and admitted to [facility] for concern of hypoxic respiratory failure with noted RHF. Echocardiogram demonstrated preserved LV function, noted RV and RA dilation RV free wall hypokinesis.  Inferior wall is not found to be hypokinetic. In the setting of EKG without ischemic changes and neg cardiac enzymes low suspicion for ischemia. Noted on echocardiogram with bubble to have PFO. Patient undergoing work up for etiology of pulmonary hypertension/cor pulmonale. V/Q scan initially performed but patient unable to tolerate ventilation. Unable to allergy test for contrast given recent steroid use in setting of tx for COPD during course. Therefore V/Q repeated and noted perfusion intact but with ventilation irregularities- suggesting underlying pulmonary disease. Patient to have further work up with RHC. Patient also of note worked up for thrombocytopenia w/ heme/onc. 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 Undergoing autoimmune, HIT work up that is pending and initiated on decadron. On 6/28 Decadron was transitioned from IV to 40mg PO on for a 4 day course (on day 2 of 4, end date 6/30).  Abdominal ultrasound did not demonstrate splenomegaly.  She had PT walk test and was desating to the 80s and will need home oxygen therapy.      OVERNIGHT EVENTS:  No acute events overnight.    SUBJECTIVE:  Denies chest pain or shortness of breath.  Feels that ankles are mildly more swollen today    Vital Signs Last 12 Hrs  T(F): 98.7 (06-28-18 @ 05:13), Max: 98.7 (06-28-18 @ 05:13)  HR: 80 (06-28-18 @ 04:58) (80 - 84)  BP: 165/76 (06-28-18 @ 04:58) (128/73 - 165/76)  BP(mean): 109 (06-28-18 @ 04:58) (82 - 111)  RR: 14 (06-28-18 @ 04:58) (14 - 18)  SpO2: 96% (06-28-18 @ 04:58) (95% - 96%)  I&O's Summary    26 Jun 2018 07:01  -  27 Jun 2018 07:00  --------------------------------------------------------  IN: 132 mL / OUT: 225 mL / NET: -93 mL        PHYSICAL EXAM:  Constitutional: NAD, comfortable in bed.  HEENT: NC/AT, PERRLA, EOMI, no conjunctival pallor or scleral icterus, MMM  Neck: Supple   Respiratory: Normal rate, rhythm, depth, effort. CTAB. No w/r/r.   Cardiovascular: RRR, normal S1 and S2, no m/r/g.   Gastrointestinal: +BS, soft nontender, mild distention, no guarding or rebound tenderness, no palpable masses   Extremities: wwp; no cyanosis or clubbing.  Trace ankle edema bilaterally.  Vascular: Pulses equal and strong throughout.   Neurological: Alert and oriented, follows commands and 5/5 strength in all extremities.    Skin: No gross skin abnormalities or rashes        LABS:                        14.3   8.4   )-----------( 31       ( 27 Jun 2018 07:44 )             46.6     06-27    139  |  96  |  29<H>  ----------------------------<  117<H>  4.4   |  34<H>  |  0.63    Ca    9.2      27 Jun 2018 07:44  Mg     2.2     06-27      PTT - ( 26 Jun 2018 20:50 )  PTT:33.4 sec    RADIOLOGY & ADDITIONAL TESTS:   from: US Abdomen Complete (06.28.18 @ 16:22)   IMPRESSION:  Hepatomegaly. Normal size spleen.        MEDICATIONS  (STANDING):  ALBUTerol/ipratropium for Nebulization 3 milliLiter(s) Nebulizer every 4 hours  ALPRAZolam 0.5 milliGRAM(s) Oral two times a day  dexamethasone  IVPB 40 milliGRAM(s) IV Intermittent every 24 hours  dextrose 5%. 1000 milliLiter(s) (50 mL/Hr) IV Continuous <Continuous>  dextrose 50% Injectable 12.5 Gram(s) IV Push once  dextrose 50% Injectable 25 Gram(s) IV Push once  dextrose 50% Injectable 25 Gram(s) IV Push once  furosemide    Tablet 40 milliGRAM(s) Oral daily  insulin lispro (HumaLOG) corrective regimen sliding scale   SubCutaneous Before meals and at bedtime  lamoTRIgine 100 milliGRAM(s) Oral daily  polyethylene glycol 3350 17 Gram(s) Oral daily  senna 2 Tablet(s) Oral at bedtime  tiotropium 2.5 MICROgram(s)/olodaterol 2.5 MICROgram(s) Inhaler 2 Puff(s) Inhalation daily  traZODone      traZODone 100 milliGRAM(s) Oral at bedtime  venlafaxine XR. 75 milliGRAM(s) Oral daily    MEDICATIONS  (PRN):  acetaminophen   Tablet. 975 milliGRAM(s) Oral every 8 hours PRN Moderate Pain (4 - 6)  bisacodyl Suppository 10 milliGRAM(s) Rectal daily PRN Constipation  dextrose 40% Gel 15 Gram(s) Oral once PRN Blood Glucose LESS THAN 70 milliGRAM(s)/deciliter  glucagon  Injectable 1 milliGRAM(s) IntraMuscular once PRN Glucose LESS THAN 70 milligrams/deciliter  HYDROmorphone   Tablet 4 milliGRAM(s) Oral every 6 hours PRN Severe Pain (7 - 10)  HYDROmorphone   Tablet 2 milliGRAM(s) Oral every 6 hours PRN for breakthru pain

## 2018-06-28 NOTE — PROGRESS NOTE ADULT - PROBLEM SELECTOR PLAN 1
Found to have pulm HTN- 58mmHg- given work up with V/Q- perfusion WNL but with grossly irregular ventilation- more suggestive of pulmonary etiology. Group 1 vs group 3 pulmonary HTN.  Echocardiogram notable for RA enlargement, severe RV dilation and free wall hypokinesis- based in on EKG findings, negative enzyme and no inferior wall involvement- makes RV infarct unlikely. Noted PFO on GURPREET with Right to left shunt.  - Pending RHC for which will be performed outpatient  - Will need home O2 and pending authorization/services, PT to evaluate with walk test today Found to have pulm HTN- 58mmHg- given work up with V/Q- perfusion WNL but with grossly irregular ventilation- more suggestive of pulmonary etiology. Group 1 vs group 3 pulmonary HTN.  Echocardiogram notable for RA enlargement, severe RV dilation and free wall hypokinesis- based in on EKG findings, negative enzyme and no inferior wall involvement- makes RV infarct unlikely. Noted PFO on GURPREET with Right to left shunt.  - Pending RHC for which will be performed outpatient  - Will need home O2 and pending authorization/services, PT to evaluate with walk test today  -CT surgery signed off

## 2018-06-28 NOTE — PROGRESS NOTE ADULT - PROBLEM SELECTOR PLAN 9
VTE: d/c hep gtt, Concern for HIT. SCDs.     DNR/DNI    Dispo:Admitted to [facility] for further management of RHF and work up for pulmonary HTN.

## 2018-06-28 NOTE — PROGRESS NOTE ADULT - SUBJECTIVE AND OBJECTIVE BOX
Overnight patient titrated off nasal cannula and tolerating it well this morning.  Feeling well    PAST MEDICAL & SURGICAL HISTORY:  COPD (chronic obstructive pulmonary disease)  Pneumonia  Thrombocytopenia  Cellulitis  S/P insertion of spinal cord stimulator  History of left knee replacement    FAMILY HISTORY:  No pertinent family history in first degree relatives    Social:  Allergies    iodine (Anaphylaxis)    Intolerances    	  MEDICATIONS:      ALBUTerol/ipratropium for Nebulization 3 milliLiter(s) Nebulizer every 4 hours  tiotropium 2.5 MICROgram(s)/olodaterol 2.5 MICROgram(s) Inhaler 2 Puff(s) Inhalation daily    acetaminophen   Tablet. 975 milliGRAM(s) Oral every 8 hours PRN  ALPRAZolam 0.5 milliGRAM(s) Oral two times a day  HYDROmorphone   Tablet 4 milliGRAM(s) Oral every 6 hours PRN  HYDROmorphone   Tablet 2 milliGRAM(s) Oral every 6 hours PRN  lamoTRIgine 100 milliGRAM(s) Oral daily  traZODone      traZODone 100 milliGRAM(s) Oral at bedtime  venlafaxine XR. 75 milliGRAM(s) Oral daily    bisacodyl Suppository 10 milliGRAM(s) Rectal daily PRN  polyethylene glycol 3350 17 Gram(s) Oral daily  senna 2 Tablet(s) Oral at bedtime    dextrose 40% Gel 15 Gram(s) Oral once PRN  dextrose 50% Injectable 12.5 Gram(s) IV Push once  dextrose 50% Injectable 25 Gram(s) IV Push once  dextrose 50% Injectable 25 Gram(s) IV Push once  glucagon  Injectable 1 milliGRAM(s) IntraMuscular once PRN  insulin lispro (HumaLOG) corrective regimen sliding scale   SubCutaneous Before meals and at bedtime    dextrose 5%. 1000 milliLiter(s) IV Continuous <Continuous>            PHYSICAL EXAM:  T(C): 36.4 (06-27-18 @ 09:23), Max: 37.3 (06-27-18 @ 05:58)  HR: 104 (06-27-18 @ 08:16) (84 - 104)  BP: 157/90 (06-27-18 @ 08:16) (121/86 - 157/90)  RR: 12 (06-27-18 @ 10:06) (11 - 26)  SpO2: 97% (06-27-18 @ 10:06) (90% - 100%)  Wt(kg): --  I&O's Summary    26 Jun 2018 07:01  -  27 Jun 2018 07:00  --------------------------------------------------------  IN: 132 mL / OUT: 225 mL / NET: -93 mL          Gen: NAD, AAOx3  Neck: no JVD  Cardiovascular: Normal S1 S2, No murmurs,    Respiratory: decreased rales	  Gastrointestinal:  Soft, Non-tender, + BS	   Ext: + edema L>R  Neuro: no focal deficits.  Vascular: Peripheral pulses palpable 2+ bilaterally      < from: Echocardiogram (06.22.18 @ 12:06) >  Study Quality: Good.  Left Ventricle  Normal left ventricular size and wall thickness.  Abnormal (paradoxical) septal motion consistent with elevated RV  end-diastolic pressure  The left ventricular ejection fraction is estimated to be 60-65%  Left Atrium  The mitral inflow pattern is consistent with impaired left ventricular  relaxation with mildly elevated left atrial pressure (8-14mmHg).  The left atrial size is normal.  Right Atrium  The right atrium is dilated.  Right Ventricle  The right ventricle is severely dilated.  The right ventricular free wall is hypokintic.  Aortic Valve  There is mild aortic valve thickening.  No aortic regurgitation noted.  Mitral Valve  Structurally normal mitral valve.  No mitral regurgitation noted.  Tricuspid Valve  Structurally normal tricuspid valve.  There is mild to moderate tricuspid regurgitation.  The pulmonary artery systolic pressure is estimated to be 58 mmHg.  There is moderate pulmonary hypertension.  Pulmonic Valve  Structurally normal pulmonic valve.  No pulmonic regurgitation noted.  Arteries and Venous System  No aortic root dilatation.  The IVC is dilated (>2.1 cm) however with normal inspiratory collapse   (>50%)  consistent with mildly elevated right atrial pressure (  8mmHg, range    < end of copied text >      LABS:	 	    CARDIAC MARKERS:                              14.4   6.8   )-----------( 39       ( 28 Jun 2018 07:54 )             46.6   06-28    137  |  95<L>  |  21  ----------------------------<  178<H>  4.9   |  31  |  0.46<L>    Ca    9.8      28 Jun 2018 07:54  Mg     2.1     06-28          ASSESSMENT/PLAN: 	  67 y.o female pmh of COPD, current smoker, anemia, thrombocytopenia, chronic pain syndrome presents with progressive SOB with symptoms of Right sided heart failure    Right sided Heart Failure -  2/2 Primary lung pathology vs Primary Pulmonary Hypertension   Bubble study + for PFO with R to L shunt which is likely contributing to hypoxia.  However main focus should be on diagnosing etiology of elevated pulmonary pressures.  Incentive spirometry and encourage patient to get out of bed.  V/Q Scan with normal perfusion but irregular ventilation.   Continue PO Lasix 40mg.  RHC and vasoreactivity testing in future  Case discussed with Cardiology Attending and Primary team

## 2018-06-28 NOTE — PROGRESS NOTE ADULT - PROBLEM SELECTOR PLAN 4
Noted thrombocytopenia for which heme/ onc consulted. Smear with large platelets consistent with platelet destructive process- noted hx of ITP for which has been refractory to steroids, IVIG/rituximab- has been on Nplate at home. Plt at 39 today from 31 yesterday. hep held in setting of r/o hit and autoimmune work up.  -f/u heme/onc recs and work up with autoimmune, HIT work up  -Decadron converted to 40mg PO on day 2 of 4 Noted thrombocytopenia for which heme/ onc consulted. Smear with large platelets consistent with platelet destructive process- noted hx of ITP for which has been refractory to steroids, IVIG/rituximab- has been on Nplate at home. Plt at 39 today from 31 yesterday. hep held in setting of r/o hit and autoimmune work up.  -f/u heme/onc recs and work up with autoimmune, HIT work up  -Decadron converted to 40mg PO on day 2 of 4  -Abd US without splenomegaly

## 2018-06-29 ENCOUNTER — TRANSCRIPTION ENCOUNTER (OUTPATIENT)
Age: 68
End: 2018-06-29

## 2018-06-29 DIAGNOSIS — I50.810 RIGHT HEART FAILURE, UNSPECIFIED: ICD-10-CM

## 2018-06-29 DIAGNOSIS — J96.01 ACUTE RESPIRATORY FAILURE WITH HYPOXIA: ICD-10-CM

## 2018-06-29 LAB
ANA PAT FLD IF-IMP: SIGNIFICANT CHANGE UP
ANA TITR SER: ABNORMAL
ANION GAP SERPL CALC-SCNC: 9 MMOL/L — SIGNIFICANT CHANGE UP (ref 5–17)
APPEARANCE UR: CLEAR — SIGNIFICANT CHANGE UP
BASOPHILS NFR BLD AUTO: 0.1 % — SIGNIFICANT CHANGE UP (ref 0–2)
BILIRUB UR-MCNC: NEGATIVE — SIGNIFICANT CHANGE UP
BUN SERPL-MCNC: 29 MG/DL — HIGH (ref 7–23)
CALCIUM SERPL-MCNC: 9.7 MG/DL — SIGNIFICANT CHANGE UP (ref 8.4–10.5)
CHLORIDE SERPL-SCNC: 97 MMOL/L — SIGNIFICANT CHANGE UP (ref 96–108)
CO2 SERPL-SCNC: 33 MMOL/L — HIGH (ref 22–31)
COLOR SPEC: YELLOW — SIGNIFICANT CHANGE UP
CREAT SERPL-MCNC: 0.49 MG/DL — LOW (ref 0.5–1.3)
DIFF PNL FLD: NEGATIVE — SIGNIFICANT CHANGE UP
GLUCOSE BLDC GLUCOMTR-MCNC: 114 MG/DL — HIGH (ref 70–99)
GLUCOSE BLDC GLUCOMTR-MCNC: 129 MG/DL — HIGH (ref 70–99)
GLUCOSE BLDC GLUCOMTR-MCNC: 140 MG/DL — HIGH (ref 70–99)
GLUCOSE BLDC GLUCOMTR-MCNC: 183 MG/DL — HIGH (ref 70–99)
GLUCOSE SERPL-MCNC: 210 MG/DL — HIGH (ref 70–99)
GLUCOSE UR QL: NEGATIVE — SIGNIFICANT CHANGE UP
HCT VFR BLD CALC: 48.2 % — HIGH (ref 34.5–45)
HGB BLD-MCNC: 14.6 G/DL — SIGNIFICANT CHANGE UP (ref 11.5–15.5)
KETONES UR-MCNC: ABNORMAL MG/DL
LEUKOCYTE ESTERASE UR-ACNC: NEGATIVE — SIGNIFICANT CHANGE UP
LYMPHOCYTES # BLD AUTO: 5.2 % — LOW (ref 13–44)
MAGNESIUM SERPL-MCNC: 2.3 MG/DL — SIGNIFICANT CHANGE UP (ref 1.6–2.6)
MCHC RBC-ENTMCNC: 26.7 PG — LOW (ref 27–34)
MCHC RBC-ENTMCNC: 30.3 G/DL — LOW (ref 32–36)
MCV RBC AUTO: 88.3 FL — SIGNIFICANT CHANGE UP (ref 80–100)
MONOCYTES NFR BLD AUTO: 2.1 % — SIGNIFICANT CHANGE UP (ref 2–14)
NEUTROPHILS NFR BLD AUTO: 92.6 % — HIGH (ref 43–77)
NITRITE UR-MCNC: NEGATIVE — SIGNIFICANT CHANGE UP
PH UR: 7 — SIGNIFICANT CHANGE UP (ref 5–8)
PHOSPHATE SERPL-MCNC: 5.3 MG/DL — HIGH (ref 2.5–4.5)
PLATELET # BLD AUTO: 35 K/UL — LOW (ref 150–400)
POTASSIUM SERPL-MCNC: 5.1 MMOL/L — SIGNIFICANT CHANGE UP (ref 3.5–5.3)
POTASSIUM SERPL-SCNC: 5.1 MMOL/L — SIGNIFICANT CHANGE UP (ref 3.5–5.3)
PROT UR-MCNC: NEGATIVE MG/DL — SIGNIFICANT CHANGE UP
RBC # BLD: 5.46 M/UL — HIGH (ref 3.8–5.2)
RBC # FLD: 15.8 % — SIGNIFICANT CHANGE UP (ref 10.3–16.9)
SODIUM SERPL-SCNC: 139 MMOL/L — SIGNIFICANT CHANGE UP (ref 135–145)
SP GR SPEC: 1.01 — SIGNIFICANT CHANGE UP (ref 1–1.03)
SRA INTERP SER-IMP: SIGNIFICANT CHANGE UP
UROBILINOGEN FLD QL: 1 E.U./DL — SIGNIFICANT CHANGE UP
WBC # BLD: 9.4 K/UL — SIGNIFICANT CHANGE UP (ref 3.8–10.5)
WBC # FLD AUTO: 9.4 K/UL — SIGNIFICANT CHANGE UP (ref 3.8–10.5)

## 2018-06-29 PROCEDURE — 99223 1ST HOSP IP/OBS HIGH 75: CPT

## 2018-06-29 PROCEDURE — 99233 SBSQ HOSP IP/OBS HIGH 50: CPT | Mod: GC

## 2018-06-29 RX ORDER — HYDROMORPHONE HYDROCHLORIDE 2 MG/ML
4 INJECTION INTRAMUSCULAR; INTRAVENOUS; SUBCUTANEOUS EVERY 6 HOURS
Qty: 0 | Refills: 0 | Status: DISCONTINUED | OUTPATIENT
Start: 2018-06-30 | End: 2018-07-02

## 2018-06-29 RX ORDER — ALPRAZOLAM 0.25 MG
0.5 TABLET ORAL
Qty: 0 | Refills: 0 | Status: DISCONTINUED | OUTPATIENT
Start: 2018-07-01 | End: 2018-07-02

## 2018-06-29 RX ORDER — PANTOPRAZOLE SODIUM 20 MG/1
40 TABLET, DELAYED RELEASE ORAL
Qty: 0 | Refills: 0 | Status: COMPLETED | OUTPATIENT
Start: 2018-06-29 | End: 2018-06-30

## 2018-06-29 RX ORDER — VENLAFAXINE HCL 75 MG
150 CAPSULE, EXT RELEASE 24 HR ORAL DAILY
Qty: 0 | Refills: 0 | Status: DISCONTINUED | OUTPATIENT
Start: 2018-06-29 | End: 2018-07-02

## 2018-06-29 RX ORDER — HYDROMORPHONE HYDROCHLORIDE 2 MG/ML
2 INJECTION INTRAMUSCULAR; INTRAVENOUS; SUBCUTANEOUS EVERY 6 HOURS
Qty: 0 | Refills: 0 | Status: DISCONTINUED | OUTPATIENT
Start: 2018-06-30 | End: 2018-07-02

## 2018-06-29 RX ADMIN — Medication 3 MILLILITER(S): at 18:27

## 2018-06-29 RX ADMIN — Medication 40 MILLIGRAM(S): at 18:29

## 2018-06-29 RX ADMIN — TIOTROPIUM BROMIDE AND OLODATEROL 2 PUFF(S): 3.124; 2.736 SPRAY, METERED RESPIRATORY (INHALATION) at 18:26

## 2018-06-29 RX ADMIN — Medication 2: at 09:35

## 2018-06-29 RX ADMIN — Medication 75 MILLIGRAM(S): at 14:36

## 2018-06-29 RX ADMIN — HYDROMORPHONE HYDROCHLORIDE 4 MILLIGRAM(S): 2 INJECTION INTRAMUSCULAR; INTRAVENOUS; SUBCUTANEOUS at 16:39

## 2018-06-29 RX ADMIN — PANTOPRAZOLE SODIUM 40 MILLIGRAM(S): 20 TABLET, DELAYED RELEASE ORAL at 08:28

## 2018-06-29 RX ADMIN — Medication 975 MILLIGRAM(S): at 00:42

## 2018-06-29 RX ADMIN — LAMOTRIGINE 100 MILLIGRAM(S): 25 TABLET, ORALLY DISINTEGRATING ORAL at 14:37

## 2018-06-29 RX ADMIN — Medication 3 MILLILITER(S): at 14:35

## 2018-06-29 RX ADMIN — POLYETHYLENE GLYCOL 3350 17 GRAM(S): 17 POWDER, FOR SOLUTION ORAL at 14:42

## 2018-06-29 RX ADMIN — Medication 0.5 MILLIGRAM(S): at 08:28

## 2018-06-29 RX ADMIN — Medication 40 MILLIGRAM(S): at 08:28

## 2018-06-29 RX ADMIN — HYDROMORPHONE HYDROCHLORIDE 4 MILLIGRAM(S): 2 INJECTION INTRAMUSCULAR; INTRAVENOUS; SUBCUTANEOUS at 15:39

## 2018-06-29 RX ADMIN — Medication 0.5 MILLIGRAM(S): at 18:28

## 2018-06-29 RX ADMIN — Medication 3 MILLILITER(S): at 08:00

## 2018-06-29 RX ADMIN — Medication 150 MILLIGRAM(S): at 21:38

## 2018-06-29 RX ADMIN — Medication 100 MILLIGRAM(S): at 21:38

## 2018-06-29 RX ADMIN — Medication 3 MILLILITER(S): at 01:48

## 2018-06-29 RX ADMIN — HYDROMORPHONE HYDROCHLORIDE 4 MILLIGRAM(S): 2 INJECTION INTRAMUSCULAR; INTRAVENOUS; SUBCUTANEOUS at 09:35

## 2018-06-29 RX ADMIN — Medication 3 MILLILITER(S): at 21:37

## 2018-06-29 RX ADMIN — SENNA PLUS 2 TABLET(S): 8.6 TABLET ORAL at 21:38

## 2018-06-29 RX ADMIN — Medication 975 MILLIGRAM(S): at 00:40

## 2018-06-29 RX ADMIN — Medication 3 MILLILITER(S): at 09:36

## 2018-06-29 RX ADMIN — HYDROMORPHONE HYDROCHLORIDE 4 MILLIGRAM(S): 2 INJECTION INTRAMUSCULAR; INTRAVENOUS; SUBCUTANEOUS at 10:00

## 2018-06-29 NOTE — DISCHARGE NOTE ADULT - MEDICATION SUMMARY - MEDICATIONS TO TAKE
I will START or STAY ON the medications listed below when I get home from the hospital:    HYDROmorphone 4 mg oral tablet  -- 1 tab(s) by mouth every 4 hours, As Needed  -- Indication: For Chronic pain of left knee    HYDROmorphone 2 mg oral tablet  -- 1 tab(s) by mouth every 4 hours, As Needed  -- Indication: For Chronic pain of left knee    lamoTRIgine 200 mg oral tablet, extended release  -- 1 tab(s) by mouth once a day  -- Indication: For Bipolar 1 disorder    traZODone 100 mg oral tablet  -- 1 tab(s) by mouth once a day (at bedtime)  -- Indication: For Bipolar 1 disorder    venlafaxine 75 mg oral tablet  -- 1 tab(s) by mouth once a day  -- Indication: For Bipolar 1 disorder    valACYclovir 1 g oral tablet  -- 1 tab(s) by mouth every 8 hours  -- Indication: For Herpetic lesion    ALPRAZolam 0.5 mg oral tablet  -- 1 tab(s) by mouth 3 times a day  -- Indication: For Bipolar 1 disorder    ipratropium-albuterol 0.5 mg-2.5 mg/3 mLinhalation solution  -- 3 milliliter(s) inhaled every 4 hours  -- Indication: For COPD (chronic obstructive pulmonary disease)    tiotropium-olodaterol 2.5 mcg-2.5 mcg/inh inhalation aerosol  -- 2 puff(s) inhaled once a day  -- Indication: For COPD (chronic obstructive pulmonary disease)    clotrimazole 1% topical cream  -- 1 application on skin 2 times a day  -- Indication: For Herpetic lesion    furosemide 40 mg oral tablet  -- 1 tab(s) by mouth once a day  -- Indication: For Right heart failure I will START or STAY ON the medications listed below when I get home from the hospital:    HYDROmorphone 4 mg oral tablet  -- 1 tab(s) by mouth every 4 hours, As Needed  -- Indication: For Chronic pain of left knee    HYDROmorphone 2 mg oral tablet  -- 1 tab(s) by mouth every 4 hours, As Needed  -- Indication: For Chronic pain of left knee    lamoTRIgine 100 mg oral tablet  -- 1 tab(s) by mouth once a day   -- Avoid prolonged or excessive exposure to direct and/or artificial sunlight while taking this medication.  It is very important that you take or use this exactly as directed.  Do not skip doses or discontinue unless directed by your doctor.  May cause drowsiness.  Alcohol may intensify this effect.  Use care when operating dangerous machinery.    -- Indication: For Bipolar 1 disorder    venlafaxine 150 mg oral tablet, extended release  -- 1 tab(s) by mouth once a day   -- Check with your doctor before becoming pregnant.  Do not chew, break, or crush.  Do not drink alcoholic beverages when taking this medication.  It is very important that you take or use this exactly as directed.  Do not skip doses or discontinue unless directed by your doctor.  Obtain medical advice before taking any non-prescription drugs as some may affect the action of this medication.  Take with food.  This drug may impair the ability to drive or operate machinery.  Use care until you become familiar with its effects.    -- Indication: For Bipolar 1 disorder    traZODone 100 mg oral tablet  -- 1 tab(s) by mouth once a day (at bedtime)  -- Indication: For Bipolar 1 disorder    valACYclovir 1 g oral tablet  -- 1 tab(s) by mouth every 8 hours for infection MDD:3 tabs  -- Indication: For Herpetic lesion    ALPRAZolam 0.5 mg oral tablet  -- 1 tab(s) by mouth 3 times a day  -- Indication: For Bipolar 1 disorder    ipratropium-albuterol 0.5 mg-2.5 mg/3 mLinhalation solution  -- 3 milliliter(s) inhaled every 4 hours  -- Indication: For COPD (chronic obstructive pulmonary disease)    tiotropium-olodaterol 2.5 mcg-2.5 mcg/inh inhalation aerosol  -- 2 puff(s) inhaled once a day  -- Indication: For COPD (chronic obstructive pulmonary disease)    clotrimazole 1% topical cream  -- 1 application on skin 2 times a day for infection  -- Indication: For Herpetic lesion    furosemide 40 mg oral tablet  -- 1 tab(s) by mouth once a day  -- Indication: For Right heart failure

## 2018-06-29 NOTE — PROGRESS NOTE ADULT - PROBLEM SELECTOR PLAN 4
Noted thrombocytopenia for which heme/ onc consulted. Smear with large platelets consistent with platelet destructive process- noted hx of ITP for which has been refractory to steroids, IVIG/rituximab- has been on Nplate at home. Plt at 39 today from 31 yesterday. hep held in setting of r/o hit and autoimmune work up.  -f/u heme/onc recs and work up with autoimmune, HIT work up  -Decadron converted to 40mg PO on day 2 of 4  -Abd US without splenomegaly. Previously on steroids. COPD bundle.   -c/w stiolto, duonebs  -On steroids for thrombocytopenia but will help w/ COPD as well

## 2018-06-29 NOTE — PROGRESS NOTE ADULT - ASSESSMENT
67F with COPD, TODD, thrombocytopenia, multiple knee surgeries and complications resulting in chronic pain syndrome, recent hospital admissions for cellulitis in March and PNA in Jan, who p/w LE edema and shortness of breath. Pt admitted for hypoxic respiratory failure. Pt now stable for RMF for continued management of RHF.    Problem/Plan - 1:  ·  Problem: Right-sided heart failure, unspecified HF chronicity. Plan: Pt has RHF with noted LE edema, abdominal distension and JVD on presentation s/p diuresis.  -Echocardiogram notable pulm HTN and RA, RV dilation and hypokinesis of the R free wall  -Patient remains euvolemic on exam  -As per cardio, continue Lasix 40mg daily  - Pending outpatient RHC.    Problem/Plan - 2:  ·  Problem: Pulmonary hypertension. Plan: Pt Found to have pulm HTN- 58mmHg- given work up with V/Q- perfusion WNL but with grossly irregular ventilation- more suggestive of pulmonary etiology. Group 1 vs group 3 pulmonary HTN. Noted PFO on GURPREET with Right to left shunt.  - Pending outpatient RHC  - Will need home O2 and pending authorization/services  - Will have PT reevaluate   - CT surgery signed off.    Problem/Plan - 3:  ·  Problem: Acute respiratory failure with hypoxia. Plan: Presented with acute hypoxic resp failure, admitted to [facility]  RESOLVING  Pt desaturating while walking with PT, will need home O2  -attempt to wean.    Problem/Plan - 4:  ·  Problem: Chronic obstructive pulmonary disease with acute exacerbation. Plan: Previously on steroids. COPD bundle.   -c/w stiolto, duonebs  -On steroids for thrombocytopenia but will help w/ COPD as well.  -Pt is in chronic stable state with COPD, discussed need for home oxygen with pt. Nebulizer treatments were attempted. Pt agreed to home oxygen. O2 sat on room air at rest was 80%.      Problem/Plan - 5:  ·  Problem: Thrombocytopenia. Plan: Noted thrombocytopenia for which heme/ onc consulted. Smear with large platelets consistent with platelet destructive process- noted hx of ITP for which has been refractory to steroids, IVIG/rituximab- has been on Nplate at home. Plt at 39 today from 31 yesterday. HSQ held in setting of r/o hit and autoimmune work up.  -f/u heme/onc recs and work up with autoimmune, HIT work up  -Decadron converted to 40mg PO on day 2 of 4  -Abd US without splenomegaly.  -Continue to trend CBC.    Problem/Plan - 6:  Problem: Chronic pain of left knee.Plan: Hx of chronic L knee pain. With current regimen: Tylenol 975mg q8h prn moderate pain, Dilaudid 2mg q6h prn breakthrough pain, dilaudid 4mg q6h for severe pain.    Problem/Plan - 7:  ·  Problem: Bipolar 1 disorder. Plan: Continue home dose of Lamictal, Effexor  Will continue xanax BID (home dose TID).     Problem/Plan - 8:  ·  Problem: Primary insomnia. Plan: Continue with home dose of trazodone.     Problem/Plan - 9:  ·  Problem: Nutrition, metabolism, and development symptoms. Plan: F: tolerating PO, no IVF  E: replete K<4, Mg<2  N: Dash/TLC.     Problem/Plan - 10:  Problem: Need for prophylactic measure. Plan; DVT: SCDs, no HSQ d/t thrombocytopenia  GI: protonix 40 qd d/t steroids  Ethics: Full code, DNR/DNI rescinded upon transfer  Lines-peripheral  Dispo: 7[facility] to [facility], needs PT reeval, needs home oxygen setup. 67F with COPD, TODD, thrombocytopenia, multiple knee surgeries and complications resulting in chronic pain syndrome, recent hospital admissions for cellulitis in March and PNA in Jan, who p/w LE edema and shortness of breath. Pt admitted for hypoxic respiratory failure. Pt now stable for RMF for continued management of RHF.    Problem/Plan - 1:  ·  Problem: Right-sided heart failure, unspecified HF chronicity. Plan: Pt has RHF with noted LE edema, abdominal distension and JVD on presentation s/p diuresis.  -Echocardiogram notable pulm HTN and RA, RV dilation and hypokinesis of the R free wall  -Patient remains euvolemic on exam  -As per cardio, continue Lasix 40mg daily  - Pending outpatient RHC.    Problem/Plan - 2:  ·  Problem: Pulmonary hypertension. Plan: Pt Found to have pulm HTN- 58mmHg- given work up with V/Q- perfusion WNL but with grossly irregular ventilation- more suggestive of pulmonary etiology. Group 1 vs group 3 pulmonary HTN. Noted PFO on GURPREET with Right to left shunt.  - Pending outpatient RHC  - Will need home O2 and pending authorization/services  - Will have PT reevaluate   - CT surgery signed off.    Problem/Plan - 3:  ·  Problem: Acute respiratory failure with hypoxia. Plan: Presented with acute hypoxic resp failure, admitted to [facility]  RESOLVING  Pt desaturating while walking with PT, will need home O2  -attempt to wean.    Problem/Plan - 4:  ·  Problem: Chronic obstructive pulmonary disease with acute exacerbation. Plan: Previously on steroids. COPD bundle.   -c/w stiolto, duonebs  -On steroids for thrombocytopenia but will help w/ COPD as well.  -Pt is in chronic stable state with COPD, discussed need for home oxygen with pt. Nebulizer treatments were attempted. Pt agreed to home oxygen. O2 sat on room air at rest was 80%.      Problem/Plan - 5:  ·  Problem: Thrombocytopenia. Plan: Noted thrombocytopenia for which heme/ onc consulted. Smear with large platelets consistent with platelet destructive process- noted hx of ITP for which has been refractory to steroids, IVIG/rituximab- has been on Nplate at home. Plt at 39 today from 31 yesterday. HSQ held in setting of r/o hit and autoimmune work up.  -f/u heme/onc recs and work up with autoimmune, HIT work up  -Decadron converted to 40mg PO on day 2 of 4  -Abd US without splenomegaly.  -Continue to trend CBC.    Problem/Plan - 6:  Problem: Chronic pain of left knee.Plan: Hx of chronic L knee pain. With current regimen: Tylenol 975mg q8h prn moderate pain, Dilaudid 2mg q6h prn breakthrough pain, dilaudid 4mg q6h for severe pain.    Problem/Plan - 7:  ·  Problem: Bipolar 1 disorder. Plan: Continue home dose of Lamictal, Effexor  Will continue xanax BID (home dose TID).   - pt complains she has been feeling very depressed lately with the recent passing of her      Problem/Plan - 8:  ·  Problem: Primary insomnia. Plan: Continue with home dose of trazodone.     Problem/Plan - 9:  ·  Problem: Nutrition, metabolism, and development symptoms. Plan: F: tolerating PO, no IVF  E: replete K<4, Mg<2  N: Dash/TLC.     Problem/Plan - 10:  Problem: Need for prophylactic measure. Plan; DVT: SCDs, no HSQ d/t thrombocytopenia  GI: protonix 40 qd d/t steroids  Ethics: Full code, DNR/DNI rescinded upon transfer  Lines-peripheral    Problem/Plan - 11:  problem: transfer of care  plan  - Dispo: [facility] to [facility]  - pt will have home O2

## 2018-06-29 NOTE — PROGRESS NOTE ADULT - SUBJECTIVE AND OBJECTIVE BOX
PGY1-Transfer Acceptance Note    HOSPITAL COURSE:  67F with COPD, Iron deficiency anemia, thrombocytopenia requiring Nplate multiple knee surgeries and complications resulting in chronic pain syndrome presented on 6/21 with 1 week of progressively worsening lower leg pain and 24h of worsening SOB. Also noted pleuritic type chest pain intermittently. Patient feels over the last week the symptoms became progressively worse.  Pt reports she sleeps on 1 pillow, does not get SOB while laying completely flat. Patient was initially hypoxic to 80s in ED, was placed on NC and improved to 89%. Initial concern for PE for which CTA unable to be performed 2/2 to hx of allergy to contrast. Given Lovenox and admitted to [facility] for concern of hypoxic respiratory failure with noted RHF. Echocardiogram demonstrated preserved LV function, noted RV and RA dilation RV free wall hypokinesis.  Inferior wall is not found to be hypokinetic. In the setting of EKG without ischemic changes and neg cardiac enzymes low suspicion for ischemia. Noted on echocardiogram with bubble to have PFO. Patient undergoing work up for etiology of pulmonary hypertension/cor pulmonale. V/Q scan initially performed but patient unable to tolerate ventilation. Unable to allergy test for contrast given recent steroid use in setting of tx for COPD during course. Therefore V/Q repeated and noted perfusion intact but with ventilation irregularities- suggesting underlying pulmonary disease. Patient to have further work up with RHC. Patient also of note worked up for thrombocytopenia w/ heme/onc. 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 Undergoing autoimmune, HIT work up that is pending and initiated on decadron. On 6/28 Decadron was transitioned from IV to 40mg PO on for a 4 day course (on day 2 of 4, end date 6/30).  Abdominal ultrasound did not demonstrate splenomegaly.  She had PT walk test and was desating to the 80s and will need home oxygen therapy.    INTERVAL HPI/OVERNIGHT EVENTS:  Patient was seen and examined at bedside. Patient resting comfortably. Complains of chronic L knee pain and swelling, but has no other complaints. Patient denies: fever, chills, dizziness, weakness, HA, Changes in vision, CP, palpitations, SOB, cough, N/V/D/C, dysuria, changes in bowel movements. ROS otherwise negative.    VITAL SIGNS:  T(F): 98.4 (06-28-18 @ 23:27)  HR: 81 (06-28-18 @ 23:27)  BP: 122/79 (06-28-18 @ 23:27)  RR: 18 (06-28-18 @ 23:27)  SpO2: 91% (06-28-18 @ 23:27)  Wt(kg): --    PHYSICAL EXAM:    Constitutional: WDWN, NAD  HEENT: PERRL, EOMI, sclera non-icteric, neck supple, trachea midline, no masses, no JVD, MMM, good dentition  Respiratory: CTA b/l, good air entry b/l, no wheezing, no rhonchi, no rales, without accessory muscle use and no intercostal retractions  Cardiovascular: RRR, normal S1S2, no M/R/G  Gastrointestinal: soft, NTND, no masses palpable, BS normal  Extremities: Warm, well perfused, pulses equal bilateral upper and lower extremities, mild edema on Left lower extremity, no clubbing. Pain on palpation of L knee.  Neurological: AAOx3, CN Grossly intact  Skin: Normal temperature, warm, dry. Bruises noted on B/L upper extremeties and scarring on Left knee.   MSK: ROM limited in Left lower extremity. Full ROM in upper extremities and right lower extremity     MEDICATIONS  (STANDING):  ALBUTerol/ipratropium for Nebulization 3 milliLiter(s) Nebulizer every 4 hours  ALPRAZolam 0.5 milliGRAM(s) Oral two times a day  dexamethasone     Tablet 40 milliGRAM(s) Oral every 24 hours  dextrose 5%. 1000 milliLiter(s) (50 mL/Hr) IV Continuous <Continuous>  dextrose 50% Injectable 12.5 Gram(s) IV Push once  dextrose 50% Injectable 25 Gram(s) IV Push once  dextrose 50% Injectable 25 Gram(s) IV Push once  furosemide    Tablet 40 milliGRAM(s) Oral daily  insulin lispro (HumaLOG) corrective regimen sliding scale   SubCutaneous Before meals and at bedtime  lamoTRIgine 100 milliGRAM(s) Oral daily  polyethylene glycol 3350 17 Gram(s) Oral daily  senna 2 Tablet(s) Oral at bedtime  tiotropium 2.5 MICROgram(s)/olodaterol 2.5 MICROgram(s) Inhaler 2 Puff(s) Inhalation daily  traZODone      traZODone 100 milliGRAM(s) Oral at bedtime  venlafaxine XR. 75 milliGRAM(s) Oral daily    MEDICATIONS  (PRN):  acetaminophen   Tablet. 975 milliGRAM(s) Oral every 8 hours PRN Moderate Pain (4 - 6)  bisacodyl Suppository 10 milliGRAM(s) Rectal daily PRN Constipation  dextrose 40% Gel 15 Gram(s) Oral once PRN Blood Glucose LESS THAN 70 milliGRAM(s)/deciliter  glucagon  Injectable 1 milliGRAM(s) IntraMuscular once PRN Glucose LESS THAN 70 milligrams/deciliter  HYDROmorphone   Tablet 4 milliGRAM(s) Oral every 6 hours PRN Severe Pain (7 - 10)  HYDROmorphone   Tablet 2 milliGRAM(s) Oral every 6 hours PRN for breakthru pain      Allergies    iodine (Anaphylaxis)    Intolerances        LABS:                        14.4   6.8   )-----------( 39       ( 28 Jun 2018 07:54 )             46.6     06-28    137  |  95<L>  |  21  ----------------------------<  178<H>  4.9   |  31  |  0.46<L>    Ca    9.8      28 Jun 2018 07:54  Mg     2.1     06-28            RADIOLOGY & ADDITIONAL TESTS:  Reviewed PGY1-Transfer Acceptance Note    HOSPITAL COURSE:  67F with COPD, Iron deficiency anemia, thrombocytopenia requiring Nplate multiple knee surgeries and complications resulting in chronic pain syndrome presented on 6/21 with 1 week of progressively worsening lower leg pain, intermittent pleuritic chest pain, and SOB worsening over 24 hours prior to admission. On admission, pt was hypoxic and admitted to [facility] for hypoxia. Echocardiogram significant for RHF and PFO. Unable to perform CTA for PE 2/2 contrast allergy. V/Q performed which noted intact perfusion but with ventilation irregularities suggestive of underlying pulmonary disease. Hospital course c/b thrombocytopenia. Heme/onc on board, has a known hx of ITP, undergoing HIT and autoimmune workup. Currently on PO steroids for 4 day course to end 6/30.Of note, desaturates when walking and will need home oxygen therapy.    INTERVAL HPI/OVERNIGHT EVENTS:  Patient was seen and examined at bedside. Patient resting comfortably. Complains of chronic L knee pain and swelling, but has no other complaints. Patient denies: fever, chills, dizziness, weakness, HA, Changes in vision, CP, palpitations, SOB, cough, N/V/D/C, dysuria, changes in bowel movements. ROS otherwise negative.    VITAL SIGNS:  T(F): 98.4 (06-28-18 @ 23:27)  HR: 81 (06-28-18 @ 23:27)  BP: 122/79 (06-28-18 @ 23:27)  RR: 18 (06-28-18 @ 23:27)  SpO2: 91% (06-28-18 @ 23:27)  Wt(kg): --    PHYSICAL EXAM:    Constitutional: WDWN, NAD  HEENT: PERRL, EOMI, sclera non-icteric, neck supple, trachea midline, no masses, no JVD, MMM, good dentition  Respiratory: CTA b/l, good air entry b/l, no wheezing, no rhonchi, no rales, without accessory muscle use and no intercostal retractions  Cardiovascular: RRR, normal S1S2, no M/R/G  Gastrointestinal: soft, NTND, no masses palpable, BS normal  Extremities: Warm, well perfused, pulses equal bilateral upper and lower extremities, mild edema on Left lower extremity, no clubbing. Pain on palpation of L knee.  Neurological: AAOx3, CN Grossly intact  Skin: Normal temperature, warm, dry. Bruises noted on B/L upper extremities and scarring on Left knee.   MSK: ROM limited in Left lower extremity. Full ROM in upper extremities and right lower extremity     MEDICATIONS  (STANDING):  ALBUTerol/ipratropium for Nebulization 3 milliLiter(s) Nebulizer every 4 hours  ALPRAZolam 0.5 milliGRAM(s) Oral two times a day  dexamethasone     Tablet 40 milliGRAM(s) Oral every 24 hours  dextrose 5%. 1000 milliLiter(s) (50 mL/Hr) IV Continuous <Continuous>  dextrose 50% Injectable 12.5 Gram(s) IV Push once  dextrose 50% Injectable 25 Gram(s) IV Push once  dextrose 50% Injectable 25 Gram(s) IV Push once  furosemide    Tablet 40 milliGRAM(s) Oral daily  insulin lispro (HumaLOG) corrective regimen sliding scale   SubCutaneous Before meals and at bedtime  lamoTRIgine 100 milliGRAM(s) Oral daily  polyethylene glycol 3350 17 Gram(s) Oral daily  senna 2 Tablet(s) Oral at bedtime  tiotropium 2.5 MICROgram(s)/olodaterol 2.5 MICROgram(s) Inhaler 2 Puff(s) Inhalation daily  traZODone      traZODone 100 milliGRAM(s) Oral at bedtime  venlafaxine XR. 75 milliGRAM(s) Oral daily    MEDICATIONS  (PRN):  acetaminophen   Tablet. 975 milliGRAM(s) Oral every 8 hours PRN Moderate Pain (4 - 6)  bisacodyl Suppository 10 milliGRAM(s) Rectal daily PRN Constipation  dextrose 40% Gel 15 Gram(s) Oral once PRN Blood Glucose LESS THAN 70 milliGRAM(s)/deciliter  glucagon  Injectable 1 milliGRAM(s) IntraMuscular once PRN Glucose LESS THAN 70 milligrams/deciliter  HYDROmorphone   Tablet 4 milliGRAM(s) Oral every 6 hours PRN Severe Pain (7 - 10)  HYDROmorphone   Tablet 2 milliGRAM(s) Oral every 6 hours PRN for breakthru pain      Allergies    iodine (Anaphylaxis)    Intolerances        LABS:                        14.4   6.8   )-----------( 39       ( 28 Jun 2018 07:54 )             46.6     06-28    137  |  95<L>  |  21  ----------------------------<  178<H>  4.9   |  31  |  0.46<L>    Ca    9.8      28 Jun 2018 07:54  Mg     2.1     06-28            RADIOLOGY & ADDITIONAL TESTS:  Reviewed

## 2018-06-29 NOTE — DISCHARGE NOTE ADULT - CARE PROVIDER_API CALL
Navarro Horta  [address] [place] [address]    Alternate Phone: [phone]  Phone: [phone]  Fax: (   )    - Navarro Horta  [address] [place] [address]    Alternate Phone: [phone]  Phone: [phone]  Fax: (   )    -    Apryl Blount  Heme-Onc Appointment:  7/25/18 at 2:45 pm  Phone: [phone]  Fax: (   )    - Navarro Horta  [address] [address]    Alternate Phone: [phone]  Phone: [phone]  Fax: (   )    -    Apryl Blount  Heme-Onc Appointment:  7/25/18 at 2:45 pm  Phone: [phone]  Fax: (   )    -    Dania Sun), Critical Care Medicine; Pulmonary Disease  [address] [address]  Phone: [phone]  Fax: [phone]

## 2018-06-29 NOTE — BEHAVIORAL HEALTH ASSESSMENT NOTE - NSBHCONSULTMEDS_PSY_A_CORE FT
Increase venlafaxine to 150 mg XR (patient's maintenance dose).  Continue lamictal and alprazolam at current doses.

## 2018-06-29 NOTE — CHART NOTE - NSCHARTNOTEFT_GEN_A_CORE
Case discussed with Dr. Ribeiro (covering for Dr. Horta). Continue to medically optimize patient from a pulmonary standpoint and hematology standpoint. Please have patient follow up with Dr. Horta/Dr. Ribeiro outpatient regarding future intervention for PFO. Patient can call our office to schedule an appointment within 1-2 weeks of discharge, at #[phone]. Thank you for consulting us.

## 2018-06-29 NOTE — PROGRESS NOTE ADULT - ASSESSMENT
67F with COPD, Iron deficiency anemia, thrombocytopenia requiring Nplate (Romiplostim) multiple knee surgeries and complications resulting in chronic pain syndrome, recent hospital admissions for cellulitis in March and PNA in Jan, who p/w LE edema and shortness of breath. Pt is downgraded to floors for continued  management of pulmonary HTN and RHF. 67F with COPD, TODD, thrombocytopenia, multiple knee surgeries and complications resulting in chronic pain syndrome, recent hospital admissions for cellulitis in March and PNA in Jan, who p/w LE edema and shortness of breath. Pt admitted for hypoxic respiratory failure. Pt now stable for RMF for continued management of RHF.

## 2018-06-29 NOTE — DISCHARGE NOTE ADULT - SECONDARY DIAGNOSIS.
Pulmonary hypertension Acute respiratory failure with hypoxia Chronic obstructive pulmonary disease, unspecified COPD type Thrombocytopenia Chronic pain of left knee Herpetic lesion

## 2018-06-29 NOTE — DISCHARGE NOTE ADULT - HOSPITAL COURSE
Pt is a 89 y/o F with PMHx of COPD, iron deficiency anemia, thrombocytopenia, and multiple knee surgeries who presented with 1 week of progressively worsening lower leg pain, intermittent pleuritic chest pain and SOB worsening over 24 hours prior to admission. She was admitted for hypoxic respiratory failure in the setting of possible PE with cor pulmonale vs COPD vs CHF. She was started on nebs, steroids, lasix, lovenox. A VQ scan was performed and was negative of PE. ECHO showed the right atrium was dilated, the right ventricle was severely dilated, and the right ventricular free was was hypokinetic with moderate pulmonary hypertensin. Pt was placed on HFNC and weaned off to NC. A repeat echo with bubble study was performed and revealed a large PFO with right to left shunt. Another VQ scan was negative for PE. Pt was found to have worsening thrombocytopenia, started on Decadron. Pt clinically improved and will have a RHC done as an outpatient. Pt is a 89 y/o F with PMHx of COPD, iron deficiency anemia, thrombocytopenia, and multiple knee surgeries who presented with 1 week of progressively worsening lower leg pain, intermittent pleuritic chest pain and SOB worsening over 24 hours prior to admission. She was admitted for hypoxic respiratory failure in the setting of possible PE with cor pulmonale vs COPD vs CHF. She was started on nebs, steroids, lasix, lovenox. A VQ scan was performed and was negative of PE. ECHO showed the right atrium was dilated, the right ventricle was severely dilated, and the right ventricular free was was hypokinetic with moderate pulmonary hypertensin. Pt was placed on HFNC and weaned off to NC. A repeat echo with bubble study was performed and revealed a large PFO with right to left shunt. Another VQ scan was negative for PE. Pt was found to have worsening thrombocytopenia, started on Decadron. Pt clinically improved and will have a RHC done as an outpatient. Pt was found to have a herpetic lesion on her sacral area and was started on Valtrex for 7 days. Pt is a 68 y/o F with PMHx of COPD, iron deficiency anemia, thrombocytopenia, and multiple knee surgeries who presented with 1 week of progressively worsening lower leg pain, intermittent pleuritic chest pain and SOB worsening over 24 hours prior to admission. She was admitted for hypoxic respiratory failure in the setting of possible PE with cor pulmonale vs COPD vs CHF. She was started on nebs, steroids, lasix, lovenox. A VQ scan was performed and was negative of PE. ECHO showed the right atrium was dilated, the right ventricle was severely dilated, and the right ventricular free was was hypokinetic with moderate pulmonary hypertensin. Pt was placed on HFNC and weaned off to NC. A repeat echo with bubble study was performed and revealed a large PFO with right to left shunt. Another VQ scan was negative for PE. Pt was found to have worsening thrombocytopenia, started on Decadron. Pt clinically improved and will have a RHC done as an outpatient with Dr. Horta. Pt was found to have a herpetic lesion on her sacral area and was started on Valtrex for 7 days. Pt is a 68 y/o F with PMHx of COPD, iron deficiency anemia, thrombocytopenia, and multiple knee surgeries who presented with 1 week of progressively worsening lower leg pain, intermittent pleuritic chest pain and SOB worsening over 24 hours prior to admission. She was admitted for hypoxic respiratory failure in the setting of possible PE with cor pulmonale vs COPD vs CHF. She was started on nebs, steroids, lasix, lovenox. A VQ scan was performed and was negative of PE. ECHO showed the right atrium was dilated, the right ventricle was severely dilated, and the right ventricular free was was hypokinetic with moderate pulmonary hypertensin. Pt was placed on HFNC and weaned off to NC. A repeat echo with bubble study was performed and revealed a large PFO with right to left shunt. Another VQ scan was negative for PE. Pt was found to have worsening thrombocytopenia, started on Decadron. Pt clinically improved and will have a RHC done as an outpatient with Dr. Horta. Pt was found to have a herpetic lesion on her sacral area and was started on Valtrex for 7 days.    On the day of discharge, the patient was seen and examined. Symptoms improved. Vital signs are stable. Labs and imaging reviewed. Patient is medically optimized and hemodynamically stable. Return precautions discussed, medication teach back done, and importance of physician followup emphasized. The patient verbalized understanding.

## 2018-06-29 NOTE — PROGRESS NOTE ADULT - PROBLEM SELECTOR PROBLEM 3
Chronic obstructive pulmonary disease with acute exacerbation Acute respiratory failure with hypoxia

## 2018-06-29 NOTE — DISCHARGE NOTE ADULT - PLAN OF CARE
You were found to have RHF with noted LE edema, abdominal distension and JVD. You continued your Lasix during your stay and you will go for an outpatient Right Heart Catheterization. You were found to have a PFO with a Right to Left shunt. You will go home with O2. You will go for an outpatient Right Heart Catheterization as above. You were found to be short of breath, especially while walking. You will need home O2. You have COPD and came in with shortness of breath. You will continue with your home COPD meds and will go home with O2. You were put on steroids and your platelet count stabilized You have chronic pain of your left leg. You were given dilaudid during your stay. You have a spinal stimulator at home and you should continue to use that. You were found to have a herpetic lesion on the sacral area. You were started on Valtrex (Valacyclovir) which you will continue 1000mg every 8 hours until 7/8/18 You were found to have a herpetic lesion on the sacral area. You were started on Valtrex (Valacyclovir) which you will continue 1000mg every 8 hours for a 7 day course until 7/8/18 Outpatient treatment You were found to have RHF with noted LE edema, abdominal distension and JVD. You continued your Lasix during your stay and you will go for an outpatient Right Heart Catheterization with Dr. Horta [phone] within 1-2 weeks. You were found to have a herpetic lesion on the sacral area. You were started on Valtrex (Valacyclovir) which you will continue 1000mg every 8 hours until resolution of symptoms

## 2018-06-29 NOTE — DISCHARGE NOTE ADULT - PROVIDER TOKENS
FREE:[LAST:[Rula],FIRST:[Navarro],PHONE:[phone]],FAX:[(   )    -],ADDRESS:[[address] [place] [address]    Alternate Phone: [phone]]] FREE:[LAST:[Rula],FIRST:[Navarro],PHONE:[phone]],FAX:[(   )    -],ADDRESS:[[address] [place] [address]    Alternate Phone: [phone]]],FREE:[LAST:[Amdave],FIRST:[Ali],PHONE:[phone]],FAX:[(   )    -],ADDRESS:[Heme-Onc Appointment:  7/25/18 at 2:45 pm]] FREE:[LAST:[Kliger],FIRST:[Navarro],PHONE:[phone]],FAX:[(   )    -],ADDRESS:[[address] [place] [address]    Alternate Phone: [phone]]],FREE:[LAST:[Ameri],FIRST:[Ali],PHONE:[phone]],FAX:[(   )    -],ADDRESS:[Heme-Onc Appointment:  7/25/18 at 2:45 pm]],TOKEN:'[id number]'

## 2018-06-29 NOTE — PROGRESS NOTE ADULT - PROBLEM SELECTOR PLAN 1
Case discussed with Dr. Horta  - Bubble study positive for PFO with right to left shunt.   - RHC needed to further work up pulmonary hypertension and to provide further recs.  Consider pre-medicating with steroids and Benadryl prior to test, consider allergy consult. Would not do RHC until medically optimized from a heme and pulm standpoint.  - Will continue to follow. Please feel free to call [phone] with any further questions.  - Recommending patient to follow up with Dr. Horta outpatient within 1-2 weeks of discharge. Please call our office at [phone] to schedule an appointment.

## 2018-06-29 NOTE — BEHAVIORAL HEALTH ASSESSMENT NOTE - SUMMARY
67F with multiple medical problems including chronic pain due to prior leg fx/surgery/RSD, hx of depression vs bipolar disorder, endorsing series of losses, most significantly death of her  4 years ago, death of her sister 3 years ago, death of one of her beloved dogs ~4 weeks ago, and death of her 46-year-old niece last week.   Pt with no suicidal ideation/intent/plan. Is future oriented. Will be going to son's home upon discharge.  Pt does not require C.O. or inpt psychiatric admission.  Pt will benefit from following up with her outpt psychiatrist upon returning home to [place]  For now, recommend continuing current rx regimen including Effexor  mg po qdaily, Lamictal 100 mg po qdaily and prn xanax 0.25 mg.

## 2018-06-29 NOTE — PROGRESS NOTE ADULT - SUBJECTIVE AND OBJECTIVE BOX
Feeling well, no complaints overnight    PAST MEDICAL & SURGICAL HISTORY:  COPD (chronic obstructive pulmonary disease)  Pneumonia  Thrombocytopenia  Cellulitis  S/P insertion of spinal cord stimulator  History of left knee replacement    FAMILY HISTORY:  No pertinent family history in first degree relatives    Social:  Allergies    iodine (Anaphylaxis)    Intolerances    	  MEDICATIONS:      ALBUTerol/ipratropium for Nebulization 3 milliLiter(s) Nebulizer every 4 hours  tiotropium 2.5 MICROgram(s)/olodaterol 2.5 MICROgram(s) Inhaler 2 Puff(s) Inhalation daily    acetaminophen   Tablet. 975 milliGRAM(s) Oral every 8 hours PRN  ALPRAZolam 0.5 milliGRAM(s) Oral two times a day  HYDROmorphone   Tablet 4 milliGRAM(s) Oral every 6 hours PRN  HYDROmorphone   Tablet 2 milliGRAM(s) Oral every 6 hours PRN  lamoTRIgine 100 milliGRAM(s) Oral daily  traZODone      traZODone 100 milliGRAM(s) Oral at bedtime  venlafaxine XR. 75 milliGRAM(s) Oral daily    bisacodyl Suppository 10 milliGRAM(s) Rectal daily PRN  polyethylene glycol 3350 17 Gram(s) Oral daily  senna 2 Tablet(s) Oral at bedtime    dextrose 40% Gel 15 Gram(s) Oral once PRN  dextrose 50% Injectable 12.5 Gram(s) IV Push once  dextrose 50% Injectable 25 Gram(s) IV Push once  dextrose 50% Injectable 25 Gram(s) IV Push once  glucagon  Injectable 1 milliGRAM(s) IntraMuscular once PRN  insulin lispro (HumaLOG) corrective regimen sliding scale   SubCutaneous Before meals and at bedtime    dextrose 5%. 1000 milliLiter(s) IV Continuous <Continuous>          ICU Vital Signs Last 24 Hrs  T(C): 36.9 (28 Jun 2018 23:27), Max: 36.9 (28 Jun 2018 23:27)  T(F): 98.4 (28 Jun 2018 23:27), Max: 98.4 (28 Jun 2018 23:27)  HR: 81 (28 Jun 2018 23:27) (81 - 98)  BP: 122/79 (28 Jun 2018 23:27) (122/79 - 145/88)  BP(mean): 102 (28 Jun 2018 20:30) (101 - 110)  ABP: --  ABP(mean): --  RR: 18 (28 Jun 2018 23:27) (14 - 32)  SpO2: 91% (28 Jun 2018 23:27) (91% - 97%)            Gen: NAD, AAOx3  Neck: no JVD  Cardiovascular: Normal S1 S2, No murmurs,    Respiratory: decreased rales	  Gastrointestinal:  Soft, Non-tender, + BS	   Ext: + edema L>R  Neuro: no focal deficits.  Vascular: Peripheral pulses palpable 2+ bilaterally      < from: Echocardiogram (06.22.18 @ 12:06) >  Study Quality: Good.  Left Ventricle  Normal left ventricular size and wall thickness.  Abnormal (paradoxical) septal motion consistent with elevated RV  end-diastolic pressure  The left ventricular ejection fraction is estimated to be 60-65%  Left Atrium  The mitral inflow pattern is consistent with impaired left ventricular  relaxation with mildly elevated left atrial pressure (8-14mmHg).  The left atrial size is normal.  Right Atrium  The right atrium is dilated.  Right Ventricle  The right ventricle is severely dilated.  The right ventricular free wall is hypokintic.  Aortic Valve  There is mild aortic valve thickening.  No aortic regurgitation noted.  Mitral Valve  Structurally normal mitral valve.  No mitral regurgitation noted.  Tricuspid Valve  Structurally normal tricuspid valve.  There is mild to moderate tricuspid regurgitation.  The pulmonary artery systolic pressure is estimated to be 58 mmHg.  There is moderate pulmonary hypertension.  Pulmonic Valve  Structurally normal pulmonic valve.  No pulmonic regurgitation noted.  Arteries and Venous System  No aortic root dilatation.  The IVC is dilated (>2.1 cm) however with normal inspiratory collapse   (>50%)  consistent with mildly elevated right atrial pressure (  8mmHg, range    < end of copied text >      LABS:	 	    CARDIAC MARKERS:                                 14.6   9.4   )-----------( 35       ( 29 Jun 2018 07:07 )             48.2   06-29    139  |  97  |  29<H>  ----------------------------<  210<H>  5.1   |  33<H>  |  0.49<L>    Ca    9.7      29 Jun 2018 07:07  Phos  5.3     06-29  Mg     2.3     06-29          ASSESSMENT/PLAN: 	  67 y.o female pmh of COPD, current smoker, anemia, thrombocytopenia, chronic pain syndrome presents with progressive SOB with symptoms of Right sided heart failure    Right sided Heart Failure -  2/2 Primary lung pathology vs Primary Pulmonary Hypertension   Bubble study + for PFO with R to L shunt which is likely contributing to hypoxia.  However, main focus should be on diagnosing etiology of elevated pulmonary pressures.  Incentive spirometry and encourage patient to get out of bed.  V/Q Scan with normal perfusion but irregular ventilation.   Continue PO Lasix 40mg today, consider Lasix 20mg PO if bicarb continuing to rise.  Pt volume status much improved  RHC and vasoreactivity testing in future per primary team  No other active cardiac issues at this time.  Please reconsult as needed.  Case discussed with Cardiology Attending and Primary team

## 2018-06-29 NOTE — PROGRESS NOTE ADULT - SUBJECTIVE AND OBJECTIVE BOX
Heme/Onc Progress Note (Dr. Costello)    Interval History: No acute events overnight. Patient s/p 2nd dose of Dexamethasone. Patient with ecchymosis on extremity 2/2 IV, no new bruising or gross bleeding. Patient otherwise on O2 for respiratory condition. Patient admits to some shortness of breath and leg swelling, denies acute cp, palpitations, nausea, vomiting, bowel changes, fevers or chills. U/S abd w/ hepatomegaly, no splenomegaly.      iodine (Anaphylaxis)    Allergies    iodine (Anaphylaxis)    Intolerances        Medications:  MEDICATIONS  (STANDING):  ALBUTerol/ipratropium for Nebulization 3 milliLiter(s) Nebulizer every 4 hours  ALPRAZolam 0.5 milliGRAM(s) Oral two times a day  dexamethasone     Tablet 40 milliGRAM(s) Oral every 24 hours  dextrose 5%. 1000 milliLiter(s) (50 mL/Hr) IV Continuous <Continuous>  dextrose 50% Injectable 12.5 Gram(s) IV Push once  dextrose 50% Injectable 25 Gram(s) IV Push once  dextrose 50% Injectable 25 Gram(s) IV Push once  furosemide    Tablet 40 milliGRAM(s) Oral daily  insulin lispro (HumaLOG) corrective regimen sliding scale   SubCutaneous Before meals and at bedtime  lamoTRIgine 100 milliGRAM(s) Oral daily  pantoprazole    Tablet 40 milliGRAM(s) Oral before breakfast  polyethylene glycol 3350 17 Gram(s) Oral daily  senna 2 Tablet(s) Oral at bedtime  tiotropium 2.5 MICROgram(s)/olodaterol 2.5 MICROgram(s) Inhaler 2 Puff(s) Inhalation daily  traZODone      traZODone 100 milliGRAM(s) Oral at bedtime  venlafaxine XR. 75 milliGRAM(s) Oral daily    MEDICATIONS  (PRN):  acetaminophen   Tablet. 975 milliGRAM(s) Oral every 8 hours PRN Moderate Pain (4 - 6)  bisacodyl Suppository 10 milliGRAM(s) Rectal daily PRN Constipation  dextrose 40% Gel 15 Gram(s) Oral once PRN Blood Glucose LESS THAN 70 milliGRAM(s)/deciliter  glucagon  Injectable 1 milliGRAM(s) IntraMuscular once PRN Glucose LESS THAN 70 milligrams/deciliter  HYDROmorphone   Tablet 4 milliGRAM(s) Oral every 6 hours PRN Severe Pain (7 - 10)  HYDROmorphone   Tablet 2 milliGRAM(s) Oral every 6 hours PRN for breakthru pain            PHYSICAL EXAM:    T(F): 98.4 (18 @ 23:27), Max: 98.4 (18 @ 23:27)  HR: 81 (18 @ 23:27) (81 - 96)  BP: 122/79 (18 @ 23:27) (122/79 - 145/88)  RR: 18 (18 @ 23:27) (18 - 32)  SpO2: 91% (18 @ 23:27) (91% - 97%)  Wt(kg): --    Daily Height in cm: 165.1 (2018 00:03)    Daily       GEN: NAD   HEENT: AT/NC, EOMI, no oral lesions  NECK: supple  CVS: +S1S2 rrr  LUNG: CTA BL  ABD: +BS, NT  : no dimitri tenderness  EXT: mild tenderness, b/l ecchymosis, no edema  NEURO: AAox3         Labs:                          14.6   9.4   )-----------( 35       ( 2018 07:07 )             48.2     CBC Full  -  ( 2018 07:07 )  WBC Count : 9.4 K/uL  Hemoglobin : 14.6 g/dL  Hematocrit : 48.2 %  Platelet Count - Automated : 35 K/uL  Mean Cell Volume : 88.3 fL  Mean Cell Hemoglobin : 26.7 pg  Mean Cell Hemoglobin Concentration : 30.3 g/dL  Auto Neutrophil # : x  Auto Lymphocyte # : x  Auto Monocyte # : x  Auto Eosinophil # : x  Auto Basophil # : x  Auto Neutrophil % : 92.6 %  Auto Lymphocyte % : 5.2 %  Auto Monocyte % : 2.1 %  Auto Eosinophil % : x  Auto Basophil % : 0.1 %            139  |  97  |  29<H>  ----------------------------<  210<H>  5.1   |  33<H>  |  0.49<L>    Ca    9.7      2018 07:07  Phos  5.3       Mg     2.3             Urinalysis Basic - ( 2018 01:06 )    Color: Yellow / Appearance: Clear / S.015 / pH: x  Gluc: x / Ketone: Trace mg/dL  / Bili: Negative / Urobili: 1.0 E.U./dL   Blood: x / Protein: NEGATIVE mg/dL / Nitrite: NEGATIVE   Leuk Esterase: NEGATIVE / RBC: x / WBC x   Sq Epi: x / Non Sq Epi: x / Bacteria: x      U/S Abd - Liver: Normal echogenicity. Enlarged measuring 19.1 cm in length. No   focal lesions.Intrahepatic ducts: Not dilated Common bile duct: Normal diameter, measuring 0.6 cm.  : No gallstones.  No wall thickening or pericholecystic fluid.  Pancreas: The visualized portions are normal in appearance.  Spleen: No focal abnormality seen. Length of 9.3 cm.  Abdominal aorta: The visualized portions are normal in appearance.Inferior vena cava: The visualized portions are normal in appearance.Right kidney: Length of 11.8 cm. Normal echogenicity. No hydronephrosis.  Left kidney: Length of 11.3 cm. Normal echogenicity. No hydronephrosis.   IMPRESSION:  Hepatomegaly. Normal size spleen.

## 2018-06-29 NOTE — PROGRESS NOTE ADULT - PROBLEM SELECTOR PLAN 2
Pt has RHF with noted LE edema, abdominal distension and JVD on presentation s/p diuresis.  -Echocardiogram notable pulm HTN and RA, RV dilation and hypokinesis of the R free wall  -Patient remains euvolemic on exam  -As per cardio, continue Lasix 40mg daily  - Pending outpatient RHC, discussed with structural heart for which discussion if patient needs CTA in setting of negative V/Q for PE, also will need optimization for thrombocytopenia. Pt Found to have pulm HTN- 58mmHg- given work up with V/Q- perfusion WNL but with grossly irregular ventilation- more suggestive of pulmonary etiology. Group 1 vs group 3 pulmonary HTN. Noted PFO on GURPREET with Right to left shunt.  - Pending outpatient RHC  - Will need home O2 and pending authorization/services  - Will have PT reevaluate   - CT surgery signed off.

## 2018-06-29 NOTE — PROGRESS NOTE ADULT - SUBJECTIVE AND OBJECTIVE BOX
Patient discussed on morning rounds with Dr. Ribeiro (covering for Dr. Horta)    SUBJECTIVE ASSESSMENT:  67y Female seen and examined bedside. Patient teary-eyed stating, "I am worried to go home because nobody will be at my sons house until ." Patient is not offering any complaints at this time. Denies chest pain, palpitations, hemopytsis, N/V/D, abdominal pain, dizziness, fever or chills. Patient is ambulating, tolerating PO diet, and voiding spontaneously.         Vital Signs Last 24 Hrs  T(C): 36.9 (2018 23:27), Max: 36.9 (2018 23:27)  T(F): 98.4 (2018 23:27), Max: 98.4 (2018 23:27)  HR: 92 (2018 12:55) (81 - 96)  BP: 117/81 (2018 12:55) (117/81 - 145/88)  BP(mean): 102 (2018 20:30) (102 - 102)  RR: 14 (2018 12:55) (14 - 32)  SpO2: 92% (2018 12:55) (91% - 97%)  I&O's Detail    2018 07:01  -  2018 07:00  --------------------------------------------------------  IN:  Total IN: 0 mL    OUT:    Voided: 300 mL  Total OUT: 300 mL    Total NET: -300 mL      PHYSICAL EXAM:    CONSTITUTIONAL: Patient sitting in bed, talkative, circumlocution, no acute distress.  NEURO: A&Ox3, no focal deficits.                    CV: S1S2, RRR, no murmurs appreciated on exam  RESPIRATORY: Clear to auscultation b/l  GI: soft, NTND  EXTREMITIES: +1 pitting peripheral edema, warm and well perfused, left knee with old healed scars, +calf tenderness to palpation. Peripheral pulses 2+ b/l. Strength 5/5 bilaterally lower and upper extremities.    LABS:                        14.6   9.4   )-----------( 35       ( 2018 07:07 )             48.2             139  |  97  |  29<H>  ----------------------------<  210<H>  5.1   |  33<H>  |  0.49<L>    Ca    9.7      2018 07:07  Phos  5.3       Mg     2.3             Urinalysis Basic - ( 2018 01:06 )    Color: Yellow / Appearance: Clear / S.015 / pH: x  Gluc: x / Ketone: Trace mg/dL  / Bili: Negative / Urobili: 1.0 E.U./dL   Blood: x / Protein: NEGATIVE mg/dL / Nitrite: NEGATIVE   Leuk Esterase: NEGATIVE / RBC: x / WBC x   Sq Epi: x / Non Sq Epi: x / Bacteria: x        MEDICATIONS  (STANDING):  ALBUTerol/ipratropium for Nebulization 3 milliLiter(s) Nebulizer every 4 hours  ALPRAZolam 0.5 milliGRAM(s) Oral two times a day  dexamethasone     Tablet 40 milliGRAM(s) Oral every 24 hours  dextrose 5%. 1000 milliLiter(s) (50 mL/Hr) IV Continuous <Continuous>  dextrose 50% Injectable 12.5 Gram(s) IV Push once  dextrose 50% Injectable 25 Gram(s) IV Push once  dextrose 50% Injectable 25 Gram(s) IV Push once  furosemide    Tablet 40 milliGRAM(s) Oral daily  insulin lispro (HumaLOG) corrective regimen sliding scale   SubCutaneous Before meals and at bedtime  lamoTRIgine 100 milliGRAM(s) Oral daily  pantoprazole    Tablet 40 milliGRAM(s) Oral before breakfast  polyethylene glycol 3350 17 Gram(s) Oral daily  senna 2 Tablet(s) Oral at bedtime  tiotropium 2.5 MICROgram(s)/olodaterol 2.5 MICROgram(s) Inhaler 2 Puff(s) Inhalation daily  traZODone      traZODone 100 milliGRAM(s) Oral at bedtime  venlafaxine XR. 75 milliGRAM(s) Oral daily    MEDICATIONS  (PRN):  acetaminophen   Tablet. 975 milliGRAM(s) Oral every 8 hours PRN Moderate Pain (4 - 6)  bisacodyl Suppository 10 milliGRAM(s) Rectal daily PRN Constipation  dextrose 40% Gel 15 Gram(s) Oral once PRN Blood Glucose LESS THAN 70 milliGRAM(s)/deciliter  glucagon  Injectable 1 milliGRAM(s) IntraMuscular once PRN Glucose LESS THAN 70 milligrams/deciliter  HYDROmorphone   Tablet 4 milliGRAM(s) Oral every 6 hours PRN Severe Pain (7 - 10)  HYDROmorphone   Tablet 2 milliGRAM(s) Oral every 6 hours PRN for breakthru pain        RADIOLOGY & ADDITIONAL TESTS:    < from: NM Pulmonary Ventilation/Perfusion Scan (18 @ 18:36) >  EXAM:  NM PULM VENTILATION PERFUS IMG                          PROCEDURE DATE:  2018          INTERPRETATION:    VENTILATION - PERFUSION LUNG SCAN    Indication: Shortness of breath; hypoxia.    Procedure:  The patient inhaled technetium 99m labeled DTPA aerosol from   a reservoir containing approximately 30 mCi and SPECT imaging was   performed with reconstruction in the coronal, sagittal and axial planes.    The patient was then injected with 4 mCi of technetium 99m labeled   macroaggregated albumin and SPECT imaging was repeated.  The images were   aligned using fusion software and analyzed concurrently.    Findings:  There is grossly irregular ventilation with large areas of   increased activity in the bronchial tree in large portions of both lungs,   particularly in the left upper lobe.    Perfusion imaging is normal.    The gross discrepancy between ventilation and perfusion is extremely   unusual since ventilatory disease normally creates matching defects with   vasoconstriction in the areas of poor ventilation.    The fact that this phenomenon does not occur in this patient raises the   possibility of a toxic reaction, possibly due to components of   e-cigarettes.    Impression: Normal perfusion with grossly irregular ventilation in parts   of both lungs. This phenomenon would explain hypoxia on the basis of   significant shunting of blood to areas of poor ventilation and raises the   possibility of a toxic reaction, possibly due to some component of   electronic cigarettes.        < end of copied text >      < from: Xray Chest 1 View- PORTABLE-Routine (18 @ 06:01) >    EXAM:  XR CHEST PORTABLE ROUTINE 1V                          PROCEDURE DATE:  2018          INTERPRETATION:  CLINICAL INFORMATION:  Shortness of Breath.    TECHNIQUE: A frontal view of the chest is dated 2018 6:01 AM.    COMPARISON:  Radiographs of the chest dated 2018    FINDINGS: There is mild pulmonary vascular congestion..  There is no   focal consolidation, pneumothorax, or pleural effusion.  Although   evaluation is limited on AP view, the cardiac silhouette appears   unchanged.      IMPRESSION: Mild pulmonary vascular congestion.        < end of copied text >

## 2018-06-29 NOTE — PROGRESS NOTE ADULT - PROBLEM SELECTOR PLAN 1
Pt Found to have pulm HTN- 58mmHg- given work up with V/Q- perfusion WNL but with grossly irregular ventilation- more suggestive of pulmonary etiology. Group 1 vs group 3 pulmonary HTN.  Echocardiogram notable for RA enlargement, severe RV dilation and free wall hypokinesis- based in on EKG findings, negative enzyme and no inferior wall involvement- makes RV infarct unlikely. Noted PFO on GURPREET with Right to left shunt.  - Pending outpatient RHC  - Will need home O2 and pending authorization/services  - Will have PT reevaluate   - CT surgery signed off. Pt has RHF with noted LE edema, abdominal distension and JVD on presentation s/p diuresis.  -Echocardiogram notable pulm HTN and RA, RV dilation and hypokinesis of the R free wall  -Patient remains euvolemic on exam  -As per cardio, continue Lasix 40mg daily  - Pending outpatient RHC

## 2018-06-29 NOTE — DISCHARGE NOTE ADULT - PATIENT PORTAL LINK FT
You can access the [facility] Patient Portal, offered by [facility], by registering with the following website: [email or web address]

## 2018-06-29 NOTE — BEHAVIORAL HEALTH ASSESSMENT NOTE - RISK ASSESSMENT
Pt with no current suicidal ideation/intent/plan. Is future oriented. Will be staying with her son in [place] when she is discharged. Low risk for dangerousness.

## 2018-06-29 NOTE — BEHAVIORAL HEALTH ASSESSMENT NOTE - REMOTE MEMORY
Problem: Patient Care Overview  Goal: Individualization & Mutuality  Patient will have an absence or decrease in hallucinations by time of discharge.  Patient will be medication compliant while hospitalized.  Patient will sleep 6 to 9 hours every night,.  Patient will be able to have a reality based conversation prior to discharge.  Patient will be independent with his ADL s while hospitalized and maintain hygiene.   Outcome: No Change  Pt has been resting in bed for the majority of this AM. He took all medications but studied each one before taking. When attempting to complete nursing assessment he walked away half way through. Pt denied having any pain, depression, and anxiety. Pt appears to be disheveled and has a body odor. Will continue to encourage a shower. Will continue to monitor.     1300- Pt has been out of his room more this after lunch. He has been pacing the halls and is more willing to talk with staff. Pt still has not showered and was given clean scrubs and shower supplies. Pt denied criteria but appears to be responding to internal stimuli and is founding laughing to self. Will continue to monitor.    Normal

## 2018-06-29 NOTE — PROGRESS NOTE ADULT - ASSESSMENT
67F with COPD, tobacco dependence, bipolar, thrombocytopenia requiring Nplate (Romiplostim) last admission in March for cellulitis, presenting now w/ COPD exacerbation w/ cor pulmonale, pulm HTN with progressive thrombocytopenia.       Peripheral smear reviewed - polysegmented neutrophils present, few hypersegmented neutrophils, red cells homogenous, no fragmented cells present, notable large platelets seen *** c/w plt destructive process, roughly 5 per HPF correlating to  platelet count     #Thrombocytopenia - per outpatient records patient follows at [facility] since 2016, patient initially found to have platelet count of 63K and easy bruising. Regarding therapy for documented ITP, patient was receiving Nplate therapy weekly, which was changed to every other week due to cost of co-pay. Patient was also given prescription for Promacta however unable to be filled due to cost. Patient was recommended to reapply for assistance. Patient has not had bone marrow biopsy. Last dose of Nplate was 5/9 per primary oncologist. Primary oncologist stated patient was also treated w/ steroids however no therapy with IVIG or Ritux at this time, no hx of splenectomy. At this time, V/Q consistent with areas of poor ventilation however no documented thrombosis, patient remains off anticoagulation at this time. Initiated on high dose steriods at this time. Current auto immune, inflammatory, infectious w/u negative, B12/Folate supplemented. No splenomegaly on u/s.    -c/w Decadron 40mg x 4 days, if severe agitation (given mental dusty), will switch to IVIG   -off hep at this time, awaiting tai, pf4 keep venodynes for dvt ppx   -Iron panel c/w elevated sat% and total serum levels, however normal ferritin, consider HFE testing  -CT c/w 1.4 cm pulm lesion ,(outpt planned for bx or once plts at least >50k, of note not active on outpatient PET/CT serially monitor)      Discussed w/ Dr. Costello   Will set up patient for outpatient follow up with Dr. Blount 67F with COPD, tobacco dependence, bipolar, thrombocytopenia requiring Nplate (Romiplostim) last admission in March for cellulitis, presenting now w/ COPD exacerbation w/ cor pulmonale, pulm HTN with progressive thrombocytopenia.       Peripheral smear reviewed - polysegmented neutrophils present, few hypersegmented neutrophils, red cells homogenous, no fragmented cells present, notable large platelets seen *** c/w plt destructive process, roughly 5 per HPF correlating to  platelet count     #Thrombocytopenia - per outpatient records patient follows at [facility] since 2016, patient initially found to have platelet count of 63K and easy bruising. Regarding therapy for documented ITP, patient was receiving Nplate therapy weekly, which was changed to every other week due to cost of co-pay. Patient was also given prescription for Promacta however unable to be filled due to cost. Patient was recommended to reapply for assistance. Patient has not had bone marrow biopsy. Last dose of Nplate was 5/9 per primary oncologist. Primary oncologist stated patient was also treated w/ steroids however no therapy with IVIG or Ritux at this time, no hx of splenectomy. At this time, V/Q consistent with areas of poor ventilation however no documented thrombosis, patient remains off anticoagulation at this time. Initiated on high dose steriods at this time. Current auto immune, inflammatory, infectious w/u negative, B12/Folate supplemented. No splenomegaly on u/s.    -c/w Decadron 40mg x 4 days, monitor for severe agitations   -will administer Nplate 1mcg/kg x1 plan for tomorrow 6/30 (dosed weekly) - discussed w/ pharmacy   -off hep at this time, awaiting tai, pf4 keep venodynes for dvt ppx   -Iron panel c/w elevated sat% and total serum levels, however normal ferritin, consider HFE testing  -CT c/w 1.4 cm pulm lesion ,(outpt planned for bx or once plts at least >50k, of note not active on outpatient PET/CT serially monitor)      Discussed w/ Dr. Costello   Will set up patient for outpatient follow up with Dr. Blount - 7/25/18 at 2:45 pm

## 2018-06-29 NOTE — PROGRESS NOTE ADULT - PROBLEM SELECTOR PLAN 3
Previously on steroids. COPD bundle.   -c/w stiolto, duonebs. Presented with acute hypoxic resp failure, admitted to [facility]  RESOLVING  Pt desaturating while walking with PT, will need home O2  -attempt to wean

## 2018-06-29 NOTE — PROGRESS NOTE ADULT - PROBLEM SELECTOR PLAN 5
Hx of chronci L knee pain. With current regimen: Tylenol 975mg q8h prn moderate pain, Dilaudid 2mg q6h prn breakthrough pain, dilaudid 4mg q6h for severe pain. Noted thrombocytopenia for which heme/ onc consulted. Smear with large platelets consistent with platelet destructive process- noted hx of ITP for which has been refractory to steroids, IVIG/rituximab- has been on Nplate at home. Plt at 39 today from 31 yesterday. HSQ held in setting of r/o hit and autoimmune work up.  -f/u heme/onc recs and work up with autoimmune, HIT work up  -Decadron converted to 40mg PO on day 2 of 4  -Abd US without splenomegaly.  -Continue to trend CBC

## 2018-06-29 NOTE — BEHAVIORAL HEALTH ASSESSMENT NOTE - NSBHSOCIALHXDETAILSFT_PSY_A_CORE
As above.  x2.  her 1st , with whom she had 4 children. Second   4 years ago at the age of 73. 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 Pt and her late  retired from [employer] to [place], [place], which is where pt still lives.
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
## 2018-06-29 NOTE — BEHAVIORAL HEALTH ASSESSMENT NOTE - HPI (INCLUDE ILLNESS QUALITY, SEVERITY, DURATION, TIMING, CONTEXT, MODIFYING FACTORS, ASSOCIATED SIGNS AND SYMPTOMS)
HPI from admit:  "67F with COPD, Iron deficiency anemia, thrombocytopenia requiring Nplate multiple knee surgeries and complications resulting in chronic pain syndrome, recent hospital admissions for cellulitis in March and PNA in Jan, who presented with 1 week of progressively worsening lower leg pain and 24h of worsening SOB." HPI from admit:  "67F with COPD, Iron deficiency anemia, thrombocytopenia requiring Nplate multiple knee surgeries and complications resulting in chronic pain syndrome, recent hospital admissions for cellulitis in March and PNA in Jan, who presented with 1 week of progressively worsening lower leg pain and 24h of worsening SOB."    Pt endorsing multiple losses, most significantly death of her  4 years ago, death of her sister 3 years ago, death of one of her beloved dogs ~4 weeks ago, and death of her 46-year-old niece last week. Pt reports, "I have a broken heart."    Pt, who currently lives in [place], [place], has been in outpt treatment with Dr. Zeynep Chilel. Pt reports that, prior to seeing Dr. Chilel, she'd been rx'ed with Prozac. Dr. Chilel stopped the Prozac, started Lamictal 100 mg po qdaily, then added Effexor  mg po qdaily ~2 month ago. Pt has also been on xanax 0.25 mg po BID x many years.     When asked regarding why she's been rx'ed with lamictal, a mood stabilizer, pt cannot provide any information. She denies danette mood swings, though does acknowledge, "I've always been hyper."

## 2018-06-29 NOTE — BEHAVIORAL HEALTH ASSESSMENT NOTE - NSBHCONSULTFOLLOWAFTERCARE_PSY_A_CORE FT
****Please provide pt with 1-minth prescriptions for all her psychotropic medications, as she [place] not be returning [place] to [place]    Pt to follow up with her outpt psychiatrist when she returns to [place]

## 2018-06-29 NOTE — BEHAVIORAL HEALTH ASSESSMENT NOTE - DESCRIPTION (FIRST USE, LAST USE, QUANTITY, FREQUENCY, DURATION)
Ex-smoker Pt reports she drank excessively after her 1st  left her >35 years ago. Has been clean and sober for 28 years.

## 2018-06-29 NOTE — DISCHARGE NOTE ADULT - ABILITY TO HEAR (WITH HEARING AID OR HEARING APPLIANCE IF NORMALLY USED):
left ear left ear/Mildly to Moderately Impaired: difficulty hearing in some environments or speaker may need to increase volume or speak distinctly

## 2018-06-29 NOTE — BEHAVIORAL HEALTH ASSESSMENT NOTE - NSBHREFERDETAILS_PSY_A_CORE_FT
67-year-old female with hx of pulmonary HTN, COPD, R-sided heart failure, a/w SOB. Pt with multiple recent losses. Please evaluate for depression.

## 2018-06-29 NOTE — BEHAVIORAL HEALTH ASSESSMENT NOTE - NSBHADMITCOUNSEL_PSY_A_CORE
supportive tx/client/family/caregiver education/risks and benefits of treatment options/instructions for management, treatment and follow up/risk factor reduction/diagnostic results/impressions and/or recommended studies

## 2018-06-29 NOTE — PROGRESS NOTE ADULT - SUBJECTIVE AND OBJECTIVE BOX
OVERNIGHT EVENTS:    HOSPITAL COURSE:  67F with COPD, Iron deficiency anemia, thrombocytopenia requiring Nplate multiple knee surgeries and complications resulting in chronic pain syndrome presented on  with 1 week of progressively worsening lower leg pain, intermittent pleuritic chest pain, and SOB worsening over 24 hours prior to admission. On admission, pt was hypoxic and admitted to [facility] for hypoxia. Echocardiogram significant for RHF and PFO. Unable to perform CTA for PE 2/2 contrast allergy. V/Q performed which noted intact perfusion but with ventilation irregularities suggestive of underlying pulmonary disease. Hospital course c/b thrombocytopenia. Heme/onc on board, has a known hx of ITP, undergoing HIT and autoimmune workup. Currently on PO steroids for 4 day course to end .Of note, desaturates when walking and will need home oxygen therapy.    SUBJECTIVE / INTERVAL HPI: Patient seen and examined at bedside. She denies fever, chest pain, n/v/c/d. She endorses SOB at rest without oxygen and on exertion.     VITAL SIGNS:  Vital Signs Last 24 Hrs  T(C): 36.9 (2018 23:27), Max: 36.9 (2018 23:27)  T(F): 98.4 (2018 23:27), Max: 98.4 (2018 23:27)  HR: 92 (2018 12:55) (81 - 96)  BP: 117/81 (2018 12:55) (117/81 - 145/88)  BP(mean): 102 (2018 20:30) (102 - 102)  RR: 14 (2018 12:55) (14 - 32)  SpO2: 92% (2018 12:55) (91% - 97%)    PHYSICAL EXAM:    General: WDWN  HEENT: NCAT; PERRL, anicteric sclera; MMM  Neck: supple, trachea midline  Cardiovascular: S1, S2 normal; RRR, no M/G/R  Respiratory: CTABL; no W/R/R  Gastrointestinal: soft, nontender, nondistended. bowel sounds present.  Skin: no ulcerations or visible rashes appreciated  Extremities: WWP; no edema, clubbing or cyanosis  Vascular: 2+ radial, DP/PT pulses B/L  Neurological: AAOx3; CN II-XII grossly intact; no focal deficits    MEDICATIONS:  MEDICATIONS  (STANDING):  ALBUTerol/ipratropium for Nebulization 3 milliLiter(s) Nebulizer every 4 hours  ALPRAZolam 0.5 milliGRAM(s) Oral two times a day  dexamethasone     Tablet 40 milliGRAM(s) Oral every 24 hours  dextrose 5%. 1000 milliLiter(s) (50 mL/Hr) IV Continuous <Continuous>  dextrose 50% Injectable 12.5 Gram(s) IV Push once  dextrose 50% Injectable 25 Gram(s) IV Push once  dextrose 50% Injectable 25 Gram(s) IV Push once  furosemide    Tablet 40 milliGRAM(s) Oral daily  insulin lispro (HumaLOG) corrective regimen sliding scale   SubCutaneous Before meals and at bedtime  lamoTRIgine 100 milliGRAM(s) Oral daily  pantoprazole    Tablet 40 milliGRAM(s) Oral before breakfast  polyethylene glycol 3350 17 Gram(s) Oral daily  senna 2 Tablet(s) Oral at bedtime  tiotropium 2.5 MICROgram(s)/olodaterol 2.5 MICROgram(s) Inhaler 2 Puff(s) Inhalation daily  traZODone      traZODone 100 milliGRAM(s) Oral at bedtime  venlafaxine XR. 75 milliGRAM(s) Oral daily    MEDICATIONS  (PRN):  acetaminophen   Tablet. 975 milliGRAM(s) Oral every 8 hours PRN Moderate Pain (4 - 6)  bisacodyl Suppository 10 milliGRAM(s) Rectal daily PRN Constipation  dextrose 40% Gel 15 Gram(s) Oral once PRN Blood Glucose LESS THAN 70 milliGRAM(s)/deciliter  glucagon  Injectable 1 milliGRAM(s) IntraMuscular once PRN Glucose LESS THAN 70 milligrams/deciliter  HYDROmorphone   Tablet 4 milliGRAM(s) Oral every 6 hours PRN Severe Pain (7 - 10)  HYDROmorphone   Tablet 2 milliGRAM(s) Oral every 6 hours PRN for breakthru pain      ALLERGIES:  Allergies    iodine (Anaphylaxis)    Intolerances        LABS:                        14.6   9.4   )-----------( 35        07:07 )             48.2         139  |  97  |  29<H>  ----------------------------<  210<H>  5.1   |  33<H>  |  0.49<L>    Ca    9.7      2018 07:07  Phos  5.3       Mg     2.3             Urinalysis Basic - ( 2018 01:06 )    Color: Yellow / Appearance: Clear / S.015 / pH: x  Gluc: x / Ketone: Trace mg/dL  / Bili: Negative / Urobili: 1.0 E.U./dL   Blood: x / Protein: NEGATIVE mg/dL / Nitrite: NEGATIVE   Leuk Esterase: NEGATIVE / RBC: x / WBC x   Sq Epi: x / Non Sq Epi: x / Bacteria: x      CAPILLARY BLOOD GLUCOSE      POCT Blood Glucose.: 114 mg/dL (2018 12:40)      RADIOLOGY & ADDITIONAL TESTS: Reviewed. OVERNIGHT EVENTS: nothing overnight    HOSPITAL COURSE:  67F with COPD, Iron deficiency anemia, thrombocytopenia requiring Nplate multiple knee surgeries and complications resulting in chronic pain syndrome presented on  with 1 week of progressively worsening lower leg pain, intermittent pleuritic chest pain, and SOB worsening over 24 hours prior to admission. On admission, pt was hypoxic and admitted to [facility] for hypoxia. Echocardiogram significant for RHF and PFO. Unable to perform CTA for PE 2/2 contrast allergy. V/Q performed which noted intact perfusion but with ventilation irregularities suggestive of underlying pulmonary disease. Hospital course c/b thrombocytopenia. Heme/onc on board, has a known hx of ITP, undergoing HIT and autoimmune workup. Currently on PO steroids for 4 day course to end .Of note, desaturates when walking and will need home oxygen therapy.    SUBJECTIVE / INTERVAL HPI: Patient seen and examined at bedside. She denies fever, chest pain, n/v/c/d. She endorses SOB at rest without oxygen and on exertion.     VITAL SIGNS:  Vital Signs Last 24 Hrs  T(C): 36.9 (2018 23:27), Max: 36.9 (2018 23:27)  T(F): 98.4 (2018 23:27), Max: 98.4 (2018 23:27)  HR: 92 (2018 12:55) (81 - 96)  BP: 117/81 (2018 12:55) (117/81 - 145/88)  BP(mean): 102 (2018 20:30) (102 - 102)  RR: 14 (2018 12:55) (14 - 32)  SpO2: 92% (2018 12:55) (91% - 97%)    PHYSICAL EXAM:    General: WDWN  HEENT: NCAT; PERRL, anicteric sclera; MMM  Neck: supple, trachea midline  Cardiovascular: S1, S2 normal; RRR, no M/G/R  Respiratory: CTABL; no W/R/R  Gastrointestinal: soft, nontender, nondistended. bowel sounds present.  Skin: no ulcerations or visible rashes appreciated  Extremities: WWP; no edema, clubbing or cyanosis  Vascular: 2+ radial, DP/PT pulses B/L  Neurological: AAOx3; CN II-XII grossly intact; no focal deficits    MEDICATIONS:  MEDICATIONS  (STANDING):  ALBUTerol/ipratropium for Nebulization 3 milliLiter(s) Nebulizer every 4 hours  ALPRAZolam 0.5 milliGRAM(s) Oral two times a day  dexamethasone     Tablet 40 milliGRAM(s) Oral every 24 hours  dextrose 5%. 1000 milliLiter(s) (50 mL/Hr) IV Continuous <Continuous>  dextrose 50% Injectable 12.5 Gram(s) IV Push once  dextrose 50% Injectable 25 Gram(s) IV Push once  dextrose 50% Injectable 25 Gram(s) IV Push once  furosemide    Tablet 40 milliGRAM(s) Oral daily  insulin lispro (HumaLOG) corrective regimen sliding scale   SubCutaneous Before meals and at bedtime  lamoTRIgine 100 milliGRAM(s) Oral daily  pantoprazole    Tablet 40 milliGRAM(s) Oral before breakfast  polyethylene glycol 3350 17 Gram(s) Oral daily  senna 2 Tablet(s) Oral at bedtime  tiotropium 2.5 MICROgram(s)/olodaterol 2.5 MICROgram(s) Inhaler 2 Puff(s) Inhalation daily  traZODone      traZODone 100 milliGRAM(s) Oral at bedtime  venlafaxine XR. 75 milliGRAM(s) Oral daily    MEDICATIONS  (PRN):  acetaminophen   Tablet. 975 milliGRAM(s) Oral every 8 hours PRN Moderate Pain (4 - 6)  bisacodyl Suppository 10 milliGRAM(s) Rectal daily PRN Constipation  dextrose 40% Gel 15 Gram(s) Oral once PRN Blood Glucose LESS THAN 70 milliGRAM(s)/deciliter  glucagon  Injectable 1 milliGRAM(s) IntraMuscular once PRN Glucose LESS THAN 70 milligrams/deciliter  HYDROmorphone   Tablet 4 milliGRAM(s) Oral every 6 hours PRN Severe Pain (7 - 10)  HYDROmorphone   Tablet 2 milliGRAM(s) Oral every 6 hours PRN for breakthru pain      ALLERGIES:  Allergies    iodine (Anaphylaxis)    Intolerances        LABS:                        14.6   9.4   )-----------( 35       ( 2018 07:07 )             48.2     -    139  |  97  |  29<H>  ----------------------------<  210<H>  5.1   |  33<H>  |  0.49<L>    Ca    9.7      2018 07:07  Phos  5.3       Mg     2.3             Urinalysis Basic - ( 2018 01:06 )    Color: Yellow / Appearance: Clear / S.015 / pH: x  Gluc: x / Ketone: Trace mg/dL  / Bili: Negative / Urobili: 1.0 E.U./dL   Blood: x / Protein: NEGATIVE mg/dL / Nitrite: NEGATIVE   Leuk Esterase: NEGATIVE / RBC: x / WBC x   Sq Epi: x / Non Sq Epi: x / Bacteria: x      CAPILLARY BLOOD GLUCOSE      POCT Blood Glucose.: 114 mg/dL (2018 12:40)      RADIOLOGY & ADDITIONAL TESTS: Reviewed.

## 2018-06-29 NOTE — DISCHARGE NOTE ADULT - CARE PLAN
Principal Discharge DX:	Right-sided heart failure, unspecified HF chronicity  Assessment and plan of treatment:	You were found to have RHF with noted LE edema, abdominal distension and JVD. You continued your Lasix during your stay and you will go for an outpatient Right Heart Catheterization.  Secondary Diagnosis:	Pulmonary hypertension  Assessment and plan of treatment:	You were found to have a PFO with a Right to Left shunt. You will go home with O2. You will go for an outpatient Right Heart Catheterization as above.  Secondary Diagnosis:	Acute respiratory failure with hypoxia  Assessment and plan of treatment:	You were found to be short of breath, especially while walking. You will need home O2.  Secondary Diagnosis:	Chronic obstructive pulmonary disease, unspecified COPD type  Assessment and plan of treatment:	You have COPD and came in with shortness of breath. You will continue with your home COPD meds and will go home with O2.  Secondary Diagnosis:	Thrombocytopenia  Assessment and plan of treatment:	You were put on steroids and your platelet count stabilized  Secondary Diagnosis:	Chronic pain of left knee  Assessment and plan of treatment:	You have chronic pain of your left leg. You were given dilaudid during your stay. You have a spinal stimulator at home and you should continue to use that. Principal Discharge DX:	Right-sided heart failure, unspecified HF chronicity  Assessment and plan of treatment:	You were found to have RHF with noted LE edema, abdominal distension and JVD. You continued your Lasix during your stay and you will go for an outpatient Right Heart Catheterization.  Secondary Diagnosis:	Pulmonary hypertension  Assessment and plan of treatment:	You were found to have a PFO with a Right to Left shunt. You will go home with O2. You will go for an outpatient Right Heart Catheterization as above.  Secondary Diagnosis:	Acute respiratory failure with hypoxia  Assessment and plan of treatment:	You were found to be short of breath, especially while walking. You will need home O2.  Secondary Diagnosis:	Chronic obstructive pulmonary disease, unspecified COPD type  Assessment and plan of treatment:	You have COPD and came in with shortness of breath. You will continue with your home COPD meds and will go home with O2.  Secondary Diagnosis:	Thrombocytopenia  Assessment and plan of treatment:	You were put on steroids and your platelet count stabilized  Secondary Diagnosis:	Chronic pain of left knee  Assessment and plan of treatment:	You have chronic pain of your left leg. You were given dilaudid during your stay. You have a spinal stimulator at home and you should continue to use that.  Secondary Diagnosis:	Herpetic lesion  Assessment and plan of treatment:	You were found to have a herpetic lesion on the sacral area. You were started on Valtrex (Valacyclovir) which you will continue 1000mg every 8 hours until 7/8/18 Principal Discharge DX:	Right-sided heart failure, unspecified HF chronicity  Assessment and plan of treatment:	You were found to have RHF with noted LE edema, abdominal distension and JVD. You continued your Lasix during your stay and you will go for an outpatient Right Heart Catheterization.  Secondary Diagnosis:	Pulmonary hypertension  Assessment and plan of treatment:	You were found to have a PFO with a Right to Left shunt. You will go home with O2. You will go for an outpatient Right Heart Catheterization as above.  Secondary Diagnosis:	Acute respiratory failure with hypoxia  Assessment and plan of treatment:	You were found to be short of breath, especially while walking. You will need home O2.  Secondary Diagnosis:	Chronic obstructive pulmonary disease, unspecified COPD type  Assessment and plan of treatment:	You have COPD and came in with shortness of breath. You will continue with your home COPD meds and will go home with O2.  Secondary Diagnosis:	Thrombocytopenia  Assessment and plan of treatment:	You were put on steroids and your platelet count stabilized  Secondary Diagnosis:	Chronic pain of left knee  Assessment and plan of treatment:	You have chronic pain of your left leg. You were given dilaudid during your stay. You have a spinal stimulator at home and you should continue to use that.  Secondary Diagnosis:	Herpetic lesion  Assessment and plan of treatment:	You were found to have a herpetic lesion on the sacral area. You were started on Valtrex (Valacyclovir) which you will continue 1000mg every 8 hours for a 7 day course until 7/8/18 Principal Discharge DX:	Right-sided heart failure, unspecified HF chronicity  Goal:	Outpatient treatment  Assessment and plan of treatment:	You were found to have RHF with noted LE edema, abdominal distension and JVD. You continued your Lasix during your stay and you will go for an outpatient Right Heart Catheterization with Dr. Horta [phone] within 1-2 weeks.  Secondary Diagnosis:	Pulmonary hypertension  Assessment and plan of treatment:	You were found to have a PFO with a Right to Left shunt. You will go home with O2. You will go for an outpatient Right Heart Catheterization as above.  Secondary Diagnosis:	Acute respiratory failure with hypoxia  Assessment and plan of treatment:	You were found to be short of breath, especially while walking. You will need home O2.  Secondary Diagnosis:	Chronic obstructive pulmonary disease, unspecified COPD type  Assessment and plan of treatment:	You have COPD and came in with shortness of breath. You will continue with your home COPD meds and will go home with O2.  Secondary Diagnosis:	Thrombocytopenia  Assessment and plan of treatment:	You were put on steroids and your platelet count stabilized  Secondary Diagnosis:	Chronic pain of left knee  Assessment and plan of treatment:	You have chronic pain of your left leg. You were given dilaudid during your stay. You have a spinal stimulator at home and you should continue to use that.  Secondary Diagnosis:	Herpetic lesion  Assessment and plan of treatment:	You were found to have a herpetic lesion on the sacral area. You were started on Valtrex (Valacyclovir) which you will continue 1000mg every 8 hours for a 7 day course until 7/8/18 Principal Discharge DX:	Right-sided heart failure, unspecified HF chronicity  Goal:	Outpatient treatment  Assessment and plan of treatment:	You were found to have RHF with noted LE edema, abdominal distension and JVD. You continued your Lasix during your stay and you will go for an outpatient Right Heart Catheterization with Dr. Horta [phone] within 1-2 weeks.  Secondary Diagnosis:	Pulmonary hypertension  Assessment and plan of treatment:	You were found to have a PFO with a Right to Left shunt. You will go home with O2. You will go for an outpatient Right Heart Catheterization as above.  Secondary Diagnosis:	Acute respiratory failure with hypoxia  Assessment and plan of treatment:	You were found to be short of breath, especially while walking. You will need home O2.  Secondary Diagnosis:	Chronic obstructive pulmonary disease, unspecified COPD type  Assessment and plan of treatment:	You have COPD and came in with shortness of breath. You will continue with your home COPD meds and will go home with O2.  Secondary Diagnosis:	Thrombocytopenia  Assessment and plan of treatment:	You were put on steroids and your platelet count stabilized  Secondary Diagnosis:	Chronic pain of left knee  Assessment and plan of treatment:	You have chronic pain of your left leg. You were given dilaudid during your stay. You have a spinal stimulator at home and you should continue to use that.  Secondary Diagnosis:	Herpetic lesion  Assessment and plan of treatment:	You were found to have a herpetic lesion on the sacral area. You were started on Valtrex (Valacyclovir) which you will continue 1000mg every 8 hours until resolution of symptoms

## 2018-06-29 NOTE — DISCHARGE NOTE ADULT - ADDITIONAL INSTRUCTIONS
Continue to take the Valtrex for 7 days until 7/8/18. Follow up with outpatient [facility]. Continue to take the Valtrex until resolution of symptoms. Follow up with outpatient [facility]. Continue to take the Valtrex until resolution of symptoms. Follow up with outpatient [facility].   Heme-Onc Appointment: Dr. Blount - 7/25/18 at 2:45 pm Continue to take the Valtrex until resolution of symptoms. Follow up with outpatient [facility].  You have an appointment with Dr. Horta on Monday, July 9th at 9AM at [address] [place] [address]  Heme-Onc Appointment: Dr. Blount - 7/25/18 at 2:45 pm

## 2018-06-30 LAB
ANION GAP SERPL CALC-SCNC: 9 MMOL/L — SIGNIFICANT CHANGE UP (ref 5–17)
BIZARRE PLATELETS BLD QL SMEAR: PRESENT — SIGNIFICANT CHANGE UP
BUN SERPL-MCNC: 30 MG/DL — HIGH (ref 7–23)
CALCIUM SERPL-MCNC: 9.7 MG/DL — SIGNIFICANT CHANGE UP (ref 8.4–10.5)
CHLORIDE SERPL-SCNC: 95 MMOL/L — LOW (ref 96–108)
CO2 SERPL-SCNC: 32 MMOL/L — HIGH (ref 22–31)
CREAT SERPL-MCNC: 0.59 MG/DL — SIGNIFICANT CHANGE UP (ref 0.5–1.3)
GIANT PLATELETS BLD QL SMEAR: PRESENT — SIGNIFICANT CHANGE UP
GLUCOSE BLDC GLUCOMTR-MCNC: 117 MG/DL — HIGH (ref 70–99)
GLUCOSE BLDC GLUCOMTR-MCNC: 118 MG/DL — HIGH (ref 70–99)
GLUCOSE BLDC GLUCOMTR-MCNC: 154 MG/DL — HIGH (ref 70–99)
GLUCOSE BLDC GLUCOMTR-MCNC: 156 MG/DL — HIGH (ref 70–99)
GLUCOSE SERPL-MCNC: 169 MG/DL — HIGH (ref 70–99)
HCT VFR BLD CALC: 46.2 % — HIGH (ref 34.5–45)
HGB BLD-MCNC: 14.2 G/DL — SIGNIFICANT CHANGE UP (ref 11.5–15.5)
LG PLATELETS BLD QL AUTO: PRESENT — SIGNIFICANT CHANGE UP
MAGNESIUM SERPL-MCNC: 2.1 MG/DL — SIGNIFICANT CHANGE UP (ref 1.6–2.6)
MANUAL SMEAR VERIFICATION: SIGNIFICANT CHANGE UP
MCHC RBC-ENTMCNC: 26.9 PG — LOW (ref 27–34)
MCHC RBC-ENTMCNC: 30.7 G/DL — LOW (ref 32–36)
MCV RBC AUTO: 87.7 FL — SIGNIFICANT CHANGE UP (ref 80–100)
PLAT MORPH BLD: ABNORMAL
PLATELET # BLD AUTO: 35 K/UL — LOW (ref 150–400)
POTASSIUM SERPL-MCNC: 5.1 MMOL/L — SIGNIFICANT CHANGE UP (ref 3.5–5.3)
POTASSIUM SERPL-SCNC: 5.1 MMOL/L — SIGNIFICANT CHANGE UP (ref 3.5–5.3)
RBC # BLD: 5.27 M/UL — HIGH (ref 3.8–5.2)
RBC # FLD: 15.8 % — SIGNIFICANT CHANGE UP (ref 10.3–16.9)
RBC BLD AUTO: SIGNIFICANT CHANGE UP
SODIUM SERPL-SCNC: 136 MMOL/L — SIGNIFICANT CHANGE UP (ref 135–145)
WBC # BLD: 9.2 K/UL — SIGNIFICANT CHANGE UP (ref 3.8–10.5)
WBC # FLD AUTO: 9.2 K/UL — SIGNIFICANT CHANGE UP (ref 3.8–10.5)

## 2018-06-30 PROCEDURE — 99233 SBSQ HOSP IP/OBS HIGH 50: CPT | Mod: GC

## 2018-06-30 RX ORDER — ROMIPLOSTIM 250 UG/.5ML
80 INJECTION, POWDER, LYOPHILIZED, FOR SOLUTION SUBCUTANEOUS ONCE
Qty: 0 | Refills: 0 | Status: COMPLETED | OUTPATIENT
Start: 2018-06-30 | End: 2018-06-30

## 2018-06-30 RX ADMIN — Medication 40 MILLIGRAM(S): at 18:07

## 2018-06-30 RX ADMIN — HYDROMORPHONE HYDROCHLORIDE 2 MILLIGRAM(S): 2 INJECTION INTRAMUSCULAR; INTRAVENOUS; SUBCUTANEOUS at 11:32

## 2018-06-30 RX ADMIN — Medication 2: at 08:54

## 2018-06-30 RX ADMIN — Medication 3 MILLILITER(S): at 22:12

## 2018-06-30 RX ADMIN — POLYETHYLENE GLYCOL 3350 17 GRAM(S): 17 POWDER, FOR SOLUTION ORAL at 11:24

## 2018-06-30 RX ADMIN — Medication 3 MILLILITER(S): at 14:37

## 2018-06-30 RX ADMIN — Medication 150 MILLIGRAM(S): at 11:24

## 2018-06-30 RX ADMIN — HYDROMORPHONE HYDROCHLORIDE 2 MILLIGRAM(S): 2 INJECTION INTRAMUSCULAR; INTRAVENOUS; SUBCUTANEOUS at 21:29

## 2018-06-30 RX ADMIN — TIOTROPIUM BROMIDE AND OLODATEROL 2 PUFF(S): 3.124; 2.736 SPRAY, METERED RESPIRATORY (INHALATION) at 20:36

## 2018-06-30 RX ADMIN — HYDROMORPHONE HYDROCHLORIDE 2 MILLIGRAM(S): 2 INJECTION INTRAMUSCULAR; INTRAVENOUS; SUBCUTANEOUS at 10:32

## 2018-06-30 RX ADMIN — Medication 0.5 MILLIGRAM(S): at 06:33

## 2018-06-30 RX ADMIN — HYDROMORPHONE HYDROCHLORIDE 2 MILLIGRAM(S): 2 INJECTION INTRAMUSCULAR; INTRAVENOUS; SUBCUTANEOUS at 22:33

## 2018-06-30 RX ADMIN — PANTOPRAZOLE SODIUM 40 MILLIGRAM(S): 20 TABLET, DELAYED RELEASE ORAL at 06:33

## 2018-06-30 RX ADMIN — Medication 3 MILLILITER(S): at 06:33

## 2018-06-30 RX ADMIN — Medication 2: at 22:12

## 2018-06-30 RX ADMIN — Medication 40 MILLIGRAM(S): at 06:33

## 2018-06-30 RX ADMIN — Medication 100 MILLIGRAM(S): at 22:12

## 2018-06-30 RX ADMIN — Medication 3 MILLILITER(S): at 01:20

## 2018-06-30 RX ADMIN — Medication 3 MILLILITER(S): at 18:06

## 2018-06-30 RX ADMIN — Medication 0.5 MILLIGRAM(S): at 18:06

## 2018-06-30 RX ADMIN — SENNA PLUS 2 TABLET(S): 8.6 TABLET ORAL at 22:12

## 2018-06-30 RX ADMIN — HYDROMORPHONE HYDROCHLORIDE 4 MILLIGRAM(S): 2 INJECTION INTRAMUSCULAR; INTRAVENOUS; SUBCUTANEOUS at 13:51

## 2018-06-30 RX ADMIN — Medication 3 MILLILITER(S): at 10:31

## 2018-06-30 RX ADMIN — LAMOTRIGINE 100 MILLIGRAM(S): 25 TABLET, ORALLY DISINTEGRATING ORAL at 11:24

## 2018-06-30 RX ADMIN — ROMIPLOSTIM 80 MICROGRAM(S): 250 INJECTION, POWDER, LYOPHILIZED, FOR SOLUTION SUBCUTANEOUS at 15:10

## 2018-06-30 RX ADMIN — HYDROMORPHONE HYDROCHLORIDE 4 MILLIGRAM(S): 2 INJECTION INTRAMUSCULAR; INTRAVENOUS; SUBCUTANEOUS at 12:51

## 2018-06-30 NOTE — PROGRESS NOTE ADULT - SUBJECTIVE AND OBJECTIVE BOX
OVERNIGHT EVENTS: nothing overnight    HOSPITAL COURSE:  67F with COPD, Iron deficiency anemia, thrombocytopenia requiring Nplate multiple knee surgeries and complications resulting in chronic pain syndrome presented on  with 1 week of progressively worsening lower leg pain, intermittent pleuritic chest pain, and SOB worsening over 24 hours prior to admission. On admission, pt was hypoxic and admitted to [facility]. Echocardiogram significant for RHF and PFO. Unable to perform CTA for PE 2/2 contrast allergy. V/Q performed which noted intact perfusion but with ventilation irregularities suggestive of underlying pulmonary disease. Hospital course c/b thrombocytopenia. Heme/onc on board, has a known hx of ITP, undergoing HIT and autoimmune workup. Currently on PO steroids for 4 day course to end .Of note, desaturates when walking and will need home oxygen therapy.    SUBJECTIVE / INTERVAL HPI: Patient seen and examined at bedside. She denies fever, chest pain, n/v/c/d. She endorses SOB at rest without oxygen and on exertion.     VITAL SIGNS:  Vital Signs Last 24 Hrs  T(C): 36.9 (2018 23:27), Max: 36.9 (2018 23:27)  T(F): 98.4 (2018 23:27), Max: 98.4 (2018 23:27)  HR: 92 (2018 12:55) (81 - 96)  BP: 117/81 (2018 12:55) (117/81 - 145/88)  BP(mean): 102 (2018 20:30) (102 - 102)  RR: 14 (2018 12:55) (14 - 32)  SpO2: 92% (2018 12:55) (91% - 97%)    PHYSICAL EXAM:    General: WDWN  HEENT: NCAT; PERRL, anicteric sclera; MMM  Neck: supple, trachea midline  Cardiovascular: S1, S2 normal; RRR, no M/G/R  Respiratory: CTABL; no W/R/R  Gastrointestinal: soft, nontender, nondistended. bowel sounds present.  Skin: no ulcerations or visible rashes appreciated  Extremities: WWP; no edema, clubbing or cyanosis  Vascular: 2+ radial, DP/PT pulses B/L  Neurological: AAOx3; CN II-XII grossly intact; no focal deficits    MEDICATIONS:  MEDICATIONS  (STANDING):  ALBUTerol/ipratropium for Nebulization 3 milliLiter(s) Nebulizer every 4 hours  ALPRAZolam 0.5 milliGRAM(s) Oral two times a day  dexamethasone     Tablet 40 milliGRAM(s) Oral every 24 hours  dextrose 5%. 1000 milliLiter(s) (50 mL/Hr) IV Continuous <Continuous>  dextrose 50% Injectable 12.5 Gram(s) IV Push once  dextrose 50% Injectable 25 Gram(s) IV Push once  dextrose 50% Injectable 25 Gram(s) IV Push once  furosemide    Tablet 40 milliGRAM(s) Oral daily  insulin lispro (HumaLOG) corrective regimen sliding scale   SubCutaneous Before meals and at bedtime  lamoTRIgine 100 milliGRAM(s) Oral daily  pantoprazole    Tablet 40 milliGRAM(s) Oral before breakfast  polyethylene glycol 3350 17 Gram(s) Oral daily  senna 2 Tablet(s) Oral at bedtime  tiotropium 2.5 MICROgram(s)/olodaterol 2.5 MICROgram(s) Inhaler 2 Puff(s) Inhalation daily  traZODone      traZODone 100 milliGRAM(s) Oral at bedtime  venlafaxine XR. 75 milliGRAM(s) Oral daily    MEDICATIONS  (PRN):  acetaminophen   Tablet. 975 milliGRAM(s) Oral every 8 hours PRN Moderate Pain (4 - 6)  bisacodyl Suppository 10 milliGRAM(s) Rectal daily PRN Constipation  dextrose 40% Gel 15 Gram(s) Oral once PRN Blood Glucose LESS THAN 70 milliGRAM(s)/deciliter  glucagon  Injectable 1 milliGRAM(s) IntraMuscular once PRN Glucose LESS THAN 70 milligrams/deciliter  HYDROmorphone   Tablet 4 milliGRAM(s) Oral every 6 hours PRN Severe Pain (7 - 10)  HYDROmorphone   Tablet 2 milliGRAM(s) Oral every 6 hours PRN for breakthru pain      ALLERGIES:  Allergies    iodine (Anaphylaxis)    Intolerances        LABS:                        14.6   9.4   )-----------( 35       ( 2018 07:07 )             48.2     -    139  |  97  |  29<H>  ----------------------------<  210<H>  5.1   |  33<H>  |  0.49<L>    Ca    9.7      2018 07:07  Phos  5.3       Mg     2.3             Urinalysis Basic - ( 2018 01:06 )    Color: Yellow / Appearance: Clear / S.015 / pH: x  Gluc: x / Ketone: Trace mg/dL  / Bili: Negative / Urobili: 1.0 E.U./dL   Blood: x / Protein: NEGATIVE mg/dL / Nitrite: NEGATIVE   Leuk Esterase: NEGATIVE / RBC: x / WBC x   Sq Epi: x / Non Sq Epi: x / Bacteria: x      CAPILLARY BLOOD GLUCOSE      POCT Blood Glucose.: 114 mg/dL (2018 12:40)      RADIOLOGY & ADDITIONAL TESTS: Reviewed.

## 2018-06-30 NOTE — PROGRESS NOTE ADULT - ASSESSMENT
67F with COPD, TODD, thrombocytopenia, multiple knee surgeries and complications resulting in chronic pain syndrome, recent hospital admissions for cellulitis in March and PNA in Jan, who p/w LE edema and shortness of breath. Pt admitted for hypoxic respiratory failure. Pt now stable for RMF for continued management of RHF.    Problem/Plan - 1:  ·  Problem: Right-sided heart failure, unspecified HF chronicity. Plan: Pt has RHF with noted LE edema, abdominal distension and JVD on presentation s/p diuresis.  -Echocardiogram notable pulm HTN and RA, RV dilation and hypokinesis of the R free wall  -Patient remains euvolemic on exam  -As per cardio, continue Lasix 40mg daily  - Pending outpatient RHC.    Problem/Plan - 2:  ·  Problem: Pulmonary hypertension. Plan: Pt Found to have pulm HTN- 58mmHg- given work up with V/Q- perfusion WNL but with grossly irregular ventilation- more suggestive of pulmonary etiology. Group 1 vs group 3 pulmonary HTN. Noted PFO on GURPREET with Right to left shunt.  - Pending outpatient RHC  - Will need home O2 and pending authorization/services  - Will have PT reevaluate   - CT surgery signed off.    Problem/Plan - 3:  ·  Problem: Acute respiratory failure with hypoxia. Plan: Presented with acute hypoxic resp failure, admitted to [facility]  RESOLVING  Pt desaturating while walking with PT, will need home O2  -attempt to wean.    Problem/Plan - 4:  ·  Problem: Chronic obstructive pulmonary disease with acute exacerbation. Plan: Previously on steroids. COPD bundle.   -c/w stiolto, duonebs  -On steroids for thrombocytopenia but will help w/ COPD as well.  -Pt is in chronic stable state with COPD, discussed need for home oxygen with pt. Nebulizer treatments were attempted. Pt agreed to home oxygen. O2 sat on room air at rest was 80%.      Problem/Plan - 5:  ·  Problem: Thrombocytopenia. Plan: Noted thrombocytopenia for which heme/ onc consulted. Smear with large platelets consistent with platelet destructive process- noted hx of ITP for which has been refractory to steroids, IVIG/rituximab- has been on Nplate at home. Plt at 39 today from 31 yesterday. HSQ held in setting of r/o hit and autoimmune work up.  -f/u heme/onc recs and work up with autoimmune, HIT work up  -Decadron converted to 40mg PO on day 2 of 4  -Abd US without splenomegaly.  -Continue to trend CBC.    Problem/Plan - 6:  Problem: Chronic pain of left knee.Plan: Hx of chronic L knee pain. With current regimen: Tylenol 975mg q8h prn moderate pain, Dilaudid 2mg q6h prn breakthrough pain, dilaudid 4mg q6h for severe pain.    Problem/Plan - 7:  ·  Problem: Bipolar 1 disorder. Plan: Continue home dose of Lamictal, Effexor  Will continue xanax BID (home dose TID).   - pt complains she has been feeling very depressed lately with the recent passing of her      Problem/Plan - 8:  ·  Problem: Primary insomnia. Plan: Continue with home dose of trazodone.     Problem/Plan - 9:  ·  Problem: Nutrition, metabolism, and development symptoms. Plan: F: tolerating PO, no IVF  E: replete K<4, Mg<2  N: Dash/TLC.     Problem/Plan - 10:  Problem: Need for prophylactic measure. Plan; DVT: SCDs, no HSQ d/t thrombocytopenia  GI: protonix 40 qd d/t steroids  Ethics: Full code, DNR/DNI rescinded upon transfer  Lines-peripheral    Problem/Plan - 11:  problem: transfer of care  plan  - Dispo: 7[facility] to [facility]  - pt will have home O2

## 2018-07-01 DIAGNOSIS — Z91.89 OTHER SPECIFIED PERSONAL RISK FACTORS, NOT ELSEWHERE CLASSIFIED: ICD-10-CM

## 2018-07-01 LAB
ANION GAP SERPL CALC-SCNC: 11 MMOL/L — SIGNIFICANT CHANGE UP (ref 5–17)
APPEARANCE UR: CLEAR — SIGNIFICANT CHANGE UP
BACTERIA # UR AUTO: PRESENT /HPF
BASOPHILS NFR BLD AUTO: 0.2 % — SIGNIFICANT CHANGE UP (ref 0–2)
BILIRUB UR-MCNC: NEGATIVE — SIGNIFICANT CHANGE UP
BUN SERPL-MCNC: 29 MG/DL — HIGH (ref 7–23)
CALCIUM SERPL-MCNC: 9 MG/DL — SIGNIFICANT CHANGE UP (ref 8.4–10.5)
CHLORIDE SERPL-SCNC: 94 MMOL/L — LOW (ref 96–108)
CO2 SERPL-SCNC: 30 MMOL/L — SIGNIFICANT CHANGE UP (ref 22–31)
COLOR SPEC: YELLOW — SIGNIFICANT CHANGE UP
CREAT SERPL-MCNC: 0.56 MG/DL — SIGNIFICANT CHANGE UP (ref 0.5–1.3)
DIFF PNL FLD: NEGATIVE — SIGNIFICANT CHANGE UP
EPI CELLS # UR: ABNORMAL /HPF (ref 0–5)
GLUCOSE BLDC GLUCOMTR-MCNC: 137 MG/DL — HIGH (ref 70–99)
GLUCOSE BLDC GLUCOMTR-MCNC: 184 MG/DL — HIGH (ref 70–99)
GLUCOSE BLDC GLUCOMTR-MCNC: 211 MG/DL — HIGH (ref 70–99)
GLUCOSE BLDC GLUCOMTR-MCNC: 81 MG/DL — SIGNIFICANT CHANGE UP (ref 70–99)
GLUCOSE SERPL-MCNC: 233 MG/DL — HIGH (ref 70–99)
GLUCOSE UR QL: NEGATIVE — SIGNIFICANT CHANGE UP
HCT VFR BLD CALC: 46.3 % — HIGH (ref 34.5–45)
HGB BLD-MCNC: 13.9 G/DL — SIGNIFICANT CHANGE UP (ref 11.5–15.5)
KETONES UR-MCNC: NEGATIVE — SIGNIFICANT CHANGE UP
LEUKOCYTE ESTERASE UR-ACNC: ABNORMAL
LYMPHOCYTES # BLD AUTO: 6 % — LOW (ref 13–44)
MCHC RBC-ENTMCNC: 27.2 PG — SIGNIFICANT CHANGE UP (ref 27–34)
MCHC RBC-ENTMCNC: 30 G/DL — LOW (ref 32–36)
MCV RBC AUTO: 90.6 FL — SIGNIFICANT CHANGE UP (ref 80–100)
MONOCYTES NFR BLD AUTO: 3.6 % — SIGNIFICANT CHANGE UP (ref 2–14)
NEUTROPHILS NFR BLD AUTO: 90.1 % — HIGH (ref 43–77)
NITRITE UR-MCNC: NEGATIVE — SIGNIFICANT CHANGE UP
PH UR: 5.5 — SIGNIFICANT CHANGE UP (ref 5–8)
PLATELET # BLD AUTO: 32 K/UL — LOW (ref 150–400)
POTASSIUM SERPL-MCNC: 4.7 MMOL/L — SIGNIFICANT CHANGE UP (ref 3.5–5.3)
POTASSIUM SERPL-SCNC: 4.7 MMOL/L — SIGNIFICANT CHANGE UP (ref 3.5–5.3)
PROT UR-MCNC: NEGATIVE MG/DL — SIGNIFICANT CHANGE UP
RBC # BLD: 5.11 M/UL — SIGNIFICANT CHANGE UP (ref 3.8–5.2)
RBC # FLD: 15.6 % — SIGNIFICANT CHANGE UP (ref 10.3–16.9)
RBC CASTS # UR COMP ASSIST: < 5 /HPF — SIGNIFICANT CHANGE UP
SODIUM SERPL-SCNC: 135 MMOL/L — SIGNIFICANT CHANGE UP (ref 135–145)
SP GR SPEC: 1.02 — SIGNIFICANT CHANGE UP (ref 1–1.03)
UROBILINOGEN FLD QL: 0.2 E.U./DL — SIGNIFICANT CHANGE UP
WBC # BLD: 10.4 K/UL — SIGNIFICANT CHANGE UP (ref 3.8–10.5)
WBC # FLD AUTO: 10.4 K/UL — SIGNIFICANT CHANGE UP (ref 3.8–10.5)
WBC UR QL: > 10 /HPF

## 2018-07-01 PROCEDURE — 99233 SBSQ HOSP IP/OBS HIGH 50: CPT | Mod: GC

## 2018-07-01 RX ORDER — VALACYCLOVIR 500 MG/1
1000 TABLET, FILM COATED ORAL EVERY 8 HOURS
Qty: 0 | Refills: 0 | Status: DISCONTINUED | OUTPATIENT
Start: 2018-07-01 | End: 2018-07-02

## 2018-07-01 RX ORDER — FLUCONAZOLE 150 MG/1
200 TABLET ORAL ONCE
Qty: 0 | Refills: 0 | Status: COMPLETED | OUTPATIENT
Start: 2018-07-01 | End: 2018-07-01

## 2018-07-01 RX ADMIN — VALACYCLOVIR 1000 MILLIGRAM(S): 500 TABLET, FILM COATED ORAL at 22:57

## 2018-07-01 RX ADMIN — Medication 4: at 12:58

## 2018-07-01 RX ADMIN — SENNA PLUS 2 TABLET(S): 8.6 TABLET ORAL at 22:17

## 2018-07-01 RX ADMIN — HYDROMORPHONE HYDROCHLORIDE 4 MILLIGRAM(S): 2 INJECTION INTRAMUSCULAR; INTRAVENOUS; SUBCUTANEOUS at 22:17

## 2018-07-01 RX ADMIN — Medication 3 MILLILITER(S): at 10:29

## 2018-07-01 RX ADMIN — Medication 3 MILLILITER(S): at 14:10

## 2018-07-01 RX ADMIN — Medication 3 MILLILITER(S): at 01:24

## 2018-07-01 RX ADMIN — Medication 150 MILLIGRAM(S): at 11:28

## 2018-07-01 RX ADMIN — HYDROMORPHONE HYDROCHLORIDE 2 MILLIGRAM(S): 2 INJECTION INTRAMUSCULAR; INTRAVENOUS; SUBCUTANEOUS at 07:34

## 2018-07-01 RX ADMIN — HYDROMORPHONE HYDROCHLORIDE 4 MILLIGRAM(S): 2 INJECTION INTRAMUSCULAR; INTRAVENOUS; SUBCUTANEOUS at 11:27

## 2018-07-01 RX ADMIN — Medication 0.5 MILLIGRAM(S): at 17:26

## 2018-07-01 RX ADMIN — Medication 3 MILLILITER(S): at 22:17

## 2018-07-01 RX ADMIN — Medication 100 MILLIGRAM(S): at 22:17

## 2018-07-01 RX ADMIN — HYDROMORPHONE HYDROCHLORIDE 2 MILLIGRAM(S): 2 INJECTION INTRAMUSCULAR; INTRAVENOUS; SUBCUTANEOUS at 07:01

## 2018-07-01 RX ADMIN — TIOTROPIUM BROMIDE AND OLODATEROL 2 PUFF(S): 3.124; 2.736 SPRAY, METERED RESPIRATORY (INHALATION) at 17:07

## 2018-07-01 RX ADMIN — HYDROMORPHONE HYDROCHLORIDE 2 MILLIGRAM(S): 2 INJECTION INTRAMUSCULAR; INTRAVENOUS; SUBCUTANEOUS at 14:19

## 2018-07-01 RX ADMIN — HYDROMORPHONE HYDROCHLORIDE 2 MILLIGRAM(S): 2 INJECTION INTRAMUSCULAR; INTRAVENOUS; SUBCUTANEOUS at 15:44

## 2018-07-01 RX ADMIN — HYDROMORPHONE HYDROCHLORIDE 4 MILLIGRAM(S): 2 INJECTION INTRAMUSCULAR; INTRAVENOUS; SUBCUTANEOUS at 15:43

## 2018-07-01 RX ADMIN — Medication 2: at 08:58

## 2018-07-01 RX ADMIN — HYDROMORPHONE HYDROCHLORIDE 4 MILLIGRAM(S): 2 INJECTION INTRAMUSCULAR; INTRAVENOUS; SUBCUTANEOUS at 23:17

## 2018-07-01 RX ADMIN — Medication 40 MILLIGRAM(S): at 06:11

## 2018-07-01 RX ADMIN — HYDROMORPHONE HYDROCHLORIDE 4 MILLIGRAM(S): 2 INJECTION INTRAMUSCULAR; INTRAVENOUS; SUBCUTANEOUS at 10:33

## 2018-07-01 RX ADMIN — Medication 3 MILLILITER(S): at 06:10

## 2018-07-01 RX ADMIN — HYDROMORPHONE HYDROCHLORIDE 4 MILLIGRAM(S): 2 INJECTION INTRAMUSCULAR; INTRAVENOUS; SUBCUTANEOUS at 17:07

## 2018-07-01 RX ADMIN — Medication 1 APPLICATION(S): at 19:37

## 2018-07-01 RX ADMIN — POLYETHYLENE GLYCOL 3350 17 GRAM(S): 17 POWDER, FOR SOLUTION ORAL at 11:28

## 2018-07-01 RX ADMIN — Medication 3 MILLILITER(S): at 17:07

## 2018-07-01 RX ADMIN — FLUCONAZOLE 200 MILLIGRAM(S): 150 TABLET ORAL at 19:37

## 2018-07-01 RX ADMIN — Medication 0.5 MILLIGRAM(S): at 06:11

## 2018-07-01 RX ADMIN — LAMOTRIGINE 100 MILLIGRAM(S): 25 TABLET, ORALLY DISINTEGRATING ORAL at 11:28

## 2018-07-01 NOTE — PROGRESS NOTE ADULT - SUBJECTIVE AND OBJECTIVE BOX
67F with COPD, Iron deficiency anemia, thrombocytopenia requiring Nplate multiple knee surgeries and complications resulting in chronic pain syndrome presented on  with 1 week of progressively worsening lower leg pain, intermittent pleuritic chest pain, and SOB worsening over 24 hours prior to admission. On admission, pt was hypoxic and admitted to [facility] for hypoxia. Echocardiogram significant for RHF and PFO. Unable to perform CTA for PE 2/2 contrast allergy. V/Q performed which noted intact perfusion but with ventilation irregularities suggestive of underlying pulmonary disease. Hospital course c/b thrombocytopenia. Heme/onc on board, has a known hx of ITP, undergoing HIT and autoimmune workup. Currently on PO steroids for 4 day course to end .Of note, desaturates when walking and will need home oxygen therapy.    OVERNIGHT EVENTS: no acute events    SUBJECTIVE / INTERVAL HPI: Patient seen and examined at bedside. She states her SOB is improving. She denies chest pain or LE swelling. She denies fever, n/v/c/d.     VITAL SIGNS:  Vital Signs Last 24 Hrs  T(C): 36.6 (2018 08:30), Max: 37.1 (2018 20:53)  T(F): 97.8 (2018 08:30), Max: 98.7 (2018 20:53)  HR: 72 (2018 08:30) (72 - 80)  BP: 116/76 (2018 08:30) (116/76 - 150/87)  BP(mean): 97 (2018 15:23) (97 - 97)  RR: 17 (2018 08:30) (16 - 19)  SpO2: 99% (2018 08:30) (96% - 99%)    PHYSICAL EXAM:    General: WDWN  HEENT: NCAT; PERRL, anicteric sclera; MMM  Neck: supple, trachea midline  Cardiovascular: S1, S2 normal; RRR, no M/G/R  Respiratory: CTABL; no W/R/R  Gastrointestinal: soft, nontender, nondistended. bowel sounds present.  Skin: no ulcerations or visible rashes appreciated  Extremities: WWP; no edema, clubbing or cyanosis  Vascular: 2+ radial, DP/PT pulses B/L  Neurological: AAOx3; CN II-XII grossly intact; no focal deficits    MEDICATIONS:  MEDICATIONS  (STANDING):  ALBUTerol/ipratropium for Nebulization 3 milliLiter(s) Nebulizer every 4 hours  ALPRAZolam 0.5 milliGRAM(s) Oral two times a day  dextrose 5%. 1000 milliLiter(s) (50 mL/Hr) IV Continuous <Continuous>  dextrose 50% Injectable 12.5 Gram(s) IV Push once  dextrose 50% Injectable 25 Gram(s) IV Push once  dextrose 50% Injectable 25 Gram(s) IV Push once  furosemide    Tablet 40 milliGRAM(s) Oral daily  insulin lispro (HumaLOG) corrective regimen sliding scale   SubCutaneous Before meals and at bedtime  lamoTRIgine 100 milliGRAM(s) Oral daily  polyethylene glycol 3350 17 Gram(s) Oral daily  senna 2 Tablet(s) Oral at bedtime  tiotropium 2.5 MICROgram(s)/olodaterol 2.5 MICROgram(s) Inhaler 2 Puff(s) Inhalation daily  traZODone      traZODone 100 milliGRAM(s) Oral at bedtime  venlafaxine XR. 150 milliGRAM(s) Oral daily    MEDICATIONS  (PRN):  acetaminophen   Tablet. 975 milliGRAM(s) Oral every 8 hours PRN Moderate Pain (4 - 6)  bisacodyl Suppository 10 milliGRAM(s) Rectal daily PRN Constipation  dextrose 40% Gel 15 Gram(s) Oral once PRN Blood Glucose LESS THAN 70 milliGRAM(s)/deciliter  glucagon  Injectable 1 milliGRAM(s) IntraMuscular once PRN Glucose LESS THAN 70 milligrams/deciliter  HYDROmorphone   Tablet 4 milliGRAM(s) Oral every 6 hours PRN Severe Pain (7 - 10)  HYDROmorphone   Tablet 2 milliGRAM(s) Oral every 6 hours PRN breakthrough pain      ALLERGIES:  Allergies    iodine (Anaphylaxis)    Intolerances        LABS:                        13.9   10.4  )-----------( 32       ( 2018 07:14 )             46.3     07-    135  |  94<L>  |  29<H>  ----------------------------<  233<H>  4.7   |  30  |  0.56    Ca    9.0      2018 07:14  Mg     2.1     06-30        Urinalysis Basic - ( 2018 14:35 )    Color: Yellow / Appearance: Clear / S.020 / pH: x  Gluc: x / Ketone: NEGATIVE  / Bili: Negative / Urobili: 0.2 E.U./dL   Blood: x / Protein: NEGATIVE mg/dL / Nitrite: NEGATIVE   Leuk Esterase: Small / RBC: < 5 /HPF / WBC > 10 /HPF   Sq Epi: x / Non Sq Epi: 5-10 /HPF / Bacteria: Present /HPF      CAPILLARY BLOOD GLUCOSE      POCT Blood Glucose.: 211 mg/dL (2018 12:21)      RADIOLOGY & ADDITIONAL TESTS: Reviewed.

## 2018-07-01 NOTE — PROGRESS NOTE ADULT - ASSESSMENT
67F with COPD, TODD, thrombocytopenia, multiple knee surgeries and complications resulting in chronic pain syndrome, recent hospital admissions for cellulitis in March and PNA in Jan, who p/w LE edema and shortness of breath. Pt admitted for hypoxic respiratory failure. Pt now stable for RMF for continued management of RHF.    Problem/Plan - 1:  ·  Problem: Right-sided heart failure, unspecified HF chronicity. Plan: Pt has RHF with noted LE edema, abdominal distension and JVD on presentation s/p diuresis.  -Echocardiogram notable pulm HTN and RA, RV dilation and hypokinesis of the R free wall  -Patient remains euvolemic on exam  -As per cardio, continue Lasix 40mg daily  - Pending outpatient RHC.    Problem/Plan - 2:  ·  Problem: Pulmonary hypertension. Plan: Pt Found to have pulm HTN- 58mmHg- given work up with V/Q- perfusion WNL but with grossly irregular ventilation- more suggestive of pulmonary etiology. Group 1 vs group 3 pulmonary HTN. Noted PFO on GURPREET with Right to left shunt.  - Pending outpatient RHC  - Will need home O2 and pending authorization/services  - Will have PT reevaluate     Problem/Plan - 3:  ·  Problem: Acute respiratory failure with hypoxia. Plan: Presented with acute hypoxic resp failure, admitted to [facility]  RESOLVING  Pt desaturating while walking with PT, will need home O2  -attempt to wean.    Problem/Plan - 4:  ·  Problem: Chronic obstructive pulmonary disease with acute exacerbation. Plan: Previously on steroids. COPD bundle.   -c/w stiolto, duonebs  -On steroids for thrombocytopenia but will help w/ COPD as well.  -Pt is in chronic stable state with COPD, discussed need for home oxygen with pt. Nebulizer treatments were attempted. Pt agreed to home oxygen. O2 sat on room air at rest was 80%.      Problem/Plan - 5:  ·  Problem: Thrombocytopenia. Plan: Noted thrombocytopenia for which heme/ onc consulted. Smear with large platelets consistent with platelet destructive process- noted hx of ITP for which has been refractory to steroids, IVIG/rituximab- has been on Nplate at home. Plt at 39 today from 31 yesterday. HSQ held in setting of r/o hit and autoimmune work up.  -f/u heme/onc recs and work up with autoimmune, HIT work up  -Decadron converted to 40mg PO on day 2 of 4  -Abd US without splenomegaly.  -Continue to trend CBC.    Problem/Plan - 6:  Problem: Chronic pain of left knee.Plan: Hx of chronic L knee pain. With current regimen: Tylenol 975mg q8h prn moderate pain, Dilaudid 2mg q6h prn breakthrough pain, dilaudid 4mg q6h for severe pain.    Problem/Plan - 7:  ·  Problem: Bipolar 1 disorder. Plan: Continue home dose of Lamictal, Effexor  Will continue xanax BID (home dose TID).   - pt complains she has been feeling very depressed lately with the recent passing of her      Problem/Plan - 8:  ·  Problem: Primary insomnia. Plan: Continue with home dose of trazodone.     Problem/Plan - 9:  ·  Problem: Nutrition, metabolism, and development symptoms. Plan: F: tolerating PO, no IVF  E: replete K<4, Mg<2  N: Dash/TLC.     Problem/Plan - 10:  Problem: Need for prophylactic measure. Plan; DVT: SCDs, no HSQ d/t thrombocytopenia  GI: protonix 40 qd d/t steroids  Ethics: Full code, DNR/DNI rescinded upon transfer  Lines-peripheral    Problem/Plan - 11:  problem: transfer of care  plan  - Dispo: 7[facility] to [facility]  - pt will have home O2    Attending Attestation:   Patient seen and examined with house-staff during bedside rounds.  Resident note read, including vitals, physical findings, laboratory data, and radiological reports.   Revisions included below.  Direct personal management at bed side and extensive interpretation of the data.  Plan was outlined and discussed in details with the housestaff.  Decision making of high complexity  Action taken for acute disease activity to reflect the level of care provided:  - medication reconciliation  - review laboratory data  Patient and wait oxygen delivery hard to discharge. COPD educational material was submitted to the patient.  CAT score is 12. PIF performed 120 with no resistance, 70 with level two resistance, and 50 with level 5 resistance.     I was physically present for the key portions of the evaluation and management (E/M) service provided.  I agree with the above history, physical, and plan which I have reviewed and edited where appropriate.     25 minutes spent on total encounter; more than 50% of the visit was spent counseling and/or coordinating care by the attending physician. 67F with COPD, TODD, thrombocytopenia, multiple knee surgeries and complications resulting in chronic pain syndrome, recent hospital admissions for cellulitis in March and PNA in Jan, who p/w LE edema and shortness of breath. Pt admitted for hypoxic respiratory failure. Pt now stable for RMF for continued management of RHF.

## 2018-07-01 NOTE — PROGRESS NOTE ADULT - ATTENDING COMMENTS
Assessment: Patient personally seen and examined myself during rounds with the Physician Assistant/House Staff/Nurse Practitioner   Physician Assistant/House Staff/Nurse Practitioner note read, including vitals, physical findings, laboratory data, and radiological reports.   Revisions included below.   Direct personal management at bed side and extensive interpretation of the data.    Plan was outlined and discussed in details with the Physician Assistant/House Staff/Nurse Practitioner.    Decision making of high complexity   Risk high of complications, morbidity, and/or mortality  Assessment and Action taken for acute disease activity to reflect the level of care provided:  -Hemodynamic evaluation and support  -Medication reconciliation  -Review laboratory data  -EKG reviewed   -    TIME SPENT in evaluation and management, reassessments, review and interpretation of labs and x-rays, and hemodynamic management, formulating a plan and coordinating care: ___25____ MIN.  Time does not include procedural time.
Patient seen and examined with house-staff during bedside rounds.  Resident note read, including vitals, physical findings, laboratory data, and radiological reports.   Revisions included below.  Direct personal management at bed side and extensive interpretation of the data.  Plan was outlined and discussed in details with the housestaff.  Decision making of high complexity  Action taken for acute disease activity to reflect the level of care provided:  - medication reconciliation  - review laboratory data  Patient and wait oxygen delivery hard to discharge. COPD educational material was submitted to the patient.  CAT score is 12. PIF performed 120 with no resistance, 70 with level two resistance, and 50 with level 5 resistance
Patient seen and examined with house-staff during bedside rounds.  Resident note read, including vitals, physical findings, laboratory data, and radiological reports.   Revisions included below.  Direct personal management at bed side and extensive interpretation of the data.  Plan was outlined and discussed in details with the housestaff.  Decision making of high complexity  Action taken for acute disease activity to reflect the level of care provided:  - medication reconciliation  - review laboratory data  Patient and wait oxygen delivery hard to discharge. COPD educational material was submitted to the patient.  CAT score is 12. PIF performed 120 with no resistance, 70 with level two resistance, and 50 with level 5 resistance
Patient seen and examined; Plans discussed on rounds; Cardiology to make some recommendations
Patient seen and examined with house-staff during bedside rounds.  Resident note read, including vitals, physical findings, laboratory data, and radiological reports.   Revisions included below.  Direct personal management at bed side and extensive interpretation of the data.  Plan was outlined and discussed in details with the housestaff.  Decision making of high complexity  Action taken for acute disease activity to reflect the level of care provided:  - medication reconciliation  - review laboratory data  - I discussed the case in details with the cardiology fellow initially. The echocardiogram was consistent with right ventricular hypokinesis and pulmonary hypertension. The presence of right ventricular hypokinesis is not consistent with long-standing pulmonary hypertension. The ideology of pulmonary hypertension cannot be totally explained by underlying COPD which is not severe. Also the degree of hypoxemia is out of proportion to the physical findings.  The Echo was repeated and she has underlying PFO. I explained the condition to the patient and the family that that PFO could be related to long-standing pulmonary hypertension. They challenge your pulmonary hypertension is not I is unknown at this point. My recommendation is first to start anticoagulation. My recommendation is to proceed with right heart cath. What if you did VQ scan tomorrow. Bedside spirometry
Patient seen and examined with house-staff during bedside rounds  Resident note read, including vitals, physical findings, laboratory data, and radiological reports.   Revisions included below.  Case discussed with House staff  Direct personal management at bedside  and extensive interpretation of data. Decision making of high complexity.
Patient seen and examined with house-staff during bedside rounds.  Resident note read, including vitals, physical findings, laboratory data, and radiological reports.   Revisions included below.  Direct personal management at bed side and extensive interpretation of the data.  Plan was outlined and discussed in details with the housestaff.  Decision making of high complexity  Action taken for acute disease activity to reflect the level of care provided:  - medication reconciliation  - review laboratory data  - Patient is to be discovered right side catheterization. Follow BQ scam. Continue lunatics at the current dose. Continue oxygen supplementation
Patient seen and examined with house-staff during bedside rounds  Resident note read, including vitals, physical findings, laboratory data, and radiological reports.   Revisions included below.  Case discussed with House staff  Direct personal management at bedside  and extensive interpretation of data. Decision making of high complexity.
Patient seen and examined with house-staff during bedside rounds.  Resident note read, including vitals, physical findings, laboratory data, and radiological reports.   Revisions included below.  Direct personal management at bed side and extensive interpretation of the data.  Plan was outlined and discussed in details with the housestaff.  Decision making of high complexity  Action taken for acute disease activity to reflect the level of care provided:  - medication reconciliation  - review laboratory data  Patient developed herpetic lesion on the sacral area. She also has vaginal irritation and rash. Patient will start him on Valtrex and Diflucan. Her blood sugar is better controlled off the prednisone. Continue insulin sliding scale.  The patient minute to be started treatment for diabetes. Continue current regimen and possible discharge tomorrow. She will require contact isolation
Patient seen and examined with house-staff during bedside rounds.  Resident note read, including vitals, physical findings, laboratory data, and radiological reports.   Revisions included below.  Direct personal management at bed side and extensive interpretation of the data.  Plan was outlined and discussed in details with the housestaff.  Decision making of high complexity  Action taken for acute disease activity to reflect the level of care provided:  - medication reconciliation  - review laboratory data  Patient was admitted with respiratory failure, hypoxemia, right to left shunt. The above is most likely related to pulmonary hypertension complicated by cor pulmonale, and PFO.  The etiology of pulmonary hypertension easier secondary to COPD and or idiopathic. The patient requires oxygen as she desaturated with ambulation it down to 87 and 84%. She is to continue on diuretics. Hold on anticoagulation. Bedside spirometry order. She is to be scheduled for right heart catheterization as outpatient. I discussed with the  discharge plan
Patient seen and examined with house-staff during bedside rounds.  Resident note read, including vitals, physical findings, laboratory data, and radiological reports.   Revisions included below.  Direct personal management at bed side and extensive interpretation of the data.  Plan was outlined and discussed in details with the housestaff.  Decision making of high complexity  Action taken for acute disease activity to reflect the level of care provided:  - medication reconciliation  - review laboratory data  -The patient is clinically stable. Our started the patient on Stiolto.   discontinue systemic steroids. What duty right-sided catheterization as an operation. Evaluate the patient for home oxygen. COPD program initiated. Patient only physical therapy at home. I discussed the case in details with the daughter over the phone

## 2018-07-01 NOTE — PROGRESS NOTE ADULT - PROBLEM SELECTOR PLAN 6
-Continue Trazadone 50mg QHS
Hx of insomnia.   -c/w Trazadone 50mg QHS
-Continue Trazadone 50mg QHS
Hx of insomnia.   -c/w Trazadone 50mg QHS
Hx of insomnia.   -c/w Trazadone 50mg QHS
Hx of chronic L knee pain. With current regimen: Tylenol 975mg q8h prn moderate pain, Dilaudid 2mg q6h prn breakthrough pain, dilaudid 4mg q6h for severe pain.

## 2018-07-01 NOTE — PROGRESS NOTE ADULT - PROBLEM SELECTOR PLAN 10
pt to f/u with her PCP and heme/onc.
DVT: SCDs, no HSQ d/t thrombocytopenia  GI: protonix 40 qd d/t steroids  Ethics: Full code, DNR/DNI rescinded upon transfer  Lines-peripheral  Dispo: 7[facility] to [facility], needs PT reeval, needs home oxygen setup

## 2018-07-01 NOTE — PROGRESS NOTE ADULT - PROBLEM SELECTOR PLAN 8
F: No IVF  E: Replete PRN  N: DASH/TLC
Continue with home dose of trazodone

## 2018-07-02 VITALS
OXYGEN SATURATION: 98 % | TEMPERATURE: 98 F | RESPIRATION RATE: 18 BRPM | DIASTOLIC BLOOD PRESSURE: 82 MMHG | SYSTOLIC BLOOD PRESSURE: 127 MMHG | HEART RATE: 79 BPM

## 2018-07-02 LAB
GLUCOSE BLDC GLUCOMTR-MCNC: 78 MG/DL — SIGNIFICANT CHANGE UP (ref 70–99)
GLUCOSE BLDC GLUCOMTR-MCNC: 90 MG/DL — SIGNIFICANT CHANGE UP (ref 70–99)
GLUCOSE BLDC GLUCOMTR-MCNC: 99 MG/DL — SIGNIFICANT CHANGE UP (ref 70–99)

## 2018-07-02 PROCEDURE — A9567: CPT

## 2018-07-02 PROCEDURE — 85730 THROMBOPLASTIN TIME PARTIAL: CPT

## 2018-07-02 PROCEDURE — 82803 BLOOD GASES ANY COMBINATION: CPT

## 2018-07-02 PROCEDURE — 83550 IRON BINDING TEST: CPT

## 2018-07-02 PROCEDURE — 76700 US EXAM ABDOM COMPLETE: CPT

## 2018-07-02 PROCEDURE — 87389 HIV-1 AG W/HIV-1&-2 AB AG IA: CPT

## 2018-07-02 PROCEDURE — 86850 RBC ANTIBODY SCREEN: CPT

## 2018-07-02 PROCEDURE — 85384 FIBRINOGEN ACTIVITY: CPT

## 2018-07-02 PROCEDURE — 93306 TTE W/DOPPLER COMPLETE: CPT

## 2018-07-02 PROCEDURE — 87086 URINE CULTURE/COLONY COUNT: CPT

## 2018-07-02 PROCEDURE — 51702 INSERT TEMP BLADDER CATH: CPT

## 2018-07-02 PROCEDURE — 78580 LUNG PERFUSION IMAGING: CPT

## 2018-07-02 PROCEDURE — 86225 DNA ANTIBODY NATIVE: CPT

## 2018-07-02 PROCEDURE — 96372 THER/PROPH/DIAG INJ SC/IM: CPT | Mod: XU

## 2018-07-02 PROCEDURE — 83880 ASSAY OF NATRIURETIC PEPTIDE: CPT

## 2018-07-02 PROCEDURE — 80175 DRUG SCREEN QUAN LAMOTRIGINE: CPT

## 2018-07-02 PROCEDURE — 80061 LIPID PANEL: CPT

## 2018-07-02 PROCEDURE — 83605 ASSAY OF LACTIC ACID: CPT

## 2018-07-02 PROCEDURE — 80074 ACUTE HEPATITIS PANEL: CPT

## 2018-07-02 PROCEDURE — 97116 GAIT TRAINING THERAPY: CPT

## 2018-07-02 PROCEDURE — 99285 EMERGENCY DEPT VISIT HI MDM: CPT | Mod: 25

## 2018-07-02 PROCEDURE — 85027 COMPLETE CBC AUTOMATED: CPT

## 2018-07-02 PROCEDURE — 83036 HEMOGLOBIN GLYCOSYLATED A1C: CPT

## 2018-07-02 PROCEDURE — 83735 ASSAY OF MAGNESIUM: CPT

## 2018-07-02 PROCEDURE — 85610 PROTHROMBIN TIME: CPT

## 2018-07-02 PROCEDURE — 86022 PLATELET ANTIBODIES: CPT

## 2018-07-02 PROCEDURE — 84100 ASSAY OF PHOSPHORUS: CPT

## 2018-07-02 PROCEDURE — 96376 TX/PRO/DX INJ SAME DRUG ADON: CPT | Mod: 59

## 2018-07-02 PROCEDURE — 85652 RBC SED RATE AUTOMATED: CPT

## 2018-07-02 PROCEDURE — 82962 GLUCOSE BLOOD TEST: CPT

## 2018-07-02 PROCEDURE — 85240 CLOT FACTOR VIII AHG 1 STAGE: CPT

## 2018-07-02 PROCEDURE — 93005 ELECTROCARDIOGRAM TRACING: CPT

## 2018-07-02 PROCEDURE — 85379 FIBRIN DEGRADATION QUANT: CPT

## 2018-07-02 PROCEDURE — 96365 THER/PROPH/DIAG IV INF INIT: CPT | Mod: 59

## 2018-07-02 PROCEDURE — 86038 ANTINUCLEAR ANTIBODIES: CPT

## 2018-07-02 PROCEDURE — 93970 EXTREMITY STUDY: CPT

## 2018-07-02 PROCEDURE — 78582 LUNG VENTILAT&PERFUS IMAGING: CPT

## 2018-07-02 PROCEDURE — 86140 C-REACTIVE PROTEIN: CPT

## 2018-07-02 PROCEDURE — 82728 ASSAY OF FERRITIN: CPT

## 2018-07-02 PROCEDURE — 82607 VITAMIN B-12: CPT

## 2018-07-02 PROCEDURE — 94150 VITAL CAPACITY TEST: CPT

## 2018-07-02 PROCEDURE — 82746 ASSAY OF FOLIC ACID SERUM: CPT

## 2018-07-02 PROCEDURE — 85025 COMPLETE CBC W/AUTO DIFF WBC: CPT

## 2018-07-02 PROCEDURE — 84484 ASSAY OF TROPONIN QUANT: CPT

## 2018-07-02 PROCEDURE — A9540: CPT

## 2018-07-02 PROCEDURE — 94060 EVALUATION OF WHEEZING: CPT

## 2018-07-02 PROCEDURE — 71045 X-RAY EXAM CHEST 1 VIEW: CPT

## 2018-07-02 PROCEDURE — 84436 ASSAY OF TOTAL THYROXINE: CPT

## 2018-07-02 PROCEDURE — 86900 BLOOD TYPING SEROLOGIC ABO: CPT

## 2018-07-02 PROCEDURE — 36415 COLL VENOUS BLD VENIPUNCTURE: CPT

## 2018-07-02 PROCEDURE — 86901 BLOOD TYPING SEROLOGIC RH(D): CPT

## 2018-07-02 PROCEDURE — 71250 CT THORAX DX C-: CPT

## 2018-07-02 PROCEDURE — 99238 HOSP IP/OBS DSCHRG MGMT 30/<: CPT

## 2018-07-02 PROCEDURE — 80053 COMPREHEN METABOLIC PANEL: CPT

## 2018-07-02 PROCEDURE — 96375 TX/PRO/DX INJ NEW DRUG ADDON: CPT | Mod: 59

## 2018-07-02 PROCEDURE — 94640 AIRWAY INHALATION TREATMENT: CPT | Mod: 59

## 2018-07-02 PROCEDURE — 81003 URINALYSIS AUTO W/O SCOPE: CPT

## 2018-07-02 PROCEDURE — 97161 PT EVAL LOW COMPLEX 20 MIN: CPT

## 2018-07-02 PROCEDURE — 80048 BASIC METABOLIC PNL TOTAL CA: CPT

## 2018-07-02 PROCEDURE — 87040 BLOOD CULTURE FOR BACTERIA: CPT

## 2018-07-02 PROCEDURE — 81001 URINALYSIS AUTO W/SCOPE: CPT

## 2018-07-02 PROCEDURE — 84443 ASSAY THYROID STIM HORMONE: CPT

## 2018-07-02 PROCEDURE — 86880 COOMBS TEST DIRECT: CPT

## 2018-07-02 RX ORDER — TIOTROPIUM BROMIDE AND OLODATEROL 3.124; 2.736 UG/1; UG/1
2 SPRAY, METERED RESPIRATORY (INHALATION)
Qty: 0 | Refills: 0 | COMMUNITY
Start: 2018-07-02

## 2018-07-02 RX ORDER — LAMOTRIGINE 25 MG/1
1 TABLET, ORALLY DISINTEGRATING ORAL
Qty: 0 | Refills: 0 | COMMUNITY

## 2018-07-02 RX ORDER — TRAZODONE HCL 50 MG
1 TABLET ORAL
Qty: 0 | Refills: 0 | COMMUNITY

## 2018-07-02 RX ORDER — VALACYCLOVIR 500 MG/1
1 TABLET, FILM COATED ORAL
Qty: 0 | Refills: 0 | COMMUNITY
Start: 2018-07-02

## 2018-07-02 RX ORDER — VALACYCLOVIR 500 MG/1
1 TABLET, FILM COATED ORAL
Qty: 30 | Refills: 0 | OUTPATIENT
Start: 2018-07-02 | End: 2018-07-11

## 2018-07-02 RX ORDER — FUROSEMIDE 40 MG
1 TABLET ORAL
Qty: 0 | Refills: 0 | COMMUNITY
Start: 2018-07-02

## 2018-07-02 RX ORDER — VENLAFAXINE HCL 75 MG
1 CAPSULE, EXT RELEASE 24 HR ORAL
Qty: 0 | Refills: 0 | COMMUNITY

## 2018-07-02 RX ORDER — TRAZODONE HCL 50 MG
1 TABLET ORAL
Qty: 30 | Refills: 0
Start: 2018-07-02 | End: 2018-07-31

## 2018-07-02 RX ORDER — IPRATROPIUM/ALBUTEROL SULFATE 18-103MCG
3 AEROSOL WITH ADAPTER (GRAM) INHALATION
Qty: 0 | Refills: 0 | DISCHARGE
Start: 2018-07-02

## 2018-07-02 RX ADMIN — Medication 3 MILLILITER(S): at 06:42

## 2018-07-02 RX ADMIN — POLYETHYLENE GLYCOL 3350 17 GRAM(S): 17 POWDER, FOR SOLUTION ORAL at 12:54

## 2018-07-02 RX ADMIN — HYDROMORPHONE HYDROCHLORIDE 2 MILLIGRAM(S): 2 INJECTION INTRAMUSCULAR; INTRAVENOUS; SUBCUTANEOUS at 13:24

## 2018-07-02 RX ADMIN — Medication 150 MILLIGRAM(S): at 12:55

## 2018-07-02 RX ADMIN — Medication 3 MILLILITER(S): at 09:50

## 2018-07-02 RX ADMIN — Medication 40 MILLIGRAM(S): at 06:39

## 2018-07-02 RX ADMIN — HYDROMORPHONE HYDROCHLORIDE 4 MILLIGRAM(S): 2 INJECTION INTRAMUSCULAR; INTRAVENOUS; SUBCUTANEOUS at 18:12

## 2018-07-02 RX ADMIN — LAMOTRIGINE 100 MILLIGRAM(S): 25 TABLET, ORALLY DISINTEGRATING ORAL at 12:55

## 2018-07-02 RX ADMIN — Medication 3 MILLILITER(S): at 17:42

## 2018-07-02 RX ADMIN — Medication 0.5 MILLIGRAM(S): at 06:39

## 2018-07-02 RX ADMIN — HYDROMORPHONE HYDROCHLORIDE 2 MILLIGRAM(S): 2 INJECTION INTRAMUSCULAR; INTRAVENOUS; SUBCUTANEOUS at 12:54

## 2018-07-02 RX ADMIN — HYDROMORPHONE HYDROCHLORIDE 4 MILLIGRAM(S): 2 INJECTION INTRAMUSCULAR; INTRAVENOUS; SUBCUTANEOUS at 07:42

## 2018-07-02 RX ADMIN — Medication 3 MILLILITER(S): at 13:00

## 2018-07-02 RX ADMIN — VALACYCLOVIR 1000 MILLIGRAM(S): 500 TABLET, FILM COATED ORAL at 06:42

## 2018-07-02 RX ADMIN — Medication 1 APPLICATION(S): at 06:41

## 2018-07-02 RX ADMIN — TIOTROPIUM BROMIDE AND OLODATEROL 2 PUFF(S): 3.124; 2.736 SPRAY, METERED RESPIRATORY (INHALATION) at 17:43

## 2018-07-02 RX ADMIN — Medication 0.5 MILLIGRAM(S): at 17:42

## 2018-07-02 RX ADMIN — VALACYCLOVIR 1000 MILLIGRAM(S): 500 TABLET, FILM COATED ORAL at 13:00

## 2018-07-02 RX ADMIN — Medication 1 APPLICATION(S): at 17:44

## 2018-07-02 RX ADMIN — HYDROMORPHONE HYDROCHLORIDE 4 MILLIGRAM(S): 2 INJECTION INTRAMUSCULAR; INTRAVENOUS; SUBCUTANEOUS at 17:42

## 2018-07-02 NOTE — PROGRESS NOTE ADULT - PROVIDER SPECIALTY LIST ADULT
Cardiology
Critical Care
Heme/Onc
Heme/Onc
Internal Medicine
Structural Heart
Cardiology
Cardiology
Internal Medicine
Heme/Onc
Internal Medicine
Internal Medicine

## 2018-07-02 NOTE — PROGRESS NOTE ADULT - ASSESSMENT
67F with COPD, tobacco dependence, bipolar, thrombocytopenia requiring Nplate (Romiplostim) last admission in March for cellulitis, presenting now w/ COPD exacerbation w/ cor pulmonale, pulm HTN with progressive thrombocytopenia.       Peripheral smear reviewed - polysegmented neutrophils present, few hypersegmented neutrophils, red cells homogenous, no fragmented cells present, notable large platelets seen *** c/w plt destructive process, roughly 5 per HPF correlating to  platelet count     #Thrombocytopenia - per outpatient records patient follows at [facility] since 2016, patient initially found to have platelet count of 63K and easy bruising. Regarding therapy for documented ITP, patient was receiving Nplate therapy weekly, which was changed to every other week due to cost of co-pay. Patient was also given prescription for Promacta however unable to be filled due to cost. Patient was recommended to reapply for assistance. Patient has not had bone marrow biopsy. Last dose of Nplate was 5/9 per primary oncologist. Primary oncologist stated patient was also treated w/ steroids however no therapy with IVIG or Ritux at this time, no hx of splenectomy. At this time, V/Q consistent with areas of poor ventilation however no documented thrombosis, patient remains off anticoagulation at this time. Initiated on high dose steriods at this time. Current auto immune, inflammatory, infectious w/u negative, B12/Folate supplemented. No splenomegaly on u/s.    -c/w Decadron 40mg x 4 days, monitor for severe agitations   -will administer Nplate 1mcg/kg x1 plan for tomorrow 6/30 (dosed weekly) - discussed w/ pharmacy   -off hep at this time, CONCHA negative   -Iron panel c/w elevated sat% and total serum levels, however normal ferritin, consider HFE testing  -CT c/w 1.4 cm pulm lesion ,(outpt planned for bx or once plts at least >50k, of note not active on outpatient PET/CT serially monitor)  -pls avoid platelet lowering medications like sulfas    Discussed w/ Dr. Costello   Will set up patient for outpatient follow up with Dr. Blount - 7/25/18 at 2:45 pm - Nplate is normally a weekly medication dosed as 1mcg SQ weekly with dose incrementally increased/decreased based on repeat platelet counts.  If possible to have CBC weekly (pt declined this to me as she cannot "afford" it) to monitor labs and determine treatment plan/dosing of Nplate.  It seems that she is not going to be able to continue this medication indefinitely d/t cost

## 2018-07-02 NOTE — PROGRESS NOTE ADULT - SUBJECTIVE AND OBJECTIVE BOX
Heme/Onc Progress Note (Dr. Guzman)  Discussed with housestaff.      Interval History: No acute events overnight. Patient s/p 4 doses of Dexamethasone. Patient with ecchymosis on extremity 2/2 IV, no new bruising or gross bleeding. Patient otherwise on O2 for respiratory condition. Patient admits to some shortness of breath and leg swelling that is improving, denies acute cp, palpitations, nausea, vomiting, bowel changes, fevers or chills. U/S abd w/ hepatomegaly, no splenomegaly.      iodine (Anaphylaxis)    Allergies    iodine (Anaphylaxis)    Intolerances    Medications:  MEDICATIONS  (STANDING):  ALBUTerol/ipratropium for Nebulization 3 milliLiter(s) Nebulizer every 4 hours  ALPRAZolam 0.5 milliGRAM(s) Oral two times a day  clotrimazole 1% Cream 1 Application(s) Topical two times a day  dextrose 5%. 1000 milliLiter(s) (50 mL/Hr) IV Continuous <Continuous>  dextrose 50% Injectable 12.5 Gram(s) IV Push once  dextrose 50% Injectable 25 Gram(s) IV Push once  dextrose 50% Injectable 25 Gram(s) IV Push once  furosemide    Tablet 40 milliGRAM(s) Oral daily  insulin lispro (HumaLOG) corrective regimen sliding scale   SubCutaneous Before meals and at bedtime  lamoTRIgine 100 milliGRAM(s) Oral daily  polyethylene glycol 3350 17 Gram(s) Oral daily  senna 2 Tablet(s) Oral at bedtime  tiotropium 2.5 MICROgram(s)/olodaterol 2.5 MICROgram(s) Inhaler 2 Puff(s) Inhalation daily  traZODone      traZODone 100 milliGRAM(s) Oral at bedtime  valACYclovir 1000 milliGRAM(s) Oral every 8 hours  venlafaxine XR. 150 milliGRAM(s) Oral daily    MEDICATIONS  (PRN):  acetaminophen   Tablet. 975 milliGRAM(s) Oral every 8 hours PRN Moderate Pain (4 - 6)  bisacodyl Suppository 10 milliGRAM(s) Rectal daily PRN Constipation  dextrose 40% Gel 15 Gram(s) Oral once PRN Blood Glucose LESS THAN 70 milliGRAM(s)/deciliter  glucagon  Injectable 1 milliGRAM(s) IntraMuscular once PRN Glucose LESS THAN 70 milligrams/deciliter  HYDROmorphone   Tablet 4 milliGRAM(s) Oral every 6 hours PRN Severe Pain (7 - 10)  HYDROmorphone   Tablet 2 milliGRAM(s) Oral every 6 hours PRN breakthrough pain      PHYSICAL EXAM:    ICU Vital Signs Last 24 Hrs  T(C): 36.9 (2018 08:54), Max: 37.9 (2018 22:07)  T(F): 98.4 (2018 08:54), Max: 100.2 (2018 22:07)  HR: 89 (2018 08:54) (77 - 91)  BP: 124/80 (2018 08:54) (114/77 - 144/86)  BP(mean): 105 (2018 16:15) (105 - 105)  ABP: --  ABP(mean): --  RR: 18 (2018 08:54) (16 - 18)  SpO2: 94% (2018 08:54) (92% - 95%)    GEN: NAD   HEENT: AT/NC, EOMI, no oral lesions  NECK: supple  CVS: +S1S2 rrr  LUNG: CTA BL  ABD: +BS, NT  : no dimitri tenderness  EXT: mild tenderness, b/l ecchymosis, no edema  NEURO: AAox3   SKIN:  crusted welt-like lesions on b/l buttocks; ecchymoses at blood draw sites on upper ext; no petechiae      Labs:                          13.9   10.4  )-----------( 32       ( 2018 07:14 )             46.3   CBC Full  -  ( 2018 07:14 )  WBC Count : 10.4 K/uL  Hemoglobin : 13.9 g/dL  Hematocrit : 46.3 %  Platelet Count - Automated : 32 K/uL  Mean Cell Volume : 90.6 fL  Mean Cell Hemoglobin : 27.2 pg  Mean Cell Hemoglobin Concentration : 30.0 g/dL  Auto Neutrophil # : x  Auto Lymphocyte # : x  Auto Monocyte # : x  Auto Eosinophil # : x  Auto Basophil # : x  Auto Neutrophil % : 90.1 %  Auto Lymphocyte % : 6.0 %  Auto Monocyte % : 3.6 %  Auto Eosinophil % : x  Auto Basophil % : 0.2 %        135  |  94<L>  |  29<H>  ----------------------------<  233<H>  4.7   |  30  |  0.56    Ca    9.0      2018 07:14     Urinalysis Basic - ( 2018 01:06 )    Color: Yellow / Appearance: Clear / S.015 / pH: x  Gluc: x / Ketone: Trace mg/dL  / Bili: Negative / Urobili: 1.0 E.U./dL   Blood: x / Protein: NEGATIVE mg/dL / Nitrite: NEGATIVE   Leuk Esterase: NEGATIVE / RBC: x / WBC x   Sq Epi: x / Non Sq Epi: x / Bacteria: x      U/S Abd - Liver: Normal echogenicity. Enlarged measuring 19.1 cm in length. No   focal lesions.Intrahepatic ducts: Not dilated Common bile duct: Normal diameter, measuring 0.6 cm.  : No gallstones.  No wall thickening or pericholecystic fluid.  Pancreas: The visualized portions are normal in appearance.  Spleen: No focal abnormality seen. Length of 9.3 cm.  Abdominal aorta: The visualized portions are normal in appearance.Inferior vena cava: The visualized portions are normal in appearance.Right kidney: Length of 11.8 cm. Normal echogenicity. No hydronephrosis.  Left kidney: Length of 11.3 cm. Normal echogenicity. No hydronephrosis.   IMPRESSION:  Hepatomegaly. Normal size spleen.

## 2018-07-03 PROBLEM — D69.6 THROMBOCYTOPENIA, UNSPECIFIED: Chronic | Status: ACTIVE | Noted: 2018-06-21

## 2018-07-03 PROBLEM — J44.9 CHRONIC OBSTRUCTIVE PULMONARY DISEASE, UNSPECIFIED: Chronic | Status: ACTIVE | Noted: 2018-06-22

## 2018-07-03 PROBLEM — L03.90 CELLULITIS, UNSPECIFIED: Chronic | Status: ACTIVE | Noted: 2018-06-21

## 2018-07-03 PROBLEM — J18.9 PNEUMONIA, UNSPECIFIED ORGANISM: Chronic | Status: ACTIVE | Noted: 2018-06-22

## 2018-07-03 RX ORDER — VENLAFAXINE HCL 75 MG
1 CAPSULE, EXT RELEASE 24 HR ORAL
Qty: 30 | Refills: 0
Start: 2018-07-03 | End: 2018-08-01

## 2018-07-03 RX ORDER — FUROSEMIDE 40 MG
1 TABLET ORAL
Qty: 30 | Refills: 0
Start: 2018-07-03 | End: 2018-08-01

## 2018-07-03 RX ORDER — LAMOTRIGINE 25 MG/1
1 TABLET, ORALLY DISINTEGRATING ORAL
Qty: 30 | Refills: 0
Start: 2018-07-03 | End: 2018-08-01

## 2018-07-04 ENCOUNTER — RX RENEWAL (OUTPATIENT)
Age: 68
End: 2018-07-04

## 2018-07-06 DIAGNOSIS — J44.1 CHRONIC OBSTRUCTIVE PULMONARY DISEASE WITH (ACUTE) EXACERBATION: ICD-10-CM

## 2018-07-06 DIAGNOSIS — D69.6 THROMBOCYTOPENIA, UNSPECIFIED: ICD-10-CM

## 2018-07-06 DIAGNOSIS — G89.4 CHRONIC PAIN SYNDROME: ICD-10-CM

## 2018-07-06 DIAGNOSIS — Q21.1 ATRIAL SEPTAL DEFECT: ICD-10-CM

## 2018-07-06 DIAGNOSIS — I27.20 PULMONARY HYPERTENSION, UNSPECIFIED: ICD-10-CM

## 2018-07-06 DIAGNOSIS — J96.01 ACUTE RESPIRATORY FAILURE WITH HYPOXIA: ICD-10-CM

## 2018-07-06 DIAGNOSIS — Z91.041 RADIOGRAPHIC DYE ALLERGY STATUS: ICD-10-CM

## 2018-07-06 DIAGNOSIS — G47.00 INSOMNIA, UNSPECIFIED: ICD-10-CM

## 2018-07-06 DIAGNOSIS — I50.810 RIGHT HEART FAILURE, UNSPECIFIED: ICD-10-CM

## 2018-07-06 DIAGNOSIS — F31.9 BIPOLAR DISORDER, UNSPECIFIED: ICD-10-CM

## 2018-07-06 DIAGNOSIS — D50.9 IRON DEFICIENCY ANEMIA, UNSPECIFIED: ICD-10-CM

## 2018-07-06 DIAGNOSIS — B00.9 HERPESVIRAL INFECTION, UNSPECIFIED: ICD-10-CM

## 2018-07-09 ENCOUNTER — APPOINTMENT (OUTPATIENT)
Dept: PULMONOLOGY | Facility: CLINIC | Age: 68
End: 2018-07-09
Payer: MEDICARE

## 2018-07-09 ENCOUNTER — OUTPATIENT (OUTPATIENT)
Dept: OUTPATIENT SERVICES | Facility: HOSPITAL | Age: 68
LOS: 1 days | End: 2018-07-09
Payer: COMMERCIAL

## 2018-07-09 ENCOUNTER — LABORATORY RESULT (OUTPATIENT)
Age: 68
End: 2018-07-09

## 2018-07-09 ENCOUNTER — APPOINTMENT (OUTPATIENT)
Dept: CARDIOTHORACIC SURGERY | Facility: CLINIC | Age: 68
End: 2018-07-09
Payer: MEDICARE

## 2018-07-09 VITALS
OXYGEN SATURATION: 96 % | SYSTOLIC BLOOD PRESSURE: 134 MMHG | WEIGHT: 174 LBS | BODY MASS INDEX: 28.99 KG/M2 | HEART RATE: 86 BPM | RESPIRATION RATE: 19 BRPM | DIASTOLIC BLOOD PRESSURE: 74 MMHG | HEIGHT: 65 IN | TEMPERATURE: 98.6 F

## 2018-07-09 VITALS
BODY MASS INDEX: 28.99 KG/M2 | OXYGEN SATURATION: 93 % | SYSTOLIC BLOOD PRESSURE: 130 MMHG | HEIGHT: 65 IN | DIASTOLIC BLOOD PRESSURE: 84 MMHG | WEIGHT: 174 LBS | TEMPERATURE: 98.4 F | HEART RATE: 90 BPM

## 2018-07-09 DIAGNOSIS — Z87.01 PERSONAL HISTORY OF PNEUMONIA (RECURRENT): ICD-10-CM

## 2018-07-09 DIAGNOSIS — Z96.652 PRESENCE OF LEFT ARTIFICIAL KNEE JOINT: Chronic | ICD-10-CM

## 2018-07-09 DIAGNOSIS — Z87.891 PERSONAL HISTORY OF NICOTINE DEPENDENCE: ICD-10-CM

## 2018-07-09 DIAGNOSIS — D69.6 THROMBOCYTOPENIA, UNSPECIFIED: ICD-10-CM

## 2018-07-09 DIAGNOSIS — Z98.890 OTHER SPECIFIED POSTPROCEDURAL STATES: Chronic | ICD-10-CM

## 2018-07-09 DIAGNOSIS — I27.20 PULMONARY HYPERTENSION, UNSPECIFIED: ICD-10-CM

## 2018-07-09 DIAGNOSIS — Z86.69 PERSONAL HISTORY OF OTHER DISEASES OF THE NERVOUS SYSTEM AND SENSE ORGANS: ICD-10-CM

## 2018-07-09 LAB
ALBUMIN SERPL ELPH-MCNC: 4.5 G/DL — SIGNIFICANT CHANGE UP (ref 3.3–5)
ALP SERPL-CCNC: 52 U/L — SIGNIFICANT CHANGE UP (ref 40–120)
ALT FLD-CCNC: 27 U/L — SIGNIFICANT CHANGE UP (ref 10–45)
ANION GAP SERPL CALC-SCNC: 14 MMOL/L — SIGNIFICANT CHANGE UP (ref 5–17)
APTT BLD: 31.4 SEC — SIGNIFICANT CHANGE UP (ref 27.5–37.4)
AST SERPL-CCNC: 27 U/L — SIGNIFICANT CHANGE UP (ref 10–40)
BASOPHILS NFR BLD AUTO: 0.6 % — SIGNIFICANT CHANGE UP (ref 0–2)
BILIRUB SERPL-MCNC: 0.3 MG/DL — SIGNIFICANT CHANGE UP (ref 0.2–1.2)
BUN SERPL-MCNC: 19 MG/DL — SIGNIFICANT CHANGE UP (ref 7–23)
CALCIUM SERPL-MCNC: 9.9 MG/DL — SIGNIFICANT CHANGE UP (ref 8.4–10.5)
CHLORIDE SERPL-SCNC: 94 MMOL/L — LOW (ref 96–108)
CO2 SERPL-SCNC: 31 MMOL/L — SIGNIFICANT CHANGE UP (ref 22–31)
CREAT SERPL-MCNC: 0.49 MG/DL — LOW (ref 0.5–1.3)
EOSINOPHIL NFR BLD AUTO: 2.6 % — SIGNIFICANT CHANGE UP (ref 0–6)
GLUCOSE SERPL-MCNC: 102 MG/DL — HIGH (ref 70–99)
HCT VFR BLD CALC: 49.3 % — HIGH (ref 34.5–45)
HGB BLD-MCNC: 14.6 G/DL — SIGNIFICANT CHANGE UP (ref 11.5–15.5)
INR BLD: 0.88 — SIGNIFICANT CHANGE UP (ref 0.88–1.16)
LYMPHOCYTES # BLD AUTO: 23.8 % — SIGNIFICANT CHANGE UP (ref 13–44)
MCHC RBC-ENTMCNC: 26.9 PG — LOW (ref 27–34)
MCHC RBC-ENTMCNC: 29.6 G/DL — LOW (ref 32–36)
MCV RBC AUTO: 90.8 FL — SIGNIFICANT CHANGE UP (ref 80–100)
MONOCYTES NFR BLD AUTO: 11.4 % — SIGNIFICANT CHANGE UP (ref 2–14)
NEUTROPHILS NFR BLD AUTO: 61.6 % — SIGNIFICANT CHANGE UP (ref 43–77)
PLATELET # BLD AUTO: 63 K/UL — LOW (ref 150–400)
POTASSIUM SERPL-MCNC: 4.2 MMOL/L — SIGNIFICANT CHANGE UP (ref 3.5–5.3)
POTASSIUM SERPL-SCNC: 4.2 MMOL/L — SIGNIFICANT CHANGE UP (ref 3.5–5.3)
PROT SERPL-MCNC: 7.5 G/DL — SIGNIFICANT CHANGE UP (ref 6–8.3)
PROTHROM AB SERPL-ACNC: 9.7 SEC — LOW (ref 9.8–12.7)
RBC # BLD: 5.43 M/UL — HIGH (ref 3.8–5.2)
RBC # FLD: 16 % — SIGNIFICANT CHANGE UP (ref 10.3–16.9)
SODIUM SERPL-SCNC: 139 MMOL/L — SIGNIFICANT CHANGE UP (ref 135–145)
WBC # BLD: 8 K/UL — SIGNIFICANT CHANGE UP (ref 3.8–10.5)
WBC # FLD AUTO: 8 K/UL — SIGNIFICANT CHANGE UP (ref 3.8–10.5)

## 2018-07-09 PROCEDURE — 94060 EVALUATION OF WHEEZING: CPT | Mod: 24

## 2018-07-09 PROCEDURE — 80053 COMPREHEN METABOLIC PANEL: CPT

## 2018-07-09 PROCEDURE — 94618 PULMONARY STRESS TESTING: CPT

## 2018-07-09 PROCEDURE — 85025 COMPLETE CBC W/AUTO DIFF WBC: CPT

## 2018-07-09 PROCEDURE — 99214 OFFICE O/P EST MOD 30 MIN: CPT | Mod: 25

## 2018-07-09 PROCEDURE — 36415 COLL VENOUS BLD VENIPUNCTURE: CPT

## 2018-07-09 PROCEDURE — 85730 THROMBOPLASTIN TIME PARTIAL: CPT

## 2018-07-09 PROCEDURE — 94729 DIFFUSING CAPACITY: CPT

## 2018-07-09 PROCEDURE — 85610 PROTHROMBIN TIME: CPT

## 2018-07-09 PROCEDURE — 99204 OFFICE O/P NEW MOD 45 MIN: CPT | Mod: 25

## 2018-07-10 PROBLEM — Z87.891 FORMER SMOKER: Status: ACTIVE | Noted: 2018-07-10

## 2018-07-10 PROBLEM — Z87.01 HISTORY OF PNEUMONIA: Status: RESOLVED | Noted: 2018-07-10 | Resolved: 2018-07-10

## 2018-07-12 ENCOUNTER — APPOINTMENT (OUTPATIENT)
Dept: SLEEP CENTER | Facility: HOSPITAL | Age: 68
End: 2018-07-12

## 2018-07-12 ENCOUNTER — OUTPATIENT (OUTPATIENT)
Dept: OUTPATIENT SERVICES | Facility: HOSPITAL | Age: 68
LOS: 1 days | End: 2018-07-12
Payer: COMMERCIAL

## 2018-07-12 DIAGNOSIS — Z96.652 PRESENCE OF LEFT ARTIFICIAL KNEE JOINT: Chronic | ICD-10-CM

## 2018-07-12 DIAGNOSIS — Z98.890 OTHER SPECIFIED POSTPROCEDURAL STATES: Chronic | ICD-10-CM

## 2018-07-12 DIAGNOSIS — G47.33 OBSTRUCTIVE SLEEP APNEA (ADULT) (PEDIATRIC): ICD-10-CM

## 2018-07-12 LAB
ANA PAT FLD IF-IMP: NORMAL
ANA SER IF-ACNC: ABNORMAL
CCP AB SER IA-ACNC: <8 UNITS
DSDNA AB SER-ACNC: <12 IU/ML
ENA JO1 AB SER IA-ACNC: <0.2 AL
ENA SCL70 IGG SER IA-ACNC: <0.2 AL
ENA SS-A AB SER IA-ACNC: <0.2 AL
ENA SS-B AB SER IA-ACNC: <0.2 AL
HCV AB SER QL: NONREACTIVE
HCV S/CO RATIO: 0.08 S/CO
RF+CCP IGG SER-IMP: NEGATIVE
RHEUMATOID FACT SER QL: <7 IU/ML

## 2018-07-12 PROCEDURE — 95810 POLYSOM 6/> YRS 4/> PARAM: CPT | Mod: 26

## 2018-07-12 PROCEDURE — 95810 POLYSOM 6/> YRS 4/> PARAM: CPT

## 2018-07-13 ENCOUNTER — APPOINTMENT (OUTPATIENT)
Dept: RHEUMATOLOGY | Facility: CLINIC | Age: 68
End: 2018-07-13
Payer: MEDICARE

## 2018-07-13 VITALS
DIASTOLIC BLOOD PRESSURE: 92 MMHG | BODY MASS INDEX: 28.82 KG/M2 | SYSTOLIC BLOOD PRESSURE: 162 MMHG | HEART RATE: 86 BPM | HEIGHT: 65 IN | OXYGEN SATURATION: 93 % | WEIGHT: 173 LBS | TEMPERATURE: 99 F

## 2018-07-13 PROCEDURE — 99205 OFFICE O/P NEW HI 60 MIN: CPT | Mod: GC

## 2018-07-15 ENCOUNTER — FORM ENCOUNTER (OUTPATIENT)
Age: 68
End: 2018-07-15

## 2018-07-16 ENCOUNTER — LABORATORY RESULT (OUTPATIENT)
Age: 68
End: 2018-07-16

## 2018-07-16 ENCOUNTER — OUTPATIENT (OUTPATIENT)
Dept: OUTPATIENT SERVICES | Facility: HOSPITAL | Age: 68
LOS: 1 days | End: 2018-07-16
Payer: MEDICARE

## 2018-07-16 ENCOUNTER — APPOINTMENT (OUTPATIENT)
Dept: CARDIOTHORACIC SURGERY | Facility: CLINIC | Age: 68
End: 2018-07-16
Payer: MEDICARE

## 2018-07-16 VITALS
HEART RATE: 80 BPM | DIASTOLIC BLOOD PRESSURE: 65 MMHG | BODY MASS INDEX: 28.79 KG/M2 | SYSTOLIC BLOOD PRESSURE: 121 MMHG | WEIGHT: 173 LBS | OXYGEN SATURATION: 91 % | RESPIRATION RATE: 20 BRPM | TEMPERATURE: 98 F

## 2018-07-16 DIAGNOSIS — Z98.890 OTHER SPECIFIED POSTPROCEDURAL STATES: Chronic | ICD-10-CM

## 2018-07-16 DIAGNOSIS — Q21.1 ATRIAL SEPTAL DEFECT: ICD-10-CM

## 2018-07-16 DIAGNOSIS — Z96.652 PRESENCE OF LEFT ARTIFICIAL KNEE JOINT: Chronic | ICD-10-CM

## 2018-07-16 LAB
HCT VFR BLD CALC: 46.4 % — HIGH (ref 34.5–45)
HGB BLD-MCNC: 13.9 G/DL — SIGNIFICANT CHANGE UP (ref 11.5–15.5)
MCHC RBC-ENTMCNC: 26.8 PG — LOW (ref 27–34)
MCHC RBC-ENTMCNC: 30 G/DL — LOW (ref 32–36)
MCV RBC AUTO: 89.4 FL — SIGNIFICANT CHANGE UP (ref 80–100)
PLATELET # BLD AUTO: 71 K/UL — LOW (ref 150–400)
RBC # BLD: 5.19 M/UL — SIGNIFICANT CHANGE UP (ref 3.8–5.2)
RBC # FLD: 15.7 % — SIGNIFICANT CHANGE UP (ref 10.3–16.9)
WBC # BLD: 7 K/UL — SIGNIFICANT CHANGE UP (ref 3.8–10.5)
WBC # FLD AUTO: 7 K/UL — SIGNIFICANT CHANGE UP (ref 3.8–10.5)

## 2018-07-16 PROCEDURE — 93325 DOPPLER ECHO COLOR FLOW MAPG: CPT | Mod: 26

## 2018-07-16 PROCEDURE — 99214 OFFICE O/P EST MOD 30 MIN: CPT

## 2018-07-16 PROCEDURE — 85027 COMPLETE CBC AUTOMATED: CPT

## 2018-07-16 PROCEDURE — 93312 ECHO TRANSESOPHAGEAL: CPT | Mod: 26

## 2018-07-16 PROCEDURE — 93320 DOPPLER ECHO COMPLETE: CPT | Mod: 26

## 2018-07-16 PROCEDURE — 36415 COLL VENOUS BLD VENIPUNCTURE: CPT

## 2018-07-16 PROCEDURE — 93312 ECHO TRANSESOPHAGEAL: CPT

## 2018-07-24 LAB
ADJUSTED MITOGEN: >10 IU/ML
ADJUSTED TB AG: 0 IU/ML
ALBUMIN SERPL ELPH-MCNC: 4.7 G/DL
ALP BLD-CCNC: 61 U/L
ALT SERPL-CCNC: 25 U/L
ANA PAT FLD IF-IMP: ABNORMAL
ANA SER IF-ACNC: ABNORMAL
ANION GAP SERPL CALC-SCNC: 14 MMOL/L
APPEARANCE: CLEAR
APTT IMM NP/PRE NP PPP: NORMAL SEC
APTT INV RATIO PPP: 30.5 SEC
AST SERPL-CCNC: 25 U/L
B2 GLYCOPROT1 IGA SERPL IA-ACNC: <5 SAU
B2 GLYCOPROT1 IGG SER-ACNC: <5 SGU
B2 GLYCOPROT1 IGM SER-ACNC: <5 SMU
BACTERIA: NEGATIVE
BASOPHILS # BLD AUTO: 0.02 K/UL
BASOPHILS NFR BLD AUTO: 0.3 %
BILIRUB SERPL-MCNC: 0.4 MG/DL
BILIRUBIN URINE: NEGATIVE
BLOOD URINE: NEGATIVE
BUN SERPL-MCNC: 11 MG/DL
CALCIUM SERPL-MCNC: 9.8 MG/DL
CARDIOLIPIN IGM SER-MCNC: 5.4 MPL
CARDIOLIPIN IGM SER-MCNC: <5 GPL
CCP AB SER IA-ACNC: <8 UNITS
CHLORIDE SERPL-SCNC: 99 MMOL/L
CO2 SERPL-SCNC: 30 MMOL/L
COLOR: ABNORMAL
CONFIRM: 23.3 SEC
CREAT SERPL-MCNC: 0.56 MG/DL
CREAT SPEC-SCNC: 163 MG/DL
CREAT/PROT UR: 0.1 RATIO
CRP SERPL-MCNC: 0.81 MG/DL
DEPRECATED CARDIOLIPIN IGA SER: <5 APL
DIRECT COOMBS: ABNORMAL
DRVVT IMM 1:2 NP PPP: NORMAL
DRVVT SCREEN TO CONFIRM RATIO: 1.03 RATIO
ENA RNP AB SER IA-ACNC: <0.2 AL
ENA SCL70 IGG SER IA-ACNC: <0.2 AL
ENA SM AB SER IA-ACNC: <0.2 AL
EOSINOPHIL # BLD AUTO: 0.18 K/UL
EOSINOPHIL NFR BLD AUTO: 2.7 %
ERYTHROCYTE [SEDIMENTATION RATE] IN BLOOD BY WESTERGREN METHOD: 23 MM/HR
GLUCOSE QUALITATIVE U: NEGATIVE MG/DL
GLUCOSE SERPL-MCNC: 126 MG/DL
HAPTOGLOB SERPL-MCNC: 158 MG/DL
HBV CORE IGG+IGM SER QL: NONREACTIVE
HBV SURFACE AB SER QL: NONREACTIVE
HBV SURFACE AG SER QL: NONREACTIVE
HCT VFR BLD CALC: 44.3 %
HCV AB SER QL: NONREACTIVE
HCV S/CO RATIO: 0.08 S/CO
HGB BLD-MCNC: 14.1 G/DL
HYALINE CASTS: 8 /LPF
IGA SER QL IEP: 137 MG/DL
IGG SER QL IEP: 608 MG/DL
IGM SER QL IEP: 124 MG/DL
IMM GRANULOCYTES NFR BLD AUTO: 0.3 %
KETONES URINE: NEGATIVE
LDH SERPL-CCNC: 257 U/L
LEUKOCYTE ESTERASE URINE: ABNORMAL
LYMPHOCYTES # BLD AUTO: 2.37 K/UL
LYMPHOCYTES NFR BLD AUTO: 35.9 %
M TB IFN-G BLD-IMP: NEGATIVE
MAN DIFF?: NORMAL
MCHC RBC-ENTMCNC: 27.8 PG
MCHC RBC-ENTMCNC: 31.8 GM/DL
MCV RBC AUTO: 87.4 FL
MICROSCOPIC-UA: NORMAL
MONOCYTES # BLD AUTO: 0.45 K/UL
MONOCYTES NFR BLD AUTO: 6.8 %
NEUTROPHILS # BLD AUTO: 3.57 K/UL
NEUTROPHILS NFR BLD AUTO: 54 %
NITRITE URINE: NEGATIVE
NPP NORMAL POOLED PLASMA: NORMAL SEC
PH URINE: 6
PLATELET # BLD AUTO: 78 K/UL
POTASSIUM SERPL-SCNC: 3.8 MMOL/L
PROT SERPL-MCNC: 6.8 G/DL
PROT UR-MCNC: 21 MG/DL
PROTEIN URINE: NEGATIVE MG/DL
QUANTIFERON GOLD NIL: 0.01 IU/ML
RBC # BLD: 5.07 M/UL
RBC # BLD: 5.07 M/UL
RBC # FLD: 16.5 %
RED BLOOD CELLS URINE: 7 /HPF
RETICS # AUTO: 0.3 %
RETICS AGGREG/RBC NFR: 17.2 K/UL
RF+CCP IGG SER-IMP: NEGATIVE
RHEUMATOID FACT SER QL: <10 IU/ML
SCREEN DRVVT: 29.4 SEC
SILICA CLOTTING TIME INTERPRETATION: NORMAL
SILICA CLOTTING TIME S/C: 1.1 RATIO
SODIUM SERPL-SCNC: 143 MMOL/L
SPECIFIC GRAVITY URINE: 1.02
SQUAMOUS EPITHELIAL CELLS: 11 /HPF
UROBILINOGEN URINE: 1 MG/DL
WBC # FLD AUTO: 6.61 K/UL
WHITE BLOOD CELLS URINE: 9 /HPF

## 2018-07-25 ENCOUNTER — RX RENEWAL (OUTPATIENT)
Age: 68
End: 2018-07-25

## 2018-07-30 VITALS
WEIGHT: 173.94 LBS | HEART RATE: 87 BPM | DIASTOLIC BLOOD PRESSURE: 70 MMHG | SYSTOLIC BLOOD PRESSURE: 145 MMHG | OXYGEN SATURATION: 97 % | RESPIRATION RATE: 14 BRPM | TEMPERATURE: 98 F | HEIGHT: 64 IN

## 2018-07-30 NOTE — ASU PATIENT PROFILE, ADULT - PMH
Anxiety    Cellulitis    Centrilobular emphysema    COPD (chronic obstructive pulmonary disease)    Depression    Hypoxemia    PFO (patent foramen ovale)    Pneumonia    Pulmonary hypertension    Thrombocytopenia Anxiety    Cellulitis    Centrilobular emphysema    COPD (chronic obstructive pulmonary disease)    Depression    Hypoxemia    PFO (patent foramen ovale)    Pneumonia    Pulmonary hypertension    Sleep apnea    Thrombocytopenia

## 2018-07-30 NOTE — ASU PATIENT PROFILE, ADULT - NS TRANSFER PATIENT BELONGINGS
Jewelry/Money (specify)/one ring and 1 pair of ear rings/Clothing/Wrist Watch/Cell Phone/PDA (specify)

## 2018-07-30 NOTE — ASU PATIENT PROFILE, ADULT - NS PRO AD PATIENT TYPE
Health Care Proxy (HCP) Health Care Proxy (HCP)/son Davion De La Cruz [phone], daughter Aline Shea [phone]

## 2018-07-30 NOTE — ASU PATIENT PROFILE, ADULT - PSH
History of left knee replacement    S/P insertion of spinal cord stimulator Bilateral cataracts    History of left knee replacement    History of partial hysterectomy    History of surgery  bladder lift  History of tonsillectomy    S/P insertion of spinal cord stimulator

## 2018-07-31 ENCOUNTER — APPOINTMENT (OUTPATIENT)
Dept: CARDIOTHORACIC SURGERY | Facility: HOSPITAL | Age: 68
End: 2018-07-31
Payer: MEDICARE

## 2018-07-31 ENCOUNTER — INPATIENT (INPATIENT)
Facility: HOSPITAL | Age: 68
LOS: 0 days | Discharge: HOME | DRG: 287 | End: 2018-08-01
Attending: INTERNAL MEDICINE | Admitting: INTERNAL MEDICINE
Payer: MEDICARE

## 2018-07-31 DIAGNOSIS — Z90.711 ACQUIRED ABSENCE OF UTERUS WITH REMAINING CERVICAL STUMP: Chronic | ICD-10-CM

## 2018-07-31 DIAGNOSIS — Z96.652 PRESENCE OF LEFT ARTIFICIAL KNEE JOINT: Chronic | ICD-10-CM

## 2018-07-31 DIAGNOSIS — Z90.89 ACQUIRED ABSENCE OF OTHER ORGANS: Chronic | ICD-10-CM

## 2018-07-31 DIAGNOSIS — Z98.890 OTHER SPECIFIED POSTPROCEDURAL STATES: Chronic | ICD-10-CM

## 2018-07-31 DIAGNOSIS — H26.9 UNSPECIFIED CATARACT: Chronic | ICD-10-CM

## 2018-07-31 LAB
ALBUMIN SERPL ELPH-MCNC: 4.1 G/DL — SIGNIFICANT CHANGE UP (ref 3.3–5)
ALBUMIN SERPL ELPH-MCNC: 4.8 G/DL — SIGNIFICANT CHANGE UP (ref 3.3–5)
ALP SERPL-CCNC: 50 U/L — SIGNIFICANT CHANGE UP (ref 40–120)
ALP SERPL-CCNC: 60 U/L — SIGNIFICANT CHANGE UP (ref 40–120)
ALT FLD-CCNC: 15 U/L — SIGNIFICANT CHANGE UP (ref 10–45)
ALT FLD-CCNC: 18 U/L — SIGNIFICANT CHANGE UP (ref 10–45)
ANION GAP SERPL CALC-SCNC: 13 MMOL/L — SIGNIFICANT CHANGE UP (ref 5–17)
ANION GAP SERPL CALC-SCNC: 14 MMOL/L — SIGNIFICANT CHANGE UP (ref 5–17)
APPEARANCE UR: CLEAR — SIGNIFICANT CHANGE UP
APTT BLD: 26.5 SEC — LOW (ref 27.5–37.4)
APTT BLD: 27 SEC — LOW (ref 27.5–37.4)
AST SERPL-CCNC: 18 U/L — SIGNIFICANT CHANGE UP (ref 10–40)
AST SERPL-CCNC: 22 U/L — SIGNIFICANT CHANGE UP (ref 10–40)
BASOPHILS NFR BLD AUTO: 0.1 % — SIGNIFICANT CHANGE UP (ref 0–2)
BASOPHILS NFR BLD AUTO: 0.3 % — SIGNIFICANT CHANGE UP (ref 0–2)
BILIRUB DIRECT SERPL-MCNC: <0.2 MG/DL — SIGNIFICANT CHANGE UP (ref 0–0.2)
BILIRUB INDIRECT FLD-MCNC: >0.1 MG/DL — LOW (ref 0.2–1)
BILIRUB SERPL-MCNC: 0.2 MG/DL — SIGNIFICANT CHANGE UP (ref 0.2–1.2)
BILIRUB SERPL-MCNC: 0.3 MG/DL — SIGNIFICANT CHANGE UP (ref 0.2–1.2)
BILIRUB UR-MCNC: NEGATIVE — SIGNIFICANT CHANGE UP
BUN SERPL-MCNC: 11 MG/DL — SIGNIFICANT CHANGE UP (ref 7–23)
BUN SERPL-MCNC: 12 MG/DL — SIGNIFICANT CHANGE UP (ref 7–23)
CALCIUM SERPL-MCNC: 10.4 MG/DL — SIGNIFICANT CHANGE UP (ref 8.4–10.5)
CALCIUM SERPL-MCNC: 9.6 MG/DL — SIGNIFICANT CHANGE UP (ref 8.4–10.5)
CHLORIDE SERPL-SCNC: 101 MMOL/L — SIGNIFICANT CHANGE UP (ref 96–108)
CHLORIDE SERPL-SCNC: 96 MMOL/L — SIGNIFICANT CHANGE UP (ref 96–108)
CO2 SERPL-SCNC: 26 MMOL/L — SIGNIFICANT CHANGE UP (ref 22–31)
CO2 SERPL-SCNC: 26 MMOL/L — SIGNIFICANT CHANGE UP (ref 22–31)
COLOR SPEC: YELLOW — SIGNIFICANT CHANGE UP
CREAT SERPL-MCNC: 0.46 MG/DL — LOW (ref 0.5–1.3)
CREAT SERPL-MCNC: 0.49 MG/DL — LOW (ref 0.5–1.3)
DIFF PNL FLD: NEGATIVE — SIGNIFICANT CHANGE UP
EOSINOPHIL NFR BLD AUTO: 0.4 % — SIGNIFICANT CHANGE UP (ref 0–6)
GLUCOSE SERPL-MCNC: 108 MG/DL — HIGH (ref 70–99)
GLUCOSE SERPL-MCNC: 144 MG/DL — HIGH (ref 70–99)
GLUCOSE UR QL: NEGATIVE — SIGNIFICANT CHANGE UP
HBA1C BLD-MCNC: 6.8 % — HIGH (ref 4–5.6)
HCT VFR BLD CALC: 38.4 % — SIGNIFICANT CHANGE UP (ref 34.5–45)
HCT VFR BLD CALC: 42.3 % — SIGNIFICANT CHANGE UP (ref 34.5–45)
HGB BLD-MCNC: 12.2 G/DL — SIGNIFICANT CHANGE UP (ref 11.5–15.5)
HGB BLD-MCNC: 13.6 G/DL — SIGNIFICANT CHANGE UP (ref 11.5–15.5)
INR BLD: 0.98 — SIGNIFICANT CHANGE UP (ref 0.88–1.16)
INR BLD: 1.01 — SIGNIFICANT CHANGE UP (ref 0.88–1.16)
KETONES UR-MCNC: NEGATIVE — SIGNIFICANT CHANGE UP
LEUKOCYTE ESTERASE UR-ACNC: NEGATIVE — SIGNIFICANT CHANGE UP
LYMPHOCYTES # BLD AUTO: 11.5 % — LOW (ref 13–44)
LYMPHOCYTES # BLD AUTO: 26.2 % — SIGNIFICANT CHANGE UP (ref 13–44)
MCHC RBC-ENTMCNC: 26.9 PG — LOW (ref 27–34)
MCHC RBC-ENTMCNC: 27.2 PG — SIGNIFICANT CHANGE UP (ref 27–34)
MCHC RBC-ENTMCNC: 31.8 G/DL — LOW (ref 32–36)
MCHC RBC-ENTMCNC: 32.2 G/DL — SIGNIFICANT CHANGE UP (ref 32–36)
MCV RBC AUTO: 84.6 FL — SIGNIFICANT CHANGE UP (ref 80–100)
MCV RBC AUTO: 84.8 FL — SIGNIFICANT CHANGE UP (ref 80–100)
MONOCYTES NFR BLD AUTO: 10 % — SIGNIFICANT CHANGE UP (ref 2–14)
MONOCYTES NFR BLD AUTO: 3.8 % — SIGNIFICANT CHANGE UP (ref 2–14)
NEUTROPHILS NFR BLD AUTO: 63.1 % — SIGNIFICANT CHANGE UP (ref 43–77)
NEUTROPHILS NFR BLD AUTO: 84.6 % — HIGH (ref 43–77)
NITRITE UR-MCNC: NEGATIVE — SIGNIFICANT CHANGE UP
NT-PROBNP SERPL-SCNC: 315 PG/ML — HIGH (ref 0–300)
PH UR: 7 — SIGNIFICANT CHANGE UP (ref 5–8)
PLATELET # BLD AUTO: 64 K/UL — LOW (ref 150–400)
PLATELET # BLD AUTO: 74 K/UL — LOW (ref 150–400)
POTASSIUM SERPL-MCNC: 3.8 MMOL/L — SIGNIFICANT CHANGE UP (ref 3.5–5.3)
POTASSIUM SERPL-MCNC: 4 MMOL/L — SIGNIFICANT CHANGE UP (ref 3.5–5.3)
POTASSIUM SERPL-SCNC: 3.8 MMOL/L — SIGNIFICANT CHANGE UP (ref 3.5–5.3)
POTASSIUM SERPL-SCNC: 4 MMOL/L — SIGNIFICANT CHANGE UP (ref 3.5–5.3)
PROT SERPL-MCNC: 6.6 G/DL — SIGNIFICANT CHANGE UP (ref 6–8.3)
PROT SERPL-MCNC: 7.6 G/DL — SIGNIFICANT CHANGE UP (ref 6–8.3)
PROT UR-MCNC: NEGATIVE MG/DL — SIGNIFICANT CHANGE UP
PROTHROM AB SERPL-ACNC: 10.9 SEC — SIGNIFICANT CHANGE UP (ref 9.8–12.7)
PROTHROM AB SERPL-ACNC: 11.2 SEC — SIGNIFICANT CHANGE UP (ref 9.8–12.7)
RBC # BLD: 4.53 M/UL — SIGNIFICANT CHANGE UP (ref 3.8–5.2)
RBC # BLD: 5 M/UL — SIGNIFICANT CHANGE UP (ref 3.8–5.2)
RBC # FLD: 15.7 % — SIGNIFICANT CHANGE UP (ref 10.3–16.9)
RBC # FLD: 15.7 % — SIGNIFICANT CHANGE UP (ref 10.3–16.9)
SODIUM SERPL-SCNC: 136 MMOL/L — SIGNIFICANT CHANGE UP (ref 135–145)
SODIUM SERPL-SCNC: 140 MMOL/L — SIGNIFICANT CHANGE UP (ref 135–145)
SP GR SPEC: 1.01 — SIGNIFICANT CHANGE UP (ref 1–1.03)
UROBILINOGEN FLD QL: 0.2 E.U./DL — SIGNIFICANT CHANGE UP
WBC # BLD: 11.4 K/UL — HIGH (ref 3.8–10.5)
WBC # BLD: 11.4 K/UL — HIGH (ref 3.8–10.5)
WBC # FLD AUTO: 11.4 K/UL — HIGH (ref 3.8–10.5)
WBC # FLD AUTO: 11.4 K/UL — HIGH (ref 3.8–10.5)

## 2018-07-31 PROCEDURE — 93530: CPT | Mod: 26

## 2018-07-31 PROCEDURE — 71045 X-RAY EXAM CHEST 1 VIEW: CPT | Mod: 26

## 2018-07-31 PROCEDURE — 99233 SBSQ HOSP IP/OBS HIGH 50: CPT | Mod: 25

## 2018-07-31 RX ORDER — SODIUM CHLORIDE 9 MG/ML
3 INJECTION INTRAMUSCULAR; INTRAVENOUS; SUBCUTANEOUS EVERY 8 HOURS
Qty: 0 | Refills: 0 | Status: DISCONTINUED | OUTPATIENT
Start: 2018-07-31 | End: 2018-08-01

## 2018-07-31 RX ORDER — HYDROMORPHONE HYDROCHLORIDE 2 MG/ML
2 INJECTION INTRAMUSCULAR; INTRAVENOUS; SUBCUTANEOUS ONCE
Qty: 0 | Refills: 0 | Status: DISCONTINUED | OUTPATIENT
Start: 2018-07-31 | End: 2018-07-31

## 2018-07-31 RX ORDER — VENLAFAXINE HCL 75 MG
150 CAPSULE, EXT RELEASE 24 HR ORAL DAILY
Qty: 0 | Refills: 0 | Status: DISCONTINUED | OUTPATIENT
Start: 2018-07-31 | End: 2018-08-01

## 2018-07-31 RX ORDER — LAMOTRIGINE 25 MG/1
100 TABLET, ORALLY DISINTEGRATING ORAL DAILY
Qty: 0 | Refills: 0 | Status: DISCONTINUED | OUTPATIENT
Start: 2018-07-31 | End: 2018-08-01

## 2018-07-31 RX ORDER — TRAZODONE HCL 50 MG
50 TABLET ORAL AT BEDTIME
Qty: 0 | Refills: 0 | Status: DISCONTINUED | OUTPATIENT
Start: 2018-07-31 | End: 2018-08-01

## 2018-07-31 RX ADMIN — HYDROMORPHONE HYDROCHLORIDE 2 MILLIGRAM(S): 2 INJECTION INTRAMUSCULAR; INTRAVENOUS; SUBCUTANEOUS at 19:29

## 2018-07-31 RX ADMIN — Medication 50 MILLIGRAM(S): at 23:50

## 2018-07-31 NOTE — H&P ADULT - HISTORY OF PRESENT ILLNESS
[facility] Interventional Cardiology Addendum Note to Structural Heart AMBI H&P:     Attending MD: Dr Navarro Horta      S: Pt presents for Right Heart Catheterization (see office H&P for detailed History).  Pt reports that today she feels generally well, but admits to SOB with associated chest pressure with minimal exertion. She additionally endorses LE edema, but notes recent improvement. Pt denies dizziness, syncope, cough, fevers, chills, palpitations, n/v/d, diaphoresis, or dysuria.       Allergies    Iodine (Anaphylaxis)    Intolerances        PHYSICAL EXAM    V/S	BP: 145/70   HR: 87   RR: 18 O2 Sat 97% on 2L NC  TEMP: 98.1    General:   HEENT: NCAT, EOMI, PERRLA  NECK: No JVD, No carotid bruits B/L, +2 Carotid pulses B/L  PULM:  CTA B/L No W/R/R  CARD: RRR, +S1 +S2,  M/R/G  ABD: ND, +BS, NT, no masses  EXT: Warm, Trace bilateral pitting edema  NEURO: A & O x 3, no focal neurologic deficits  PULSES:	     B: 2+ b/l 	          R: 2+ b/l 	      	  FEM: 2+ b/l           DP: 2+ b/l       PT: 1+ b/l    		                                                          LABS:                        13.6   11.4  )-----------( 74       ( 31 Jul 2018 15:22 )             42.3     07-31    136  |  96  |  12  ----------------------------<  144<H>  4.0   |  26  |  0.46<L>    Ca    10.4      31 Jul 2018 15:22    TPro  7.6  /  Alb  4.8  /  TBili  0.3  /  DBili  <0.2  /  AST  22  /  ALT  18  /  AlkPhos  60  07-31    PT/INR - ( 31 Jul 2018 15:21 )   PT: 10.9 sec;   INR: 0.98          PTT - ( 31 Jul 2018 15:21 )  PTT:27.0 sec        EKG: SR @ 64 BPM with TWI in v1-v4    ASA: III			Mallampati class: III	    A/P:  68 y/o F, former smoker, with PMHx depression/anxiety, anemia, idiopathic thrombocytopenia, reflex sympathetic dystrophy with spinal nerve stimulator, left lung nodule, recent herpetic lesion (treated with Valtrex), COPD/emphysema (on home O2), suspected foramen ovale and moderate to severe pulmonary HTN, who presents to [facility] for recommended Right heart catheterization with shunt run to rule out a cardiac shunt.    Precath/consented    Of note, pt has WBC 11.4 with left shift noted on CBC. Pt is afebrile and asymptomatic at this time. Ordered UA/UCx and made Dr. Horta's staff aware as PA was unable to reach Dr. Horta directly.    Sedation Plan:  Moderate  Patient Is Suitable Candidate For Sedation: Yes      Risks & benefits of procedure and sedation and risks and benefits for the alternative therapy have been explained to the patient in layman’s terms including but not limited to: allergic reaction, bleeding, infection, arrhythmia, respiratory compromise, renal and vascular compromise, limb damage, MI, CVA, emergent CABG/Vascular Surgery and death. Informed consent obtained and in chart.

## 2018-07-31 NOTE — H&P ADULT - PMH
Anxiety    Cellulitis    Centrilobular emphysema    COPD (chronic obstructive pulmonary disease)    Depression    Hypoxemia    PFO (patent foramen ovale)    Pneumonia    Pulmonary hypertension    Sleep apnea    Thrombocytopenia

## 2018-07-31 NOTE — H&P ADULT - PSH
Bilateral cataracts    History of left knee replacement    History of partial hysterectomy    History of surgery  bladder lift  History of tonsillectomy    S/P insertion of spinal cord stimulator

## 2018-08-01 ENCOUNTER — TRANSCRIPTION ENCOUNTER (OUTPATIENT)
Age: 68
End: 2018-08-01

## 2018-08-01 VITALS
HEART RATE: 96 BPM | SYSTOLIC BLOOD PRESSURE: 141 MMHG | DIASTOLIC BLOOD PRESSURE: 83 MMHG | RESPIRATION RATE: 18 BRPM | OXYGEN SATURATION: 98 %

## 2018-08-01 PROBLEM — F41.9 ANXIETY DISORDER, UNSPECIFIED: Chronic | Status: ACTIVE | Noted: 2018-07-30

## 2018-08-01 PROBLEM — R09.02 HYPOXEMIA: Chronic | Status: ACTIVE | Noted: 2018-07-30

## 2018-08-01 PROBLEM — G47.30 SLEEP APNEA, UNSPECIFIED: Chronic | Status: ACTIVE | Noted: 2018-07-30

## 2018-08-01 PROBLEM — J43.2 CENTRILOBULAR EMPHYSEMA: Chronic | Status: ACTIVE | Noted: 2018-07-30

## 2018-08-01 PROBLEM — F32.9 MAJOR DEPRESSIVE DISORDER, SINGLE EPISODE, UNSPECIFIED: Chronic | Status: ACTIVE | Noted: 2018-07-30

## 2018-08-01 PROBLEM — Q21.1 ATRIAL SEPTAL DEFECT: Chronic | Status: ACTIVE | Noted: 2018-07-30

## 2018-08-01 PROBLEM — I27.20 PULMONARY HYPERTENSION, UNSPECIFIED: Chronic | Status: ACTIVE | Noted: 2018-07-30

## 2018-08-01 LAB
ALBUMIN SERPL ELPH-MCNC: 3.9 G/DL — SIGNIFICANT CHANGE UP (ref 3.3–5)
ALP SERPL-CCNC: 44 U/L — SIGNIFICANT CHANGE UP (ref 40–120)
ALT FLD-CCNC: 15 U/L — SIGNIFICANT CHANGE UP (ref 10–45)
ANION GAP SERPL CALC-SCNC: 11 MMOL/L — SIGNIFICANT CHANGE UP (ref 5–17)
AST SERPL-CCNC: 17 U/L — SIGNIFICANT CHANGE UP (ref 10–40)
BASOPHILS NFR BLD AUTO: 0.2 % — SIGNIFICANT CHANGE UP (ref 0–2)
BILIRUB SERPL-MCNC: 0.2 MG/DL — SIGNIFICANT CHANGE UP (ref 0.2–1.2)
BUN SERPL-MCNC: 14 MG/DL — SIGNIFICANT CHANGE UP (ref 7–23)
CALCIUM SERPL-MCNC: 9.6 MG/DL — SIGNIFICANT CHANGE UP (ref 8.4–10.5)
CHLORIDE SERPL-SCNC: 103 MMOL/L — SIGNIFICANT CHANGE UP (ref 96–108)
CO2 SERPL-SCNC: 29 MMOL/L — SIGNIFICANT CHANGE UP (ref 22–31)
CREAT SERPL-MCNC: 0.57 MG/DL — SIGNIFICANT CHANGE UP (ref 0.5–1.3)
CULTURE RESULTS: NO GROWTH — SIGNIFICANT CHANGE UP
EOSINOPHIL NFR BLD AUTO: 0.7 % — SIGNIFICANT CHANGE UP (ref 0–6)
GLUCOSE SERPL-MCNC: 125 MG/DL — HIGH (ref 70–99)
HCT VFR BLD CALC: 40.7 % — SIGNIFICANT CHANGE UP (ref 34.5–45)
HGB BLD-MCNC: 12.2 G/DL — SIGNIFICANT CHANGE UP (ref 11.5–15.5)
LYMPHOCYTES # BLD AUTO: 33.9 % — SIGNIFICANT CHANGE UP (ref 13–44)
MAGNESIUM SERPL-MCNC: 2 MG/DL — SIGNIFICANT CHANGE UP (ref 1.6–2.6)
MCHC RBC-ENTMCNC: 26.5 PG — LOW (ref 27–34)
MCHC RBC-ENTMCNC: 30 G/DL — LOW (ref 32–36)
MCV RBC AUTO: 88.5 FL — SIGNIFICANT CHANGE UP (ref 80–100)
MONOCYTES NFR BLD AUTO: 11.2 % — SIGNIFICANT CHANGE UP (ref 2–14)
NEUTROPHILS NFR BLD AUTO: 54 % — SIGNIFICANT CHANGE UP (ref 43–77)
PLATELET # BLD AUTO: 49 K/UL — LOW (ref 150–400)
POTASSIUM SERPL-MCNC: 3.7 MMOL/L — SIGNIFICANT CHANGE UP (ref 3.5–5.3)
POTASSIUM SERPL-SCNC: 3.7 MMOL/L — SIGNIFICANT CHANGE UP (ref 3.5–5.3)
PROT SERPL-MCNC: 5.8 G/DL — LOW (ref 6–8.3)
RBC # BLD: 4.6 M/UL — SIGNIFICANT CHANGE UP (ref 3.8–5.2)
RBC # FLD: 16.1 % — SIGNIFICANT CHANGE UP (ref 10.3–16.9)
SODIUM SERPL-SCNC: 143 MMOL/L — SIGNIFICANT CHANGE UP (ref 135–145)
SPECIMEN SOURCE: SIGNIFICANT CHANGE UP
TSH SERPL-MCNC: 0.35 UIU/ML — LOW (ref 0.35–4.94)
WBC # BLD: 8.2 K/UL — SIGNIFICANT CHANGE UP (ref 3.8–10.5)
WBC # FLD AUTO: 8.2 K/UL — SIGNIFICANT CHANGE UP (ref 3.8–10.5)

## 2018-08-01 PROCEDURE — 99238 HOSP IP/OBS DSCHRG MGMT 30/<: CPT

## 2018-08-01 RX ORDER — ALPRAZOLAM 0.25 MG
0.5 TABLET ORAL THREE TIMES A DAY
Qty: 0 | Refills: 0 | Status: DISCONTINUED | OUTPATIENT
Start: 2018-08-01 | End: 2018-08-01

## 2018-08-01 RX ORDER — ALPRAZOLAM 0.25 MG
1 TABLET ORAL
Qty: 90 | Refills: 0
Start: 2018-08-01 | End: 2018-08-30

## 2018-08-01 RX ORDER — ALPRAZOLAM 0.25 MG
1 TABLET ORAL
Qty: 0 | Refills: 0 | COMMUNITY

## 2018-08-01 RX ADMIN — SODIUM CHLORIDE 3 MILLILITER(S): 9 INJECTION INTRAMUSCULAR; INTRAVENOUS; SUBCUTANEOUS at 05:12

## 2018-08-01 RX ADMIN — Medication 0.5 MILLIGRAM(S): at 06:58

## 2018-08-01 RX ADMIN — LAMOTRIGINE 100 MILLIGRAM(S): 25 TABLET, ORALLY DISINTEGRATING ORAL at 07:19

## 2018-08-01 RX ADMIN — Medication 150 MILLIGRAM(S): at 07:19

## 2018-08-01 NOTE — DISCHARGE NOTE ADULT - PLAN OF CARE
Full Recovery -Please follow up with Dr. Horta.  The office is located at [facility], [facility], 4th floor. Call us with any questions #[phone].    -Walk daily as tolerated    -Gently clean your incisions with anti-bacterial soap and water, pat dry.  You may leave them open to air.    -Call your doctor if you have shortness of breath, chest pain not relieved by pain medication, dizziness, fever >101.5, or increased redness or drainage from incisions. -Please follow up with Dr. Sun, PCP/Pulm- call [phone] for an appointment in 1-2 weeks.  Please keep your schedule sleep study appointment on Tuesday 8/7/18.     -If you have any questions or concerns regarding your heart catheterization you can call Dr. Horta at #[phone].    -Continue follow up with Heme/Onc doctor Apryl Blount for your platelet count as instructed. Phone: [phone]    -Walk daily as tolerated    -Gently clean your incisions with anti-bacterial soap and water, pat dry.  You may leave them open to air.    -Call your doctor if you have shortness of breath, chest pain not relieved by pain medication, dizziness, fever >101.5, or increased redness or drainage from incisions.

## 2018-08-01 NOTE — DISCHARGE NOTE ADULT - HOSPITAL COURSE
Mrs. Choudhary is a 67 year old female, former smoker, with PMHx depression/anxiety, anemia, idiopathic thrombocytopenia, reflex sympathetic dystrophy with spinal nerve stimulator, left lung nodule, recent herpetic lesion (treated with Valtrex), COPD/emphysema (on home O2), suspected foramen ovale and moderate to severe pulmonary HTN, who presented to [facility] on 7/31 for recommended Right heart catheterization to rule out a cardiac shunt.  	  On 7/31 she underwent right heart catheterization with Dr. Horta. Findings: pulmonary Hypertension was not severe and there was no evidence of cardiac shunt. She tolerated the procedure well. She was admitted overnight for observation and discharged home the following morning on her usual home medications. Mrs. Choudhary is a 67 year old female, former smoker, with PMHx depression/anxiety, anemia, idiopathic thrombocytopenia, reflex sympathetic dystrophy with spinal nerve stimulator, left lung nodule, recent herpetic lesion (treated with Valtrex), COPD/emphysema (on home O2), suspected foramen ovale and moderate to severe pulmonary HTN, who presented to [facility] on 7/31 for recommended Right heart catheterization to rule out a cardiac shunt.  	  On 7/31 she underwent right heart catheterization with Dr. Horta. Findings: pulmonary Hypertension was not severe and there was no evidence of cardiac shunt. She tolerated the procedure well. She was admitted overnight for observation and discharged home the following morning on her usual home medications.     Of note: pt was recently discharged from hospital and does not want refills for her medications except for Xanax, as she already has medications at home.

## 2018-08-01 NOTE — DISCHARGE NOTE ADULT - MEDICATION SUMMARY - MEDICATIONS TO TAKE
I will START or STAY ON the medications listed below when I get home from the hospital:    HYDROmorphone 4 mg oral tablet  -- 1 tab(s) by mouth every 4 hours, As Needed  -- Indication: For Pain    HYDROmorphone 2 mg oral tablet  -- 1 tab(s) by mouth every 4 hours, As Needed  -- Indication: For Pain    lamoTRIgine 100 mg oral tablet  -- 1 tab(s) by mouth once a day   -- Avoid prolonged or excessive exposure to direct and/or artificial sunlight while taking this medication.  It is very important that you take or use this exactly as directed.  Do not skip doses or discontinue unless directed by your doctor.  May cause drowsiness.  Alcohol may intensify this effect.  Use care when operating dangerous machinery.    -- Indication: For Depression    venlafaxine 150 mg oral tablet, extended release  -- 1 tab(s) by mouth once a day   -- Check with your doctor before becoming pregnant.  Do not chew, break, or crush.  Do not drink alcoholic beverages when taking this medication.  It is very important that you take or use this exactly as directed.  Do not skip doses or discontinue unless directed by your doctor.  Obtain medical advice before taking any non-prescription drugs as some may affect the action of this medication.  Take with food.  This drug may impair the ability to drive or operate machinery.  Use care until you become familiar with its effects.    -- Indication: For Depression    traZODone 100 mg oral tablet  -- 1 tab(s) by mouth once a day (at bedtime)  -- Indication: For Depression/Insomnia    ALPRAZolam 0.5 mg oral tablet  -- 1 tab(s) by mouth 3 times a day, As Needed -for anxiety MDD:3   -- Indication: For Anxiety    ipratropium-albuterol 0.5 mg-2.5 mg/3 mLinhalation solution  -- 3 milliliter(s) inhaled every 4 hours  -- Indication: For Lungs    clotrimazole 1% topical cream  -- 1 application on skin 2 times a day for infection  -- Indication: For Antifungal    furosemide 40 mg oral tablet  -- 1 tab(s) by mouth once a day  -- Indication: For Diuretic

## 2018-08-01 NOTE — PROGRESS NOTE ADULT - SUBJECTIVE AND OBJECTIVE BOX
Patient discussed on morning rounds with Dr. Horta    Operation / Date: [facility]18    Structural Heart: Rula    Referring Physician:    SUBJECTIVE ASSESSMENT:  67y Female     Hospital Course: Mrs. Choudhary is a 67 year old female, former smoker, with PMHx depression/anxiety, anemia, idiopathic thrombocytopenia, reflex sympathetic dystrophy with spinal nerve stimulator, left lung nodule, recent herpetic lesion (treated with Valtrex), COPD/emphysema (on home O2), suspected foramen ovale and moderate to severe pulmonary HTN, who presented to [facility] on  for recommended Right heart catheterization to rule out a cardiac shunt.  	  On  she underwent right heart catheterization with Dr. Horta. Findings: pulmonary Hypertension was not severe and there was no evidence of cardiac shunt. She tolerated the procedure well. She was admitted overnight for observation and discharged home the following morning on her usual home medications.     Of note: pt was recently discharged from hospital and does not want refills for her medications except for Xanax, as she already has medications at home.       Vital Signs Last 24 Hrs  T(C): 36.2 (01 Aug 2018 08:10), Max: 36.7 (2018 22:39)  T(F): 97.1 (01 Aug 2018 08:10), Max: 98 (2018 22:39)  HR: 96 (01 Aug 2018 08:54) (72 - 96)  BP: 141/83 (01 Aug 2018 08:54) (110/58 - 170/81)  BP(mean): --  RR: 18 (01 Aug 2018 08:54) (18 - 18)  SpO2: 98% (01 Aug 2018 08:54) (95% - 99%)  I&O's Detail    01 Aug 2018 07:01  -  01 Aug 2018 10:53  --------------------------------------------------------  IN:    Oral Fluid: 180 mL  Total IN: 180 mL    OUT:  Total OUT: 0 mL    Total NET: 180 mL        PHYSICAL EXAM:    General:  NAD    Neurological: non focal    Cardiovascular: RRR, no m/r/g    Respiratory: CTABL    Gastrointestinal: + BS, soft non tender    Extremities: warm no edema    Vascular: 2+ right femoral, DP, and PT. Right groin soft, no bruit    Incision Sites: c/d/i    LABS:                        12.2   8.2   )-----------( 49       ( 01 Aug 2018 06:42 )             40.7       COUMADIN:  Yes/No.        DOSE:                  INDICATION:                GOAL INR:    PT/INR - ( 2018 23:02 )   PT: 11.2 sec;   INR: 1.01          PTT - ( 2018 23:02 )  PTT:26.5 sec        143  |  103  |  14  ----------------------------<  125<H>  3.7   |  29  |  0.57    Ca    9.6      01 Aug 2018 06:42  Mg     2.0         TPro  5.8<L>  /  Alb  3.9  /  TBili  0.2  /  DBili  x   /  AST  17  /  ALT  15  /  AlkPhos  44  08      Urinalysis Basic - ( 2018 16:46 )    Color: Yellow / Appearance: Clear / S.010 / pH: x  Gluc: x / Ketone: NEGATIVE  / Bili: Negative / Urobili: 0.2 E.U./dL   Blood: x / Protein: NEGATIVE mg/dL / Nitrite: NEGATIVE   Leuk Esterase: NEGATIVE / RBC: x / WBC x   Sq Epi: x / Non Sq Epi: x / Bacteria: x        MEDICATIONS  (STANDING):  lamoTRIgine 100 milliGRAM(s) Oral daily  sodium chloride 0.9% lock flush 3 milliLiter(s) IV Push every 8 hours  traZODone 50 milliGRAM(s) Oral at bedtime  venlafaxine XR. 150 milliGRAM(s) Oral daily      Discharge CXR: < from: Xray Chest 1 View AP/PA (18 @ 23:35) >  IMPRESSION: Clear lungs.    < end of copied text >

## 2018-08-01 NOTE — PROGRESS NOTE ADULT - ASSESSMENT
Mrs. Choudhary is a 67 year old female, former smoker, with PMHx depression/anxiety, anemia, idiopathic thrombocytopenia, reflex sympathetic dystrophy with spinal nerve stimulator, left lung nodule, recent herpetic lesion (treated with Valtrex), COPD/emphysema (on home O2), suspected foramen ovale and moderate to severe pulmonary HTN, who presented to [facility] on 7/31 for recommended Right heart catheterization to rule out a cardiac shunt. On 7/31 she underwent right heart catheterization with Dr. Horta. Findings: pulmonary Hypertension was not severe and there was no evidence of cardiac shunt. She tolerated the procedure well. She was admitted overnight for observation and discharged home the following morning on her usual home medications.     -CV: VSS.     -Pulm: Good SaO2 on RA. PRN oxygen at home.     -GI: no issues. Tolerating PO's    -Dispo: Home today

## 2018-08-01 NOTE — DISCHARGE NOTE ADULT - PATIENT PORTAL LINK FT
You can access the [facility] Patient Portal, offered by [facility], by registering with the following website: [email or web address]

## 2018-08-01 NOTE — DISCHARGE NOTE ADULT - CARE PLAN
Goal:	Full Recovery  Assessment and plan of treatment:	-Please follow up with Dr. Horta.  The office is located at [facility], 4th floor. Call us with any questions #[phone].    -Walk daily as tolerated    -Gently clean your incisions with anti-bacterial soap and water, pat dry.  You may leave them open to air.    -Call your doctor if you have shortness of breath, chest pain not relieved by pain medication, dizziness, fever >101.5, or increased redness or drainage from incisions. Goal:	Full Recovery  Assessment and plan of treatment:	-Please follow up with Dr. Sun, PCP/Pulm- call [phone] for an appointment in 1-2 weeks.  Please keep your schedule sleep study appointment on Tuesday 8/7/18.     -If you have any questions or concerns regarding your heart catheterization you can call Dr. Horta at #[phone].    -Continue follow up with Heme/Onc doctor Apryl Blount for your platelet count as instructed. Phone: [phone]    -Walk daily as tolerated    -Gently clean your incisions with anti-bacterial soap and water, pat dry.  You may leave them open to air.    -Call your doctor if you have shortness of breath, chest pain not relieved by pain medication, dizziness, fever >101.5, or increased redness or drainage from incisions.

## 2018-08-02 PROCEDURE — 36415 COLL VENOUS BLD VENIPUNCTURE: CPT

## 2018-08-02 PROCEDURE — 80048 BASIC METABOLIC PNL TOTAL CA: CPT

## 2018-08-02 PROCEDURE — 80076 HEPATIC FUNCTION PANEL: CPT

## 2018-08-02 PROCEDURE — 85730 THROMBOPLASTIN TIME PARTIAL: CPT

## 2018-08-02 PROCEDURE — 85025 COMPLETE CBC W/AUTO DIFF WBC: CPT

## 2018-08-02 PROCEDURE — C1769: CPT

## 2018-08-02 PROCEDURE — C1887: CPT

## 2018-08-02 PROCEDURE — 81003 URINALYSIS AUTO W/O SCOPE: CPT

## 2018-08-02 PROCEDURE — C1889: CPT

## 2018-08-02 PROCEDURE — 80053 COMPREHEN METABOLIC PANEL: CPT

## 2018-08-02 PROCEDURE — 71045 X-RAY EXAM CHEST 1 VIEW: CPT

## 2018-08-02 PROCEDURE — 87086 URINE CULTURE/COLONY COUNT: CPT

## 2018-08-02 PROCEDURE — 85610 PROTHROMBIN TIME: CPT

## 2018-08-02 PROCEDURE — 83880 ASSAY OF NATRIURETIC PEPTIDE: CPT

## 2018-08-02 PROCEDURE — 84443 ASSAY THYROID STIM HORMONE: CPT

## 2018-08-02 PROCEDURE — C1894: CPT

## 2018-08-02 PROCEDURE — 83735 ASSAY OF MAGNESIUM: CPT

## 2018-08-02 PROCEDURE — 83036 HEMOGLOBIN GLYCOSYLATED A1C: CPT

## 2018-08-05 PROBLEM — Z86.69 HISTORY OF OBSTRUCTIVE SLEEP APNEA: Status: RESOLVED | Noted: 2018-07-24 | Resolved: 2018-08-05

## 2018-08-06 DIAGNOSIS — D64.9 ANEMIA, UNSPECIFIED: ICD-10-CM

## 2018-08-06 DIAGNOSIS — H26.9 UNSPECIFIED CATARACT: ICD-10-CM

## 2018-08-06 DIAGNOSIS — Z87.891 PERSONAL HISTORY OF NICOTINE DEPENDENCE: ICD-10-CM

## 2018-08-06 DIAGNOSIS — R07.9 CHEST PAIN, UNSPECIFIED: ICD-10-CM

## 2018-08-06 DIAGNOSIS — D69.3 IMMUNE THROMBOCYTOPENIC PURPURA: ICD-10-CM

## 2018-08-06 DIAGNOSIS — G47.30 SLEEP APNEA, UNSPECIFIED: ICD-10-CM

## 2018-08-06 DIAGNOSIS — Q21.1 ATRIAL SEPTAL DEFECT: ICD-10-CM

## 2018-08-06 DIAGNOSIS — R09.02 HYPOXEMIA: ICD-10-CM

## 2018-08-06 DIAGNOSIS — I27.20 PULMONARY HYPERTENSION, UNSPECIFIED: ICD-10-CM

## 2018-08-06 DIAGNOSIS — Z91.041 RADIOGRAPHIC DYE ALLERGY STATUS: ICD-10-CM

## 2018-08-06 DIAGNOSIS — I51.7 CARDIOMEGALY: ICD-10-CM

## 2018-08-06 DIAGNOSIS — Z90.711 ACQUIRED ABSENCE OF UTERUS WITH REMAINING CERVICAL STUMP: ICD-10-CM

## 2018-08-06 DIAGNOSIS — J44.9 CHRONIC OBSTRUCTIVE PULMONARY DISEASE, UNSPECIFIED: ICD-10-CM

## 2018-08-06 DIAGNOSIS — R91.1 SOLITARY PULMONARY NODULE: ICD-10-CM

## 2018-08-06 DIAGNOSIS — G90.59 COMPLEX REGIONAL PAIN SYNDROME I OF OTHER SPECIFIED SITE: ICD-10-CM

## 2018-08-06 DIAGNOSIS — F41.8 OTHER SPECIFIED ANXIETY DISORDERS: ICD-10-CM

## 2018-08-06 DIAGNOSIS — F31.9 BIPOLAR DISORDER, UNSPECIFIED: ICD-10-CM

## 2018-08-07 ENCOUNTER — APPOINTMENT (OUTPATIENT)
Dept: SLEEP CENTER | Facility: HOSPITAL | Age: 68
End: 2018-08-07

## 2018-08-08 ENCOUNTER — OTHER (OUTPATIENT)
Age: 68
End: 2018-08-08

## 2018-08-09 ENCOUNTER — APPOINTMENT (OUTPATIENT)
Dept: PULMONOLOGY | Facility: CLINIC | Age: 68
End: 2018-08-09

## 2018-08-13 ENCOUNTER — APPOINTMENT (OUTPATIENT)
Dept: CARDIOTHORACIC SURGERY | Facility: CLINIC | Age: 68
End: 2018-08-13

## 2018-08-14 ENCOUNTER — APPOINTMENT (OUTPATIENT)
Dept: PULMONOLOGY | Facility: CLINIC | Age: 68
End: 2018-08-14
Payer: MEDICARE

## 2018-08-14 VITALS
TEMPERATURE: 99.5 F | DIASTOLIC BLOOD PRESSURE: 70 MMHG | HEIGHT: 66 IN | BODY MASS INDEX: 27.74 KG/M2 | WEIGHT: 172.6 LBS | SYSTOLIC BLOOD PRESSURE: 130 MMHG | HEART RATE: 85 BPM | OXYGEN SATURATION: 89 %

## 2018-08-14 PROCEDURE — 99214 OFFICE O/P EST MOD 30 MIN: CPT

## 2018-08-14 RX ORDER — TIOTROPIUM BROMIDE AND OLODATEROL 3.124; 2.736 UG/1; UG/1
2.5-2.5 SPRAY, METERED RESPIRATORY (INHALATION) DAILY
Qty: 3 | Refills: 3 | Status: DISCONTINUED | COMMUNITY
Start: 2018-07-09 | End: 2018-08-14

## 2018-08-15 ENCOUNTER — OUTPATIENT (OUTPATIENT)
Dept: OUTPATIENT SERVICES | Facility: HOSPITAL | Age: 68
LOS: 1 days | End: 2018-08-15
Payer: MEDICARE

## 2018-08-15 ENCOUNTER — APPOINTMENT (OUTPATIENT)
Dept: SLEEP CENTER | Facility: HOSPITAL | Age: 68
End: 2018-08-15

## 2018-08-15 DIAGNOSIS — H26.9 UNSPECIFIED CATARACT: Chronic | ICD-10-CM

## 2018-08-15 DIAGNOSIS — Z90.711 ACQUIRED ABSENCE OF UTERUS WITH REMAINING CERVICAL STUMP: Chronic | ICD-10-CM

## 2018-08-15 DIAGNOSIS — G47.33 OBSTRUCTIVE SLEEP APNEA (ADULT) (PEDIATRIC): ICD-10-CM

## 2018-08-15 DIAGNOSIS — Z90.89 ACQUIRED ABSENCE OF OTHER ORGANS: Chronic | ICD-10-CM

## 2018-08-15 DIAGNOSIS — Z98.890 OTHER SPECIFIED POSTPROCEDURAL STATES: Chronic | ICD-10-CM

## 2018-08-15 DIAGNOSIS — Z96.652 PRESENCE OF LEFT ARTIFICIAL KNEE JOINT: Chronic | ICD-10-CM

## 2018-08-15 PROCEDURE — 95811 POLYSOM 6/>YRS CPAP 4/> PARM: CPT

## 2018-08-15 PROCEDURE — 95811 POLYSOM 6/>YRS CPAP 4/> PARM: CPT | Mod: 26,GC

## 2018-08-21 ENCOUNTER — OTHER (OUTPATIENT)
Age: 68
End: 2018-08-21

## 2018-08-23 ENCOUNTER — RESULT REVIEW (OUTPATIENT)
Age: 68
End: 2018-08-23

## 2018-08-23 ENCOUNTER — OUTPATIENT (OUTPATIENT)
Dept: OUTPATIENT SERVICES | Facility: HOSPITAL | Age: 68
LOS: 1 days | End: 2018-08-23
Payer: MEDICARE

## 2018-08-23 DIAGNOSIS — Z98.890 OTHER SPECIFIED POSTPROCEDURAL STATES: Chronic | ICD-10-CM

## 2018-08-23 DIAGNOSIS — Z90.89 ACQUIRED ABSENCE OF OTHER ORGANS: Chronic | ICD-10-CM

## 2018-08-23 DIAGNOSIS — Z96.652 PRESENCE OF LEFT ARTIFICIAL KNEE JOINT: Chronic | ICD-10-CM

## 2018-08-23 DIAGNOSIS — Z90.711 ACQUIRED ABSENCE OF UTERUS WITH REMAINING CERVICAL STUMP: Chronic | ICD-10-CM

## 2018-08-23 DIAGNOSIS — H26.9 UNSPECIFIED CATARACT: Chronic | ICD-10-CM

## 2018-08-23 LAB — GLUCOSE BLDC GLUCOMTR-MCNC: 113 MG/DL — HIGH (ref 70–99)

## 2018-08-23 PROCEDURE — A9552: CPT

## 2018-08-23 PROCEDURE — 78815 PET IMAGE W/CT SKULL-THIGH: CPT | Mod: 26,PS

## 2018-08-23 PROCEDURE — 78815 PET IMAGE W/CT SKULL-THIGH: CPT

## 2018-08-23 PROCEDURE — 82962 GLUCOSE BLOOD TEST: CPT

## 2018-08-27 ENCOUNTER — OTHER (OUTPATIENT)
Age: 68
End: 2018-08-27

## 2018-09-26 ENCOUNTER — RX RENEWAL (OUTPATIENT)
Age: 68
End: 2018-09-26

## 2018-09-30 ENCOUNTER — RX RENEWAL (OUTPATIENT)
Age: 68
End: 2018-09-30

## 2018-10-08 ENCOUNTER — RX RENEWAL (OUTPATIENT)
Age: 68
End: 2018-10-08

## 2018-10-15 ENCOUNTER — APPOINTMENT (OUTPATIENT)
Dept: CT IMAGING | Facility: HOSPITAL | Age: 68
End: 2018-10-15
Payer: MEDICARE

## 2018-10-15 ENCOUNTER — OUTPATIENT (OUTPATIENT)
Dept: OUTPATIENT SERVICES | Facility: HOSPITAL | Age: 68
LOS: 1 days | End: 2018-10-15
Payer: MEDICARE

## 2018-10-15 DIAGNOSIS — H26.9 UNSPECIFIED CATARACT: Chronic | ICD-10-CM

## 2018-10-15 DIAGNOSIS — Z96.652 PRESENCE OF LEFT ARTIFICIAL KNEE JOINT: Chronic | ICD-10-CM

## 2018-10-15 DIAGNOSIS — Z98.890 OTHER SPECIFIED POSTPROCEDURAL STATES: Chronic | ICD-10-CM

## 2018-10-15 DIAGNOSIS — Z90.89 ACQUIRED ABSENCE OF OTHER ORGANS: Chronic | ICD-10-CM

## 2018-10-15 DIAGNOSIS — Z90.711 ACQUIRED ABSENCE OF UTERUS WITH REMAINING CERVICAL STUMP: Chronic | ICD-10-CM

## 2018-10-15 PROCEDURE — 71250 CT THORAX DX C-: CPT | Mod: 26

## 2018-10-15 PROCEDURE — 71250 CT THORAX DX C-: CPT

## 2018-10-16 ENCOUNTER — MED ADMIN CHARGE (OUTPATIENT)
Age: 68
End: 2018-10-16

## 2018-10-16 ENCOUNTER — APPOINTMENT (OUTPATIENT)
Dept: PULMONOLOGY | Facility: CLINIC | Age: 68
End: 2018-10-16
Payer: MEDICARE

## 2018-10-16 ENCOUNTER — LABORATORY RESULT (OUTPATIENT)
Age: 68
End: 2018-10-16

## 2018-10-16 VITALS
WEIGHT: 178 LBS | BODY MASS INDEX: 28.61 KG/M2 | OXYGEN SATURATION: 93 % | HEART RATE: 85 BPM | TEMPERATURE: 98.2 F | DIASTOLIC BLOOD PRESSURE: 80 MMHG | SYSTOLIC BLOOD PRESSURE: 120 MMHG | RESPIRATION RATE: 12 BRPM | HEIGHT: 66 IN

## 2018-10-16 DIAGNOSIS — Z23 ENCOUNTER FOR IMMUNIZATION: ICD-10-CM

## 2018-10-16 DIAGNOSIS — F32.9 MAJOR DEPRESSIVE DISORDER, SINGLE EPISODE, UNSPECIFIED: ICD-10-CM

## 2018-10-16 PROCEDURE — 90662 IIV NO PRSV INCREASED AG IM: CPT

## 2018-10-16 PROCEDURE — G0008: CPT

## 2018-10-16 PROCEDURE — 36415 COLL VENOUS BLD VENIPUNCTURE: CPT

## 2018-10-16 PROCEDURE — 99215 OFFICE O/P EST HI 40 MIN: CPT | Mod: 25

## 2018-10-19 ENCOUNTER — APPOINTMENT (OUTPATIENT)
Dept: PULMONOLOGY | Facility: CLINIC | Age: 68
End: 2018-10-19

## 2018-10-21 PROBLEM — Z23 IMMUNIZATION, PNEUMOCOCCUS AND INFLUENZA: Status: ACTIVE | Noted: 2018-10-21

## 2018-10-21 PROBLEM — F32.9 DEPRESSION: Status: ACTIVE | Noted: 2018-07-09

## 2018-10-21 LAB
ANION GAP SERPL CALC-SCNC: 17 MMOL/L
BUN SERPL-MCNC: 12 MG/DL
CALCIUM SERPL-MCNC: 9.9 MG/DL
CHLORIDE SERPL-SCNC: 100 MMOL/L
CO2 SERPL-SCNC: 26 MMOL/L
CREAT SERPL-MCNC: 0.57 MG/DL
GLUCOSE SERPL-MCNC: 111 MG/DL
POTASSIUM SERPL-SCNC: 4.2 MMOL/L
SODIUM SERPL-SCNC: 143 MMOL/L

## 2018-10-24 ENCOUNTER — OUTPATIENT (OUTPATIENT)
Dept: OUTPATIENT SERVICES | Facility: HOSPITAL | Age: 68
LOS: 1 days | End: 2018-10-24
Payer: MEDICARE

## 2018-10-24 DIAGNOSIS — Z98.890 OTHER SPECIFIED POSTPROCEDURAL STATES: Chronic | ICD-10-CM

## 2018-10-24 DIAGNOSIS — Z90.89 ACQUIRED ABSENCE OF OTHER ORGANS: Chronic | ICD-10-CM

## 2018-10-24 DIAGNOSIS — Z90.711 ACQUIRED ABSENCE OF UTERUS WITH REMAINING CERVICAL STUMP: Chronic | ICD-10-CM

## 2018-10-24 DIAGNOSIS — Z96.652 PRESENCE OF LEFT ARTIFICIAL KNEE JOINT: Chronic | ICD-10-CM

## 2018-10-24 DIAGNOSIS — I27.20 PULMONARY HYPERTENSION, UNSPECIFIED: ICD-10-CM

## 2018-10-24 DIAGNOSIS — H26.9 UNSPECIFIED CATARACT: Chronic | ICD-10-CM

## 2018-10-24 PROCEDURE — 93306 TTE W/DOPPLER COMPLETE: CPT

## 2018-10-24 PROCEDURE — 93306 TTE W/DOPPLER COMPLETE: CPT | Mod: 26

## 2018-11-02 ENCOUNTER — RX RENEWAL (OUTPATIENT)
Age: 68
End: 2018-11-02

## 2018-11-02 ENCOUNTER — APPOINTMENT (OUTPATIENT)
Dept: PULMONOLOGY | Facility: CLINIC | Age: 68
End: 2018-11-02

## 2018-11-14 ENCOUNTER — FORM ENCOUNTER (OUTPATIENT)
Age: 68
End: 2018-11-14

## 2018-11-15 ENCOUNTER — APPOINTMENT (OUTPATIENT)
Dept: INTERVENTIONAL RADIOLOGY/VASCULAR | Facility: HOSPITAL | Age: 68
End: 2018-11-15
Payer: MEDICARE

## 2018-11-15 ENCOUNTER — RESULT REVIEW (OUTPATIENT)
Age: 68
End: 2018-11-15

## 2018-11-15 ENCOUNTER — APPOINTMENT (OUTPATIENT)
Dept: CT IMAGING | Facility: HOSPITAL | Age: 68
End: 2018-11-15
Payer: MEDICARE

## 2018-11-15 ENCOUNTER — OUTPATIENT (OUTPATIENT)
Dept: OUTPATIENT SERVICES | Facility: HOSPITAL | Age: 68
LOS: 1 days | End: 2018-11-15
Payer: MEDICARE

## 2018-11-15 ENCOUNTER — APPOINTMENT (OUTPATIENT)
Dept: PULMONOLOGY | Facility: CLINIC | Age: 68
End: 2018-11-15
Payer: MEDICARE

## 2018-11-15 VITALS — BODY MASS INDEX: 28.61 KG/M2 | HEIGHT: 66 IN | OXYGEN SATURATION: 89 % | WEIGHT: 178 LBS | HEART RATE: 78 BPM

## 2018-11-15 DIAGNOSIS — Z90.711 ACQUIRED ABSENCE OF UTERUS WITH REMAINING CERVICAL STUMP: Chronic | ICD-10-CM

## 2018-11-15 DIAGNOSIS — Z98.890 OTHER SPECIFIED POSTPROCEDURAL STATES: Chronic | ICD-10-CM

## 2018-11-15 DIAGNOSIS — H26.9 UNSPECIFIED CATARACT: Chronic | ICD-10-CM

## 2018-11-15 DIAGNOSIS — Z96.652 PRESENCE OF LEFT ARTIFICIAL KNEE JOINT: Chronic | ICD-10-CM

## 2018-11-15 DIAGNOSIS — Z90.89 ACQUIRED ABSENCE OF OTHER ORGANS: Chronic | ICD-10-CM

## 2018-11-15 PROCEDURE — 32405: CPT

## 2018-11-15 PROCEDURE — 77012 CT SCAN FOR NEEDLE BIOPSY: CPT | Mod: 26

## 2018-11-15 PROCEDURE — 71045 X-RAY EXAM CHEST 1 VIEW: CPT | Mod: 26

## 2018-11-15 PROCEDURE — 71045 X-RAY EXAM CHEST 1 VIEW: CPT

## 2018-11-15 PROCEDURE — C1769: CPT

## 2018-11-15 PROCEDURE — 88173 CYTOPATH EVAL FNA REPORT: CPT

## 2018-11-15 PROCEDURE — 99214 OFFICE O/P EST MOD 30 MIN: CPT

## 2018-11-15 PROCEDURE — 88305 TISSUE EXAM BY PATHOLOGIST: CPT

## 2018-11-15 PROCEDURE — 77012 CT SCAN FOR NEEDLE BIOPSY: CPT

## 2018-11-19 LAB
NON-GYNECOLOGICAL CYTOLOGY STUDY: SIGNIFICANT CHANGE UP
SURGICAL PATHOLOGY STUDY: SIGNIFICANT CHANGE UP

## 2018-11-21 ENCOUNTER — APPOINTMENT (OUTPATIENT)
Dept: PULMONOLOGY | Facility: CLINIC | Age: 68
End: 2018-11-21

## 2018-12-21 ENCOUNTER — RX RENEWAL (OUTPATIENT)
Age: 68
End: 2018-12-21

## 2019-01-24 ENCOUNTER — APPOINTMENT (OUTPATIENT)
Dept: PULMONOLOGY | Facility: CLINIC | Age: 69
End: 2019-01-24
Payer: MEDICARE

## 2019-01-24 VITALS
SYSTOLIC BLOOD PRESSURE: 128 MMHG | DIASTOLIC BLOOD PRESSURE: 80 MMHG | OXYGEN SATURATION: 93 % | BODY MASS INDEX: 27.64 KG/M2 | HEART RATE: 76 BPM | TEMPERATURE: 99.1 F | HEIGHT: 66 IN | WEIGHT: 172 LBS

## 2019-01-24 DIAGNOSIS — B30.9 VIRAL CONJUNCTIVITIS, UNSPECIFIED: ICD-10-CM

## 2019-01-24 PROCEDURE — 36415 COLL VENOUS BLD VENIPUNCTURE: CPT

## 2019-01-24 PROCEDURE — 99214 OFFICE O/P EST MOD 30 MIN: CPT | Mod: 25

## 2019-01-27 NOTE — PHYSICAL EXAM
[General Appearance - Well Developed] : well developed [Normal Appearance] : normal appearance [Well Groomed] : well groomed [General Appearance - Well Nourished] : well nourished [No Deformities] : no deformities [General Appearance - In No Acute Distress] : no acute distress [Eyelids - No Xanthelasma] : the eyelids demonstrated no xanthelasmas [Normal Oropharynx] : normal oropharynx [Neck Appearance] : the appearance of the neck was normal [Neck Cervical Mass (___cm)] : no neck mass was observed [Jugular Venous Distention Increased] : there was no jugular-venous distention [Thyroid Diffuse Enlargement] : the thyroid was not enlarged [Thyroid Nodule] : there were no palpable thyroid nodules [Heart Rate And Rhythm] : heart rate and rhythm were normal [Heart Sounds] : normal S1 and S2 [Murmurs] : no murmurs present [Respiration, Rhythm And Depth] : normal respiratory rhythm and effort [Exaggerated Use Of Accessory Muscles For Inspiration] : no accessory muscle use [Auscultation Breath Sounds / Voice Sounds] : lungs were clear to auscultation bilaterally [Abdomen Soft] : soft [Abdomen Tenderness] : non-tender [Abdomen Mass (___ Cm)] : no abdominal mass palpated [Abnormal Walk] : normal gait [Gait - Sufficient For Exercise Testing] : the gait was sufficient for exercise testing [Nail Clubbing] : no clubbing of the fingernails [Cyanosis, Localized] : no localized cyanosis [Petechial Hemorrhages (___cm)] : no petechial hemorrhages [Skin Color & Pigmentation] : normal skin color and pigmentation [] : no rash [No Venous Stasis] : no venous stasis [Skin Lesions] : no skin lesions [No Skin Ulcers] : no skin ulcer [No Xanthoma] : no  xanthoma was observed [Deep Tendon Reflexes (DTR)] : deep tendon reflexes were 2+ and symmetric [Sensation] : the sensory exam was normal to light touch and pinprick [No Focal Deficits] : no focal deficits [Oriented To Time, Place, And Person] : oriented to person, place, and time [Impaired Insight] : insight and judgment were intact [Affect] : the affect was normal [FreeTextEntry1] : congested conjuntiva and stye

## 2019-01-27 NOTE — HISTORY OF PRESENT ILLNESS
[Difficulty Breathing During Exertion] : stable dyspnea on exertion [Feelings Of Weakness On Exertion] : stable exercise intolerance [Cough] : denies coughing [Wheezing] : denies wheezing [Regional Soft Tissue Swelling Both Lower Extremities] : denies lower extremity edema [Chest Pain Or Discomfort] : denies chest pain [Fever] : denies fever [Oxygen] : the patient uses supplemental oxygen [Oxygen Rate  ___ lpm] : Oxygen rate is [unfilled] lpm [NC] : Nasal Cannula [PRN] : as needed [2  -  Slight] : 2, slight [Class II - Mild Symptoms and Slight Limitations] : II [Never] : was never a smoker [None] : None [Adherent] : the patient is adherent with ~his/her~ medication regimen [Goals--Doing Well] : the patient is doing well with ~his/her~ goals [Wt Gain ___ Lbs] : no recent weight gain [Wt Loss ___ Lbs] : no recent weight loss [Good Control] : peak flow has been poor [More Frequent Use Needed Recently] : Patient reports no recent increase in frequency of [Side Effects] : ~He/She~ denies medication side effects [FreeTextEntry1] : 68 year old female PMH of PFO, pulmonary HTN, DARRIN, pulmonary nodule, thrombocytopenia and emphysema. \par \par She is doing well besides complaints of fatigue and leg pain. She is compliant with CPAP. She was seen by ortho Dr. Ramon for her RSD and was referred for nerve block.  \par

## 2019-01-27 NOTE — ASSESSMENT
[FreeTextEntry1] : Obstructive sleep apnea\par \par She is compliant with his CPAP and tolerating\par \par COPD\par \par The patient was clinically stable and to continue on bronchodilator.  Pt is currently not on any LABA/LAMA due to co-pay.  Pt was given 2 samples of Stiolto Lot number [id number] exp 1/2021.  [facility] to evaluate co-pay.\par \par \par Pulmonary nodule\par \par Patient had a CT scan biopsy 11/2018. Chest x-ray revealed no pneumothorax. Exam was normal and oxygen saturation is at baseline.  CT scan repeat in one year 11/15/2019 due for repeat.\par \par Hypertension\par \par Continue current meds she is compliant with oxygen at night and CPAP.\par \par Health maintenance\par - Follow mammogram\par - Referred to Dr. Castro for colonoscopy\par \par Thrombocytopenia\par \par Platelet count was stable.  Follow with CBC due to complaints of fatigue\par \par RSD \par \par She was seen by ortho and referred for nerve block.  I called Dr. Ochoa for consultation and pt provided information to make appt.

## 2019-01-28 ENCOUNTER — RESULT REVIEW (OUTPATIENT)
Age: 69
End: 2019-01-28

## 2019-01-28 ENCOUNTER — OUTPATIENT (OUTPATIENT)
Dept: OUTPATIENT SERVICES | Facility: HOSPITAL | Age: 69
LOS: 1 days | End: 2019-01-28
Payer: COMMERCIAL

## 2019-01-28 DIAGNOSIS — Z98.890 OTHER SPECIFIED POSTPROCEDURAL STATES: Chronic | ICD-10-CM

## 2019-01-28 DIAGNOSIS — H26.9 UNSPECIFIED CATARACT: Chronic | ICD-10-CM

## 2019-01-28 DIAGNOSIS — Z90.89 ACQUIRED ABSENCE OF OTHER ORGANS: Chronic | ICD-10-CM

## 2019-01-28 DIAGNOSIS — Z96.652 PRESENCE OF LEFT ARTIFICIAL KNEE JOINT: Chronic | ICD-10-CM

## 2019-01-28 DIAGNOSIS — Z90.711 ACQUIRED ABSENCE OF UTERUS WITH REMAINING CERVICAL STUMP: Chronic | ICD-10-CM

## 2019-01-28 LAB
BASOPHILS # BLD AUTO: 0.03 K/UL
BASOPHILS NFR BLD AUTO: 0.4 %
EOSINOPHIL # BLD AUTO: 0.17 K/UL
EOSINOPHIL NFR BLD AUTO: 2.1 %
HCT VFR BLD CALC: 41.6 %
HGB BLD-MCNC: 13.4 G/DL
IMM GRANULOCYTES NFR BLD AUTO: 0.5 %
LYMPHOCYTES # BLD AUTO: 2.51 K/UL
LYMPHOCYTES NFR BLD AUTO: 30.9 %
MAN DIFF?: NORMAL
MCHC RBC-ENTMCNC: 29.6 PG
MCHC RBC-ENTMCNC: 32.2 GM/DL
MCV RBC AUTO: 91.8 FL
MONOCYTES # BLD AUTO: 0.6 K/UL
MONOCYTES NFR BLD AUTO: 7.4 %
NEUTROPHILS # BLD AUTO: 4.76 K/UL
NEUTROPHILS NFR BLD AUTO: 58.7 %
PLATELET # BLD AUTO: 104 K/UL
RBC # BLD: 4.53 M/UL
RBC # FLD: 13 %
WBC # FLD AUTO: 8.11 K/UL

## 2019-01-28 PROCEDURE — 73560 X-RAY EXAM OF KNEE 1 OR 2: CPT

## 2019-01-28 PROCEDURE — 73560 X-RAY EXAM OF KNEE 1 OR 2: CPT | Mod: 26,LT

## 2019-01-28 PROCEDURE — 73590 X-RAY EXAM OF LOWER LEG: CPT

## 2019-01-28 PROCEDURE — 73590 X-RAY EXAM OF LOWER LEG: CPT | Mod: 26,LT

## 2019-02-13 ENCOUNTER — RX RENEWAL (OUTPATIENT)
Age: 69
End: 2019-02-13

## 2019-02-20 ENCOUNTER — APPOINTMENT (OUTPATIENT)
Dept: ORTHOPEDIC SURGERY | Facility: CLINIC | Age: 69
End: 2019-02-20
Payer: MEDICARE

## 2019-02-20 PROCEDURE — 99203 OFFICE O/P NEW LOW 30 MIN: CPT

## 2019-02-21 VITALS
SYSTOLIC BLOOD PRESSURE: 120 MMHG | BODY MASS INDEX: 27.64 KG/M2 | OXYGEN SATURATION: 97 % | WEIGHT: 172 LBS | DIASTOLIC BLOOD PRESSURE: 80 MMHG | HEIGHT: 66 IN | HEART RATE: 80 BPM

## 2019-03-01 NOTE — PROCEDURE
[de-identified] : Left knee superficial nerve injection:\par Verbal consent obtained, risks benefits clearly explained, normal sterile technique utilized. Joint was injected using 25ga needles at the medial and lateral periphery of hyperesthetic skin. A total of 24cc's of 1% lidocaine and 6cc of 40mg/mL kenalog. Patient tolerated well. No immediate complications. Hemostasis obtained. \par

## 2019-03-01 NOTE — PHYSICAL EXAM
[de-identified] : General: AL, A&O x3, Appropriately Dressed\par Skin: Warm and Dry, Normal Turgor, no rashes\par Neuro: AOx3, Cranial nerves grossly intact\par Psych: Mood and affect appropriate\par \par Left knee:  2 well-healed incisions over the anterior and anterior lateral aspect of the knee. Soft tissue puffiness around the left knee. No obvious skin discoloration. Severe hyperesthesia of the skin around the incisions from the proximal to distal aspect. Normal sensation and no tenderness to palpation proximal and distal to the knee.\par Range of motion 20-70°. Stable varus and valgus stress. Intact dorsiflexion plantar flexion distally.  [de-identified] : 2 view x-ray of the left knee from a prior encounter are reviewed and show a stemmed tibial component and a standard primary femoral component in good position without any signs of loosening or fracture.

## 2019-03-01 NOTE — HISTORY OF PRESENT ILLNESS
[de-identified] : 68-year-old female here for evaluation of left knee pain ongoing for many years. She has a prolonged history with her left knee beginning in 2011 when she sustained a tibial plateau fracture. She subsequently had been over reduction internal fixation and then do to excruciating pain had a removal of hardware 7 months later. The following day she had refractured her tibia and therefore underwent a total knee arthroplasty at [facility]. \par She is had continued severe pain in her left knee since the surgery. She has been followed by several pain specialist doctors. Has had a spine stimulator implanted. Has had nerve ablation was performed. Approximately 2 weeks ago had some ganglion block performed by a pain doctor which made her symptoms worse. Exact details of this procedure are unclear.\par She complains of burning over the anterior aspect of her left knee as well as severe hypersensitivity. She often wears her pants rolled up above the knee. She takes Dilaudid throughout the day and with only minimal pain relief.

## 2019-03-05 ENCOUNTER — FORM ENCOUNTER (OUTPATIENT)
Age: 69
End: 2019-03-05

## 2019-03-06 ENCOUNTER — APPOINTMENT (OUTPATIENT)
Dept: ORTHOPEDIC SURGERY | Facility: CLINIC | Age: 69
End: 2019-03-06
Payer: MEDICARE

## 2019-03-06 ENCOUNTER — OUTPATIENT (OUTPATIENT)
Dept: OUTPATIENT SERVICES | Facility: HOSPITAL | Age: 69
LOS: 1 days | End: 2019-03-06
Payer: MEDICARE

## 2019-03-06 DIAGNOSIS — H26.9 UNSPECIFIED CATARACT: Chronic | ICD-10-CM

## 2019-03-06 DIAGNOSIS — Z98.890 OTHER SPECIFIED POSTPROCEDURAL STATES: Chronic | ICD-10-CM

## 2019-03-06 DIAGNOSIS — Z90.89 ACQUIRED ABSENCE OF OTHER ORGANS: Chronic | ICD-10-CM

## 2019-03-06 DIAGNOSIS — Z90.711 ACQUIRED ABSENCE OF UTERUS WITH REMAINING CERVICAL STUMP: Chronic | ICD-10-CM

## 2019-03-06 DIAGNOSIS — Z96.652 PRESENCE OF LEFT ARTIFICIAL KNEE JOINT: Chronic | ICD-10-CM

## 2019-03-06 PROCEDURE — 73562 X-RAY EXAM OF KNEE 3: CPT | Mod: 26,LT

## 2019-03-06 PROCEDURE — 73501 X-RAY EXAM HIP UNI 1 VIEW: CPT | Mod: 26,LT

## 2019-03-06 PROCEDURE — 73562 X-RAY EXAM OF KNEE 3: CPT

## 2019-03-06 PROCEDURE — 99213 OFFICE O/P EST LOW 20 MIN: CPT

## 2019-03-06 PROCEDURE — 73501 X-RAY EXAM HIP UNI 1 VIEW: CPT

## 2019-03-07 NOTE — PHYSICAL EXAM
[de-identified] : Focused exam left knee: Moderate bruising, no swelling, well-healed surgical incision, range of motion 20-70, the joint is stable. [de-identified] : New x-rays of the left hip and knee showed no acute changes and no evidence of fracture or the previously seen total knee arthroplasty remains the same.

## 2019-03-07 NOTE — ASSESSMENT
[FreeTextEntry1] : Assessment\par #1 status post complex left knee revision, left knee neuroma\par #2 status post fall\par \par Plan\par #1 fall precautions reviewed\par #2 would like to wait for the bruising and swelling to completely calm down before we'll consider reinjecting area, she may still benefit from a neuroma/peroneal nerve excision in the future....

## 2019-03-07 NOTE — HISTORY OF PRESENT ILLNESS
[de-identified] : Following up after a neuroma injections last week. In the office she had 100% pain relief, a few days after this she fell down in her house and had some knee pain thereafter her pain came back about 30% so she still has a net 70% pain relief. She has no other complaints at this time.

## 2019-03-26 ENCOUNTER — RX RENEWAL (OUTPATIENT)
Age: 69
End: 2019-03-26

## 2019-03-26 DIAGNOSIS — J32.9 CHRONIC SINUSITIS, UNSPECIFIED: ICD-10-CM

## 2019-04-17 ENCOUNTER — RX RENEWAL (OUTPATIENT)
Age: 69
End: 2019-04-17

## 2019-04-17 ENCOUNTER — APPOINTMENT (OUTPATIENT)
Dept: ORTHOPEDIC SURGERY | Facility: CLINIC | Age: 69
End: 2019-04-17
Payer: MEDICARE

## 2019-04-17 PROCEDURE — 99214 OFFICE O/P EST MOD 30 MIN: CPT

## 2019-04-19 NOTE — HISTORY OF PRESENT ILLNESS
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 she had 70% relief of pain following multiple neuroma injections. the resolution of pain decreased to 30% prior to a fall she had 7 weeks ago. because of ecchymosis she could not receive additional injections. she now presents 7 weeks later with persistent ecchymosis that is resolving slowly. she is requesting injections again.

## 2019-04-19 NOTE — ASSESSMENT
[FreeTextEntry1] : assessment;\par chronic left knee periarticular neuromas\par \par Plan:\par -await resolution of ecchymosis\par -if 80% ecchymosis resolves will then inject again\par -referral made to [facility] in [place]\par -we will consider doing the nerve surgery if pain becomes very isolated to 1-2 areas\par -f/u 4-6 weeks...

## 2019-04-19 NOTE — PHYSICAL EXAM
[de-identified] : LLE:\par resolving ecchymosis around knee\par persistent exquisite tenderness\par ROM 2-100\par stable v/v\par walks with a walker [de-identified] : none today

## 2019-04-19 NOTE — REASON FOR VISIT
[Follow-Up Visit] : a follow-up visit for [Artificial Knee Joint] : artificial knee joint [Knee Pain] : knee pain [FreeTextEntry2] : periarticular neuroma

## 2019-05-22 ENCOUNTER — APPOINTMENT (OUTPATIENT)
Dept: ORTHOPEDIC SURGERY | Facility: CLINIC | Age: 69
End: 2019-05-22
Payer: MEDICARE

## 2019-05-22 PROCEDURE — 99213 OFFICE O/P EST LOW 20 MIN: CPT

## 2019-05-26 ENCOUNTER — RX RENEWAL (OUTPATIENT)
Age: 69
End: 2019-05-26

## 2019-05-31 NOTE — HISTORY OF PRESENT ILLNESS
[de-identified] : This is a [age] female with a previous history of traumatic left knee arthritis but sequentially needed a left total knee arthroplasty with removal of hardware. She was first seen for 4 periarticular neuromas on the medial lateral aspect of the incisions that she's got around the knee. She had a recent fall approximately 2 months ago that had been it begins ecchymoses surrounding area for injection. We have delayed the injections until today due to the ecchymoses and risk of infection. She still complains of medial as well as the lateral pain around the incision. She states the pain is ranging between 3-8/10 and had improved from a previous injection that we had performed on her. She denies any pain with range of motion of the knee but still has difficulty to flex her knee. She denies fevers, chills, signs of infection.

## 2019-05-31 NOTE — ASSESSMENT
[FreeTextEntry1] : Assessment :This is a 68-year-old female with ruben-incisional neuromas\par Plan: She had multiple cortisone and lidocaine injections today. We are going to see her back between the 2 to three-month range to see if these injections helped and if she required further injections....

## 2019-05-31 NOTE — PHYSICAL EXAM
[de-identified] : Physical examination of the left knee demonstrates 0-90° of range of motion. Patient has pain and hyper-anesthesia around the medial and lateral aspect of the midline total knee incision. She has some pain around the J. incision from the tibial plateau surgery. There is some mild ecchymoses around the tibial plateau surgery with some indentation around the distal portion of the incision. There is no erythema present. There is no drainage. She is stable to varus valgus stress. She has minimal anterior translation less than 5 mm. Distal exam is within normal limits.

## 2019-05-31 NOTE — PROCEDURE
[de-identified] : Under sterile conditions with alcohol and Betadine the patient's had multiple cortisone with lidocaine injections around the sensory nerves from the medial portion of the incision as well as the lateral portion of the incision. This was done in a sequential manner from distal to proximal plantar covering all the areas that were painful for the patient. A Band-Aid was applied to every single injection site

## 2019-06-05 ENCOUNTER — APPOINTMENT (OUTPATIENT)
Dept: ORTHOPEDIC SURGERY | Facility: CLINIC | Age: 69
End: 2019-06-05
Payer: MEDICARE

## 2019-06-05 PROCEDURE — 99213 OFFICE O/P EST LOW 20 MIN: CPT | Mod: 25

## 2019-06-05 PROCEDURE — 20553 NJX 1/MLT TRIGGER POINTS 3/>: CPT | Mod: LT

## 2019-06-10 NOTE — ASSESSMENT
[FreeTextEntry1] : Assessment: This is a [age] female with periarticular neuromas increasing pain from her previous incision\par Plan: We injected the areas that were painful today. We have previously injected and incision areas not long ago. She is going to see how these injections do followup with us....

## 2019-06-10 NOTE — HISTORY OF PRESENT ILLNESS
[de-identified] : This is a 68 emale with a previous history of traumatic left knee arthritis but sequentially needed a left total knee arthroplasty with removal of hardware. She was seen recently for periarticular neuromas and was injected again the medial as well as lateral aspect of her incision. She states that she significantly improved but she has ongoing pain over the noninjected area of the tibial tubercle. She states that she is able to range her knee and is able to sleep secondary to the relief of her pain.

## 2019-06-10 NOTE — PROCEDURE
[FreeTextEntry1] : The patient had 2 injections total of 4 cc of lidocaine without epinephrine and 1 cc of Kenalog. This was injected with Betadine and alcohol prep the skin over the areas that were painful to the patient. These were then covered with a bandage

## 2019-06-10 NOTE — PHYSICAL EXAM
[de-identified] : Patient has zero to on her and 10° range of motion in her left knee. She has no pain over the medial aspect of the skin incision where the previous injection were made. She has pain over the tibial tubercle area as well as one spot over the lateral aspect of her knee. She has no pain distally or proximally to the incision.

## 2019-06-26 ENCOUNTER — APPOINTMENT (OUTPATIENT)
Dept: ORTHOPEDIC SURGERY | Facility: CLINIC | Age: 69
End: 2019-06-26
Payer: MEDICARE

## 2019-07-24 ENCOUNTER — APPOINTMENT (OUTPATIENT)
Dept: ORTHOPEDIC SURGERY | Facility: CLINIC | Age: 69
End: 2019-07-24
Payer: MEDICARE

## 2019-07-24 PROCEDURE — 99213 OFFICE O/P EST LOW 20 MIN: CPT

## 2019-07-25 NOTE — HISTORY OF PRESENT ILLNESS
[de-identified] : Following up after a series of injections into the left common peroneal nerve. Her pain has completely returned. No other complaints at this time.

## 2019-07-25 NOTE — ASSESSMENT
[FreeTextEntry1] : Assessment/plan\par Return of the pain indicates that this is pain coming from the common peroneal nerve, as discussed we are going to plan to excise the nerve down. She will schedule this at her convenience.\par \par All medical record entries made by the PA/Scribe/Fellow are at my, Dr. Bryan Abraham's direction and personally dictated by me on 07/24/2019]. I have reviewed the chart and agree that the record accurately reflects my personal performance of the history, physical exam, assessment, and plan. I have also personally directed reviewed, and agreed with the chart.\par

## 2019-07-25 NOTE — PHYSICAL EXAM
[de-identified] : Focused exam left knee: Moderate bruising, no swelling, well-healed surgical incision, range of motion 20-70, the joint is stable.

## 2019-07-29 ENCOUNTER — FORM ENCOUNTER (OUTPATIENT)
Age: 69
End: 2019-07-29

## 2019-07-30 ENCOUNTER — OUTPATIENT (OUTPATIENT)
Dept: OUTPATIENT SERVICES | Facility: HOSPITAL | Age: 69
LOS: 1 days | End: 2019-07-30
Payer: MEDICARE

## 2019-07-30 ENCOUNTER — NON-APPOINTMENT (OUTPATIENT)
Age: 69
End: 2019-07-30

## 2019-07-30 ENCOUNTER — APPOINTMENT (OUTPATIENT)
Dept: PULMONOLOGY | Facility: CLINIC | Age: 69
End: 2019-07-30
Payer: MEDICARE

## 2019-07-30 VITALS
OXYGEN SATURATION: 95 % | WEIGHT: 151 LBS | HEIGHT: 66 IN | DIASTOLIC BLOOD PRESSURE: 84 MMHG | HEART RATE: 87 BPM | TEMPERATURE: 98.1 F | BODY MASS INDEX: 24.27 KG/M2 | SYSTOLIC BLOOD PRESSURE: 122 MMHG

## 2019-07-30 DIAGNOSIS — Z01.818 ENCOUNTER FOR OTHER PREPROCEDURAL EXAMINATION: ICD-10-CM

## 2019-07-30 DIAGNOSIS — Z98.890 OTHER SPECIFIED POSTPROCEDURAL STATES: Chronic | ICD-10-CM

## 2019-07-30 DIAGNOSIS — H26.9 UNSPECIFIED CATARACT: Chronic | ICD-10-CM

## 2019-07-30 DIAGNOSIS — Z90.89 ACQUIRED ABSENCE OF OTHER ORGANS: Chronic | ICD-10-CM

## 2019-07-30 DIAGNOSIS — Z90.711 ACQUIRED ABSENCE OF UTERUS WITH REMAINING CERVICAL STUMP: Chronic | ICD-10-CM

## 2019-07-30 DIAGNOSIS — Z96.652 PRESENCE OF LEFT ARTIFICIAL KNEE JOINT: Chronic | ICD-10-CM

## 2019-07-30 PROCEDURE — 36415 COLL VENOUS BLD VENIPUNCTURE: CPT

## 2019-07-30 PROCEDURE — 93000 ELECTROCARDIOGRAM COMPLETE: CPT

## 2019-07-30 PROCEDURE — 99214 OFFICE O/P EST MOD 30 MIN: CPT | Mod: 25

## 2019-07-30 PROCEDURE — 71046 X-RAY EXAM CHEST 2 VIEWS: CPT

## 2019-07-30 PROCEDURE — 71046 X-RAY EXAM CHEST 2 VIEWS: CPT | Mod: 26

## 2019-07-30 RX ORDER — ZOLPIDEM TARTRATE 10 MG/1
10 TABLET, FILM COATED ORAL
Refills: 0 | Status: DISCONTINUED | COMMUNITY
Start: 2019-01-24 | End: 2019-07-30

## 2019-07-30 NOTE — RESULTS/DATA
[de-identified] : pending [de-identified] : pending [de-identified] : pending [de-identified] : pending

## 2019-07-30 NOTE — PLAN
[FreeTextEntry1] : Preop\par No contraindication for surgery. The medical condition is optimized for surgery. There is no evidence neither of angina, CHF, arrhythmias, nor valvular disease. There is no limitation of exercise capacity. KENNEY BROWN is to be extubated once fully awake and able to protect airway. she is to be monitored in the recovery room.  They might benefit for high flow oxygen or noninvasive ventilation to prevent or reverse atelectasis.  Avoid oversedation. Patient is high risk for DVT and will require bimodal agents for DVT prophylaxis early mobilization is recommended. Patient is to use the incentive spirometry postoperative.  The patient was given instructions regarding the preoperative preparation. I instructed the patient to notify me if there is any change in the clinical status prior the surgery including any skin manifestations. No aspirin or NSAIDs, Naproxen, BROWN inhibitors, herbs, green tea and vitamins prior to surgery. NPO after midnight prior to surg. \par \par - Follow on lab work\par - Follow on CXR \par - EKG today \par - Last echo 10/2018  estimated Pulmonary artery pressure 30 mmhg.  compared to prior study 6/18 RV function and PASP has improved. EF 55%\par \par Knee pain\par \par Pt for surg \par \par \par Obstructive sleep apnea\par \par She is compliant with his CPAP and tolerating full face mask.  She has limited sleep at night only 2 hours.  Instructed her to use when she naps as well. \par \par COPD\par \par The patient was clinically stable and to continue on bronchodilator. Pt is on LABA/LAMA  given samples of stioloto.. Pt was given 1 samples of Stiolto Lot number [id number] exp 1/2021. [facility]280 co pay  and stiolto co pay over $300 unable to afford co pay and given samples.  Continue with weight loss on weight watchers.  Her SOB has improved with weight loss\par \par \par Pulmonary nodule\par \par Patient had a CT scan biopsy 11/2018. Chest x-ray revealed no pneumothorax. Exam was normal and oxygen saturation is at baseline. CT scan repeat in one year 11/15/2019 due for repeat.\par \par Hypertension\par \par Continue current meds she is compliant with oxygen at night and CPAP.\par \par Health maintenance\par - Follow mammogram\par - Referred to Dr. Castro for colonoscopy\par \par Thrombocytopenia\par \par Platelet count was stable. Follow with CBC.\par \par \par  \par

## 2019-07-30 NOTE — ASSESSMENT
[No Further Testing Recommended] : no further testing recommended [Patient Optimized for Surgery] : Patient optimized for surgery [ECG] : ECG [As per surgery] : as per surgery

## 2019-07-30 NOTE — PLAN
[FreeTextEntry1] : Preop\par No contraindication for surgery. The medical condition is optimized for surgery. There is no evidence neither of angina, CHF, arrhythmias, nor valvular disease. There is no limitation of exercise capacity. KENNEY BROWN is to be extubated once fully awake and able to protect airway. she is to be monitored in the recovery room.  They might benefit for high flow oxygen or noninvasive ventilation to prevent or reverse atelectasis.  Avoid oversedation. Patient is high risk for DVT and will require bimodal agents for DVT prophylaxis early mobilization is recommended. Patient is to use the incentive spirometry postoperative.  The patient was given instructions regarding the preoperative preparation. I instructed the patient to notify me if there is any change in the clinical status prior the surgery including any skin manifestations. No aspirin or NSAIDs, Naproxen, BROWN inhibitors, herbs, green tea and vitamins prior to surgery. NPO after midnight prior to surg. \par \par - Follow on lab work\par - Follow on CXR \par - EKG today \par - Last echo 10/2018  estimated Pulmonary artery pressure 30 mmhg.  compared to prior study 6/18 RV function and PASP has improved. EF 55%\par \par Knee pain\par \par Pt for surg \par \par \par Obstructive sleep apnea\par \par She is compliant with his CPAP and tolerating full face mask.  She has limited sleep at night only 2 hours.  Instructed her to use when she naps as well. \par \par COPD\par \par The patient was clinically stable and to continue on bronchodilator. Pt is on LABA/LAMA  given samples of stioloto.. Pt was given 1 samples of Stiolto Lot number [id number] exp 1/2021. [facility] $280 co pay  and stiolto co pay over $300 unable to afford co pay and given samples.  Continue with weight loss on weight watchers.  Her SOB has improved with weight loss\par \par \par Pulmonary nodule\par \par Patient had a CT scan biopsy 11/2018. Chest x-ray revealed no pneumothorax. Exam was normal and oxygen saturation is at baseline. CT scan repeat in one year 11/15/2019 due for repeat.\par \par Hypertension\par \par Continue current meds she is compliant with oxygen at night and CPAP.\par \par Health maintenance\par - Follow mammogram\par - Referred to Dr. Castro for colonoscopy\par \par Thrombocytopenia\par \par Platelet count was stable. Follow with CBC.\par \par \par  \par

## 2019-07-30 NOTE — HISTORY OF PRESENT ILLNESS
[No Pertinent Cardiac History] : no history of aortic stenosis, atrial fibrillation, coronary artery disease, recent myocardial infarction, or implantable device/pacemaker [Sleep Apnea] : sleep apnea [COPD] : COPD [No Adverse Anesthesia Reaction] : no adverse anesthesia reaction in self or family member [Chronic Anticoagulation] : no chronic anticoagulation [Chronic Kidney Disease] : no chronic kidney disease [Diabetes] : no diabetes [FreeTextEntry1] : Left knee surg [FreeTextEntry3] : Dr. Abraham [FreeTextEntry2] : 8/8/2019 [FreeTextEntry4] : 68 yr old female with PMH of emphysema, former smoker,  Depression  Hypoxemia, DARRIN, PFT, pulmonary HTN (last echo 30 mmhg), Pulmonary nodule and thrombocytopenia.  Pt presents for medical clearance for up coming knee Surg.  She has lost weight due to weight watchers.   \par \par Pt denies any SOB, coughing, wheezing, fever, chest pain, palpations, N&V, abdominal pain or changes to BM.\par \par Pt with complains of left knee pain and trouble sleeping due to the pain.  She is taking tramadol and Dilaudid for the pain. She takes trazodone.  She fell the other day due to unsteadies and knee pain, denies any injury. She is using a walker to ambulate.\par \par she is using the CPAP at night every night.  She is sleeping about 2 hours then wakes up and put it back on.  She is using the full face mask at night.  She is doing better with using the CPAP.  She is using it every time she is sleeping. She uses oxygen at night and she has a portable oxygen.  She is using the oxygen as needed.  \par \par she feels her breathing has gotten a lot better and her SOB.

## 2019-07-30 NOTE — HISTORY OF PRESENT ILLNESS
[Sleep Apnea] : sleep apnea [COPD] : COPD [No Pertinent Cardiac History] : no history of aortic stenosis, atrial fibrillation, coronary artery disease, recent myocardial infarction, or implantable device/pacemaker [No Adverse Anesthesia Reaction] : no adverse anesthesia reaction in self or family member [Chronic Anticoagulation] : no chronic anticoagulation [Diabetes] : no diabetes [Chronic Kidney Disease] : no chronic kidney disease [FreeTextEntry1] : Left knee surg [FreeTextEntry3] : Dr. Abraham [FreeTextEntry2] : 8/8/2019 [FreeTextEntry4] : 68 yr old female with PMH of emphysema, former smoker,  Depression  Hypoxemia, DARRIN, PFT, pulmonary HTN (last echo 30 mmhg), Pulmonary nodule and thrombocytopenia.  Pt presents for medical clearance for up coming knee Surg.  She has lost weight due to weight watchers.   \par \par Pt denies any SOB, coughing, wheezing, fever, chest pain, palpations, N&V, abdominal pain or changes to BM.\par \par Pt with complains of left knee pain and trouble sleeping due to the pain.  She is taking tramadol and Dilaudid for the pain. She takes trazodone.  She fell the other day due to unsteadies and knee pain, denies any injury. She is using a walker to ambulate.\par \par she is using the CPAP at night every night.  She is sleeping about 2 hours then wakes up and put it back on.  She is using the full face mask at night.  She is doing better with using the CPAP.  She is using it every time she is sleeping. She uses oxygen at night and she has a portable oxygen.  She is using the oxygen as needed.  \par \par she feels her breathing has gotten a lot better and her SOB.

## 2019-07-30 NOTE — PHYSICAL EXAM
[No Acute Distress] : no acute distress [Well Nourished] : well nourished [Well-Appearing] : well-appearing [Well Developed] : well developed [PERRL] : pupils equal round and reactive to light [Normal Sclera/Conjunctiva] : normal sclera/conjunctiva [EOMI] : extraocular movements intact [Normal Oropharynx] : the oropharynx was normal [Normal Outer Ear/Nose] : the outer ears and nose were normal in appearance [No Lymphadenopathy] : no lymphadenopathy [No JVD] : no jugular venous distention [Supple] : supple [Thyroid Normal, No Nodules] : the thyroid was normal and there were no nodules present [No Respiratory Distress] : no respiratory distress  [No Accessory Muscle Use] : no accessory muscle use [Clear to Auscultation] : lungs were clear to auscultation bilaterally [Regular Rhythm] : with a regular rhythm [Normal Rate] : normal rate  [No Carotid Bruits] : no carotid bruits [Normal S1, S2] : normal S1 and S2 [No Murmur] : no murmur heard [No Abdominal Bruit] : a ~M bruit was not heard ~T in the abdomen [Pedal Pulses Present] : the pedal pulses are present [No Varicosities] : no varicosities [No Edema] : there was no peripheral edema [No Palpable Aorta] : no palpable aorta [No Extremity Clubbing/Cyanosis] : no extremity clubbing/cyanosis [Non Tender] : non-tender [Soft] : abdomen soft [Non-distended] : non-distended [No Masses] : no abdominal mass palpated [Normal Bowel Sounds] : normal bowel sounds [No HSM] : no HSM [Normal Posterior Cervical Nodes] : no posterior cervical lymphadenopathy [No CVA Tenderness] : no CVA  tenderness [Normal Anterior Cervical Nodes] : no anterior cervical lymphadenopathy [No Spinal Tenderness] : no spinal tenderness [No Joint Swelling] : no joint swelling [No Rash] : no rash [Grossly Normal Strength/Tone] : grossly normal strength/tone [Coordination Grossly Intact] : coordination grossly intact [Deep Tendon Reflexes (DTR)] : deep tendon reflexes were 2+ and symmetric [No Focal Deficits] : no focal deficits [Normal Gait] : normal gait [Normal Affect] : the affect was normal [Normal Insight/Judgement] : insight and judgment were intact

## 2019-07-30 NOTE — RESULTS/DATA
[de-identified] : pending [de-identified] : pending [de-identified] : pending [de-identified] : pending

## 2019-07-30 NOTE — PHYSICAL EXAM
[No Acute Distress] : no acute distress [Well Nourished] : well nourished [Well Developed] : well developed [Well-Appearing] : well-appearing [Normal Sclera/Conjunctiva] : normal sclera/conjunctiva [EOMI] : extraocular movements intact [PERRL] : pupils equal round and reactive to light [Normal Outer Ear/Nose] : the outer ears and nose were normal in appearance [Normal Oropharynx] : the oropharynx was normal [No JVD] : no jugular venous distention [Supple] : supple [No Lymphadenopathy] : no lymphadenopathy [Thyroid Normal, No Nodules] : the thyroid was normal and there were no nodules present [No Respiratory Distress] : no respiratory distress  [No Accessory Muscle Use] : no accessory muscle use [Normal Rate] : normal rate  [Regular Rhythm] : with a regular rhythm [Clear to Auscultation] : lungs were clear to auscultation bilaterally [No Carotid Bruits] : no carotid bruits [No Murmur] : no murmur heard [Normal S1, S2] : normal S1 and S2 [Pedal Pulses Present] : the pedal pulses are present [No Abdominal Bruit] : a ~M bruit was not heard ~T in the abdomen [No Edema] : there was no peripheral edema [No Varicosities] : no varicosities [No Extremity Clubbing/Cyanosis] : no extremity clubbing/cyanosis [No Palpable Aorta] : no palpable aorta [Soft] : abdomen soft [Non-distended] : non-distended [Non Tender] : non-tender [Normal Bowel Sounds] : normal bowel sounds [No Masses] : no abdominal mass palpated [No HSM] : no HSM [No Spinal Tenderness] : no spinal tenderness [Normal Posterior Cervical Nodes] : no posterior cervical lymphadenopathy [No CVA Tenderness] : no CVA  tenderness [Normal Anterior Cervical Nodes] : no anterior cervical lymphadenopathy [No Joint Swelling] : no joint swelling [Grossly Normal Strength/Tone] : grossly normal strength/tone [No Rash] : no rash [Coordination Grossly Intact] : coordination grossly intact [Normal Gait] : normal gait [Deep Tendon Reflexes (DTR)] : deep tendon reflexes were 2+ and symmetric [No Focal Deficits] : no focal deficits [Normal Affect] : the affect was normal [Normal Insight/Judgement] : insight and judgment were intact

## 2019-07-30 NOTE — ASSESSMENT
[No Further Testing Recommended] : no further testing recommended [Patient Optimized for Surgery] : Patient optimized for surgery [As per surgery] : as per surgery [ECG] : ECG

## 2019-08-03 LAB
ALBUMIN SERPL ELPH-MCNC: 4.6 G/DL
ALP BLD-CCNC: 48 U/L
ALT SERPL-CCNC: 17 U/L
ANION GAP SERPL CALC-SCNC: 12 MMOL/L
APPEARANCE: CLEAR
APTT BLD: 31.6 SEC
AST SERPL-CCNC: 17 U/L
BASOPHILS # BLD AUTO: 0.06 K/UL
BASOPHILS NFR BLD AUTO: 0.7 %
BILIRUB SERPL-MCNC: 0.2 MG/DL
BILIRUBIN URINE: NEGATIVE
BLOOD URINE: NEGATIVE
BUN SERPL-MCNC: 18 MG/DL
CALCIUM SERPL-MCNC: 10.3 MG/DL
CHLORIDE SERPL-SCNC: 99 MMOL/L
CO2 SERPL-SCNC: 27 MMOL/L
COLOR: YELLOW
CREAT SERPL-MCNC: 0.65 MG/DL
EOSINOPHIL # BLD AUTO: 0.1 K/UL
EOSINOPHIL NFR BLD AUTO: 1.2 %
GLUCOSE QUALITATIVE U: NEGATIVE
GLUCOSE SERPL-MCNC: 103 MG/DL
HCT VFR BLD CALC: 39.9 %
HGB BLD-MCNC: 13 G/DL
IMM GRANULOCYTES NFR BLD AUTO: 0.6 %
INR PPP: 0.88 RATIO
KETONES URINE: NEGATIVE
LEUKOCYTE ESTERASE URINE: NEGATIVE
LYMPHOCYTES # BLD AUTO: 2.5 K/UL
LYMPHOCYTES NFR BLD AUTO: 29.7 %
MAN DIFF?: NORMAL
MCHC RBC-ENTMCNC: 31 PG
MCHC RBC-ENTMCNC: 32.6 GM/DL
MCV RBC AUTO: 95.2 FL
MONOCYTES # BLD AUTO: 0.79 K/UL
MONOCYTES NFR BLD AUTO: 9.4 %
NEUTROPHILS # BLD AUTO: 4.92 K/UL
NEUTROPHILS NFR BLD AUTO: 58.4 %
NITRITE URINE: NEGATIVE
PH URINE: 6
PLATELET # BLD AUTO: 100 K/UL
POTASSIUM SERPL-SCNC: 4.4 MMOL/L
PROT SERPL-MCNC: 6.8 G/DL
PROTEIN URINE: NEGATIVE
PT BLD: 10.1 SEC
RBC # BLD: 4.19 M/UL
RBC # FLD: 13.2 %
SODIUM SERPL-SCNC: 138 MMOL/L
SPECIFIC GRAVITY URINE: 1.01
UROBILINOGEN URINE: NORMAL
WBC # FLD AUTO: 8.42 K/UL

## 2019-08-07 VITALS
OXYGEN SATURATION: 94 % | DIASTOLIC BLOOD PRESSURE: 84 MMHG | SYSTOLIC BLOOD PRESSURE: 126 MMHG | TEMPERATURE: 98 F | WEIGHT: 145.73 LBS | HEIGHT: 64 IN | RESPIRATION RATE: 18 BRPM | HEART RATE: 87 BPM

## 2019-08-07 RX ORDER — TIOTROPIUM BROMIDE 18 UG/1
2 CAPSULE ORAL; RESPIRATORY (INHALATION)
Qty: 0 | Refills: 0 | DISCHARGE

## 2019-08-07 NOTE — ASU PATIENT PROFILE, ADULT - TEACHING/LEARNING LEARNING PREFERENCES
verbal instruction/individual instruction/written material verbal instruction/written material/skill demonstration/individual instruction

## 2019-08-08 ENCOUNTER — INPATIENT (INPATIENT)
Facility: HOSPITAL | Age: 69
LOS: 0 days | Discharge: HOME | DRG: 41 | End: 2019-08-09
Attending: ORTHOPAEDIC SURGERY | Admitting: ORTHOPAEDIC SURGERY
Payer: MEDICARE

## 2019-08-08 ENCOUNTER — APPOINTMENT (OUTPATIENT)
Dept: ORTHOPEDIC SURGERY | Facility: HOSPITAL | Age: 69
End: 2019-08-08
Payer: MEDICARE

## 2019-08-08 DIAGNOSIS — Z90.89 ACQUIRED ABSENCE OF OTHER ORGANS: Chronic | ICD-10-CM

## 2019-08-08 DIAGNOSIS — H26.9 UNSPECIFIED CATARACT: Chronic | ICD-10-CM

## 2019-08-08 DIAGNOSIS — Z90.711 ACQUIRED ABSENCE OF UTERUS WITH REMAINING CERVICAL STUMP: Chronic | ICD-10-CM

## 2019-08-08 DIAGNOSIS — M25.562 PAIN IN LEFT KNEE: ICD-10-CM

## 2019-08-08 DIAGNOSIS — F41.9 ANXIETY DISORDER, UNSPECIFIED: ICD-10-CM

## 2019-08-08 DIAGNOSIS — Z96.652 PRESENCE OF LEFT ARTIFICIAL KNEE JOINT: Chronic | ICD-10-CM

## 2019-08-08 DIAGNOSIS — D69.6 THROMBOCYTOPENIA, UNSPECIFIED: ICD-10-CM

## 2019-08-08 DIAGNOSIS — Z98.890 OTHER SPECIFIED POSTPROCEDURAL STATES: Chronic | ICD-10-CM

## 2019-08-08 DIAGNOSIS — J44.9 CHRONIC OBSTRUCTIVE PULMONARY DISEASE, UNSPECIFIED: ICD-10-CM

## 2019-08-08 DIAGNOSIS — G47.30 SLEEP APNEA, UNSPECIFIED: ICD-10-CM

## 2019-08-08 DIAGNOSIS — F32.9 MAJOR DEPRESSIVE DISORDER, SINGLE EPISODE, UNSPECIFIED: ICD-10-CM

## 2019-08-08 PROCEDURE — 64784 REMOVE NERVE LESION: CPT | Mod: LT

## 2019-08-08 RX ORDER — TIOTROPIUM BROMIDE AND OLODATEROL 3.124; 2.736 UG/1; UG/1
2 SPRAY, METERED RESPIRATORY (INHALATION)
Refills: 0 | Status: DISCONTINUED | OUTPATIENT
Start: 2019-08-08 | End: 2019-08-08

## 2019-08-08 RX ORDER — CEFAZOLIN SODIUM 1 G
2000 VIAL (EA) INJECTION EVERY 8 HOURS
Refills: 0 | Status: DISCONTINUED | OUTPATIENT
Start: 2019-08-08 | End: 2019-08-08

## 2019-08-08 RX ORDER — HYDROMORPHONE HYDROCHLORIDE 2 MG/ML
0.5 INJECTION INTRAMUSCULAR; INTRAVENOUS; SUBCUTANEOUS EVERY 4 HOURS
Refills: 0 | Status: DISCONTINUED | OUTPATIENT
Start: 2019-08-08 | End: 2019-08-09

## 2019-08-08 RX ORDER — SODIUM CHLORIDE 9 MG/ML
1000 INJECTION, SOLUTION INTRAVENOUS
Refills: 0 | Status: DISCONTINUED | OUTPATIENT
Start: 2019-08-08 | End: 2019-08-09

## 2019-08-08 RX ORDER — ASPIRIN/CALCIUM CARB/MAGNESIUM 324 MG
81 TABLET ORAL
Refills: 0 | Status: DISCONTINUED | OUTPATIENT
Start: 2019-08-08 | End: 2019-08-09

## 2019-08-08 RX ORDER — ONDANSETRON 8 MG/1
4 TABLET, FILM COATED ORAL EVERY 6 HOURS
Refills: 0 | Status: DISCONTINUED | OUTPATIENT
Start: 2019-08-08 | End: 2019-08-09

## 2019-08-08 RX ORDER — HYDROMORPHONE HYDROCHLORIDE 2 MG/ML
4 INJECTION INTRAMUSCULAR; INTRAVENOUS; SUBCUTANEOUS EVERY 4 HOURS
Refills: 0 | Status: DISCONTINUED | OUTPATIENT
Start: 2019-08-08 | End: 2019-08-09

## 2019-08-08 RX ORDER — POLYETHYLENE GLYCOL 3350 17 G/17G
17 POWDER, FOR SOLUTION ORAL DAILY
Refills: 0 | Status: DISCONTINUED | OUTPATIENT
Start: 2019-08-08 | End: 2019-08-09

## 2019-08-08 RX ORDER — SENNA PLUS 8.6 MG/1
2 TABLET ORAL AT BEDTIME
Refills: 0 | Status: DISCONTINUED | OUTPATIENT
Start: 2019-08-08 | End: 2019-08-09

## 2019-08-08 RX ORDER — ALPRAZOLAM 0.25 MG
1 TABLET ORAL THREE TIMES A DAY
Refills: 0 | Status: DISCONTINUED | OUTPATIENT
Start: 2019-08-08 | End: 2019-08-09

## 2019-08-08 RX ORDER — ACETAMINOPHEN 500 MG
650 TABLET ORAL EVERY 6 HOURS
Refills: 0 | Status: DISCONTINUED | OUTPATIENT
Start: 2019-08-08 | End: 2019-08-09

## 2019-08-08 RX ORDER — DOCUSATE SODIUM 100 MG
100 CAPSULE ORAL THREE TIMES A DAY
Refills: 0 | Status: DISCONTINUED | OUTPATIENT
Start: 2019-08-08 | End: 2019-08-09

## 2019-08-08 RX ORDER — FUROSEMIDE 40 MG
40 TABLET ORAL DAILY
Refills: 0 | Status: DISCONTINUED | OUTPATIENT
Start: 2019-08-08 | End: 2019-08-09

## 2019-08-08 RX ORDER — CEFAZOLIN SODIUM 1 G
2000 VIAL (EA) INJECTION EVERY 8 HOURS
Refills: 0 | Status: COMPLETED | OUTPATIENT
Start: 2019-08-08 | End: 2019-08-09

## 2019-08-08 RX ORDER — TRAZODONE HCL 50 MG
100 TABLET ORAL AT BEDTIME
Refills: 0 | Status: DISCONTINUED | OUTPATIENT
Start: 2019-08-08 | End: 2019-08-09

## 2019-08-08 RX ORDER — TIOTROPIUM BROMIDE AND OLODATEROL 3.124; 2.736 UG/1; UG/1
2 SPRAY, METERED RESPIRATORY (INHALATION) DAILY
Refills: 0 | Status: DISCONTINUED | OUTPATIENT
Start: 2019-08-08 | End: 2019-08-09

## 2019-08-08 RX ORDER — HYDROMORPHONE HYDROCHLORIDE 2 MG/ML
6 INJECTION INTRAMUSCULAR; INTRAVENOUS; SUBCUTANEOUS EVERY 4 HOURS
Refills: 0 | Status: DISCONTINUED | OUTPATIENT
Start: 2019-08-08 | End: 2019-08-09

## 2019-08-08 RX ORDER — VENLAFAXINE HCL 75 MG
150 CAPSULE, EXT RELEASE 24 HR ORAL DAILY
Refills: 0 | Status: DISCONTINUED | OUTPATIENT
Start: 2019-08-08 | End: 2019-08-09

## 2019-08-08 RX ORDER — LAMOTRIGINE 25 MG/1
200 TABLET, ORALLY DISINTEGRATING ORAL DAILY
Refills: 0 | Status: DISCONTINUED | OUTPATIENT
Start: 2019-08-08 | End: 2019-08-09

## 2019-08-08 RX ORDER — PANTOPRAZOLE SODIUM 20 MG/1
40 TABLET, DELAYED RELEASE ORAL
Refills: 0 | Status: DISCONTINUED | OUTPATIENT
Start: 2019-08-08 | End: 2019-08-09

## 2019-08-08 RX ORDER — BUPIVACAINE 13.3 MG/ML
20 INJECTION, SUSPENSION, LIPOSOMAL INFILTRATION ONCE
Refills: 0 | Status: DISCONTINUED | OUTPATIENT
Start: 2019-08-08 | End: 2019-08-09

## 2019-08-08 RX ADMIN — SENNA PLUS 2 TABLET(S): 8.6 TABLET ORAL at 22:18

## 2019-08-08 RX ADMIN — Medication 100 MILLIGRAM(S): at 17:24

## 2019-08-08 RX ADMIN — HYDROMORPHONE HYDROCHLORIDE 6 MILLIGRAM(S): 2 INJECTION INTRAMUSCULAR; INTRAVENOUS; SUBCUTANEOUS at 21:42

## 2019-08-08 RX ADMIN — HYDROMORPHONE HYDROCHLORIDE 6 MILLIGRAM(S): 2 INJECTION INTRAMUSCULAR; INTRAVENOUS; SUBCUTANEOUS at 22:42

## 2019-08-08 RX ADMIN — HYDROMORPHONE HYDROCHLORIDE 0.5 MILLIGRAM(S): 2 INJECTION INTRAMUSCULAR; INTRAVENOUS; SUBCUTANEOUS at 14:35

## 2019-08-08 RX ADMIN — HYDROMORPHONE HYDROCHLORIDE 0.5 MILLIGRAM(S): 2 INJECTION INTRAMUSCULAR; INTRAVENOUS; SUBCUTANEOUS at 18:44

## 2019-08-08 RX ADMIN — Medication 650 MILLIGRAM(S): at 17:25

## 2019-08-08 RX ADMIN — Medication 2000 MILLIGRAM(S): at 17:25

## 2019-08-08 RX ADMIN — HYDROMORPHONE HYDROCHLORIDE 6 MILLIGRAM(S): 2 INJECTION INTRAMUSCULAR; INTRAVENOUS; SUBCUTANEOUS at 18:44

## 2019-08-08 RX ADMIN — HYDROMORPHONE HYDROCHLORIDE 0.5 MILLIGRAM(S): 2 INJECTION INTRAMUSCULAR; INTRAVENOUS; SUBCUTANEOUS at 23:54

## 2019-08-08 RX ADMIN — HYDROMORPHONE HYDROCHLORIDE 6 MILLIGRAM(S): 2 INJECTION INTRAMUSCULAR; INTRAVENOUS; SUBCUTANEOUS at 17:25

## 2019-08-08 RX ADMIN — Medication 100 MILLIGRAM(S): at 22:18

## 2019-08-08 RX ADMIN — HYDROMORPHONE HYDROCHLORIDE 0.5 MILLIGRAM(S): 2 INJECTION INTRAMUSCULAR; INTRAVENOUS; SUBCUTANEOUS at 19:28

## 2019-08-08 RX ADMIN — Medication 650 MILLIGRAM(S): at 18:44

## 2019-08-08 RX ADMIN — Medication 81 MILLIGRAM(S): at 17:25

## 2019-08-08 RX ADMIN — HYDROMORPHONE HYDROCHLORIDE 0.5 MILLIGRAM(S): 2 INJECTION INTRAMUSCULAR; INTRAVENOUS; SUBCUTANEOUS at 22:54

## 2019-08-08 NOTE — PROGRESS NOTE ADULT - SUBJECTIVE AND OBJECTIVE BOX
POST OPERATIVE DAY #:  0  STATUS POST: Open exploration of left peroneal nerve                 SUBJECTIVE: Patient seen and examined. Doing well. Mild pain to left knee. Patient denies any CP, SOB, fever, chills, numbness.    Pain:  well controlled      OBJECTIVE:     Vital Signs Last 24 Hrs  T(C): 36.4 (08 Aug 2019 11:45), Max: 36.4 (08 Aug 2019 11:45)  T(F): 97.5 (08 Aug 2019 11:45), Max: 97.5 (08 Aug 2019 11:45)  HR: 82 (08 Aug 2019 14:45) (64 - 82)  BP: 125/69 (08 Aug 2019 14:45) (91/55 - 125/69)  BP(mean): 90 (08 Aug 2019 14:45) (69 - 90)  RR: 27 (08 Aug 2019 14:45) (7 - 36)  SpO2: 96% (08 Aug 2019 14:45) (89% - 97%)    Affected extremity:          Dressing: clean/dry/intact          Sensation: intact to light touch          Motor exam: 5/5/ to EHL/TA/GS         warm, well-perfused; capillary refill < 3 seconds              I&O's Detail    08 Aug 2019 07:01  -  08 Aug 2019 15:58  --------------------------------------------------------  IN:    lactated ringers.: 200 mL  Total IN: 200 mL    OUT:  Total OUT: 0 mL    Total NET: 200 mL          LABS:                MEDICATIONS:  ceFAZolin  Injectable. 2000 milliGRAM(s) IV Push every 8 hours    acetaminophen   Tablet .. 650 milliGRAM(s) Oral every 6 hours  ALPRAZolam 1 milliGRAM(s) Oral three times a day PRN  HYDROmorphone   Tablet 4 milliGRAM(s) Oral every 4 hours PRN  HYDROmorphone   Tablet 6 milliGRAM(s) Oral every 4 hours PRN  HYDROmorphone  Injectable 0.5 milliGRAM(s) IV Push every 4 hours PRN  lamoTRIgine 200 milliGRAM(s) Oral daily  ondansetron Injectable 4 milliGRAM(s) IV Push every 6 hours PRN  traZODone 100 milliGRAM(s) Oral at bedtime  venlafaxine XR. 150 milliGRAM(s) Oral daily    aspirin enteric coated 81 milliGRAM(s) Oral two times a day      ASSESSMENT AND PLAN:     1. Analgesic pain control  2. DVT prophylaxis: ASA       SCDs      Other:   4. Weight Bearing Status:  Weight bearing as tolerated        5. Disposition: Pending PT eval

## 2019-08-08 NOTE — H&P ADULT - HISTORY OF PRESENT ILLNESS
68F Hx left TKR c/o left knee pain worsened over time without improvement. Failed conservative treatments. Denies numbness, tingling. Ambulates with RW. Presents today for elective Left knee peroneal nerve decompression/excision.

## 2019-08-08 NOTE — H&P ADULT - NSHPPHYSICALEXAM_GEN_ALL_CORE
LLE: Knee decreased ROM secondary to pain, sensation intact, DP 2+, brisk cap refill, EHL/FHL/TA/GS 5/5  Rest of PE per MD clearance

## 2019-08-08 NOTE — H&P ADULT - NSICDXPASTMEDICALHX_GEN_ALL_CORE_FT
PAST MEDICAL HISTORY:  Anxiety     Cellulitis     Centrilobular emphysema     COPD (chronic obstructive pulmonary disease)     Depression     Hypoxemia     PFO (patent foramen ovale)     Pneumonia     Pulmonary hypertension     Sleep apnea     Thrombocytopenia

## 2019-08-09 ENCOUNTER — TRANSCRIPTION ENCOUNTER (OUTPATIENT)
Age: 69
End: 2019-08-09

## 2019-08-09 VITALS
OXYGEN SATURATION: 96 % | SYSTOLIC BLOOD PRESSURE: 168 MMHG | DIASTOLIC BLOOD PRESSURE: 75 MMHG | TEMPERATURE: 98 F | HEART RATE: 78 BPM | RESPIRATION RATE: 18 BRPM

## 2019-08-09 DIAGNOSIS — Q21.1 ATRIAL SEPTAL DEFECT: ICD-10-CM

## 2019-08-09 DIAGNOSIS — R09.02 HYPOXEMIA: ICD-10-CM

## 2019-08-09 DIAGNOSIS — J43.2 CENTRILOBULAR EMPHYSEMA: ICD-10-CM

## 2019-08-09 DIAGNOSIS — I27.20 PULMONARY HYPERTENSION, UNSPECIFIED: ICD-10-CM

## 2019-08-09 DIAGNOSIS — G47.33 OBSTRUCTIVE SLEEP APNEA (ADULT) (PEDIATRIC): ICD-10-CM

## 2019-08-09 DIAGNOSIS — Z98.890 OTHER SPECIFIED POSTPROCEDURAL STATES: ICD-10-CM

## 2019-08-09 LAB
ANION GAP SERPL CALC-SCNC: 12 MMOL/L — SIGNIFICANT CHANGE UP (ref 5–17)
BUN SERPL-MCNC: 19 MG/DL — SIGNIFICANT CHANGE UP (ref 7–23)
CALCIUM SERPL-MCNC: 9.6 MG/DL — SIGNIFICANT CHANGE UP (ref 8.4–10.5)
CHLORIDE SERPL-SCNC: 104 MMOL/L — SIGNIFICANT CHANGE UP (ref 96–108)
CO2 SERPL-SCNC: 26 MMOL/L — SIGNIFICANT CHANGE UP (ref 22–31)
CREAT SERPL-MCNC: 0.54 MG/DL — SIGNIFICANT CHANGE UP (ref 0.5–1.3)
GLUCOSE SERPL-MCNC: 107 MG/DL — HIGH (ref 70–99)
HCT VFR BLD CALC: 36.5 % — SIGNIFICANT CHANGE UP (ref 34.5–45)
HCV AB S/CO SERPL IA: 0.04 S/CO — SIGNIFICANT CHANGE UP
HCV AB SERPL-IMP: SIGNIFICANT CHANGE UP
HGB BLD-MCNC: 11.6 G/DL — SIGNIFICANT CHANGE UP (ref 11.5–15.5)
MCHC RBC-ENTMCNC: 30.9 PG — SIGNIFICANT CHANGE UP (ref 27–34)
MCHC RBC-ENTMCNC: 31.8 GM/DL — LOW (ref 32–36)
MCV RBC AUTO: 97.1 FL — SIGNIFICANT CHANGE UP (ref 80–100)
NRBC # BLD: 0 /100 WBCS — SIGNIFICANT CHANGE UP (ref 0–0)
PLATELET # BLD AUTO: 87 K/UL — LOW (ref 150–400)
POTASSIUM SERPL-MCNC: 4.1 MMOL/L — SIGNIFICANT CHANGE UP (ref 3.5–5.3)
POTASSIUM SERPL-SCNC: 4.1 MMOL/L — SIGNIFICANT CHANGE UP (ref 3.5–5.3)
RBC # BLD: 3.76 M/UL — LOW (ref 3.8–5.2)
RBC # FLD: 12.9 % — SIGNIFICANT CHANGE UP (ref 10.3–14.5)
SODIUM SERPL-SCNC: 142 MMOL/L — SIGNIFICANT CHANGE UP (ref 135–145)
WBC # BLD: 12.57 K/UL — HIGH (ref 3.8–10.5)
WBC # FLD AUTO: 12.57 K/UL — HIGH (ref 3.8–10.5)

## 2019-08-09 PROCEDURE — 99233 SBSQ HOSP IP/OBS HIGH 50: CPT | Mod: GC

## 2019-08-09 RX ORDER — ACETAMINOPHEN 500 MG
2 TABLET ORAL
Qty: 0 | Refills: 0 | DISCHARGE
Start: 2019-08-09

## 2019-08-09 RX ORDER — ALPRAZOLAM 0.25 MG
1 TABLET ORAL
Qty: 0 | Refills: 0 | DISCHARGE
Start: 2019-08-09

## 2019-08-09 RX ORDER — HYDROMORPHONE HYDROCHLORIDE 2 MG/ML
1 INJECTION INTRAMUSCULAR; INTRAVENOUS; SUBCUTANEOUS
Qty: 0 | Refills: 0 | DISCHARGE

## 2019-08-09 RX ORDER — ALPRAZOLAM 0.25 MG
1 TABLET ORAL
Qty: 0 | Refills: 0 | DISCHARGE

## 2019-08-09 RX ORDER — ASPIRIN/CALCIUM CARB/MAGNESIUM 324 MG
1 TABLET ORAL
Qty: 60 | Refills: 0
Start: 2019-08-09 | End: 2019-09-07

## 2019-08-09 RX ORDER — CELECOXIB 200 MG/1
1 CAPSULE ORAL
Qty: 60 | Refills: 0
Start: 2019-08-09 | End: 2019-09-07

## 2019-08-09 RX ORDER — POLYETHYLENE GLYCOL 3350 17 G/17G
17 POWDER, FOR SOLUTION ORAL
Qty: 0 | Refills: 0 | DISCHARGE
Start: 2019-08-09

## 2019-08-09 RX ORDER — DOCUSATE SODIUM 100 MG
1 CAPSULE ORAL
Qty: 0 | Refills: 0 | DISCHARGE
Start: 2019-08-09

## 2019-08-09 RX ORDER — KETOROLAC TROMETHAMINE 30 MG/ML
15 SYRINGE (ML) INJECTION ONCE
Refills: 0 | Status: DISCONTINUED | OUTPATIENT
Start: 2019-08-09 | End: 2019-08-09

## 2019-08-09 RX ORDER — HYDROMORPHONE HYDROCHLORIDE 2 MG/ML
1 INJECTION INTRAMUSCULAR; INTRAVENOUS; SUBCUTANEOUS
Qty: 40 | Refills: 0
Start: 2019-08-09 | End: 2019-08-15

## 2019-08-09 RX ADMIN — HYDROMORPHONE HYDROCHLORIDE 6 MILLIGRAM(S): 2 INJECTION INTRAMUSCULAR; INTRAVENOUS; SUBCUTANEOUS at 06:03

## 2019-08-09 RX ADMIN — HYDROMORPHONE HYDROCHLORIDE 6 MILLIGRAM(S): 2 INJECTION INTRAMUSCULAR; INTRAVENOUS; SUBCUTANEOUS at 01:46

## 2019-08-09 RX ADMIN — Medication 650 MILLIGRAM(S): at 01:39

## 2019-08-09 RX ADMIN — HYDROMORPHONE HYDROCHLORIDE 0.5 MILLIGRAM(S): 2 INJECTION INTRAMUSCULAR; INTRAVENOUS; SUBCUTANEOUS at 10:47

## 2019-08-09 RX ADMIN — PANTOPRAZOLE SODIUM 40 MILLIGRAM(S): 20 TABLET, DELAYED RELEASE ORAL at 06:03

## 2019-08-09 RX ADMIN — Medication 15 MILLIGRAM(S): at 09:15

## 2019-08-09 RX ADMIN — Medication 15 MILLIGRAM(S): at 10:00

## 2019-08-09 RX ADMIN — Medication 2000 MILLIGRAM(S): at 01:38

## 2019-08-09 RX ADMIN — Medication 650 MILLIGRAM(S): at 07:03

## 2019-08-09 RX ADMIN — HYDROMORPHONE HYDROCHLORIDE 6 MILLIGRAM(S): 2 INJECTION INTRAMUSCULAR; INTRAVENOUS; SUBCUTANEOUS at 02:46

## 2019-08-09 RX ADMIN — Medication 650 MILLIGRAM(S): at 12:41

## 2019-08-09 RX ADMIN — Medication 650 MILLIGRAM(S): at 06:03

## 2019-08-09 RX ADMIN — HYDROMORPHONE HYDROCHLORIDE 6 MILLIGRAM(S): 2 INJECTION INTRAMUSCULAR; INTRAVENOUS; SUBCUTANEOUS at 07:03

## 2019-08-09 RX ADMIN — LAMOTRIGINE 200 MILLIGRAM(S): 25 TABLET, ORALLY DISINTEGRATING ORAL at 11:42

## 2019-08-09 RX ADMIN — Medication 650 MILLIGRAM(S): at 02:10

## 2019-08-09 RX ADMIN — POLYETHYLENE GLYCOL 3350 17 GRAM(S): 17 POWDER, FOR SOLUTION ORAL at 11:41

## 2019-08-09 RX ADMIN — HYDROMORPHONE HYDROCHLORIDE 4 MILLIGRAM(S): 2 INJECTION INTRAMUSCULAR; INTRAVENOUS; SUBCUTANEOUS at 02:00

## 2019-08-09 RX ADMIN — Medication 100 MILLIGRAM(S): at 06:03

## 2019-08-09 RX ADMIN — Medication 650 MILLIGRAM(S): at 11:41

## 2019-08-09 RX ADMIN — Medication 150 MILLIGRAM(S): at 06:03

## 2019-08-09 RX ADMIN — Medication 81 MILLIGRAM(S): at 06:03

## 2019-08-09 RX ADMIN — HYDROMORPHONE HYDROCHLORIDE 4 MILLIGRAM(S): 2 INJECTION INTRAMUSCULAR; INTRAVENOUS; SUBCUTANEOUS at 13:14

## 2019-08-09 RX ADMIN — HYDROMORPHONE HYDROCHLORIDE 0.5 MILLIGRAM(S): 2 INJECTION INTRAMUSCULAR; INTRAVENOUS; SUBCUTANEOUS at 13:03

## 2019-08-09 RX ADMIN — Medication 150 MILLIGRAM(S): at 11:42

## 2019-08-09 RX ADMIN — Medication 40 MILLIGRAM(S): at 06:03

## 2019-08-09 NOTE — PHYSICAL THERAPY INITIAL EVALUATION ADULT - RANGE OF MOTION EXAMINATION, REHAB EVAL
L knee AROM limited secondary to pain. Otherwise, no ROM deficits were identified. L knee flexion AROM -14 degrees- 45 degrees limited secondary to pain. Otherwise, no ROM deficits were identified. deficits as listed below/decreased L knee AROM

## 2019-08-09 NOTE — PROGRESS NOTE ADULT - ASSESSMENT
A/P: 68yFemale s/p L knee sensory denervation procedure  - Stable  - Pain/Nausea Control inadequate this AM -- will increase Dilaudid regiment  - Home meds  - AM labs unremarkable  - DVT ppx: ASA 81 BID  - WBS: WBAT LLE  - Plan to d/c today pending adequate pain control      Ortho Pager [phone]

## 2019-08-09 NOTE — PROGRESS NOTE ADULT - ATTENDING COMMENTS
Patient seen and examined with house-staff during bedside rounds.  Resident note read, including vitals, physical findings, laboratory data, and radiological reports.   Revisions included below.  Direct personal management at bed side and extensive interpretation of the data.  Plan was outlined and discussed in details with the housestaff.  Decision making of high complexity  Action taken for acute disease activity to reflect the level of care provided:  - medication reconciliation  - review laboratory data  cleared for DC

## 2019-08-09 NOTE — PROGRESS NOTE ADULT - SUBJECTIVE AND OBJECTIVE BOX
ORTHO NOTE    [x ] Pt seen/examined at 8:45am with daughter present  [ ] Pt without any complaints/in NAD.    [x ] Pt complains of: severe left knee pain uncontrolled by oral hydromorphone      ROS: [ ] Fever  [ ] Chills  [ ] CP [ ] SOB [ ] Dysnea  [ ] Palpitations [ ] Cough [ ] N/V/C/D [ ] Paresthia [ ] Other     [x ] ROS  otherwise negative    .    PHYSICAL EXAM:    Vital Signs Last 24 Hrs  T(C): 37.2 (09 Aug 2019 09:20), Max: 37.5 (08 Aug 2019 17:08)  T(F): 99 (09 Aug 2019 09:20), Max: 99.5 (08 Aug 2019 17:08)  HR: 66 (09 Aug 2019 09:20) (54 - 106)  BP: 131/68 (09 Aug 2019 09:20) (91/55 - 149/82)  BP(mean): 79 (08 Aug 2019 15:45) (69 - 90)  RR: 18 (09 Aug 2019 09:20) (7 - 54)  SpO2: 94% (09 Aug 2019 08:50) (89% - 98%)    I&O's Detail    08 Aug 2019 07:01  -  09 Aug 2019 07:00  --------------------------------------------------------  IN:    lactated ringers.: 500 mL  Total IN: 500 mL    OUT:    Intermittent Catheterization - Urethral: 900 mL    Voided: 2 mL  Total OUT: 902 mL    Total NET: -402 mL           CAPILLARY BLOOD GLUCOSE                      Neuro: AAOX3    Lungs: nonlabored, Spo2 wnl on RA    CV:nsr, HR 60s    ABD: soft, nontender    Ext:  left knee medial and lateral gauze/abdominal pad/tegaderm  left foot fifth digit absent  left LE sensation: silt  left LE vascular: warm, pedal pulse 2+  left LE motor: 5/5 ehl/fhl/gs/ta    LABS                        11.6   12.57 )-----------( 87       ( 09 Aug 2019 08:28 )             36.5                                08-09    142  |  104  |  19  ----------------------------<  107<H>  4.1   |  26  |  0.54    Ca    9.6      09 Aug 2019 08:16        [ ] Other Labs  [ ] None ordered            Please check or Koi when present:  •  Heart Failure:    [ ] Acute        [ ]  Acute on Chronic        [ ] Chronic         [ ] Diastolic     [ ]  Combined    •  VENANCIO:     [ ] ATN        [ ]  Renal medullary necrosis       [ ]  Renal cortical necrosis                  [ ] Other pathological Lesion:  •  CKD:  [ ] Stage I   [ ] Stage II  [ ] Stage III    [ ]Stage IV   [ ]  CKD V   [ ]  Other/Unspecified:    •  Abdominal Nutritional Status:   [ ] Malnutrition-See Nutrition note    [ ] Cachexia   [ ]  Other        [ ] Supplement ordered:            [ ] Morbid Obesity: BMI >=40         ASSESSMENT/PLAN:      STATUS POST: Exploration of left peroneal nerve 08-Aug-2019  pain control- toradol 15mg x1, acetaminophen 650mg q 6 mild pain, hydromorphone 4-6mg q 4 prn moderate to severe pain, hydromorphone 0.5mg prn breakthrough pain, ice cryocuff added  bowel regimen  anxiety- continue home psychiatric medications, therapeutic communication provided  functionally independent with PT  CONTINUE:          [ ] PT- wbat    [ ] DVT PPX- scd    [ ] Pain Mgt- po meds    [ ] Dispo plan- home, no needs

## 2019-08-09 NOTE — DISCHARGE NOTE PROVIDER - NSDCCPCAREPLAN_GEN_ALL_CORE_FT
PRINCIPAL DISCHARGE DIAGNOSIS  Diagnosis: Knee pain, left  Assessment and Plan of Treatment: Knee pain, left

## 2019-08-09 NOTE — DISCHARGE NOTE PROVIDER - NSDCFUADDINST_GEN_ALL_CORE_FT
Weight bearing as tolerated on left leg with assistive device  No strenuous activity, heavy lifting, driving, tub bathing, or returning to work until cleared by MD.  You may shower--dressing is waterproof.  Keep left knee gauze/abdominal pad/tegaderm dressing on.  Dr. Abraham will remove dressing at follow up appointment  Wear left knee ice cryocuff as tolerated for comfort  Call Dr. Abraham to schedule an appt within 10-14 days.  If you don't have a bowel movement by post op day 3, then take Milk of Magnesia (over the counter).  If no bowel movement by at least post op day 5, then use a Dulcolax suppository (over the counter) and/or a Fleets enema--if still no bowel movement, call your MD.  Contact your doctor if you experience: fever greater than 101.5, chills, chest pain, difficulty breathing, bleeding, redness or heat around the incision.

## 2019-08-09 NOTE — PHYSICAL THERAPY INITIAL EVALUATION ADULT - MANUAL MUSCLE TESTING RESULTS, REHAB EVAL
Quick Note:    LMTCB  ______ Bilat LE >3+/5 strength as demonstrated by standing dynamic balance/grossly assessed due to

## 2019-08-09 NOTE — PHYSICAL THERAPY INITIAL EVALUATION ADULT - ADDITIONAL COMMENTS
Patient reports 1 fall in last 6 months secondary to tripping. Patient reports prior use of rollator. Patient denies HHA. Patient lives in elevator apartment building with no steps to enter.

## 2019-08-09 NOTE — PROGRESS NOTE ADULT - SUBJECTIVE AND OBJECTIVE BOX
Ortho Note    Pt in significant pain this AM  States she would like to be d/c'ed home today  Denies CP, SOB, N/V, numbness/tingling     Vital Signs Last 24 Hrs  T(C): 37.2 (09 Aug 2019 09:20), Max: 37.5 (08 Aug 2019 17:08)  T(F): 99 (09 Aug 2019 09:20), Max: 99.5 (08 Aug 2019 17:08)  HR: 66 (09 Aug 2019 09:20) (54 - 106)  BP: 131/68 (09 Aug 2019 09:20) (114/58 - 149/82)  BP(mean): 79 (08 Aug 2019 15:45) (79 - 90)  RR: 18 (09 Aug 2019 09:20) (17 - 54)  SpO2: 94% (09 Aug 2019 08:50) (89% - 98%)      VSS  General: A&Ox3, NAD  LLE: Gauze/ Abd / Tegaderm DSG x2 C/D/I  Pulses: Foot WWP; DP pulse 2+; Cap refill < 2 sec  Sensation: SILT distally and symmetric to contralateral extremity  Motor: TA/EHL/FHL/GS 5/5 and symmetric to contralateral extremity                          11.6   12.57 )-----------( 87       ( 09 Aug 2019 08:28 )             36.5     09 Aug 2019 08:16    142    |  104    |  19     ----------------------------<  107    4.1     |  26     |  0.54     Ca    9.6        09 Aug 2019 08:16

## 2019-08-09 NOTE — PROGRESS NOTE ADULT - SUBJECTIVE AND OBJECTIVE BOX
Interval Events: Reviewed  Patient seen and examined at bedside.    Patient is a 68y old  Female who presents with a chief complaint of Left knee pain (09 Aug 2019 14:09)    she is doing well and she is ambulating.  Pain is better controlled  PAST MEDICAL & SURGICAL HISTORY:  Sleep apnea  Anxiety  Pulmonary hypertension  PFO (patent foramen ovale)  Hypoxemia  Depression  Centrilobular emphysema  COPD (chronic obstructive pulmonary disease)  Pneumonia  Thrombocytopenia  Cellulitis  Bilateral cataracts  History of surgery: bladder lift  History of tonsillectomy  History of partial hysterectomy  S/P insertion of spinal cord stimulator  History of left knee replacement      MEDICATIONS:  Pulmonary:  tiotropium 2.5 MICROgram(s)/olodaterol 2.5 MICROgram(s) (STIOLTO) Inhaler 2 Puff(s) Inhalation daily    Antimicrobials:    Anticoagulants:  aspirin enteric coated 81 milliGRAM(s) Oral two times a day    Cardiac:  furosemide    Tablet 40 milliGRAM(s) Oral daily      Allergies    iodine (Anaphylaxis)    Intolerances        Vital Signs Last 24 Hrs  T(C): 36.6 (09 Aug 2019 16:14), Max: 37.2 (09 Aug 2019 09:20)  T(F): 97.8 (09 Aug 2019 16:14), Max: 99 (09 Aug 2019 09:20)  HR: 78 (09 Aug 2019 16:14) (54 - 84)  BP: 168/75 (09 Aug 2019 16:14) (114/58 - 168/75)  BP(mean): --  RR: 18 (09 Aug 2019 16:14) (17 - 20)  SpO2: 96% (09 Aug 2019 16:14) (94% - 98%)    08-08 @ 07:01  -  08-09 @ 07:00  --------------------------------------------------------  IN: 500 mL / OUT: 902 mL / NET: -402 mL          LABS:      CBC Full  -  ( 09 Aug 2019 08:28 )  WBC Count : 12.57 K/uL  RBC Count : 3.76 M/uL  Hemoglobin : 11.6 g/dL  Hematocrit : 36.5 %  Platelet Count - Automated : 87 K/uL  Mean Cell Volume : 97.1 fl  Mean Cell Hemoglobin : 30.9 pg  Mean Cell Hemoglobin Concentration : 31.8 gm/dL  Auto Neutrophil # : x  Auto Lymphocyte # : x  Auto Monocyte # : x  Auto Eosinophil # : x  Auto Basophil # : x  Auto Neutrophil % : x  Auto Lymphocyte % : x  Auto Monocyte % : x  Auto Eosinophil % : x  Auto Basophil % : x    08-09    142  |  104  |  19  ----------------------------<  107<H>  4.1   |  26  |  0.54    Ca    9.6      09 Aug 2019 08:16                          RADIOLOGY & ADDITIONAL STUDIES (The following images were personally reviewed):  Martínez:                                     No  Urine output:                       adequate  DVT prophylaxis:                 Yes  Flattus:                                  Yes  Bowel movement:              No

## 2019-08-09 NOTE — DISCHARGE NOTE NURSING/CASE MANAGEMENT/SOCIAL WORK - NSDCDPATPORTLINK_GEN_ALL_CORE
You can access the [facility] Patient Portal, offered by [facility], by registering with the following website: [email or web address]

## 2019-08-09 NOTE — PHYSICAL THERAPY INITIAL EVALUATION ADULT - GENERAL OBSERVATIONS, REHAB EVAL
Patient received semi-supine in bed, complaining of 10/10 L knee pain (Pt pre-medicated, RN aware), wound C/D/I, +heplock, +L knee ice pack, +bilat SCD, +tele, RN aware.

## 2019-08-09 NOTE — DISCHARGE NOTE PROVIDER - HOSPITAL COURSE
Admit    OR- Exploration of left peroneal nerve 08-Aug-2019     Periop Antibx    DVT ppx    PT     Pain mgt

## 2019-08-09 NOTE — DISCHARGE NOTE PROVIDER - CARE PROVIDER_API CALL
Bryan Abraham)  Orthopaedic Surgery  [address]  [place], [place] [address]  Phone: [phone]  Fax: [phone]  Follow Up Time:

## 2019-08-12 PROCEDURE — 86803 HEPATITIS C AB TEST: CPT

## 2019-08-12 PROCEDURE — 97161 PT EVAL LOW COMPLEX 20 MIN: CPT

## 2019-08-12 PROCEDURE — 80048 BASIC METABOLIC PNL TOTAL CA: CPT

## 2019-08-12 PROCEDURE — 36415 COLL VENOUS BLD VENIPUNCTURE: CPT

## 2019-08-12 PROCEDURE — 94660 CPAP INITIATION&MGMT: CPT

## 2019-08-12 PROCEDURE — 85027 COMPLETE CBC AUTOMATED: CPT

## 2019-08-16 ENCOUNTER — RX RENEWAL (OUTPATIENT)
Age: 69
End: 2019-08-16

## 2019-08-19 DIAGNOSIS — Q21.1 ATRIAL SEPTAL DEFECT: ICD-10-CM

## 2019-08-19 DIAGNOSIS — G47.33 OBSTRUCTIVE SLEEP APNEA (ADULT) (PEDIATRIC): ICD-10-CM

## 2019-08-19 DIAGNOSIS — D69.6 THROMBOCYTOPENIA, UNSPECIFIED: ICD-10-CM

## 2019-08-19 DIAGNOSIS — F41.9 ANXIETY DISORDER, UNSPECIFIED: ICD-10-CM

## 2019-08-19 DIAGNOSIS — M25.562 PAIN IN LEFT KNEE: ICD-10-CM

## 2019-08-19 DIAGNOSIS — Z98.890 OTHER SPECIFIED POSTPROCEDURAL STATES: ICD-10-CM

## 2019-08-19 DIAGNOSIS — R09.02 HYPOXEMIA: ICD-10-CM

## 2019-08-19 DIAGNOSIS — Z88.8 ALLERGY STATUS TO OTHER DRUGS, MEDICAMENTS AND BIOLOGICAL SUBSTANCES STATUS: ICD-10-CM

## 2019-08-19 DIAGNOSIS — Z87.891 PERSONAL HISTORY OF NICOTINE DEPENDENCE: ICD-10-CM

## 2019-08-19 DIAGNOSIS — Z96.652 PRESENCE OF LEFT ARTIFICIAL KNEE JOINT: ICD-10-CM

## 2019-08-19 DIAGNOSIS — Z99.89 DEPENDENCE ON OTHER ENABLING MACHINES AND DEVICES: ICD-10-CM

## 2019-08-19 DIAGNOSIS — I27.20 PULMONARY HYPERTENSION, UNSPECIFIED: ICD-10-CM

## 2019-08-19 DIAGNOSIS — Z91.041 RADIOGRAPHIC DYE ALLERGY STATUS: ICD-10-CM

## 2019-08-19 DIAGNOSIS — G57.82 OTHER SPECIFIED MONONEUROPATHIES OF LEFT LOWER LIMB: ICD-10-CM

## 2019-08-19 DIAGNOSIS — F32.9 MAJOR DEPRESSIVE DISORDER, SINGLE EPISODE, UNSPECIFIED: ICD-10-CM

## 2019-08-19 DIAGNOSIS — J43.2 CENTRILOBULAR EMPHYSEMA: ICD-10-CM

## 2019-08-21 ENCOUNTER — APPOINTMENT (OUTPATIENT)
Dept: ORTHOPEDIC SURGERY | Facility: CLINIC | Age: 69
End: 2019-08-21
Payer: MEDICARE

## 2019-08-21 PROCEDURE — 99024 POSTOP FOLLOW-UP VISIT: CPT

## 2019-08-23 NOTE — HISTORY OF PRESENT ILLNESS
[de-identified] : First f/u after LK peroneal nerve resection. Has improved pain, well controlled with dilaudid. Happy so far, no complaints at this time.

## 2019-08-23 NOTE — ASSESSMENT
[FreeTextEntry1] : A/P\par 2 weeks s/p LK peroneal nerve resection. Staples removed, steri strips applied, wound care instructions given. F/U 1 month. Should continue to improved daily.\par \par All medical record entries made by the PA/Scribe/Fellow are at my, Dr. Bryan Abraham's direction and personally dictated by me on 08/21/2019]. I have reviewed the chart and agree that the record accurately reflects my personal performance of the history, physical exam, assessment, and plan. I have also personally directed reviewed, and agreed with the chart.\par

## 2019-08-23 NOTE — PHYSICAL EXAM
[de-identified] : Focused exam LK: well healed medial and lateral incisions closed with staples. No wound erythema or discharge.

## 2019-08-27 RX ORDER — HYDROMORPHONE HYDROCHLORIDE 4 MG/1
4 TABLET ORAL
Qty: 120 | Refills: 0 | Status: ACTIVE | COMMUNITY
Start: 2018-07-04 | End: 1900-01-01

## 2019-09-17 ENCOUNTER — APPOINTMENT (OUTPATIENT)
Dept: PULMONOLOGY | Facility: CLINIC | Age: 69
End: 2019-09-17
Payer: MEDICARE

## 2019-09-17 VITALS
RESPIRATION RATE: 12 BRPM | WEIGHT: 155 LBS | HEIGHT: 66 IN | OXYGEN SATURATION: 95 % | BODY MASS INDEX: 24.91 KG/M2 | HEART RATE: 93 BPM | SYSTOLIC BLOOD PRESSURE: 120 MMHG | TEMPERATURE: 98.8 F | DIASTOLIC BLOOD PRESSURE: 80 MMHG

## 2019-09-17 DIAGNOSIS — Z23 ENCOUNTER FOR IMMUNIZATION: ICD-10-CM

## 2019-09-17 PROCEDURE — G0008: CPT

## 2019-09-17 PROCEDURE — 99214 OFFICE O/P EST MOD 30 MIN: CPT | Mod: 25

## 2019-09-17 PROCEDURE — 90662 IIV NO PRSV INCREASED AG IM: CPT

## 2019-09-17 NOTE — HISTORY OF PRESENT ILLNESS
[FreeTextEntry1] : Pt is here and following up post left knee surg [de-identified] : She is doing will post surg and having less pain in the knee. She is doing physical therapy on her own.  She is using ice machine on the knee for 20 mins which helps with the pain.  She is using the walker and ambulating without any pain.  She denies any falls. \par \par She lost weight and is doing weight watchers.  Her appetite is good. \par \par Denies any fever, coughing, SOB, wheezing.\par \par S

## 2019-09-17 NOTE — ASSESSMENT
[FreeTextEntry1] : Last echo 10/2018 estimated Pulmonary artery pressure 30 mmhg. compared to prior study 6/18 RV function and PASP has improved. EF 55%.  follow with echo, ordered new echo to evaluate PFO and Pulmonary HTN\par \par Knee pain\par \par She is post op and doing well.  She is doing well and cutting down on her narcotics.  \par \par \par Obstructive sleep apnea\par \par She is compliant with his CPAP and tolerating full face mask. She has limited sleep at night only 2 hours. Instructed her to use when she naps as well.  Pt has lost weight once her weight loss plateaus to follow with repeat sleep study. \par \par COPD\par \par The patient was clinically stable and to continue on bronchodilator. Pt is on LABA/LAMA  has bene given samples of stioloto in the past but have no inhalers to give her today.  [facility] $280 co pay and stiolto co pay over $300 unable to afford co pay and relies on samples. Continue with weight loss on weight watchers. Her SOB has improved with weight loss\par \par \par Pulmonary nodule\par \par Patient had a CT scan biopsy 11/2018. Chest x-ray revealed no pneumothorax. Exam was normal and oxygen saturation is at baseline. CT scan to be repeated 10/2019 \par \par Hypertension\par \par Continue current meds she is compliant with oxygen at night and CPAP.\par \par Health maintenance\par - Follow mammogram\par - Referred to Dr. Castro for colonoscopy\par \par Thrombocytopenia\par \par Platelet count was stable. Follow with CBC next visit. \par \par Pt given flu vaccine in the right deltoid without any reactions. \par \par  \par  \par

## 2019-09-17 NOTE — HISTORY OF PRESENT ILLNESS
[FreeTextEntry1] : Pt is here and following up post left knee surg [de-identified] : She is doing will post surg and having less pain in the knee. She is doing physical therapy on her own.  She is using ice machine on the knee for 20 mins which helps with the pain.  She is using the walker and ambulating without any pain.  She denies any falls. \par \par She lost weight and is doing weight watchers.  Her appetite is good. \par \par Denies any fever, coughing, SOB, wheezing.\par \par S

## 2019-09-17 NOTE — PHYSICAL EXAM
[No Acute Distress] : no acute distress [Well Nourished] : well nourished [Well Developed] : well developed [Well-Appearing] : well-appearing [PERRL] : pupils equal round and reactive to light [Normal Sclera/Conjunctiva] : normal sclera/conjunctiva [EOMI] : extraocular movements intact [Normal Outer Ear/Nose] : the outer ears and nose were normal in appearance [Normal Oropharynx] : the oropharynx was normal [No JVD] : no jugular venous distention [No Lymphadenopathy] : no lymphadenopathy [Supple] : supple [Thyroid Normal, No Nodules] : the thyroid was normal and there were no nodules present [No Respiratory Distress] : no respiratory distress  [No Accessory Muscle Use] : no accessory muscle use [Clear to Auscultation] : lungs were clear to auscultation bilaterally [Normal Rate] : normal rate  [Regular Rhythm] : with a regular rhythm [Normal S1, S2] : normal S1 and S2 [No Murmur] : no murmur heard [No Carotid Bruits] : no carotid bruits [No Abdominal Bruit] : a ~M bruit was not heard ~T in the abdomen [No Varicosities] : no varicosities [Pedal Pulses Present] : the pedal pulses are present [No Edema] : there was no peripheral edema [No Palpable Aorta] : no palpable aorta [No Extremity Clubbing/Cyanosis] : no extremity clubbing/cyanosis [Soft] : abdomen soft [Non Tender] : non-tender [Non-distended] : non-distended [No Masses] : no abdominal mass palpated [No HSM] : no HSM [Normal Bowel Sounds] : normal bowel sounds [Normal Posterior Cervical Nodes] : no posterior cervical lymphadenopathy [Normal Anterior Cervical Nodes] : no anterior cervical lymphadenopathy [No CVA Tenderness] : no CVA  tenderness [No Spinal Tenderness] : no spinal tenderness [Grossly Normal Strength/Tone] : grossly normal strength/tone [No Joint Swelling] : no joint swelling [No Rash] : no rash [Coordination Grossly Intact] : coordination grossly intact [No Focal Deficits] : no focal deficits [Normal Gait] : normal gait [Deep Tendon Reflexes (DTR)] : deep tendon reflexes were 2+ and symmetric [Normal Insight/Judgement] : insight and judgment were intact [Normal Affect] : the affect was normal

## 2019-09-17 NOTE — PHYSICAL EXAM
[No Acute Distress] : no acute distress [Well Nourished] : well nourished [Well Developed] : well developed [Well-Appearing] : well-appearing [PERRL] : pupils equal round and reactive to light [Normal Sclera/Conjunctiva] : normal sclera/conjunctiva [EOMI] : extraocular movements intact [Normal Outer Ear/Nose] : the outer ears and nose were normal in appearance [Normal Oropharynx] : the oropharynx was normal [No Lymphadenopathy] : no lymphadenopathy [No JVD] : no jugular venous distention [Supple] : supple [Thyroid Normal, No Nodules] : the thyroid was normal and there were no nodules present [No Respiratory Distress] : no respiratory distress  [No Accessory Muscle Use] : no accessory muscle use [Clear to Auscultation] : lungs were clear to auscultation bilaterally [Normal Rate] : normal rate  [Regular Rhythm] : with a regular rhythm [Normal S1, S2] : normal S1 and S2 [No Murmur] : no murmur heard [No Carotid Bruits] : no carotid bruits [No Abdominal Bruit] : a ~M bruit was not heard ~T in the abdomen [No Varicosities] : no varicosities [Pedal Pulses Present] : the pedal pulses are present [No Edema] : there was no peripheral edema [No Palpable Aorta] : no palpable aorta [No Extremity Clubbing/Cyanosis] : no extremity clubbing/cyanosis [Soft] : abdomen soft [Non Tender] : non-tender [Non-distended] : non-distended [No Masses] : no abdominal mass palpated [No HSM] : no HSM [Normal Bowel Sounds] : normal bowel sounds [Normal Anterior Cervical Nodes] : no anterior cervical lymphadenopathy [Normal Posterior Cervical Nodes] : no posterior cervical lymphadenopathy [No CVA Tenderness] : no CVA  tenderness [No Spinal Tenderness] : no spinal tenderness [Grossly Normal Strength/Tone] : grossly normal strength/tone [No Joint Swelling] : no joint swelling [Coordination Grossly Intact] : coordination grossly intact [No Rash] : no rash [Normal Gait] : normal gait [No Focal Deficits] : no focal deficits [Deep Tendon Reflexes (DTR)] : deep tendon reflexes were 2+ and symmetric [Normal Affect] : the affect was normal [Normal Insight/Judgement] : insight and judgment were intact

## 2019-10-08 ENCOUNTER — APPOINTMENT (OUTPATIENT)
Dept: ORTHOPEDIC SURGERY | Facility: CLINIC | Age: 69
End: 2019-10-08
Payer: MEDICARE

## 2019-10-22 ENCOUNTER — APPOINTMENT (OUTPATIENT)
Dept: ORTHOPEDIC SURGERY | Facility: CLINIC | Age: 69
End: 2019-10-22
Payer: MEDICARE

## 2019-10-22 DIAGNOSIS — M25.562 PAIN IN LEFT KNEE: ICD-10-CM

## 2019-10-22 DIAGNOSIS — G89.29 PAIN IN LEFT KNEE: ICD-10-CM

## 2019-10-22 PROCEDURE — 99024 POSTOP FOLLOW-UP VISIT: CPT

## 2019-11-01 ENCOUNTER — RX RENEWAL (OUTPATIENT)
Age: 69
End: 2019-11-01

## 2019-11-08 NOTE — HISTORY OF PRESENT ILLNESS
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 She expresses concern regarding her medication usage. No complaints.

## 2019-11-08 NOTE — PHYSICAL EXAM
[de-identified] : Focused exam LK: well healed medial and lateral incisions.. No wound erythema or discharge.ull painless range of motion walks without any assistive devices.

## 2019-11-08 NOTE — ASSESSMENT
[FreeTextEntry1] : Had a danette conversation with Sunni regarding her opioid medication usage. We are going to try and start decreasing her down in a safe manner that does not cause her withdrawals. She eventually will need to follow pain management physician who will assume this role. If her today we will stop her dialogue breakthrough medication and switch her to Poteet for breakthrough now.\par \par All medical record entries made by the PA/Hadleyibgregory/Fellow are at my, Dr. Bryan Abraham's direction and personally dictated by me on 10/22/2019]. I have reviewed the chart and agree that the record accurately reflects my personal performance of the history, physical exam, assessment, and plan. I have also personally directed reviewed, and agreed with the chart.\par

## 2019-11-11 ENCOUNTER — RX RENEWAL (OUTPATIENT)
Age: 69
End: 2019-11-11

## 2019-11-18 ENCOUNTER — FORM ENCOUNTER (OUTPATIENT)
Age: 69
End: 2019-11-18

## 2019-11-18 ENCOUNTER — RX RENEWAL (OUTPATIENT)
Age: 69
End: 2019-11-18

## 2019-11-19 ENCOUNTER — APPOINTMENT (OUTPATIENT)
Dept: CT IMAGING | Facility: HOSPITAL | Age: 69
End: 2019-11-19
Payer: MEDICARE

## 2019-11-19 ENCOUNTER — OUTPATIENT (OUTPATIENT)
Dept: OUTPATIENT SERVICES | Facility: HOSPITAL | Age: 69
LOS: 1 days | End: 2019-11-19
Payer: MEDICARE

## 2019-11-19 DIAGNOSIS — Z98.890 OTHER SPECIFIED POSTPROCEDURAL STATES: Chronic | ICD-10-CM

## 2019-11-19 DIAGNOSIS — Z96.652 PRESENCE OF LEFT ARTIFICIAL KNEE JOINT: Chronic | ICD-10-CM

## 2019-11-19 DIAGNOSIS — H26.9 UNSPECIFIED CATARACT: Chronic | ICD-10-CM

## 2019-11-19 DIAGNOSIS — Z90.89 ACQUIRED ABSENCE OF OTHER ORGANS: Chronic | ICD-10-CM

## 2019-11-19 DIAGNOSIS — Z90.711 ACQUIRED ABSENCE OF UTERUS WITH REMAINING CERVICAL STUMP: Chronic | ICD-10-CM

## 2019-11-19 DIAGNOSIS — I27.20 PULMONARY HYPERTENSION, UNSPECIFIED: ICD-10-CM

## 2019-11-19 DIAGNOSIS — Q21.1 ATRIAL SEPTAL DEFECT: ICD-10-CM

## 2019-11-19 PROCEDURE — 93306 TTE W/DOPPLER COMPLETE: CPT | Mod: 26

## 2019-11-19 PROCEDURE — 93306 TTE W/DOPPLER COMPLETE: CPT

## 2019-11-19 PROCEDURE — 71250 CT THORAX DX C-: CPT

## 2019-11-19 PROCEDURE — 71250 CT THORAX DX C-: CPT | Mod: 26

## 2019-11-26 ENCOUNTER — RX RENEWAL (OUTPATIENT)
Age: 69
End: 2019-11-26

## 2019-12-04 ENCOUNTER — APPOINTMENT (OUTPATIENT)
Dept: MAMMOGRAPHY | Facility: HOSPITAL | Age: 69
End: 2019-12-04

## 2019-12-05 ENCOUNTER — RX RENEWAL (OUTPATIENT)
Age: 69
End: 2019-12-05

## 2020-01-07 ENCOUNTER — RX RENEWAL (OUTPATIENT)
Age: 70
End: 2020-01-07

## 2020-01-21 ENCOUNTER — INPATIENT (INPATIENT)
Facility: HOSPITAL | Age: 70
LOS: 5 days | Discharge: HOME HEALTH | DRG: 871 | End: 2020-01-27
Payer: MEDICARE

## 2020-01-21 ENCOUNTER — APPOINTMENT (OUTPATIENT)
Dept: PULMONOLOGY | Facility: CLINIC | Age: 70
End: 2020-01-21
Payer: MEDICARE

## 2020-01-21 VITALS
TEMPERATURE: 98 F | WEIGHT: 158.07 LBS | OXYGEN SATURATION: 83 % | HEART RATE: 98 BPM | HEIGHT: 64 IN | SYSTOLIC BLOOD PRESSURE: 84 MMHG | DIASTOLIC BLOOD PRESSURE: 63 MMHG | RESPIRATION RATE: 20 BRPM

## 2020-01-21 VITALS
HEART RATE: 89 BPM | OXYGEN SATURATION: 82 % | TEMPERATURE: 97.3 F | HEIGHT: 66 IN | DIASTOLIC BLOOD PRESSURE: 80 MMHG | SYSTOLIC BLOOD PRESSURE: 128 MMHG

## 2020-01-21 DIAGNOSIS — H26.9 UNSPECIFIED CATARACT: Chronic | ICD-10-CM

## 2020-01-21 DIAGNOSIS — Z90.711 ACQUIRED ABSENCE OF UTERUS WITH REMAINING CERVICAL STUMP: Chronic | ICD-10-CM

## 2020-01-21 DIAGNOSIS — Z96.652 PRESENCE OF LEFT ARTIFICIAL KNEE JOINT: Chronic | ICD-10-CM

## 2020-01-21 DIAGNOSIS — A41.9 SEPSIS, UNSPECIFIED ORGANISM: ICD-10-CM

## 2020-01-21 DIAGNOSIS — Z90.89 ACQUIRED ABSENCE OF OTHER ORGANS: Chronic | ICD-10-CM

## 2020-01-21 DIAGNOSIS — J44.9 CHRONIC OBSTRUCTIVE PULMONARY DISEASE, UNSPECIFIED: ICD-10-CM

## 2020-01-21 DIAGNOSIS — R60.9 EDEMA, UNSPECIFIED: ICD-10-CM

## 2020-01-21 DIAGNOSIS — N17.9 ACUTE KIDNEY FAILURE, UNSPECIFIED: ICD-10-CM

## 2020-01-21 DIAGNOSIS — D69.6 THROMBOCYTOPENIA, UNSPECIFIED: ICD-10-CM

## 2020-01-21 DIAGNOSIS — Z98.890 OTHER SPECIFIED POSTPROCEDURAL STATES: Chronic | ICD-10-CM

## 2020-01-21 DIAGNOSIS — Z91.89 OTHER SPECIFIED PERSONAL RISK FACTORS, NOT ELSEWHERE CLASSIFIED: ICD-10-CM

## 2020-01-21 DIAGNOSIS — R63.8 OTHER SYMPTOMS AND SIGNS CONCERNING FOOD AND FLUID INTAKE: ICD-10-CM

## 2020-01-21 LAB
ALBUMIN SERPL ELPH-MCNC: 4 G/DL — SIGNIFICANT CHANGE UP (ref 3.3–5)
ALP SERPL-CCNC: 165 U/L — HIGH (ref 40–120)
ALT FLD-CCNC: 43 U/L — SIGNIFICANT CHANGE UP (ref 10–45)
ANION GAP SERPL CALC-SCNC: 18 MMOL/L — HIGH (ref 5–17)
ANISOCYTOSIS BLD QL: SLIGHT — SIGNIFICANT CHANGE UP
APTT BLD: 31.9 SEC — SIGNIFICANT CHANGE UP (ref 27.5–36.3)
AST SERPL-CCNC: 31 U/L — SIGNIFICANT CHANGE UP (ref 10–40)
BASE EXCESS BLDV CALC-SCNC: 5.4 MMOL/L — SIGNIFICANT CHANGE UP
BASOPHILS # BLD AUTO: 0 K/UL — SIGNIFICANT CHANGE UP (ref 0–0.2)
BASOPHILS NFR BLD AUTO: 0 % — SIGNIFICANT CHANGE UP (ref 0–2)
BILIRUB SERPL-MCNC: 0.3 MG/DL — SIGNIFICANT CHANGE UP (ref 0.2–1.2)
BUN SERPL-MCNC: 27 MG/DL — HIGH (ref 7–23)
CA-I SERPL-SCNC: 1.18 MMOL/L — SIGNIFICANT CHANGE UP (ref 1.12–1.3)
CALCIUM SERPL-MCNC: 10.1 MG/DL — SIGNIFICANT CHANGE UP (ref 8.4–10.5)
CHLORIDE SERPL-SCNC: 95 MMOL/L — LOW (ref 96–108)
CK MB CFR SERPL CALC: 3.9 NG/ML — SIGNIFICANT CHANGE UP (ref 0–6.7)
CK SERPL-CCNC: 221 U/L — HIGH (ref 25–170)
CO2 SERPL-SCNC: 27 MMOL/L — SIGNIFICANT CHANGE UP (ref 22–31)
CREAT SERPL-MCNC: 1.38 MG/DL — HIGH (ref 0.5–1.3)
D DIMER BLD IA.RAPID-MCNC: 1057 NG/ML DDU — HIGH
EOSINOPHIL # BLD AUTO: 0.17 K/UL — SIGNIFICANT CHANGE UP (ref 0–0.5)
EOSINOPHIL NFR BLD AUTO: 0.9 % — SIGNIFICANT CHANGE UP (ref 0–6)
GAS PNL BLDV: SIGNIFICANT CHANGE UP
GAS PNL BLDV: SIGNIFICANT CHANGE UP
GIANT PLATELETS BLD QL SMEAR: PRESENT — SIGNIFICANT CHANGE UP
GLUCOSE SERPL-MCNC: 136 MG/DL — HIGH (ref 70–99)
HCO3 BLDV-SCNC: 34 MMOL/L — HIGH (ref 20–27)
HCT VFR BLD CALC: 39 % — SIGNIFICANT CHANGE UP (ref 34.5–45)
HGB BLD-MCNC: 11.9 G/DL — SIGNIFICANT CHANGE UP (ref 11.5–15.5)
INR BLD: 1.19 — HIGH (ref 0.88–1.16)
LACTATE SERPL-SCNC: 2.3 MMOL/L — HIGH (ref 0.5–2)
LYMPHOCYTES # BLD AUTO: 1.76 K/UL — SIGNIFICANT CHANGE UP (ref 1–3.3)
LYMPHOCYTES # BLD AUTO: 9.5 % — LOW (ref 13–44)
MAGNESIUM SERPL-MCNC: 2.1 MG/DL — SIGNIFICANT CHANGE UP (ref 1.6–2.6)
MANUAL SMEAR VERIFICATION: SIGNIFICANT CHANGE UP
MCHC RBC-ENTMCNC: 28.5 PG — SIGNIFICANT CHANGE UP (ref 27–34)
MCHC RBC-ENTMCNC: 30.5 GM/DL — LOW (ref 32–36)
MCV RBC AUTO: 93.5 FL — SIGNIFICANT CHANGE UP (ref 80–100)
METAMYELOCYTES # FLD: 0.9 % — HIGH (ref 0–0)
MONOCYTES # BLD AUTO: 2.1 K/UL — HIGH (ref 0–0.9)
MONOCYTES NFR BLD AUTO: 11.3 % — SIGNIFICANT CHANGE UP (ref 2–14)
NEUTROPHILS # BLD AUTO: 14.21 K/UL — HIGH (ref 1.8–7.4)
NEUTROPHILS NFR BLD AUTO: 71.3 % — SIGNIFICANT CHANGE UP (ref 43–77)
NEUTS BAND # BLD: 5.2 % — SIGNIFICANT CHANGE UP (ref 0–8)
NT-PROBNP SERPL-SCNC: 9144 PG/ML — HIGH (ref 0–300)
OVALOCYTES BLD QL SMEAR: SLIGHT — SIGNIFICANT CHANGE UP
PCO2 BLDV: 72 MMHG — HIGH (ref 41–51)
PH BLDV: 7.29 — LOW (ref 7.32–7.43)
PLAT MORPH BLD: ABNORMAL
PLATELET # BLD AUTO: 61 K/UL — LOW (ref 150–400)
PO2 BLDV: 28 MMHG — SIGNIFICANT CHANGE UP
POLYCHROMASIA BLD QL SMEAR: SLIGHT — SIGNIFICANT CHANGE UP
POTASSIUM BLDV-SCNC: 3.3 MMOL/L — LOW (ref 3.5–4.9)
POTASSIUM SERPL-MCNC: 3.6 MMOL/L — SIGNIFICANT CHANGE UP (ref 3.5–5.3)
POTASSIUM SERPL-SCNC: 3.6 MMOL/L — SIGNIFICANT CHANGE UP (ref 3.5–5.3)
PROT SERPL-MCNC: 7.8 G/DL — SIGNIFICANT CHANGE UP (ref 6–8.3)
PROTHROM AB SERPL-ACNC: 13.6 SEC — HIGH (ref 10–12.9)
RAPID RVP RESULT: SIGNIFICANT CHANGE UP
RBC # BLD: 4.17 M/UL — SIGNIFICANT CHANGE UP (ref 3.8–5.2)
RBC # FLD: 12.5 % — SIGNIFICANT CHANGE UP (ref 10.3–14.5)
RBC BLD AUTO: ABNORMAL
SAO2 % BLDV: 50 % — SIGNIFICANT CHANGE UP
SODIUM SERPL-SCNC: 140 MMOL/L — SIGNIFICANT CHANGE UP (ref 135–145)
SPHEROCYTES BLD QL SMEAR: SLIGHT — SIGNIFICANT CHANGE UP
TROPONIN T SERPL-MCNC: 0.02 NG/ML — HIGH (ref 0–0.01)
VARIANT LYMPHS # BLD: 0.9 % — SIGNIFICANT CHANGE UP (ref 0–6)
WBC # BLD: 18.57 K/UL — HIGH (ref 3.8–10.5)
WBC # FLD AUTO: 18.57 K/UL — HIGH (ref 3.8–10.5)

## 2020-01-21 PROCEDURE — 71250 CT THORAX DX C-: CPT | Mod: 26

## 2020-01-21 PROCEDURE — 93010 ELECTROCARDIOGRAM REPORT: CPT

## 2020-01-21 PROCEDURE — 99215 OFFICE O/P EST HI 40 MIN: CPT

## 2020-01-21 PROCEDURE — 71045 X-RAY EXAM CHEST 1 VIEW: CPT | Mod: 26

## 2020-01-21 PROCEDURE — 99291 CRITICAL CARE FIRST HOUR: CPT

## 2020-01-21 RX ORDER — POLYETHYLENE GLYCOL 3350 17 G/17G
17 POWDER, FOR SOLUTION ORAL DAILY
Refills: 0 | Status: DISCONTINUED | OUTPATIENT
Start: 2020-01-21 | End: 2020-01-27

## 2020-01-21 RX ORDER — HYDROMORPHONE HYDROCHLORIDE 2 MG/ML
4 INJECTION INTRAMUSCULAR; INTRAVENOUS; SUBCUTANEOUS EVERY 4 HOURS
Refills: 0 | Status: DISCONTINUED | OUTPATIENT
Start: 2020-01-21 | End: 2020-01-22

## 2020-01-21 RX ORDER — ACETAMINOPHEN 500 MG
650 TABLET ORAL ONCE
Refills: 0 | Status: COMPLETED | OUTPATIENT
Start: 2020-01-21 | End: 2020-01-21

## 2020-01-21 RX ORDER — AZITHROMYCIN 500 MG/1
500 TABLET, FILM COATED ORAL ONCE
Refills: 0 | Status: COMPLETED | OUTPATIENT
Start: 2020-01-21 | End: 2020-01-21

## 2020-01-21 RX ORDER — CEFTRIAXONE 500 MG/1
1000 INJECTION, POWDER, FOR SOLUTION INTRAMUSCULAR; INTRAVENOUS EVERY 24 HOURS
Refills: 0 | Status: DISCONTINUED | OUTPATIENT
Start: 2020-01-22 | End: 2020-01-22

## 2020-01-21 RX ORDER — IPRATROPIUM/ALBUTEROL SULFATE 18-103MCG
3 AEROSOL WITH ADAPTER (GRAM) INHALATION ONCE
Refills: 0 | Status: COMPLETED | OUTPATIENT
Start: 2020-01-21 | End: 2020-01-21

## 2020-01-21 RX ORDER — CEFTRIAXONE 500 MG/1
1000 INJECTION, POWDER, FOR SOLUTION INTRAMUSCULAR; INTRAVENOUS ONCE
Refills: 0 | Status: COMPLETED | OUTPATIENT
Start: 2020-01-21 | End: 2020-01-21

## 2020-01-21 RX ORDER — ENOXAPARIN SODIUM 100 MG/ML
40 INJECTION SUBCUTANEOUS EVERY 24 HOURS
Refills: 0 | Status: DISCONTINUED | OUTPATIENT
Start: 2020-01-21 | End: 2020-01-27

## 2020-01-21 RX ORDER — TIOTROPIUM BROMIDE AND OLODATEROL 3.124; 2.736 UG/1; UG/1
2 SPRAY, METERED RESPIRATORY (INHALATION)
Refills: 0 | Status: DISCONTINUED | OUTPATIENT
Start: 2020-01-21 | End: 2020-01-21

## 2020-01-21 RX ORDER — AZITHROMYCIN 500 MG/1
500 TABLET, FILM COATED ORAL EVERY 24 HOURS
Refills: 0 | Status: COMPLETED | OUTPATIENT
Start: 2020-01-22 | End: 2020-01-25

## 2020-01-21 RX ORDER — SENNA PLUS 8.6 MG/1
2 TABLET ORAL AT BEDTIME
Refills: 0 | Status: DISCONTINUED | OUTPATIENT
Start: 2020-01-21 | End: 2020-01-27

## 2020-01-21 RX ORDER — TRAZODONE HCL 50 MG
100 TABLET ORAL AT BEDTIME
Refills: 0 | Status: DISCONTINUED | OUTPATIENT
Start: 2020-01-21 | End: 2020-01-27

## 2020-01-21 RX ORDER — SODIUM CHLORIDE 9 MG/ML
500 INJECTION INTRAMUSCULAR; INTRAVENOUS; SUBCUTANEOUS ONCE
Refills: 0 | Status: COMPLETED | OUTPATIENT
Start: 2020-01-21 | End: 2020-01-21

## 2020-01-21 RX ORDER — ALPRAZOLAM 0.25 MG
1 TABLET ORAL THREE TIMES A DAY
Refills: 0 | Status: DISCONTINUED | OUTPATIENT
Start: 2020-01-21 | End: 2020-01-27

## 2020-01-21 RX ORDER — LAMOTRIGINE 25 MG/1
200 TABLET, ORALLY DISINTEGRATING ORAL DAILY
Refills: 0 | Status: DISCONTINUED | OUTPATIENT
Start: 2020-01-22 | End: 2020-01-27

## 2020-01-21 RX ORDER — DIPHENHYDRAMINE HCL 50 MG
50 CAPSULE ORAL ONCE
Refills: 0 | Status: DISCONTINUED | OUTPATIENT
Start: 2020-01-21 | End: 2020-01-21

## 2020-01-21 RX ORDER — VENLAFAXINE HCL 75 MG
150 CAPSULE, EXT RELEASE 24 HR ORAL DAILY
Refills: 0 | Status: DISCONTINUED | OUTPATIENT
Start: 2020-01-21 | End: 2020-01-27

## 2020-01-21 RX ORDER — IPRATROPIUM/ALBUTEROL SULFATE 18-103MCG
3 AEROSOL WITH ADAPTER (GRAM) INHALATION EVERY 6 HOURS
Refills: 0 | Status: DISCONTINUED | OUTPATIENT
Start: 2020-01-21 | End: 2020-01-22

## 2020-01-21 RX ADMIN — CEFTRIAXONE 100 MILLIGRAM(S): 500 INJECTION, POWDER, FOR SOLUTION INTRAMUSCULAR; INTRAVENOUS at 18:17

## 2020-01-21 RX ADMIN — AZITHROMYCIN 255 MILLIGRAM(S): 500 TABLET, FILM COATED ORAL at 20:57

## 2020-01-21 RX ADMIN — Medication 1 MILLIGRAM(S): at 20:57

## 2020-01-21 RX ADMIN — ENOXAPARIN SODIUM 40 MILLIGRAM(S): 100 INJECTION SUBCUTANEOUS at 23:20

## 2020-01-21 RX ADMIN — SENNA PLUS 2 TABLET(S): 8.6 TABLET ORAL at 23:20

## 2020-01-21 RX ADMIN — Medication 650 MILLIGRAM(S): at 23:21

## 2020-01-21 RX ADMIN — HYDROMORPHONE HYDROCHLORIDE 4 MILLIGRAM(S): 2 INJECTION INTRAMUSCULAR; INTRAVENOUS; SUBCUTANEOUS at 20:56

## 2020-01-21 RX ADMIN — SODIUM CHLORIDE 500 MILLILITER(S): 9 INJECTION INTRAMUSCULAR; INTRAVENOUS; SUBCUTANEOUS at 13:41

## 2020-01-21 RX ADMIN — Medication 3 MILLILITER(S): at 13:40

## 2020-01-21 RX ADMIN — POLYETHYLENE GLYCOL 3350 17 GRAM(S): 17 POWDER, FOR SOLUTION ORAL at 20:56

## 2020-01-21 RX ADMIN — Medication 650 MILLIGRAM(S): at 20:56

## 2020-01-21 RX ADMIN — HYDROMORPHONE HYDROCHLORIDE 4 MILLIGRAM(S): 2 INJECTION INTRAMUSCULAR; INTRAVENOUS; SUBCUTANEOUS at 23:21

## 2020-01-21 NOTE — H&P ADULT - PROBLEM SELECTOR PROBLEM 3
Transition of care performed with sharing of clinical summary COPD (chronic obstructive pulmonary disease)

## 2020-01-21 NOTE — ED ADULT NURSE NOTE - OBJECTIVE STATEMENT
Pt came to ED under direction of MD Sun for SOB x3 days,  82% on ra.  Pt has home oxygen for COPD, emphysema.  Pt has crackles on left lower lung base.  Pt denies chest pain, abd pain, fever, chills, D/N/V. Pt admits to vaping in recent days, vape pen found on her person.  Pt is alert and oriented x3. Speaking in shortened sentences.

## 2020-01-21 NOTE — ED ADULT NURSE NOTE - CHPI ED NUR SYMPTOMS NEG
no edema/no fever/no chills/no wheezing/no cough/no diaphoresis/no chest pain/no headache/no body aches

## 2020-01-21 NOTE — ED PROVIDER NOTE - CLINICAL SUMMARY MEDICAL DECISION MAKING FREE TEXT BOX
70 y/o F pt with COPD presents to ED with increasing shortness of breath and white count elevated to 18, suspect pneumonia with mild pulmonary edema, consider PE. Pt given abx, 500CC saline improved pt's BP, weight based fluids not given due to concerns for pulmonary edema. Discuss with Dr. Sun. Will pre-treat with Solu-Medrol and Benadryl, and admit for further work up. 68 y/o F pt with COPD presents to ED with increasing shortness of breath and white count elevated to 18, suspect pneumonia with mild pulmonary edema, consider PE as elevated d dimer. Pt given abx, 500CC saline improved pt's BP, weight based fluids not given due to concerns for pulmonary edema. Discussed with Dr. Sun. Will pre-treat with Solu-Medrol and Benadryl as ho of anaphylaxis to IV dye ( initially aware of iodine allergy however family now reporting anaphylaxis) , Doppler and admit for further work up, consider CTA later, .

## 2020-01-21 NOTE — CONSULT NOTE ADULT - ASSESSMENT
#Severe Sepsis 2/2 CAP  - Admit to [facility]  - Continue ceftriaxone/azthromycin for CAP coverge  - Sputum culture  - Blood culture  - Urine Legionella  - LE Dopplers  - CT PE protocol  - RVP     Disposition: Admit to [facility]. Discussed with Dr. Sun 70 yo F with history of COPD on home oxygen 2LNC prn, DARRIN on CPAP, thrombocytopenia, multiple knee surgeries resulting in chronic pain syndrome who presents from Dr. Sun's office with tachypnea and hypoxia, ICU consulted for recommendations.     #Severe Sepsis 2/2 CAP  - Patient presenting with productive cough, fatigue, shortness of breath and tachypnea, on arrival to ED, meets criteria for severe sepsis. Source suspected to be pulmonary given her complaints, physical exam findings of ronchi and egophony in RLL and CXR with hazy opacity in RLL  - Admit to [facility]  - Continue ceftriaxone/azthromycin for CAP coverage   - Obtain RVP given recent sick contact   - Please obtain sputum culture, blood cultures, urine Legionella  - Will need to r/o PE given complaints of tachypnea, VS s/f hypoxia, physical exam with LLE>RLE. Please obtain LE Dopplers and CT PE protocol (will need to be pre medicated given iodine allergy      Disposition: Admit to [facility]. Discussed with Dr. Sun 68 yo F with history of COPD on home oxygen 2LNC prn, DARRIN on CPAP, thrombocytopenia, multiple knee surgeries resulting in chronic pain syndrome who presents from Dr. Sun's office with tachypnea and hypoxia, ICU consulted for recommendations.     #Severe Sepsis 2/2 CAP  - Patient presenting with productive cough, fatigue, shortness of breath and tachypnea, on arrival to ED, meets criteria for severe sepsis. Source suspected to be pulmonary given her complaints, physical exam findings of ronchi and egophony in RLL and CXR with hazy opacity in RLL  - Admit to [facility]  - Continue ceftriaxone/azthromycin for CAP coverage   - Obtain RVP given recent sick contact   - Please obtain sputum culture, blood cultures, urine Legionella  - Will need to r/o PE given complaints of tachypnea, VS s/f hypoxia, physical exam with LLE>RLE. Please obtain LE Dopplers and CT PE protocol (will need to be pre medicated given iodine allergy      Disposition: Admit to [facility]. Discussed with Dr. Sun. Thank you for the consult. 68 yo F with history of COPD on home oxygen 2LNC prn, DARRIN on CPAP, thrombocytopenia, multiple knee surgeries resulting in chronic pain syndrome who presents from Dr. Sun's office with tachypnea and hypoxia, ICU consulted for recommendations.     #Severe Sepsis 2/2 CAP  - Patient presenting with productive cough, fatigue, shortness of breath and tachypnea, on arrival to ED, meets criteria for severe sepsis. Source suspected to be pulmonary given her complaints, physical exam findings of ronchi and egophony in RLL and CXR with hazy opacity in RLL  - Admit to [facility]  - Continue ceftriaxone/azthromycin for CAP coverage   - Obtain RVP given recent sick contact   - Please obtain sputum culture, blood cultures, urine Legionella  - Follow up LE Dopplers though less likely a DVT as her LLE swelling appears to be chronic  - Obtain CT non contrast (anaphylaxis to contrast) for further characterization. Another differential is vaping inducing pneumonitis given she recently started vaping (uses Juul, denies using THC vape)      Disposition: Admit to [facility] for monitoring. Discussed with Dr. Sun. Thank you for the consult.

## 2020-01-21 NOTE — H&P ADULT - PROBLEM SELECTOR PLAN 2
1.38 on admission, was 0.54 in August  - urine lytes  - avoid nephrotoxins  -renally dose meds  - monitor Cr.  - encourage PO 1.38 on admission, was 0.54 in August  - urine lytes  - avoid nephrotoxins  - hold home lasix  -renally dose meds  - monitor Cr.  - encourage PO

## 2020-01-21 NOTE — H&P ADULT - HISTORY OF PRESENT ILLNESS
70 yo F with history of COPD on home oxygen 2LNC prn, DARRIN on CPAP, thrombocytopenia, multiple knee surgeries resulting in chronic pain syndrome who presents from Dr. Sun's office with tachypnea and hypoxia. The patient is accompanied by her daughter and son who state that she has been progressively getting ill for the past 1 week. The patient endorses 1 week history of productive cough with yellow sputum, difficulty catching her breath, no recorded fevers but notes episodes of sweating. She endorses her symptoms started after being exposed to one of her grand children who had similar symptoms. Denies any recent travel or recent antibiotic use. No recent hospitalization. ROS otherwise negative.     On arrival to [facility] ED: VS: afebrile, HR 98, bp 84/63, RR 20 with 02 saturation on 83% on supplemental oxygen. Initial labs s/f WBC of 18K, platelet count of 61K, PT/INR 13.6/1.19, D Dimer 1057, BMP s/f VENANCIO BUN/creat 27/1.38, Alk phos 165, , Trop T 0.02, BNP 9144, VBG with pH 7.29 and pCO2 72, CXR with increase pulmonary vascular congestion, right lower lobe opacity. ICU consulted for further recommendations.

## 2020-01-21 NOTE — H&P ADULT - PROBLEM SELECTOR PLAN 4
Continue to trend PLT, patient previously on Nplate HX of ITP; Continue to trend PLT, patient previously on Nplate. Platelets 68 on admission, close to baseline. HX of ITP; Continue to trend PLT, patient previously on Nplate. Platelets 68 on admission, close to baseline.    #Anxiety  -alprazolam prn  - trazadone prn

## 2020-01-21 NOTE — H&P ADULT - NSICDXPASTSURGICALHX_GEN_ALL_CORE_FT
PAST SURGICAL HISTORY:  Bilateral cataracts     History of left knee replacement     History of partial hysterectomy     History of surgery bladder lift    History of tonsillectomy     S/P insertion of spinal cord stimulator

## 2020-01-21 NOTE — H&P ADULT - NSHPLABSRESULTS_GEN_ALL_CORE
LABS:                        11.9   18.57 )-----------( 61       ( 21 Jan 2020 13:37 )             39.0     01-21    140  |  95<L>  |  27<H>  ----------------------------<  136<H>  3.6   |  27  |  1.38<H>    Ca    10.1      21 Jan 2020 13:37  Mg     2.1     01-21    TPro  7.8  /  Alb  4.0  /  TBili  0.3  /  DBili  x   /  AST  31  /  ALT  43  /  AlkPhos  165<H>  01-21    PT/INR - ( 21 Jan 2020 13:37 )   PT: 13.6 sec;   INR: 1.19          PTT - ( 21 Jan 2020 13:37 )  PTT:31.9 sec      RADIOLOGY & ADDITIONAL TESTS:    MEDICATIONS  (STANDING):  azithromycin  IVPB 500 milliGRAM(s) IV Intermittent Once  diphenhydrAMINE   Injectable 50 milliGRAM(s) IV Push once  methylPREDNISolone sodium succinate Injectable 125 milliGRAM(s) IV Push once    MEDICATIONS  (PRN):

## 2020-01-21 NOTE — H&P ADULT - PROBLEM SELECTOR PLAN 5
chronic LLE swelling, unlikely to be DVT/PE at this time chronic LLE swelling, unlikely to be DVT/PE at this time    #Chronic leg pain  - hydromorphone 4mg q4h  - can titrate up with patietns other home pain meds as needed

## 2020-01-21 NOTE — H&P ADULT - NSHPPHYSICALEXAM_GEN_ALL_CORE
Vital Signs Last 12 Hrs  T(F): 98.8 (01-21-20 @ 18:22), Max: 98.8 (01-21-20 @ 18:22)  HR: 80 (01-21-20 @ 18:22) (78 - 98)  BP: 117/76 (01-21-20 @ 18:22) (84/63 - 117/76)  BP(mean): --  RR: 20 (01-21-20 @ 18:22) (20 - 20)  SpO2: 94% (01-21-20 @ 18:22) (83% - 95%)      General: NAD, Ambulating comfortably, no tachypnea observed  HEENT:  MILA, EOMI             Lymphatic system: No LN  Lungs: Bibasilar crackles r>l, right lower lobe egophony present. Speaking in full sentences. No wheezing.   Cardiovascular: S1S2 RRR, No M/R/G  Gastrointestinal: Soft, NT/ND Positive BS  Musculoskeletal: No clubbing.  Moves all extremities.    Extremities: LLE>RLE, 1+ pitting edema LLE till mid calf  Skin: Warm.  Intact  Neurological: No motor or sensory deficit

## 2020-01-21 NOTE — ED PROVIDER NOTE - CONSTITUTIONAL, MLM
normal... Chronically ill-appearing, awake, alert, oriented to person, place, time/situation and in no respiratory distress.

## 2020-01-21 NOTE — H&P ADULT - PROBLEM SELECTOR PLAN 1
2/2 CAP - Patient presenting with productive cough, fatigue, shortness of breath and tachypnea, on arrival to ED, meets criteria for severe sepsis. Source suspected to be pulmonary given her complaints, physical exam findings of ronchi and egophony in RLL and CXR with hazy opacity in RLL  - Continue ceftriaxone/azthromycin for CAP coverage   - Obtain RVP given recent sick contact   - f/u sputum culture, blood cultures, urine Legionella  - Low suspicion of PE at this time, also pt is allergic to contrast (would need benadryl/allergy protocol). But given complaints of tachypnea, VS s/f hypoxia, physical exam with LLE>RLE, PE should remain in mind, LLE asymetry is chronic. 2/2 CAP - Patient presenting with productive cough, fatigue, shortness of breath and tachypnea, on arrival to ED, meets criteria for severe sepsis. Source suspected to be pulmonary given her complaints, physical exam findings of ronchi and egophony in RLL and CXR with hazy opacity in RLL. Pt also been vaping, possibly vaping induced lung injury  - Continue ceftriaxone/azthromycin for CAP coverage   - Obtain RVP given recent sick contact   - f/u sputum culture, blood cultures, urine Legionella  - Low suspicion of PE at this time, also pt is allergic to contrast (would need benadryl/allergy protocol). But given complaints of tachypnea, VS s/f hypoxia, physical exam with LLE>RLE, PE should remain in mind, LLE asymetry is chronic.

## 2020-01-21 NOTE — H&P ADULT - ASSESSMENT
70 yo F with history of COPD on home oxygen 2LNC prn, DARRIN on CPAP, thrombocytopenia, multiple knee surgeries resulting in chronic pain syndrome who presents from Dr. Sun's office with tachypnea and hypoxia, ICU consulted for recommendations. 70 yo F with history of COPD on home oxygen 2LNC prn, DARRIN on CPAP, thrombocytopenia, multiple knee surgeries resulting in chronic pain syndrome who presents from Dr. Sun's office with tachypnea and hypoxia, here for severe sepsis likely 2/2 CAP 70 yo F with history of COPD on home oxygen 2LNC prn, DARRIN on CPAP, thrombocytopenia, multiple knee surgeries resulting in chronic pain syndrome who presents from Dr. Sun's office with tachypnea and hypoxia, here for severe sepsis likely 2/2 CAP vs acute lung injury 2/2 vape

## 2020-01-21 NOTE — ED ADULT NURSE NOTE - NSIMPLEMENTINTERV_GEN_ALL_ED
Implemented All Fall with Harm Risk Interventions:  [place] to call system. Call bell, personal items and telephone within reach. Instruct patient to call for assistance. Room bathroom lighting operational. Non-slip footwear when patient is off stretcher. Physically safe environment: no spills, clutter or unnecessary equipment. Stretcher in lowest position, wheels locked, appropriate side rails in place. Provide visual cue, wrist band, yellow gown, etc. Monitor gait and stability. Monitor for mental status changes and reorient to person, place, and time. Review medications for side effects contributing to fall risk. Reinforce activity limits and safety measures with patient and family. Provide visual clues: red socks.

## 2020-01-21 NOTE — CONSULT NOTE ADULT - SUBJECTIVE AND OBJECTIVE BOX
ICU Consult Note:    HPI    70 yo F with history of COPD on home oxygen 2LNC prn, DARRIN on CPAP, thrombocytopenia, multiple knee surgeries resulting in chronic pain syndrome who presents from Dr. Sun's office with tachypnea and hypoxia. The patient is accompanied by her daughter and son who state that she has been progressively getting ill for the past 1 week. The patient endorses 1 week history of productive cough with yellow sputum, difficulty catching her breath, no recorded fevers but notes episodes of sweating. She endorses her symptoms started after being exposed to one of her grand children who had similar symptoms. Denies any recent travel or recent antibiotic use. No recent hospitalization. ROS otherwise negative. ICU Consult Note:    HPI    70 yo F with history of COPD on home oxygen 2LNC prn, DARRIN on CPAP, thrombocytopenia, multiple knee surgeries resulting in chronic pain syndrome who presents from Dr. Sun's office with tachypnea and hypoxia. The patient is accompanied by her daughter and son who state that she has been progressively getting ill for the past 1 week. The patient endorses 1 week history of productive cough with yellow sputum, difficulty catching her breath, no recorded fevers but notes episodes of sweating. She endorses her symptoms started after being exposed to one of her grand children who had similar symptoms. Denies any recent travel or recent antibiotic use. No recent hospitalization. ROS otherwise negative.   On arrival to [facility] ED: VS: afebrile, HR 98, bp 84/63, RR 20 with 02 saturation on 83% on supplemental oxygen. Initial labs s/f WBC of 18K ICU Consult Note:    HPI    68 yo F with history of COPD on home oxygen 2LNC prn, DARRIN on CPAP, thrombocytopenia, multiple knee surgeries resulting in chronic pain syndrome who presents from Dr. Sun's office with tachypnea and hypoxia. The patient is accompanied by her daughter and son who state that she has been progressively getting ill for the past 1 week. The patient endorses 1 week history of productive cough with yellow sputum, difficulty catching her breath, no recorded fevers but notes episodes of sweating. She endorses her symptoms started after being exposed to one of her grand children who had similar symptoms. Denies any recent travel or recent antibiotic use. No recent hospitalization. ROS otherwise negative.   On arrival to [facility] ED: VS: afebrile, HR 98, bp 84/63, RR 20 with 02 saturation on 83% on supplemental oxygen. Initial labs s/f WBC of 18K,platelet count of 61K, PT/INR 13.6/1.19, D Dimer 1057, BMP s/f VENANCIO UN/creat 27/1.38, Alk phos 165, , Trop T 0.02, BNP 9144, VBG with pH 7.29 and pCO2 72, CXR with increase pulmonary vascular congestion, right lower lobe opacity. ICU consulted for further recommendations.    Allergies:  iodine (Anaphylaxis)  Intolerances    Home Medications:  acetaminophen 325 mg oral tablet: 2 tab(s) orally every 6 hours, as needed, mild pain (1-3) or fever temperature 100.3 or higher.  Do not take more than 3,250mg in a day    (09 Aug 2019 11:21)  ALPRAZolam 1 mg oral tablet: 1 tab(s) orally 3 times a day, As needed, Anxiety.  Take with caution if administering hydromorphone.  The combination can increase fall risks (09 Aug 2019 11:21)  bisacodyl 10 mg rectal suppository: 1 suppository(ies) rectal once a day, As needed, If no bowel movement by POD#2 (09 Aug 2019 11:21)  docusate sodium 100 mg oral capsule: 1 cap(s) orally 3 times a day (09 Aug 2019 11:21)  lamoTRIgine 200 mg oral tablet: 1 tab(s) orally once a day (08 Aug 2019 08:42)  polyethylene glycol 3350 oral powder for reconstitution: 17 gram(s) orally once a day (09 Aug 2019 11:21)  Senokot 8.6 mg oral tablet: 2 tab(s) orally once a day (at bedtime) (08 Aug 2019 08:42)  Stiolto Respimat 10 ACT 2.5 mcg-2.5 mcg/inh inhalation aerosol: 2 puff(s) inhaled 2 times a day (08 Aug 2019 08:42)    SOCIAL HX:     Smoking: past smoker for 30 years, quit around 5 years ago. Currently vapes Juul    ETOH/Illicit drugs: denies    PAST MEDICAL & SURGICAL HISTORY:  Sleep apnea  Anxiety  Pulmonary hypertension  PFO (patent foramen ovale)  Hypoxemia  Depression  Centrilobular emphysema  COPD (chronic obstructive pulmonary disease)  Pneumonia  Thrombocytopenia  Cellulitis  Bilateral cataracts  History of surgery: bladder lift  History of tonsillectomy  History of partial hysterectomy  S/P insertion of spinal cord stimulator  History of left knee replacement      FAMILY HISTORY:  No known cardiovascular or pulmonary family history     ROS:  See HPI     PHYSICAL EXAM    ICU Vital Signs Last 24 Hrs  T(C): 36.8 (21 Jan 2020 14:57), Max: 36.8 (21 Jan 2020 14:57)  T(F): 98.2 (21 Jan 2020 14:57), Max: 98.2 (21 Jan 2020 14:57)  HR: 78 (21 Jan 2020 14:57) (78 - 98)  BP: 99/62 (21 Jan 2020 14:57) (84/63 - 103/67)  BP(mean): --  ABP: --  ABP(mean): --  RR: 20 (21 Jan 2020 14:57) (20 - 20)  SpO2: 95% (21 Jan 2020 14:57) (83% - 95%)    General: NAD, Ambulating comfortably, no tachypnea observed  HEENT:  MILA, EOMI             Lymphatic system: No LN  Lungs: Bibasilar crackles r>l, right lower lobe egophony present. Speaking in full sentences. No wheezing.   Cardiovascular: S1S2 RRR, No M/R/G  Gastrointestinal: Soft, NT/ND Positive BS  Musculoskeletal: No clubbing.  Moves all extremities.    Extremities: LLE>RLE, 1+ pitting edema LLE till mid calf  Skin: Warm.  Intact  Neurological: No motor or sensory deficit     LABS:                          11.9   18.57 )-----------( 61       ( 21 Jan 2020 13:37 )             39.0                                               01-21    140  |  95<L>  |  27<H>  ----------------------------<  136<H>  3.6   |  27  |  1.38<H>    Ca    10.1      21 Jan 2020 13:37  Mg     2.1     01-21    TPro  7.8  /  Alb  4.0  /  TBili  0.3  /  DBili  x   /  AST  31  /  ALT  43  /  AlkPhos  165<H>  01-21      PT/INR - ( 21 Jan 2020 13:37 )   PT: 13.6 sec;   INR: 1.19          PTT - ( 21 Jan 2020 13:37 )  PTT:31.9 sec                                           CARDIAC MARKERS ( 21 Jan 2020 13:37 )  x     / 0.02 ng/mL / 221 U/L / x     / 3.9 ng/mL                                            LIVER FUNCTIONS - ( 21 Jan 2020 13:37 )  Alb: 4.0 g/dL / Pro: 7.8 g/dL / ALK PHOS: 165 U/L / ALT: 43 U/L / AST: 31 U/L / GGT: x                    Serum Pro-Brain Natriuretic Peptide: 9144 pg/mL (01-21-20 @ 13:37)  MEDICATIONS  (STANDING):  azithromycin  IVPB 500 milliGRAM(s) IV Intermittent Once  cefTRIAXone   IVPB 1000 milliGRAM(s) IV Intermittent Once  diphenhydrAMINE   Injectable 50 milliGRAM(s) IV Push once  methylPREDNISolone sodium succinate Injectable 125 milliGRAM(s) IV Push once    MEDICATIONS  (PRN): ICU Consult Note:    HPI    70 yo F with history of COPD on home oxygen 2LNC prn, DARRIN on CPAP, thrombocytopenia, multiple knee surgeries resulting in chronic pain syndrome who presents from Dr. Sun's office with tachypnea and hypoxia. The patient is accompanied by her daughter and son who state that she has been progressively getting ill for the past 1 week. The patient endorses 1 week history of productive cough with yellow sputum, difficulty catching her breath, no recorded fevers but notes episodes of sweating. She endorses her symptoms started after being exposed to one of her grand children who had similar symptoms. Denies any recent travel or recent antibiotic use. No recent hospitalization. ROS otherwise negative.   On arrival to [facility] ED: VS: afebrile, HR 98, bp 84/63, RR 20 with 02 saturation on 83% on supplemental oxygen. Initial labs s/f WBC of 18K, platelet count of 61K, PT/INR 13.6/1.19, D Dimer 1057, BMP s/f VENANCIO BUN/creat 27/1.38, Alk phos 165, , Trop T 0.02, BNP 9144, VBG with pH 7.29 and pCO2 72, CXR with increase pulmonary vascular congestion, right lower lobe opacity. ICU consulted for further recommendations.    Allergies:  iodine (Anaphylaxis)  Intolerances    Home Medications:  acetaminophen 325 mg oral tablet: 2 tab(s) orally every 6 hours, as needed, mild pain (1-3) or fever temperature 100.3 or higher.  Do not take more than 3,250mg in a day    (09 Aug 2019 11:21)  ALPRAZolam 1 mg oral tablet: 1 tab(s) orally 3 times a day, As needed, Anxiety.  Take with caution if administering hydromorphone.  The combination can increase fall risks (09 Aug 2019 11:21)  bisacodyl 10 mg rectal suppository: 1 suppository(ies) rectal once a day, As needed, If no bowel movement by POD#2 (09 Aug 2019 11:21)  docusate sodium 100 mg oral capsule: 1 cap(s) orally 3 times a day (09 Aug 2019 11:21)  lamoTRIgine 200 mg oral tablet: 1 tab(s) orally once a day (08 Aug 2019 08:42)  polyethylene glycol 3350 oral powder for reconstitution: 17 gram(s) orally once a day (09 Aug 2019 11:21)  Senokot 8.6 mg oral tablet: 2 tab(s) orally once a day (at bedtime) (08 Aug 2019 08:42)  Stiolto Respimat 10 ACT 2.5 mcg-2.5 mcg/inh inhalation aerosol: 2 puff(s) inhaled 2 times a day (08 Aug 2019 08:42)    SOCIAL HX:     Smoking: past smoker for 30 years, quit around 5 years ago. Currently vapes Juul    ETOH/Illicit drugs: denies    PAST MEDICAL & SURGICAL HISTORY:  Sleep apnea  Anxiety  Pulmonary hypertension  PFO (patent foramen ovale)  Hypoxemia  Depression  Centrilobular emphysema  COPD (chronic obstructive pulmonary disease)  Pneumonia  Thrombocytopenia  Cellulitis  Bilateral cataracts  History of surgery: bladder lift  History of tonsillectomy  History of partial hysterectomy  S/P insertion of spinal cord stimulator  History of left knee replacement      FAMILY HISTORY:  No known cardiovascular or pulmonary family history     ROS:  See HPI     PHYSICAL EXAM    ICU Vital Signs Last 24 Hrs  T(C): 36.8 (21 Jan 2020 14:57), Max: 36.8 (21 Jan 2020 14:57)  T(F): 98.2 (21 Jan 2020 14:57), Max: 98.2 (21 Jan 2020 14:57)  HR: 78 (21 Jan 2020 14:57) (78 - 98)  BP: 99/62 (21 Jan 2020 14:57) (84/63 - 103/67)  BP(mean): --  ABP: --  ABP(mean): --  RR: 20 (21 Jan 2020 14:57) (20 - 20)  SpO2: 95% (21 Jan 2020 14:57) (83% - 95%)    General: NAD, Ambulating comfortably, no tachypnea observed  HEENT:  MILA, EOMI             Lymphatic system: No LN  Lungs: Bibasilar crackles r>l, right lower lobe egophony present. Speaking in full sentences. No wheezing.   Cardiovascular: S1S2 RRR, No M/R/G  Gastrointestinal: Soft, NT/ND Positive BS  Musculoskeletal: No clubbing.  Moves all extremities.    Extremities: LLE>RLE, 1+ pitting edema LLE till mid calf  Skin: Warm.  Intact  Neurological: No motor or sensory deficit     LABS:                          11.9   18.57 )-----------( 61       ( 21 Jan 2020 13:37 )             39.0                                               01-21    140  |  95<L>  |  27<H>  ----------------------------<  136<H>  3.6   |  27  |  1.38<H>    Ca    10.1      21 Jan 2020 13:37  Mg     2.1     01-21    TPro  7.8  /  Alb  4.0  /  TBili  0.3  /  DBili  x   /  AST  31  /  ALT  43  /  AlkPhos  165<H>  01-21      PT/INR - ( 21 Jan 2020 13:37 )   PT: 13.6 sec;   INR: 1.19          PTT - ( 21 Jan 2020 13:37 )  PTT:31.9 sec                                           CARDIAC MARKERS ( 21 Jan 2020 13:37 )  x     / 0.02 ng/mL / 221 U/L / x     / 3.9 ng/mL                                            LIVER FUNCTIONS - ( 21 Jan 2020 13:37 )  Alb: 4.0 g/dL / Pro: 7.8 g/dL / ALK PHOS: 165 U/L / ALT: 43 U/L / AST: 31 U/L / GGT: x                    Serum Pro-Brain Natriuretic Peptide: 9144 pg/mL (01-21-20 @ 13:37)  MEDICATIONS  (STANDING):  azithromycin  IVPB 500 milliGRAM(s) IV Intermittent Once  cefTRIAXone   IVPB 1000 milliGRAM(s) IV Intermittent Once  diphenhydrAMINE   Injectable 50 milliGRAM(s) IV Push once  methylPREDNISolone sodium succinate Injectable 125 milliGRAM(s) IV Push once    MEDICATIONS  (PRN):

## 2020-01-21 NOTE — ED PROVIDER NOTE - OBJECTIVE STATEMENT
68 y/o F pt with PMHx of emphysema and COPD, and no pertinent PSHx presents to ED c/o worsening shortness of breath, non-productive cough, chills, and fatigue for the past several days. Pt has chronic edema in her L LE from a prior orthopedic surgery that has not been different than usual. Pt denies hx of PE or DVT and any other acute complaints.

## 2020-01-22 LAB
ANION GAP SERPL CALC-SCNC: 13 MMOL/L — SIGNIFICANT CHANGE UP (ref 5–17)
BASOPHILS # BLD AUTO: 0.06 K/UL — SIGNIFICANT CHANGE UP (ref 0–0.2)
BASOPHILS NFR BLD AUTO: 0.4 % — SIGNIFICANT CHANGE UP (ref 0–2)
BUN SERPL-MCNC: 12 MG/DL — SIGNIFICANT CHANGE UP (ref 7–23)
CALCIUM SERPL-MCNC: 8.8 MG/DL — SIGNIFICANT CHANGE UP (ref 8.4–10.5)
CHLORIDE SERPL-SCNC: 98 MMOL/L — SIGNIFICANT CHANGE UP (ref 96–108)
CO2 SERPL-SCNC: 28 MMOL/L — SIGNIFICANT CHANGE UP (ref 22–31)
CREAT SERPL-MCNC: 0.54 MG/DL — SIGNIFICANT CHANGE UP (ref 0.5–1.3)
EOSINOPHIL # BLD AUTO: 0.22 K/UL — SIGNIFICANT CHANGE UP (ref 0–0.5)
EOSINOPHIL NFR BLD AUTO: 1.4 % — SIGNIFICANT CHANGE UP (ref 0–6)
GLUCOSE BLDC GLUCOMTR-MCNC: 129 MG/DL — HIGH (ref 70–99)
GLUCOSE BLDC GLUCOMTR-MCNC: 189 MG/DL — HIGH (ref 70–99)
GLUCOSE BLDC GLUCOMTR-MCNC: 252 MG/DL — HIGH (ref 70–99)
GLUCOSE BLDC GLUCOMTR-MCNC: 299 MG/DL — HIGH (ref 70–99)
GLUCOSE SERPL-MCNC: 143 MG/DL — HIGH (ref 70–99)
HCT VFR BLD CALC: 33.3 % — LOW (ref 34.5–45)
HGB BLD-MCNC: 10.3 G/DL — LOW (ref 11.5–15.5)
IMM GRANULOCYTES NFR BLD AUTO: 1.9 % — HIGH (ref 0–1.5)
LYMPHOCYTES # BLD AUTO: 13.7 % — SIGNIFICANT CHANGE UP (ref 13–44)
LYMPHOCYTES # BLD AUTO: 2.22 K/UL — SIGNIFICANT CHANGE UP (ref 1–3.3)
MAGNESIUM SERPL-MCNC: 2 MG/DL — SIGNIFICANT CHANGE UP (ref 1.6–2.6)
MCHC RBC-ENTMCNC: 29 PG — SIGNIFICANT CHANGE UP (ref 27–34)
MCHC RBC-ENTMCNC: 30.9 GM/DL — LOW (ref 32–36)
MCV RBC AUTO: 93.8 FL — SIGNIFICANT CHANGE UP (ref 80–100)
MONOCYTES # BLD AUTO: 1.58 K/UL — HIGH (ref 0–0.9)
MONOCYTES NFR BLD AUTO: 9.8 % — SIGNIFICANT CHANGE UP (ref 2–14)
NEUTROPHILS # BLD AUTO: 11.77 K/UL — HIGH (ref 1.8–7.4)
NEUTROPHILS NFR BLD AUTO: 72.8 % — SIGNIFICANT CHANGE UP (ref 43–77)
NRBC # BLD: 0 /100 WBCS — SIGNIFICANT CHANGE UP (ref 0–0)
PHOSPHATE SERPL-MCNC: 3.6 MG/DL — SIGNIFICANT CHANGE UP (ref 2.5–4.5)
PLATELET # BLD AUTO: 61 K/UL — LOW (ref 150–400)
POTASSIUM SERPL-MCNC: 3.4 MMOL/L — LOW (ref 3.5–5.3)
POTASSIUM SERPL-SCNC: 3.4 MMOL/L — LOW (ref 3.5–5.3)
RBC # BLD: 3.55 M/UL — LOW (ref 3.8–5.2)
RBC # FLD: 12.7 % — SIGNIFICANT CHANGE UP (ref 10.3–14.5)
SODIUM SERPL-SCNC: 139 MMOL/L — SIGNIFICANT CHANGE UP (ref 135–145)
WBC # BLD: 16.16 K/UL — HIGH (ref 3.8–10.5)
WBC # FLD AUTO: 16.16 K/UL — HIGH (ref 3.8–10.5)

## 2020-01-22 PROCEDURE — 71045 X-RAY EXAM CHEST 1 VIEW: CPT | Mod: 26

## 2020-01-22 PROCEDURE — 93970 EXTREMITY STUDY: CPT | Mod: 26

## 2020-01-22 PROCEDURE — 99233 SBSQ HOSP IP/OBS HIGH 50: CPT | Mod: GC

## 2020-01-22 RX ORDER — CEFTRIAXONE 500 MG/1
2000 INJECTION, POWDER, FOR SOLUTION INTRAMUSCULAR; INTRAVENOUS EVERY 24 HOURS
Refills: 0 | Status: COMPLETED | OUTPATIENT
Start: 2020-01-22 | End: 2020-01-25

## 2020-01-22 RX ORDER — DEXTROSE 50 % IN WATER 50 %
12.5 SYRINGE (ML) INTRAVENOUS ONCE
Refills: 0 | Status: DISCONTINUED | OUTPATIENT
Start: 2020-01-22 | End: 2020-01-27

## 2020-01-22 RX ORDER — POTASSIUM CHLORIDE 20 MEQ
10 PACKET (EA) ORAL
Refills: 0 | Status: COMPLETED | OUTPATIENT
Start: 2020-01-22 | End: 2020-01-22

## 2020-01-22 RX ORDER — IPRATROPIUM/ALBUTEROL SULFATE 18-103MCG
3 AEROSOL WITH ADAPTER (GRAM) INHALATION EVERY 4 HOURS
Refills: 0 | Status: DISCONTINUED | OUTPATIENT
Start: 2020-01-22 | End: 2020-01-27

## 2020-01-22 RX ORDER — SODIUM CHLORIDE 9 MG/ML
1000 INJECTION, SOLUTION INTRAVENOUS
Refills: 0 | Status: DISCONTINUED | OUTPATIENT
Start: 2020-01-22 | End: 2020-01-27

## 2020-01-22 RX ORDER — HYDROMORPHONE HYDROCHLORIDE 2 MG/ML
4 INJECTION INTRAMUSCULAR; INTRAVENOUS; SUBCUTANEOUS EVERY 4 HOURS
Refills: 0 | Status: DISCONTINUED | OUTPATIENT
Start: 2020-01-22 | End: 2020-01-27

## 2020-01-22 RX ORDER — DEXTROSE 50 % IN WATER 50 %
15 SYRINGE (ML) INTRAVENOUS ONCE
Refills: 0 | Status: DISCONTINUED | OUTPATIENT
Start: 2020-01-22 | End: 2020-01-27

## 2020-01-22 RX ORDER — DEXTROSE 50 % IN WATER 50 %
25 SYRINGE (ML) INTRAVENOUS ONCE
Refills: 0 | Status: DISCONTINUED | OUTPATIENT
Start: 2020-01-22 | End: 2020-01-27

## 2020-01-22 RX ORDER — GLUCAGON INJECTION, SOLUTION 0.5 MG/.1ML
1 INJECTION, SOLUTION SUBCUTANEOUS ONCE
Refills: 0 | Status: DISCONTINUED | OUTPATIENT
Start: 2020-01-22 | End: 2020-01-27

## 2020-01-22 RX ORDER — INSULIN LISPRO 100/ML
VIAL (ML) SUBCUTANEOUS
Refills: 0 | Status: DISCONTINUED | OUTPATIENT
Start: 2020-01-22 | End: 2020-01-27

## 2020-01-22 RX ORDER — CEFTRIAXONE 500 MG/1
1000 INJECTION, POWDER, FOR SOLUTION INTRAMUSCULAR; INTRAVENOUS EVERY 24 HOURS
Refills: 0 | Status: COMPLETED | OUTPATIENT
Start: 2020-01-22 | End: 2020-01-22

## 2020-01-22 RX ORDER — INSULIN LISPRO 100/ML
VIAL (ML) SUBCUTANEOUS
Refills: 0 | Status: DISCONTINUED | OUTPATIENT
Start: 2020-01-22 | End: 2020-01-22

## 2020-01-22 RX ADMIN — Medication 3 MILLILITER(S): at 04:07

## 2020-01-22 RX ADMIN — CEFTRIAXONE 100 MILLIGRAM(S): 500 INJECTION, POWDER, FOR SOLUTION INTRAMUSCULAR; INTRAVENOUS at 03:11

## 2020-01-22 RX ADMIN — Medication 3 MILLILITER(S): at 02:09

## 2020-01-22 RX ADMIN — HYDROMORPHONE HYDROCHLORIDE 4 MILLIGRAM(S): 2 INJECTION INTRAMUSCULAR; INTRAVENOUS; SUBCUTANEOUS at 01:54

## 2020-01-22 RX ADMIN — CEFTRIAXONE 100 MILLIGRAM(S): 500 INJECTION, POWDER, FOR SOLUTION INTRAMUSCULAR; INTRAVENOUS at 18:06

## 2020-01-22 RX ADMIN — Medication 100 MILLIEQUIVALENT(S): at 10:28

## 2020-01-22 RX ADMIN — AZITHROMYCIN 500 MILLIGRAM(S): 500 TABLET, FILM COATED ORAL at 18:06

## 2020-01-22 RX ADMIN — Medication 6: at 17:09

## 2020-01-22 RX ADMIN — HYDROMORPHONE HYDROCHLORIDE 4 MILLIGRAM(S): 2 INJECTION INTRAMUSCULAR; INTRAVENOUS; SUBCUTANEOUS at 18:21

## 2020-01-22 RX ADMIN — Medication 1 MILLIGRAM(S): at 22:14

## 2020-01-22 RX ADMIN — Medication 2: at 22:22

## 2020-01-22 RX ADMIN — HYDROMORPHONE HYDROCHLORIDE 4 MILLIGRAM(S): 2 INJECTION INTRAMUSCULAR; INTRAVENOUS; SUBCUTANEOUS at 18:50

## 2020-01-22 RX ADMIN — Medication 100 MILLIEQUIVALENT(S): at 12:33

## 2020-01-22 RX ADMIN — SENNA PLUS 2 TABLET(S): 8.6 TABLET ORAL at 22:15

## 2020-01-22 RX ADMIN — Medication 3 MILLILITER(S): at 22:15

## 2020-01-22 RX ADMIN — Medication 100 MILLIEQUIVALENT(S): at 09:13

## 2020-01-22 RX ADMIN — Medication 150 MILLIGRAM(S): at 12:33

## 2020-01-22 RX ADMIN — ENOXAPARIN SODIUM 40 MILLIGRAM(S): 100 INJECTION SUBCUTANEOUS at 22:15

## 2020-01-22 RX ADMIN — Medication 3 MILLILITER(S): at 18:06

## 2020-01-22 RX ADMIN — POLYETHYLENE GLYCOL 3350 17 GRAM(S): 17 POWDER, FOR SOLUTION ORAL at 12:33

## 2020-01-22 RX ADMIN — Medication 3 MILLILITER(S): at 13:56

## 2020-01-22 RX ADMIN — LAMOTRIGINE 200 MILLIGRAM(S): 25 TABLET, ORALLY DISINTEGRATING ORAL at 12:33

## 2020-01-22 RX ADMIN — Medication 3 MILLILITER(S): at 09:12

## 2020-01-22 RX ADMIN — HYDROMORPHONE HYDROCHLORIDE 4 MILLIGRAM(S): 2 INJECTION INTRAMUSCULAR; INTRAVENOUS; SUBCUTANEOUS at 00:24

## 2020-01-22 RX ADMIN — Medication 40 MILLIGRAM(S): at 06:49

## 2020-01-22 NOTE — PHYSICAL THERAPY INITIAL EVALUATION ADULT - PERTINENT HX OF CURRENT PROBLEM, REHAB EVAL
70 yo F with history of COPD on home oxygen 2LNC prn, DARRIN on CPAP, thrombocytopenia, multiple knee surgeries resulting in chronic pain syndrome who presents from Dr. Sun's office with tachypnea and hypoxia. Diagnosed with pneumonia

## 2020-01-22 NOTE — CONSULT NOTE ADULT - ASSESSMENT
per Internal Medicine    70 yo F with history of COPD on home oxygen 2LNC prn, DARRIN on CPAP, thrombocytopenia, multiple knee surgeries resulting in chronic pain syndrome who presents from Dr. Sun's office with tachypnea and hypoxia, here for severe sepsis likely 2/2 CAP vs acute lung injury 2/2 vape    Problem/Plan - 1:  ·  Problem: Severe sepsis.  Plan: 2/2 CAP - Patient presenting with productive cough, fatigue, shortness of breath and tachypnea, on arrival to ED, meets criteria for severe sepsis. Source suspected to be pulmonary given her complaints, physical exam findings of ronchi and egophony in RLL and CXR with hazy opacity in RLL. Pt also been vaping, possibly vaping induced lung injury  - Continue ceftriaxone/azthromycin for CAP coverage   - Obtain RVP given recent sick contact   - f/u sputum culture, blood cultures, urine Legionella  - Low suspicion of PE at this time, also pt is allergic to contrast (would need benadryl/allergy protocol). But given complaints of tachypnea, VS s/f hypoxia, physical exam with LLE>RLE, PE should remain in mind, LLE asymetry is chronic.     Problem/Plan - 2:  ·  Problem: VENANCIO (acute kidney injury).  Plan: 1.38 on admission, was 0.54 in August  - urine lytes  - avoid nephrotoxins  - hold home lasix  -renally dose meds  - monitor Cr.  - encourage PO.    Problem/Plan - 3:  ·  Problem: COPD (chronic obstructive pulmonary disease).  Plan: -prednisone 40 daily  -duonebs prn  - home stiolto.    Problem/Plan - 4:  ·  Problem: Thrombocytopenia.  Plan: HX of ITP; Continue to trend PLT, patient previously on Nplate. Platelets 68 on admission, close to baseline.    #Anxiety  -alprazolam prn  - trazadone prn.    Problem/Plan - 5:  ·  Problem: Swelling.  Plan: chronic LLE swelling, unlikely to be DVT/PE at this time    #Chronic leg pain  - hydromorphone 4mg q4h  - can titrate up with patietns other home pain meds as needed.    Problem/Plan - 6:  Problem: Nutrition, metabolism, and development symptoms. Plan: F: prn  E: replete K4 Mg2  N: Dash  ppx:  GI: none  DVT: lovenox 40.

## 2020-01-22 NOTE — PROGRESS NOTE ADULT - PROBLEM SELECTOR PLAN 1
2/2 CAP - Patient presenting with productive cough, fatigue, shortness of breath and tachypnea, on arrival to ED, meets criteria for severe sepsis. Source suspected to be pulmonary given her complaints, physical exam findings of ronchi and egophony in RLL and CXR with hazy opacity in RLL. Pt also been vaping, possibly vaping induced lung injury. CT Chest showing multifocal pneumonia.   - IV Ceftriaxone 2g q24H, IV azthromycin 500 q24H for CAP coverage (1/21 -   - RVP negative  - BCcx (1/21) NGTD  - Low suspicion of PE at this time, also pt is allergic to contrast (would need benadryl/allergy protocol). But given complaints of tachypnea, VS s/f hypoxia, physical exam with LLE>RLE, PE should remain in mind, LLE asymetry is chronic; f/u LE dopplers  - F/U ECHO

## 2020-01-22 NOTE — PROGRESS NOTE ADULT - SUBJECTIVE AND OBJECTIVE BOX
SUBJECTIVE / INTERVAL HPI: Patient seen and examined at bedside.     VITAL SIGNS:  Vital Signs Last 24 Hrs  T(C): 36.8 (22 Jan 2020 05:17), Max: 38.8 (21 Jan 2020 19:55)  T(F): 98.3 (22 Jan 2020 05:17), Max: 101.9 (21 Jan 2020 19:55)  HR: 92 (22 Jan 2020 03:55) (78 - 114)  BP: 112/63 (22 Jan 2020 03:55) (84/63 - 126/66)  BP(mean): 82 (22 Jan 2020 03:55) (82 - 92)  RR: 18 (22 Jan 2020 03:55) (18 - 22)  SpO2: 100% (22 Jan 2020 03:55) (83% - 100%)    PHYSICAL EXAM:    General: WDWN  HEENT: NC/AT; PERRL, anicteric sclera; MMM  Neck: supple  Cardiovascular: +S1/S2; RRR  Respiratory: CTA B/L; no W/R/R  Gastrointestinal: soft, NT/ND; +BSx4  Extremities: WWP; no edema, clubbing or cyanosis  Vascular: 2+ radial, DP/PT pulses B/L  Neurological: AAOx3; no focal deficits    MEDICATIONS:  MEDICATIONS  (STANDING):  albuterol/ipratropium for Nebulization 3 milliLiter(s) Nebulizer every 4 hours  azithromycin   Tablet 500 milliGRAM(s) Oral every 24 hours  cefTRIAXone   IVPB 2000 milliGRAM(s) IV Intermittent every 24 hours  enoxaparin Injectable 40 milliGRAM(s) SubCutaneous every 24 hours  lamoTRIgine 200 milliGRAM(s) Oral daily  polyethylene glycol 3350 17 Gram(s) Oral daily  predniSONE   Tablet 40 milliGRAM(s) Oral daily  senna 2 Tablet(s) Oral at bedtime  venlafaxine XR. 150 milliGRAM(s) Oral daily    MEDICATIONS  (PRN):  ALPRAZolam 1 milliGRAM(s) Oral three times a day PRN anxiety  HYDROmorphone   Tablet 4 milliGRAM(s) Oral every 4 hours PRN Moderate Pain (4 - 6)  traZODone 100 milliGRAM(s) Oral at bedtime PRN insomnia      ALLERGIES:  Allergies    iodine (Anaphylaxis)  IV Contrast (Anaphylaxis)    Intolerances        LABS:                        10.3   16.16 )-----------( 61       ( 22 Jan 2020 06:21 )             33.3     01-21    140  |  95<L>  |  27<H>  ----------------------------<  136<H>  3.6   |  27  |  1.38<H>    Ca    10.1      21 Jan 2020 13:37  Phos  3.6     01-22  Mg     2.0     01-22    TPro  7.8  /  Alb  4.0  /  TBili  0.3  /  DBili  x   /  AST  31  /  ALT  43  /  AlkPhos  165<H>  01-21    PT/INR - ( 21 Jan 2020 13:37 )   PT: 13.6 sec;   INR: 1.19          PTT - ( 21 Jan 2020 13:37 )  PTT:31.9 sec    CAPILLARY BLOOD GLUCOSE      POCT Blood Glucose.: 129 mg/dL (22 Jan 2020 06:27)      RADIOLOGY & ADDITIONAL TESTS: Reviewed.    ASSESSMENT:    PLAN: SUBJECTIVE / INTERVAL HPI: Patient seen and examined at bedside. Patient complaining of some mild SOB, no cough. Denies any chest pain, fevers, chills.     VITAL SIGNS:  Vital Signs Last 24 Hrs  T(C): 36.8 (22 Jan 2020 05:17), Max: 38.8 (21 Jan 2020 19:55)  T(F): 98.3 (22 Jan 2020 05:17), Max: 101.9 (21 Jan 2020 19:55)  HR: 92 (22 Jan 2020 03:55) (78 - 114)  BP: 112/63 (22 Jan 2020 03:55) (84/63 - 126/66)  BP(mean): 82 (22 Jan 2020 03:55) (82 - 92)  RR: 18 (22 Jan 2020 03:55) (18 - 22)  SpO2: 100% (22 Jan 2020 03:55) (83% - 100%)    PHYSICAL EXAM:    General: WDWN, non BIPAP, no accessory muscle use  HEENT: NC/AT; PERRL, anicteric sclera; MMM  Neck: supple  Cardiovascular: +S1/S2; RRR  Respiratory: diminished right sided breath sounds, no wheezing  Gastrointestinal: soft, NT/ND; +BSx4  Extremities: WWP; no edema, clubbing or cyanosis  Vascular: 2+ radial, DP/PT pulses B/L  Neurological: AAOx3; no focal deficits    MEDICATIONS:  MEDICATIONS  (STANDING):  albuterol/ipratropium for Nebulization 3 milliLiter(s) Nebulizer every 4 hours  azithromycin   Tablet 500 milliGRAM(s) Oral every 24 hours  cefTRIAXone   IVPB 2000 milliGRAM(s) IV Intermittent every 24 hours  enoxaparin Injectable 40 milliGRAM(s) SubCutaneous every 24 hours  lamoTRIgine 200 milliGRAM(s) Oral daily  polyethylene glycol 3350 17 Gram(s) Oral daily  predniSONE   Tablet 40 milliGRAM(s) Oral daily  senna 2 Tablet(s) Oral at bedtime  venlafaxine XR. 150 milliGRAM(s) Oral daily    MEDICATIONS  (PRN):  ALPRAZolam 1 milliGRAM(s) Oral three times a day PRN anxiety  HYDROmorphone   Tablet 4 milliGRAM(s) Oral every 4 hours PRN Moderate Pain (4 - 6)  traZODone 100 milliGRAM(s) Oral at bedtime PRN insomnia      ALLERGIES:  Allergies    iodine (Anaphylaxis)  IV Contrast (Anaphylaxis)    Intolerances        LABS:                        10.3   16.16 )-----------( 61       ( 22 Jan 2020 06:21 )             33.3     01-21    140  |  95<L>  |  27<H>  ----------------------------<  136<H>  3.6   |  27  |  1.38<H>    Ca    10.1      21 Jan 2020 13:37  Phos  3.6     01-22  Mg     2.0     01-22    TPro  7.8  /  Alb  4.0  /  TBili  0.3  /  DBili  x   /  AST  31  /  ALT  43  /  AlkPhos  165<H>  01-21    PT/INR - ( 21 Jan 2020 13:37 )   PT: 13.6 sec;   INR: 1.19          PTT - ( 21 Jan 2020 13:37 )  PTT:31.9 sec    CAPILLARY BLOOD GLUCOSE      POCT Blood Glucose.: 129 mg/dL (22 Jan 2020 06:27)      RADIOLOGY & ADDITIONAL TESTS: Reviewed.    ASSESSMENT:    PLAN:

## 2020-01-22 NOTE — PROGRESS NOTE ADULT - ASSESSMENT
70 yo F with history of COPD on home oxygen 2LNC prn, DARRIN on CPAP, thrombocytopenia, multiple knee surgeries resulting in chronic pain syndrome who presents from Dr. Sun's office with tachypnea and hypoxia, here for severe sepsis likely 2/2 CAP vs acute lung injury 2/2 vape

## 2020-01-22 NOTE — PHYSICAL THERAPY INITIAL EVALUATION ADULT - ADDITIONAL COMMENTS
Patient lives in elevator building. Reports ambulating without AD at home but uses rollator outdoors. Home O2 as needed Patient lives in elevator building. Reports ambulating without AD at home but uses rollator outdoors.  Denies recent falls. Home O2 as needed

## 2020-01-22 NOTE — CONSULT NOTE ADULT - SUBJECTIVE AND OBJECTIVE BOX
Patient is a 69y old  Female who presents with a chief complaint of cough (22 Jan 2020 07:45)       HPI:  68 yo F with history of COPD on home oxygen 2LNC prn, DARRIN on CPAP, thrombocytopenia, multiple knee surgeries resulting in chronic pain syndrome who presents from Dr. Sun's office with tachypnea and hypoxia. The patient is accompanied by her daughter and son who state that she has been progressively getting ill for the past 1 week. The patient endorses 1 week history of productive cough with yellow sputum, difficulty catching her breath, no recorded fevers but notes episodes of sweating. She endorses her symptoms started after being exposed to one of her grand children who had similar symptoms. Denies any recent travel or recent antibiotic use. No recent hospitalization. ROS otherwise negative.     On arrival to [facility] ED: VS: afebrile, HR 98, bp 84/63, RR 20 with 02 saturation on 83% on supplemental oxygen. Initial labs s/f WBC of 18K, platelet count of 61K, PT/INR 13.6/1.19, D Dimer 1057, BMP s/f VENANCIO BUN/creat 27/1.38, Alk phos 165, , Trop T 0.02, BNP 9144, VBG with pH 7.29 and pCO2 72, CXR with increase pulmonary vascular congestion, right lower lobe opacity. ICU consulted for further recommendations. (21 Jan 2020 18:36)      PAST MEDICAL & SURGICAL HISTORY:  Sleep apnea  Anxiety  Pulmonary hypertension  PFO (patent foramen ovale)  Hypoxemia  Depression  Centrilobular emphysema  COPD (chronic obstructive pulmonary disease)  Pneumonia  Thrombocytopenia  Cellulitis  Bilateral cataracts  History of surgery: bladder lift  History of tonsillectomy  History of partial hysterectomy  S/P insertion of spinal cord stimulator  History of left knee replacement      MEDICATIONS  (STANDING):  albuterol/ipratropium for Nebulization 3 milliLiter(s) Nebulizer every 4 hours  azithromycin   Tablet 500 milliGRAM(s) Oral every 24 hours  cefTRIAXone   IVPB 2000 milliGRAM(s) IV Intermittent every 24 hours  enoxaparin Injectable 40 milliGRAM(s) SubCutaneous every 24 hours  lamoTRIgine 200 milliGRAM(s) Oral daily  polyethylene glycol 3350 17 Gram(s) Oral daily  potassium chloride  10 mEq/100 mL IVPB 10 milliEquivalent(s) IV Intermittent every 1 hour  predniSONE   Tablet 40 milliGRAM(s) Oral daily  senna 2 Tablet(s) Oral at bedtime  venlafaxine XR. 150 milliGRAM(s) Oral daily    MEDICATIONS  (PRN):  ALPRAZolam 1 milliGRAM(s) Oral three times a day PRN anxiety  HYDROmorphone   Tablet 4 milliGRAM(s) Oral every 4 hours PRN Moderate Pain (4 - 6)  traZODone 100 milliGRAM(s) Oral at bedtime PRN insomnia    FAMILY HISTORY:      CBC Full  -  ( 22 Jan 2020 06:21 )  WBC Count : 16.16 K/uL  RBC Count : 3.55 M/uL  Hemoglobin : 10.3 g/dL  Hematocrit : 33.3 %  Platelet Count - Automated : 61 K/uL  Mean Cell Volume : 93.8 fl  Mean Cell Hemoglobin : 29.0 pg  Mean Cell Hemoglobin Concentration : 30.9 gm/dL  Auto Neutrophil # : 11.77 K/uL  Auto Lymphocyte # : 2.22 K/uL  Auto Monocyte # : 1.58 K/uL  Auto Eosinophil # : 0.22 K/uL  Auto Basophil # : 0.06 K/uL  Auto Neutrophil % : 72.8 %  Auto Lymphocyte % : 13.7 %  Auto Monocyte % : 9.8 %  Auto Eosinophil % : 1.4 %  Auto Basophil % : 0.4 %      01-22    139  |  98  |  12  ----------------------------<  143<H>  3.4<L>   |  28  |  0.54    Ca    8.8      22 Jan 2020 06:21  Phos  3.6     01-22  Mg     2.0     01-22    TPro  7.8  /  Alb  4.0  /  TBili  0.3  /  DBili  x   /  AST  31  /  ALT  43  /  AlkPhos  165<H>  01-21            Radiology:    < from: CT Chest No Cont (01.21.20 @ 23:37) >    EXAM:  CT CHEST                          PROCEDURE DATE:  01/21/2020    ******PRELIMINARY REPORT******    ******PRELIMINARY REPORT******              INTERPRETATION:  CT SCAN OF CHEST    History: Severe sepsis likely secondary to technique or pneumonia.    Technique: CT scan of chest performed from lung apices through lung bases. Axial, coronal, and sagittal multiplanar reformatted images were produced. Thin section axial images and axial MIPS were also produced. Intravenous contrast material was not administered, as ordered.    Comparison: CT chest 11/19/2020.    Findings: Findings are limited by motion artifact.    There are new patchy areas of consolidation seen primarily in the right upper lobe and bilateral lower lobes. There are a few areas of scattered groundglass seen bilaterally.     There are no pleural effusions.      Impression:  Findings are consistent with multifocal pneumonia.        Vital Signs Last 24 Hrs  T(C): 36.8 (22 Jan 2020 05:17), Max: 38.8 (21 Jan 2020 19:55)  T(F): 98.3 (22 Jan 2020 05:17), Max: 101.9 (21 Jan 2020 19:55)  HR: 92 (22 Jan 2020 03:55) (78 - 114)  BP: 112/63 (22 Jan 2020 03:55) (84/63 - 126/66)  BP(mean): 82 (22 Jan 2020 03:55) (82 - 92)  RR: 18 (22 Jan 2020 03:55) (18 - 22)  SpO2: 100% (22 Jan 2020 03:55) (83% - 100%)    REVIEW OF SYSTEMS:    CONSTITUTIONAL: fatigue  EYES: No eye pain, visual disturbances, or discharge  ENMT:  No difficulty hearing, tinnitus, vertigo; No sinus or throat pain  NECK: No pain or stiffness  BREASTS: No pain, masses, or nipple discharge  RESPIRATORY: cough with yellow sputum  CARDIOVASCULAR: No chest pain, palpitations, dizziness, or leg swelling  GASTROINTESTINAL: No abdominal or epigastric pain. No nausea, vomiting, or hematemesis; No diarrhea or constipation. No melena or hematochezia.  GENITOURINARY: No dysuria, frequency, hematuria, or incontinence  NEUROLOGICAL: No headaches, memory loss, loss of strength, numbness, or tremors  SKIN: No itching, burning, rashes, or lesions   LYMPH NODES: No enlarged glands  ENDOCRINE: No heat or cold intolerance; No hair loss  MUSCULOSKELETAL: No joint pain or swelling; No muscle, back, or extremity pain  PSYCHIATRIC: No depression, anxiety, mood swings, or difficulty sleeping  HEME/LYMPH: No easy bruising, or bleeding gums  ALLERGY AND IMMUNOLOGIC: No hives or eczema  VASCULAR: no swelling, erythema        Physical Exam: 68 yo  woman lying in semi Prery's position, c/o fatigue and cough with whitish/yellow sputum    Head: normocephalic, atraumatic    Eyes: PERRLA, EOMI, no nystagmus, sclera anicteric    ENT: nasal discharge, uvula midline, no oropharyngeal erythema/exudate    Neck: supple, negative JVD, negative carotid bruits, no thyromegaly    Chest: bilateral crackles    Cardiovascular: regular rate and rhythm, neg murmurs/rubs/gallops    Abdomen: soft, non distended, non tender to palpation, negative rebound/guarding, normal bowel sounds, neg hepatosplenomegaly    Extremities: 1 + LE edema, negative calf tenderness to palpation, negative Bk's sign    :     Neurologic Exam:      Motor Exam:    Upper Extremities:     RIght:   no focal weakness    Left :    no focal weakness    Lower Extremities:                 Right:   no focal weakness                 Left:      no focal weakness                 Sensory:    intact to LT/PP in all UE/LE dermatomes                                              DTR:            = biceps/     triceps/     brachioradialis                      = patella/   medial hamstring/ankle                      neg clonus                      neg Babinski                        Gait:  not tested        PM&R Impression:    1) deconditioned  2) no focal weakness  3) sepsis/ PNA/ VENANCIO        Recommendations:    1) Physical therapy focusing on therapeutic exercises, bed mobility/transfer out of bed evaluation, progressive ambulation with assistive devices prn.    2) Disposition Plan/Recs:  pending functional progress

## 2020-01-22 NOTE — CONSULT NOTE ADULT - REASON FOR ADMISSION
cough
platelet count of 61K   PT/INR 13.6/1.19, D Dimer 1057  BMP s/f VENANCIO UN/creat 27/1.38  Alk phos 165, , Trop T 0.02, BNP 9144  VBG with pH 7.29 and pCO2 72  CXR with increase pulmonary vascular congestion, right lower lobe opacity

## 2020-01-22 NOTE — PHYSICAL THERAPY INITIAL EVALUATION ADULT - ACTIVE RANGE OF MOTION EXAMINATION, REHAB EVAL
no Active ROM deficits were identified no Active ROM deficits were identified/Right LE Active ROM was WFL (within functional limits)/bilateral upper extremity Active ROM was WFL (within functional limits)/Left knee flex 0-60 in supine

## 2020-01-22 NOTE — PHYSICAL THERAPY INITIAL EVALUATION ADULT - GENERAL OBSERVATIONS, REHAB EVAL
Received sitting in chair + IV, oxyegn 3LNC, in NAD. As per Abi RN patient lceared for PT/OOB. Received sitting in chair + heplock, telemetry, oxygen 4LNC, in NAD.  Agreebale to PT

## 2020-01-23 LAB
ANION GAP SERPL CALC-SCNC: 12 MMOL/L — SIGNIFICANT CHANGE UP (ref 5–17)
BASOPHILS # BLD AUTO: 0.13 K/UL — SIGNIFICANT CHANGE UP (ref 0–0.2)
BASOPHILS NFR BLD AUTO: 0.9 % — SIGNIFICANT CHANGE UP (ref 0–2)
BUN SERPL-MCNC: 13 MG/DL — SIGNIFICANT CHANGE UP (ref 7–23)
CALCIUM SERPL-MCNC: 8.5 MG/DL — SIGNIFICANT CHANGE UP (ref 8.4–10.5)
CHLORIDE SERPL-SCNC: 101 MMOL/L — SIGNIFICANT CHANGE UP (ref 96–108)
CO2 SERPL-SCNC: 30 MMOL/L — SIGNIFICANT CHANGE UP (ref 22–31)
CREAT SERPL-MCNC: 0.52 MG/DL — SIGNIFICANT CHANGE UP (ref 0.5–1.3)
EOSINOPHIL # BLD AUTO: 0.37 K/UL — SIGNIFICANT CHANGE UP (ref 0–0.5)
EOSINOPHIL NFR BLD AUTO: 2.6 % — SIGNIFICANT CHANGE UP (ref 0–6)
GLUCOSE BLDC GLUCOMTR-MCNC: 112 MG/DL — HIGH (ref 70–99)
GLUCOSE BLDC GLUCOMTR-MCNC: 140 MG/DL — HIGH (ref 70–99)
GLUCOSE BLDC GLUCOMTR-MCNC: 192 MG/DL — HIGH (ref 70–99)
GLUCOSE BLDC GLUCOMTR-MCNC: 205 MG/DL — HIGH (ref 70–99)
GLUCOSE SERPL-MCNC: 121 MG/DL — HIGH (ref 70–99)
HBA1C BLD-MCNC: 6.3 % — HIGH (ref 4–5.6)
HCT VFR BLD CALC: 31.4 % — LOW (ref 34.5–45)
HGB BLD-MCNC: 9.9 G/DL — LOW (ref 11.5–15.5)
LYMPHOCYTES # BLD AUTO: 19.1 % — SIGNIFICANT CHANGE UP (ref 13–44)
LYMPHOCYTES # BLD AUTO: 2.69 K/UL — SIGNIFICANT CHANGE UP (ref 1–3.3)
MAGNESIUM SERPL-MCNC: 2.3 MG/DL — SIGNIFICANT CHANGE UP (ref 1.6–2.6)
MCHC RBC-ENTMCNC: 29.2 PG — SIGNIFICANT CHANGE UP (ref 27–34)
MCHC RBC-ENTMCNC: 31.5 GM/DL — LOW (ref 32–36)
MCV RBC AUTO: 92.6 FL — SIGNIFICANT CHANGE UP (ref 80–100)
MONOCYTES # BLD AUTO: 0.86 K/UL — SIGNIFICANT CHANGE UP (ref 0–0.9)
MONOCYTES NFR BLD AUTO: 6.1 % — SIGNIFICANT CHANGE UP (ref 2–14)
NEUTROPHILS # BLD AUTO: 10.04 K/UL — HIGH (ref 1.8–7.4)
NEUTROPHILS NFR BLD AUTO: 71.3 % — SIGNIFICANT CHANGE UP (ref 43–77)
NRBC # BLD: SIGNIFICANT CHANGE UP /100 WBCS (ref 0–0)
PHOSPHATE SERPL-MCNC: 2.6 MG/DL — SIGNIFICANT CHANGE UP (ref 2.5–4.5)
PLATELET # BLD AUTO: 74 K/UL — LOW (ref 150–400)
POTASSIUM SERPL-MCNC: 3.5 MMOL/L — SIGNIFICANT CHANGE UP (ref 3.5–5.3)
POTASSIUM SERPL-SCNC: 3.5 MMOL/L — SIGNIFICANT CHANGE UP (ref 3.5–5.3)
RBC # BLD: 3.39 M/UL — LOW (ref 3.8–5.2)
RBC # FLD: 13.4 % — SIGNIFICANT CHANGE UP (ref 10.3–14.5)
SODIUM SERPL-SCNC: 143 MMOL/L — SIGNIFICANT CHANGE UP (ref 135–145)
WBC # BLD: 14.08 K/UL — HIGH (ref 3.8–10.5)
WBC # FLD AUTO: 14.08 K/UL — HIGH (ref 3.8–10.5)

## 2020-01-23 PROCEDURE — 99233 SBSQ HOSP IP/OBS HIGH 50: CPT | Mod: GC

## 2020-01-23 PROCEDURE — 71101 X-RAY EXAM UNILAT RIBS/CHEST: CPT | Mod: 26,LT

## 2020-01-23 RX ORDER — ACETAMINOPHEN WITH CODEINE 300MG-30MG
1 TABLET ORAL EVERY 4 HOURS
Refills: 0 | Status: DISCONTINUED | OUTPATIENT
Start: 2020-01-23 | End: 2020-01-23

## 2020-01-23 RX ORDER — BENZOCAINE AND MENTHOL 5; 1 G/100ML; G/100ML
1 LIQUID ORAL
Refills: 0 | Status: DISCONTINUED | OUTPATIENT
Start: 2020-01-23 | End: 2020-01-27

## 2020-01-23 RX ORDER — POTASSIUM CHLORIDE 20 MEQ
40 PACKET (EA) ORAL ONCE
Refills: 0 | Status: COMPLETED | OUTPATIENT
Start: 2020-01-23 | End: 2020-01-24

## 2020-01-23 RX ADMIN — Medication 3 MILLILITER(S): at 12:08

## 2020-01-23 RX ADMIN — HYDROMORPHONE HYDROCHLORIDE 4 MILLIGRAM(S): 2 INJECTION INTRAMUSCULAR; INTRAVENOUS; SUBCUTANEOUS at 13:00

## 2020-01-23 RX ADMIN — CEFTRIAXONE 100 MILLIGRAM(S): 500 INJECTION, POWDER, FOR SOLUTION INTRAMUSCULAR; INTRAVENOUS at 17:04

## 2020-01-23 RX ADMIN — Medication 3 MILLILITER(S): at 21:00

## 2020-01-23 RX ADMIN — HYDROMORPHONE HYDROCHLORIDE 4 MILLIGRAM(S): 2 INJECTION INTRAMUSCULAR; INTRAVENOUS; SUBCUTANEOUS at 12:09

## 2020-01-23 RX ADMIN — Medication 1 MILLIGRAM(S): at 09:46

## 2020-01-23 RX ADMIN — Medication 150 MILLIGRAM(S): at 12:08

## 2020-01-23 RX ADMIN — Medication 2: at 17:03

## 2020-01-23 RX ADMIN — HYDROMORPHONE HYDROCHLORIDE 4 MILLIGRAM(S): 2 INJECTION INTRAMUSCULAR; INTRAVENOUS; SUBCUTANEOUS at 06:54

## 2020-01-23 RX ADMIN — Medication 1 MILLIGRAM(S): at 19:27

## 2020-01-23 RX ADMIN — HYDROMORPHONE HYDROCHLORIDE 4 MILLIGRAM(S): 2 INJECTION INTRAMUSCULAR; INTRAVENOUS; SUBCUTANEOUS at 18:00

## 2020-01-23 RX ADMIN — POLYETHYLENE GLYCOL 3350 17 GRAM(S): 17 POWDER, FOR SOLUTION ORAL at 12:08

## 2020-01-23 RX ADMIN — Medication 1 MILLIGRAM(S): at 23:00

## 2020-01-23 RX ADMIN — Medication 1 ENEMA: at 06:51

## 2020-01-23 RX ADMIN — AZITHROMYCIN 500 MILLIGRAM(S): 500 TABLET, FILM COATED ORAL at 17:04

## 2020-01-23 RX ADMIN — Medication 200 MILLIGRAM(S): at 07:39

## 2020-01-23 RX ADMIN — ENOXAPARIN SODIUM 40 MILLIGRAM(S): 100 INJECTION SUBCUTANEOUS at 22:00

## 2020-01-23 RX ADMIN — Medication 40 MILLIGRAM(S): at 06:51

## 2020-01-23 RX ADMIN — Medication 3 MILLILITER(S): at 01:09

## 2020-01-23 RX ADMIN — LAMOTRIGINE 200 MILLIGRAM(S): 25 TABLET, ORALLY DISINTEGRATING ORAL at 12:08

## 2020-01-23 RX ADMIN — HYDROMORPHONE HYDROCHLORIDE 4 MILLIGRAM(S): 2 INJECTION INTRAMUSCULAR; INTRAVENOUS; SUBCUTANEOUS at 17:04

## 2020-01-23 RX ADMIN — Medication 200 MILLIGRAM(S): at 14:13

## 2020-01-23 RX ADMIN — Medication 3 MILLILITER(S): at 06:50

## 2020-01-23 RX ADMIN — Medication 4: at 12:17

## 2020-01-23 RX ADMIN — Medication 3 MILLILITER(S): at 17:03

## 2020-01-23 RX ADMIN — Medication 3 MILLILITER(S): at 09:46

## 2020-01-23 NOTE — HISTORY OF PRESENT ILLNESS
[Worsened] : have worsened [Difficulty Breathing During Exertion] : worsened dyspnea on exertion [Cough] : worsened coughing [Feelings Of Weakness On Exertion] : worsened exercise intolerance [Wheezing] : worsened wheezing [Regional Soft Tissue Swelling Both Lower Extremities] : denies lower extremity edema [Chest Pain Or Discomfort] : denies chest pain [Fever] : denies fever [Oxygen] : the patient uses supplemental oxygen [PRN] : as needed [6] : 6 [Class II - Mild Symptoms and Slight Limitations] : II [Wt Gain ___ Lbs] : no recent weight gain [Wt Loss ___ Lbs] : no recent weight loss [FreeTextEntry1] : she was doing well till .  Griffin felt that she had a cold run down.  She felt asleep and woke up beny.  She started herself on Dayqil and Nyquil.  She is still taking the pain meds .  She is coughing, having difficulty breathing.  She started vaping

## 2020-01-23 NOTE — DIETITIAN INITIAL EVALUATION ADULT. - DIET TYPE
Diet, DASH/TLC:   Sodium & Cholesterol Restricted (01-21-20 @ 19:21) DASH/TLC (sodium and cholesterol restricted diet)/consistent carbohydrate (no snacks)

## 2020-01-23 NOTE — PROGRESS NOTE ADULT - SUBJECTIVE AND OBJECTIVE BOX
O/N Events: Lower extremity dopplers negative for DVT. Given fleet enema for constipation (no bowel movement for 4-5 days). Was on CPAP for two hours overnight.     Subjective: Patient complaining of significant coughing overnight, non-productive, causing chest pain. Also has shortness of breath and chest pain on inspiration. No nausea, vomiting.     VITALS  Vital Signs Last 24 Hrs  T(C): 37 (23 Jan 2020 10:22), Max: 37.1 (22 Jan 2020 18:05)  T(F): 98.6 (23 Jan 2020 10:22), Max: 98.8 (22 Jan 2020 21:54)  HR: 88 (23 Jan 2020 12:03) (72 - 104)  BP: 136/67 (23 Jan 2020 12:03) (104/56 - 139/63)  BP(mean): 95 (23 Jan 2020 12:03) (76 - 95)  RR: 18 (23 Jan 2020 12:03) (12 - 18)  SpO2: 95% (23 Jan 2020 12:03) (88% - 96%)    CAPILLARY BLOOD GLUCOSE  POCT Blood Glucose.: 205 mg/dL (23 Jan 2020 11:55)  POCT Blood Glucose.: 112 mg/dL (23 Jan 2020 07:09)  POCT Blood Glucose.: 189 mg/dL (22 Jan 2020 21:51)  POCT Blood Glucose.: 252 mg/dL (22 Jan 2020 16:43)    PHYSICAL EXAM  General appearance: patient lying in bed, squeezing pillow and coughing  HEENT: PERRLA, EOMI, dry MM  Respiratory: bibasilar crackles present to the middle lobes bilaterally, no accessory muscle use, no wheezing, coughing throughout exam  Cardiovascular: regular rate and rhythm, normal S1/S2, no murmurs appreciated, point tenderness on palpation of chest wall  Gastrointestinal: soft, non-tender, non-distended, normoactive bowel sounds  Extremities: WWP, no pitting edema  Neurological: no obvious focal deficits    MEDICATIONS  (STANDING):  albuterol/ipratropium for Nebulization 3 milliLiter(s) Nebulizer every 4 hours  azithromycin   Tablet 500 milliGRAM(s) Oral every 24 hours  cefTRIAXone   IVPB 2000 milliGRAM(s) IV Intermittent every 24 hours  dextrose 5%. 1000 milliLiter(s) (50 mL/Hr) IV Continuous <Continuous>  dextrose 50% Injectable 12.5 Gram(s) IV Push once  dextrose 50% Injectable 25 Gram(s) IV Push once  dextrose 50% Injectable 25 Gram(s) IV Push once  enoxaparin Injectable 40 milliGRAM(s) SubCutaneous every 24 hours  insulin lispro (HumaLOG) corrective regimen sliding scale   SubCutaneous Before meals and at bedtime  lamoTRIgine 200 milliGRAM(s) Oral daily  polyethylene glycol 3350 17 Gram(s) Oral daily  predniSONE   Tablet 40 milliGRAM(s) Oral daily  senna 2 Tablet(s) Oral at bedtime  venlafaxine XR. 150 milliGRAM(s) Oral daily    MEDICATIONS  (PRN):  ALPRAZolam 1 milliGRAM(s) Oral three times a day PRN anxiety  dextrose 40% Gel 15 Gram(s) Oral once PRN Blood Glucose LESS THAN 70 milliGRAM(s)/deciliter  glucagon  Injectable 1 milliGRAM(s) IntraMuscular once PRN Glucose LESS THAN 70 milligrams/deciliter  guaiFENesin   Syrup  (Sugar-Free) 200 milliGRAM(s) Oral every 6 hours PRN Cough  HYDROmorphone   Tablet 4 milliGRAM(s) Oral every 4 hours PRN Moderate Pain (4 - 6)  traZODone 100 milliGRAM(s) Oral at bedtime PRN insomnia      iodine (Anaphylaxis)  IV Contrast (Anaphylaxis)      LABS                        9.9    14.08 )-----------( 74       ( 23 Jan 2020 06:26 )             31.4     01-23    143  |  101  |  13  ----------------------------<  121<H>  3.5   |  30  |  0.52    Ca    8.5      23 Jan 2020 06:27  Phos  2.6     01-23  Mg     2.3     01-23                PROCEDURES/IMAGING: reviewed.

## 2020-01-23 NOTE — DIETITIAN INITIAL EVALUATION ADULT. - PROBLEM SELECTOR PLAN 4
HX of ITP; Continue to trend PLT, patient previously on Nplate. Platelets 68 on admission, close to baseline.    #Anxiety  -alprazolam prn  - trazadone prn

## 2020-01-23 NOTE — DIETITIAN INITIAL EVALUATION ADULT. - OTHER INFO
Pt is a 68 yo F with history of COPD on home oxygen 2LNC prn, DARRIN on CPAP, thrombocytopenia, multiple knee surgeries resulting in chronic pain syndrome who presents from Dr. Sun's office with tachypnea and hypoxia, here for severe sepsis likely 2/2 CAP vs acute lung injury 2/2 vape.     Pt seen sitting up in chair, pleasant. Pt reports good appetite and PO intake PTA, reports following a 1200kcal diet from weight watchers program PTA. Pt states UBW 188lbs, achieved intentional 30lb(16%) wt loss x 3 months. Confirms NKFA or dietary restrictions. Denies chewing/swallowing difficulties. Pt reports slowly improving appetite since admission, tolerating current diet order with 50-75% PO intake of meals. Denies N/V/D/C. +BM 1/18, receiving bowel regimen noted. No pain reported at this time. Skin: Intact noted. Discussed COPD nutrition therapy with handouts and heart-healthy nutrition therapy. Pt appeared receptive. RD to f/u per protocol. Pt is a 70 yo F with history of COPD on home oxygen 2LNC prn, DARRIN on CPAP, thrombocytopenia, multiple knee surgeries resulting in chronic pain syndrome who presents from Dr. Sun's office with tachypnea and hypoxia, here for severe sepsis likely 2/2 CAP vs acute lung injury 2/2 vape.     Pt seen sitting up in chair, pleasant. Pt reports good appetite and PO intake PTA, reports following a 1200kcal diet from weight watchers program PTA. Pt states UBW 188lbs, achieved intentional 30lb(16%) wt loss x 3 months. Confirms NKFA or dietary restrictions. Denies chewing/swallowing difficulties. Pt reports slowly improving appetite since admission, tolerating current diet order with 50-75% PO intake of meals. Denies N/V/D/C. +BM 1/18, receiving bowel regimen noted. No pain reported at this time. Skin: Intact noted. Discussed COPD nutrition therapy with handouts and heart-healthy nutrition therapy. Pt appeared receptive. Please see below for full nutrition recs-d/w MD. RD to f/u per protocol.

## 2020-01-23 NOTE — PHYSICAL EXAM
[General Appearance - Well Developed] : well developed [Normal Appearance] : normal appearance [Well Groomed] : well groomed [No Deformities] : no deformities [General Appearance - Well Nourished] : well nourished [General Appearance - In No Acute Distress] : no acute distress [Eyelids - No Xanthelasma] : the eyelids demonstrated no xanthelasmas [FreeTextEntry1] : congested conjuntiva and stye [Normal Oropharynx] : normal oropharynx [Neck Appearance] : the appearance of the neck was normal [Neck Cervical Mass (___cm)] : no neck mass was observed [Jugular Venous Distention Increased] : there was no jugular-venous distention [Thyroid Diffuse Enlargement] : the thyroid was not enlarged [Thyroid Nodule] : there were no palpable thyroid nodules [Heart Rate And Rhythm] : heart rate and rhythm were normal [Heart Sounds] : normal S1 and S2 [Murmurs] : no murmurs present [Respiration, Rhythm And Depth] : normal respiratory rhythm and effort [Exaggerated Use Of Accessory Muscles For Inspiration] : no accessory muscle use [Auscultation Breath Sounds / Voice Sounds] : lungs were clear to auscultation bilaterally [Abdomen Soft] : soft [Abdomen Tenderness] : non-tender [Abdomen Mass (___ Cm)] : no abdominal mass palpated [Gait - Sufficient For Exercise Testing] : the gait was sufficient for exercise testing [Abnormal Walk] : normal gait [Nail Clubbing] : no clubbing of the fingernails [Cyanosis, Localized] : no localized cyanosis [Petechial Hemorrhages (___cm)] : no petechial hemorrhages [Skin Color & Pigmentation] : normal skin color and pigmentation [] : no rash [No Venous Stasis] : no venous stasis [Skin Lesions] : no skin lesions [No Skin Ulcers] : no skin ulcer [Deep Tendon Reflexes (DTR)] : deep tendon reflexes were 2+ and symmetric [No Xanthoma] : no  xanthoma was observed [Oriented To Time, Place, And Person] : oriented to person, place, and time [Sensation] : the sensory exam was normal to light touch and pinprick [No Focal Deficits] : no focal deficits [Impaired Insight] : insight and judgment were intact [Affect] : the affect was normal

## 2020-01-23 NOTE — REVIEW OF SYSTEMS
[Cough] : cough [Sputum] : sputum  [Dyspnea] : dyspnea [Wheezing] : wheezing [Negative] : Sleep Disorder [Hemoptysis] : no hemoptysis [Chest Tightness] : no chest tightness [Pleuritic Pain] : no pleuritic pain [Frequent URIs] : no frequent upper respiratory infections [Chronically Infected with _____] : no chronic infections

## 2020-01-23 NOTE — PROGRESS NOTE ADULT - ASSESSMENT
per Internal Medicine    70 yo F with history of COPD on home oxygen 2L NC, DARRIN on CPAP, thrombocytopenia, and multiple knee surgeries resulting in chronic pain who presented from Dr. Sun's office with tachypnea and hypoxia, admitted with severe sepsis likely 2/2 CAP vs acute lung injury 2/2 vaping, [facility]      Problem/Plan - 1:  ·  Problem: Severe sepsis.  Plan: Suspect CAP vs vaping related lung injury. Patient presented with productive cough, fatigue, shortness of breath and tachypnea. Met criteria for severe sepsis. CT Chest showing multifocal pneumonia. RVP negative. BCx NGTD. LE dopplers negative for DVT.   - IV Ceftriaxone 2g q24H, IV Azithromycin 500 q24H for CAP coverage (1/21 - 1/25)  - f/u ECHO    #Cough  Complaining of cough and chest pain on inspiration. On exam noted to have point tenderness to palpation.  - f/u rib series to rule out rib fracture  - guaifenesin for cough  - pain control, f/u Dr. Promise singh  - monitor symptoms.     Problem/Plan - 2:  ·  Problem: VENANCIO (acute kidney injury).  Plan: RESOLVED. 1.38 on admission, was 0.54 in August, back to baseline. Likely pre-renal etiology given severe sepsis.  - monitor BMP  - avoid nephrotoxic agents  - holding home lasix  - encourage PO.     Problem/Plan - 3:  ·  Problem: COPD (chronic obstructive pulmonary disease).  Plan: Hx of COPD on 2L NC at home as needed. On CPAP briefly overnight. Saturating well on 6L NC.   - prednisone 40 daily x5 days (1/21 - 1/25)  - duonebs prn  - incentive spirometry  - OOBTC.     Problem/Plan - 4:  ·  Problem: Thrombocytopenia.  Plan: HX of ITP. Has previously been on Nplate. Platelets 68 on admission - close to baseline. Improving.  - monitor CBC    #Anxiety  - alprazolam prn  - trazodone prn  - continue Effexor 150 mg daily    #Bipolar Disorder  History of bipolar disorder per chart review.  - continue Lamictal 200 mg PO daily.     Problem/Plan - 5:  ·  Problem: Swelling.  Plan: Chronic LLE swelling and chronic leg pain. LE dopplers negative.  - pain control - f/u Dr. Promise singh  - hydromorphone 4mg q4h PRN.     Problem/Plan - 6:  Problem: Nutrition, metabolism, and development symptoms. Plan: F: prn  E: replete K4 Mg2  N: Dash  ppx:  GI: none  DVT: lovenox 40

## 2020-01-23 NOTE — DIETITIAN INITIAL EVALUATION ADULT. - PROBLEM SELECTOR PLAN 5
chronic LLE swelling, unlikely to be DVT/PE at this time    #Chronic leg pain  - hydromorphone 4mg q4h  - can titrate up with patietns other home pain meds as needed

## 2020-01-23 NOTE — PROGRESS NOTE ADULT - PROBLEM SELECTOR PLAN 1
Suspect CAP vs vaping related lung injury. Patient presented with productive cough, fatigue, shortness of breath and tachypnea. Met criteria for severe sepsis. CT Chest showing multifocal pneumonia. RVP negative. BCx NGTD. LE dopplers negative for DVT.   - IV Ceftriaxone 2g q24H, IV Azithromycin 500 q24H for CAP coverage (1/21 - 1/25)  - f/u ECHO    #Cough  Complaining of cough and chest pain on inspiration. On exam noted to have point tenderness to palpation.  - f/u rib series to rule out rib fracture  - guaifenesin for cough  - pain control, f/u Dr. Promise singh  - monitor symptoms

## 2020-01-23 NOTE — PROGRESS NOTE ADULT - ASSESSMENT
68 yo F with history of COPD on home oxygen 2L NC, DARRIN on CPAP, thrombocytopenia, and multiple knee surgeries resulting in chronic pain who presented from Dr. Sun's office with tachypnea and hypoxia, admitted with severe sepsis likely 2/2 CAP vs acute lung injury 2/2 vaping, admitted to [facility] for further management.

## 2020-01-23 NOTE — PROGRESS NOTE ADULT - SUBJECTIVE AND OBJECTIVE BOX
Physical Medicine and Rehabilitation Progress Note:    Patient is a 69y old  Female who presents with a chief complaint of cough (23 Jan 2020 06:06)      HPI:  70 yo F with history of COPD on home oxygen 2LNC prn, DARRIN on CPAP, thrombocytopenia, multiple knee surgeries resulting in chronic pain syndrome who presents from Dr. Sun's office with tachypnea and hypoxia. The patient is accompanied by her daughter and son who state that she has been progressively getting ill for the past 1 week. The patient endorses 1 week history of productive cough with yellow sputum, difficulty catching her breath, no recorded fevers but notes episodes of sweating. She endorses her symptoms started after being exposed to one of her grand children who had similar symptoms. Denies any recent travel or recent antibiotic use. No recent hospitalization. ROS otherwise negative.     On arrival to [facility] ED: VS: afebrile, HR 98, bp 84/63, RR 20 with 02 saturation on 83% on supplemental oxygen. Initial labs s/f WBC of 18K, platelet count of 61K, PT/INR 13.6/1.19, D Dimer 1057, BMP s/f VENANCIO BUN/creat 27/1.38, Alk phos 165, , Trop T 0.02, BNP 9144, VBG with pH 7.29 and pCO2 72, CXR with increase pulmonary vascular congestion, right lower lobe opacity. ICU consulted for further recommendations. (21 Jan 2020 18:36)                            9.9    14.08 )-----------( 74       ( 23 Jan 2020 06:26 )             31.4       01-23    143  |  101  |  13  ----------------------------<  121<H>  3.5   |  30  |  0.52    Ca    8.5      23 Jan 2020 06:27  Phos  2.6     01-23  Mg     2.3     01-23      Vital Signs Last 24 Hrs  T(C): 36.6 (23 Jan 2020 14:13), Max: 37.1 (22 Jan 2020 18:05)  T(F): 97.9 (23 Jan 2020 14:13), Max: 98.8 (22 Jan 2020 21:54)  HR: 88 (23 Jan 2020 12:03) (72 - 104)  BP: 136/67 (23 Jan 2020 12:03) (104/56 - 139/63)  BP(mean): 95 (23 Jan 2020 12:03) (76 - 95)  RR: 18 (23 Jan 2020 12:03) (12 - 18)  SpO2: 95% (23 Jan 2020 12:03) (88% - 96%)    MEDICATIONS  (STANDING):  albuterol/ipratropium for Nebulization 3 milliLiter(s) Nebulizer every 4 hours  azithromycin   Tablet 500 milliGRAM(s) Oral every 24 hours  cefTRIAXone   IVPB 2000 milliGRAM(s) IV Intermittent every 24 hours  dextrose 5%. 1000 milliLiter(s) (50 mL/Hr) IV Continuous <Continuous>  dextrose 50% Injectable 12.5 Gram(s) IV Push once  dextrose 50% Injectable 25 Gram(s) IV Push once  dextrose 50% Injectable 25 Gram(s) IV Push once  enoxaparin Injectable 40 milliGRAM(s) SubCutaneous every 24 hours  insulin lispro (HumaLOG) corrective regimen sliding scale   SubCutaneous Before meals and at bedtime  lamoTRIgine 200 milliGRAM(s) Oral daily  polyethylene glycol 3350 17 Gram(s) Oral daily  predniSONE   Tablet 40 milliGRAM(s) Oral daily  senna 2 Tablet(s) Oral at bedtime  venlafaxine XR. 150 milliGRAM(s) Oral daily    MEDICATIONS  (PRN):  ALPRAZolam 1 milliGRAM(s) Oral three times a day PRN anxiety  dextrose 40% Gel 15 Gram(s) Oral once PRN Blood Glucose LESS THAN 70 milliGRAM(s)/deciliter  glucagon  Injectable 1 milliGRAM(s) IntraMuscular once PRN Glucose LESS THAN 70 milligrams/deciliter  guaiFENesin   Syrup  (Sugar-Free) 200 milliGRAM(s) Oral every 6 hours PRN Cough  HYDROmorphone   Tablet 4 milliGRAM(s) Oral every 4 hours PRN Moderate Pain (4 - 6)  traZODone 100 milliGRAM(s) Oral at bedtime PRN insomnia    Currently Undergoing Physical Therapy at bedside.    Functional Status Assessment: 1/22/2020      Previous Level of Function:     · Ambulation Skills  independent    · Transfer Skills  independent    · ADL Skills  independent    · Work/Leisure Activity  independent    · Additional Comments  Patient lives in elevator building. Reports ambulating without AD at home but uses rollator outdoors.  Denies recent falls. Home O2 as needed      Cognitive Status Examination:   · Orientation  oriented to person, place, time and situation    · Level of Consciousness  alert    · Follows Commands and Answers Questions  100% of the time; able to follow single-step instructions      Range of Motion Exam:   · Active Range of Motion Examination  no Active ROM deficits were identified; bilateral upper extremity Active ROM was WFL (within functional limits); Right LE Active ROM was WFL (within functional limits); Left knee flex 0-60 in supine            Manual Muscle Testing:   · Manual Muscle Testing Results  Both UE/ right LE, left ankle at least 3+/5 throughout.  Left hip flex/ knee ext 3-/5       Bed Mobility: Sit to Supine:     · Level of [place]  independent      Transfer: Sit to Stand:     · Level of [place]  contact guard    · Physical Assist/Nonphysical Assist  1 person assist    · Weight-Bearing Restrictions  full weight-bearing    · Assistive Device  rolling walker      Transfer: Stand to Sit:     · Level of [place]  contact guard    · Physical Assist/Nonphysical Assist  verbal cues; 1 person assist    · Weight-Bearing Restrictions  weight-bearing as tolerated    · Assistive Device  rolling walker      Sit/Stand Transfer Safety Analysis:     · Impairments Contributing to Impaired Transfers  decreased strength; impaired balance; decreased ROM; pain      Gait Skills:     · Level of [place]  contact guard    · Physical Assist/Nonphysical Assist  1 person assist; verbal cues    · Weight-Bearing Restrictions  weight-bearing as tolerated    · Assistive Device  rolling walker    · Gait Distance  60 feet with oxygen 4L      Gait Analysis:     · Gait Pattern Used  3-point gait     · Gait Deviations Noted  decreased steffany; decreased step length; decreased stride length    · Impairments Contributing to Gait Deviations  impaired balance; decreased strength; decreased ROM; pain      Balance Skills Assessment:     · Sitting Balance: Static  normal balance     · Sitting Balance: Dynamic  normal balance     · Sit-to-Stand Balance  fair minus     · Standing Balance: Static  good balance     · Standing Balance: Dynamic  fair minus     · Identified Impairments Contributing to Balance Disturbance  decreased ROM; decreased strength; pain      Sensory Examination:   Sensory Examination:    Grossly Intact:   · Gross Sensory Examination  Grossly Intact; Left UE; Right UE; Left LE; Right LE        Clinical Impressions:   · Criteria for Skilled Therapeutic Interventions  impairments found; rehab potential; therapy frequency; anticipated discharge recommendation    · Impairments Found (describe specific impairments)  aerobic capacity/endurance; gait, locomotion, and balance            PM&R Impression: as above    Current Disposition Plan Recommendations: d/c home, home physical therapy

## 2020-01-23 NOTE — ASSESSMENT
[FreeTextEntry1] : the condition is detriorating.  She stopped taking her bronchodilators, noncompliant with oxygen and NIV, and started vaping.  She is hypoxic at rrest with sat 85% ON ROOM AIR.  I discussed the condition in details with the patient and son. \par - admit\par - CT scan of chest\par labbs\par Oxygen supplementation\par bronchodilators\par NIV\par ECHO\par I escorted the patient to ER.  I discussed with ER attending\par discussed with admitting resident\par CT scan of chest was consistent with pneumonia\par started antibiotics\par

## 2020-01-23 NOTE — DIETITIAN INITIAL EVALUATION ADULT. - PROBLEM SELECTOR PLAN 2
1.38 on admission, was 0.54 in August  - urine lytes  - avoid nephrotoxins  - hold home lasix  -renally dose meds  - monitor Cr.  - encourage PO

## 2020-01-23 NOTE — DIETITIAN INITIAL EVALUATION ADULT. - ADD RECOMMEND
1) Recommend change to CSTCHO + DASH/TLC diet. (Pt denies hx of DM, noted w/ A1c 6.3%, currently on prednisone). 2) Monitor lytes and replete prn. POC [id number]. 3) Bowel regimen per team discretion. 4) Trend weekly wts.

## 2020-01-23 NOTE — DIETITIAN INITIAL EVALUATION ADULT. - PROBLEM SELECTOR PLAN 1
2/2 CAP - Patient presenting with productive cough, fatigue, shortness of breath and tachypnea, on arrival to ED, meets criteria for severe sepsis. Source suspected to be pulmonary given her complaints, physical exam findings of ronchi and egophony in RLL and CXR with hazy opacity in RLL. Pt also been vaping, possibly vaping induced lung injury  - Continue ceftriaxone/azthromycin for CAP coverage   - Obtain RVP given recent sick contact   - f/u sputum culture, blood cultures, urine Legionella  - Low suspicion of PE at this time, also pt is allergic to contrast (would need benadryl/allergy protocol). But given complaints of tachypnea, VS s/f hypoxia, physical exam with LLE>RLE, PE should remain in mind, LLE asymetry is chronic.

## 2020-01-24 LAB
ANION GAP SERPL CALC-SCNC: 12 MMOL/L — SIGNIFICANT CHANGE UP (ref 5–17)
BUN SERPL-MCNC: 13 MG/DL — SIGNIFICANT CHANGE UP (ref 7–23)
CALCIUM SERPL-MCNC: 9 MG/DL — SIGNIFICANT CHANGE UP (ref 8.4–10.5)
CHLORIDE SERPL-SCNC: 101 MMOL/L — SIGNIFICANT CHANGE UP (ref 96–108)
CO2 SERPL-SCNC: 31 MMOL/L — SIGNIFICANT CHANGE UP (ref 22–31)
CREAT SERPL-MCNC: 0.52 MG/DL — SIGNIFICANT CHANGE UP (ref 0.5–1.3)
GLUCOSE BLDC GLUCOMTR-MCNC: 117 MG/DL — HIGH (ref 70–99)
GLUCOSE BLDC GLUCOMTR-MCNC: 143 MG/DL — HIGH (ref 70–99)
GLUCOSE BLDC GLUCOMTR-MCNC: 145 MG/DL — HIGH (ref 70–99)
GLUCOSE BLDC GLUCOMTR-MCNC: 227 MG/DL — HIGH (ref 70–99)
GLUCOSE SERPL-MCNC: 123 MG/DL — HIGH (ref 70–99)
HCT VFR BLD CALC: 33.3 % — LOW (ref 34.5–45)
HGB BLD-MCNC: 10.1 G/DL — LOW (ref 11.5–15.5)
MAGNESIUM SERPL-MCNC: 2.2 MG/DL — SIGNIFICANT CHANGE UP (ref 1.6–2.6)
MCHC RBC-ENTMCNC: 28.5 PG — SIGNIFICANT CHANGE UP (ref 27–34)
MCHC RBC-ENTMCNC: 30.3 GM/DL — LOW (ref 32–36)
MCV RBC AUTO: 94.1 FL — SIGNIFICANT CHANGE UP (ref 80–100)
NRBC # BLD: 0 /100 WBCS — SIGNIFICANT CHANGE UP (ref 0–0)
PLATELET # BLD AUTO: 76 K/UL — LOW (ref 150–400)
POTASSIUM SERPL-MCNC: 4 MMOL/L — SIGNIFICANT CHANGE UP (ref 3.5–5.3)
POTASSIUM SERPL-SCNC: 4 MMOL/L — SIGNIFICANT CHANGE UP (ref 3.5–5.3)
RBC # BLD: 3.54 M/UL — LOW (ref 3.8–5.2)
RBC # FLD: 13.5 % — SIGNIFICANT CHANGE UP (ref 10.3–14.5)
SODIUM SERPL-SCNC: 144 MMOL/L — SIGNIFICANT CHANGE UP (ref 135–145)
WBC # BLD: 15.23 K/UL — HIGH (ref 3.8–10.5)
WBC # FLD AUTO: 15.23 K/UL — HIGH (ref 3.8–10.5)

## 2020-01-24 PROCEDURE — 99233 SBSQ HOSP IP/OBS HIGH 50: CPT | Mod: GC

## 2020-01-24 PROCEDURE — 93306 TTE W/DOPPLER COMPLETE: CPT | Mod: 26

## 2020-01-24 RX ADMIN — AZITHROMYCIN 500 MILLIGRAM(S): 500 TABLET, FILM COATED ORAL at 17:33

## 2020-01-24 RX ADMIN — Medication 200 MILLIGRAM(S): at 19:04

## 2020-01-24 RX ADMIN — Medication 4: at 13:17

## 2020-01-24 RX ADMIN — Medication 1 MILLIGRAM(S): at 10:20

## 2020-01-24 RX ADMIN — HYDROMORPHONE HYDROCHLORIDE 4 MILLIGRAM(S): 2 INJECTION INTRAMUSCULAR; INTRAVENOUS; SUBCUTANEOUS at 11:00

## 2020-01-24 RX ADMIN — HYDROMORPHONE HYDROCHLORIDE 4 MILLIGRAM(S): 2 INJECTION INTRAMUSCULAR; INTRAVENOUS; SUBCUTANEOUS at 10:20

## 2020-01-24 RX ADMIN — CEFTRIAXONE 100 MILLIGRAM(S): 500 INJECTION, POWDER, FOR SOLUTION INTRAMUSCULAR; INTRAVENOUS at 17:32

## 2020-01-24 RX ADMIN — LAMOTRIGINE 200 MILLIGRAM(S): 25 TABLET, ORALLY DISINTEGRATING ORAL at 12:21

## 2020-01-24 RX ADMIN — Medication 3 MILLILITER(S): at 00:34

## 2020-01-24 RX ADMIN — BENZOCAINE AND MENTHOL 1 LOZENGE: 5; 1 LIQUID ORAL at 21:17

## 2020-01-24 RX ADMIN — Medication 40 MILLIEQUIVALENT(S): at 07:00

## 2020-01-24 RX ADMIN — Medication 3 MILLILITER(S): at 21:17

## 2020-01-24 RX ADMIN — Medication 3 MILLILITER(S): at 17:31

## 2020-01-24 RX ADMIN — ENOXAPARIN SODIUM 40 MILLIGRAM(S): 100 INJECTION SUBCUTANEOUS at 21:17

## 2020-01-24 RX ADMIN — Medication 40 MILLIGRAM(S): at 06:01

## 2020-01-24 RX ADMIN — Medication 1 MILLIGRAM(S): at 17:56

## 2020-01-24 RX ADMIN — Medication 150 MILLIGRAM(S): at 12:22

## 2020-01-24 RX ADMIN — HYDROMORPHONE HYDROCHLORIDE 4 MILLIGRAM(S): 2 INJECTION INTRAMUSCULAR; INTRAVENOUS; SUBCUTANEOUS at 17:57

## 2020-01-24 RX ADMIN — Medication 3 MILLILITER(S): at 10:20

## 2020-01-24 RX ADMIN — HYDROMORPHONE HYDROCHLORIDE 4 MILLIGRAM(S): 2 INJECTION INTRAMUSCULAR; INTRAVENOUS; SUBCUTANEOUS at 00:00

## 2020-01-24 RX ADMIN — BENZOCAINE AND MENTHOL 1 LOZENGE: 5; 1 LIQUID ORAL at 10:20

## 2020-01-24 RX ADMIN — POLYETHYLENE GLYCOL 3350 17 GRAM(S): 17 POWDER, FOR SOLUTION ORAL at 12:22

## 2020-01-24 NOTE — PROGRESS NOTE ADULT - PROBLEM SELECTOR PLAN 1
RESOLVED. Suspect CAP vs vaping related lung injury. Patient presented with productive cough, fatigue, shortness of breath and tachypnea. Met criteria for severe sepsis. CT chest showing multifocal pneumonia. RVP negative. BCx NGTD. LE dopplers negative for DVT.   - IV Ceftriaxone 2g q24H, IV Azithromycin 500 q24H for CAP coverage (1/21 - 1/25)  - ECHO w/ Pulmonary hypertension PASP 40.2, normal LV/RV size & function, mild TR, no interval change on echo 11/19/19.     #Cough  Continuing to complain of cough and chest pain on inspiration. On exam has point tenderness to palpation underneath the left breast. Rib series negative for fracture. Hycodan overnight minimally helpful.  - continue guaifenesin for cough  - continue benzocaine for sore throat  - continue hycodan for cough and can consider adding benzonatate for better symptom control  - pain control  - monitor symptoms

## 2020-01-24 NOTE — PROGRESS NOTE ADULT - PROBLEM SELECTOR PLAN 1
Suspect CAP vs vaping related lung injury. Patient presented with productive cough, fatigue, shortness of breath and tachypnea. Met criteria for severe sepsis. CT chest showing multifocal pneumonia. RVP negative. BCx NGTD. LE dopplers negative for DVT.   - IV Ceftriaxone 2g q24H, IV Azithromycin 500 q24H for CAP coverage (1/21 - 1/25)  - f/u ECHO    #Cough  Continuing to complain of cough and chest pain on inspiration. On exam has point tenderness to palpation underneath the left breast. Rib series negative for fracture. Hycodan overnight minimally helpful.  - continue guaifenesin for cough  - continue benzocaine for sore throat  - pain control  - monitor symptoms RESOLVED. Suspect CAP vs vaping related lung injury. Patient presented with productive cough, fatigue, shortness of breath and tachypnea. Met criteria for severe sepsis. CT chest showing multifocal pneumonia. RVP negative. BCx NGTD. LE dopplers negative for DVT.   - IV Ceftriaxone 2g q24H, IV Azithromycin 500 q24H for CAP coverage (1/21 - 1/25)  - f/u ECHO    #Cough  Continuing to complain of cough and chest pain on inspiration. On exam has point tenderness to palpation underneath the left breast. Rib series negative for fracture. Hycodan overnight minimally helpful.  - continue guaifenesin for cough  - continue benzocaine for sore throat  - continue hycodan for cough and can consider adding benzonatate for better symptom control  - pain control  - monitor symptoms

## 2020-01-24 NOTE — PROGRESS NOTE ADULT - ASSESSMENT
68 yo F with history of COPD on home oxygen 2L NC, DARRIN on CPAP, thrombocytopenia, and multiple knee surgeries resulting in chronic pain who presented from Dr. Sun's office with tachypnea and hypoxia, admitted with severe sepsis likely 2/2 CAP vs acute lung injury 2/2 vaping, admitted to [facility] for further management, now stable for stepdown to [facility].

## 2020-01-24 NOTE — PROGRESS NOTE ADULT - SUBJECTIVE AND OBJECTIVE BOX
Transfer from [facility] to [facility]  The patient is a 69F with a history of COPD (on home O2 2L NC), DARRIN (on CPAP), ITP, and chronic knee pain who presented with tachypnea and hypoxia from Dr. Sun's office. The patient met severe sepsis criteria at the time of admission (tachycardia, leukocytosis, elevated lactate, RLL opacity on CXR). The ICU team was consulted and the patient was admitted to [facility] for further management. She was started on Ceftriaxone/Azithromycin for CAP coverage and Prednisone. CT chest showed multifocal pneumonia. Blood cultures and RVP were negative. She had an VENANCIO that resolved with fluids. On [facility], the patient has been on increased oxygen requirements from baseline - 4 to 6 liters nasal canula with good oxygen saturation. She has stable bibasilar crackles on exam. 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 She is saturating 92% on 4L, but she continues to have bothersome cough.     O/N Events: patient declined CPAP, throat lozenges for sore throat    Subjective: Patient is complaining of severe pain 2/2 cough, and is requesting stronger cough medicine. She feels subjectively better, and she states that her shortness of breath is much improved.     T(F): 98.1 (01-24-20 @ 18:00)  HR: 62 (01-24-20 @ 17:28)  BP: 139/67 (01-24-20 @ 17:28)  RR: 18 (01-24-20 @ 17:28)  SpO2: 96% (01-24-20 @ 17:28)    CAPILLARY BLOOD GLUCOSE    POCT Blood Glucose.: 143 mg/dL (24 Jan 2020 16:49)  POCT Blood Glucose.: 227 mg/dL (24 Jan 2020 12:09)  POCT Blood Glucose.: 117 mg/dL (24 Jan 2020 06:47)  POCT Blood Glucose.: 140 mg/dL (23 Jan 2020 23:23)    PHYSICAL EXAM  General appearance: patient lying in bed, coughing  HEENT: EOMI, sclera anicteric, dry MM  Respiratory: bibasilar crackles present, egophony in the RLL, otherwise breath sounds clear to auscultation throughout, no accessory muscle use, no wheezing  Cardiovascular: regular rate and rhythm, normal S1/S2, no murmurs, rubs or gallops appreciated  Gastrointestinal: soft, non-tender, non-distended, normoactive bowel sounds  Extremities: WWP, trace lower extremity pitting edema, Surgical deformity of left knee with vertical scar over the patella  Neurological: no obvious focal deficits  Skin: right buttock with pressure ulcer bandage    MEDICATIONS  (STANDING):  albuterol/ipratropium for Nebulization 3 milliLiter(s) Nebulizer every 4 hours  azithromycin   Tablet 500 milliGRAM(s) Oral every 24 hours  cefTRIAXone   IVPB 2000 milliGRAM(s) IV Intermittent every 24 hours  dextrose 5%. 1000 milliLiter(s) (50 mL/Hr) IV Continuous <Continuous>  dextrose 50% Injectable 12.5 Gram(s) IV Push once  dextrose 50% Injectable 25 Gram(s) IV Push once  dextrose 50% Injectable 25 Gram(s) IV Push once  enoxaparin Injectable 40 milliGRAM(s) SubCutaneous every 24 hours  insulin lispro (HumaLOG) corrective regimen sliding scale   SubCutaneous Before meals and at bedtime  lamoTRIgine 200 milliGRAM(s) Oral daily  polyethylene glycol 3350 17 Gram(s) Oral daily  predniSONE   Tablet 40 milliGRAM(s) Oral daily  senna 2 Tablet(s) Oral at bedtime  venlafaxine XR. 150 milliGRAM(s) Oral daily    MEDICATIONS  (PRN):  ALPRAZolam 1 milliGRAM(s) Oral three times a day PRN anxiety  benzocaine 15 mG/menthol 3.6 mG (Sugar-Free) Lozenge 1 Lozenge Oral every 2 hours PRN Sore Throat  benzonatate 100 milliGRAM(s) Oral three times a day PRN Cough  dextrose 40% Gel 15 Gram(s) Oral once PRN Blood Glucose LESS THAN 70 milliGRAM(s)/deciliter  glucagon  Injectable 1 milliGRAM(s) IntraMuscular once PRN Glucose LESS THAN 70 milligrams/deciliter  guaiFENesin   Syrup  (Sugar-Free) 200 milliGRAM(s) Oral every 6 hours PRN Cough  hydrocodone/homatropine Syrup 5 milliLiter(s) Oral every 4 hours PRN Coughing  HYDROmorphone   Tablet 4 milliGRAM(s) Oral every 4 hours PRN Moderate Pain (4 - 6)  traZODone 100 milliGRAM(s) Oral at bedtime PRN insomnia      iodine (Anaphylaxis)  IV Contrast (Anaphylaxis)      LABS:                         10.1   15.23 )-----------( 76       ( 24 Jan 2020 06:31 )             33.3     01-24    144  |  101  |  13  ----------------------------<  123<H>  4.0   |  31  |  0.52    Ca    9.0      24 Jan 2020 06:31  Phos  2.6     01-23  Mg     2.2     01-24          RADIOLOGY, EKG & ADDITIONAL TESTS: Reviewed.

## 2020-01-24 NOTE — PROGRESS NOTE ADULT - SUBJECTIVE AND OBJECTIVE BOX
re: asked by Dr Laughlin and Dr. Sun for pain management assessment.    Patient is known to me as an outpatient for chronic left knee pain , s/p multiple knee surgeries , to include nerve block for neuropathic pain with poor outcome.  Her outpatient pain regimen is dilaudid 4 mg q 4 prn.  Patient reported no adverse reaction but feels the pain is not controlled and rates the pain 7/10 VAS.    Suggest addition of oxycontin ER 10 mg q 12h standing.  continue dilaudid 4 mg q 4 hrs prn.

## 2020-01-24 NOTE — PROGRESS NOTE ADULT - SUBJECTIVE AND OBJECTIVE BOX
O/N Events: patient declined CPAP, throat lozenges for sore throat    Subjective: Patient reporting that stronger cough medicine helped mildly overnight. Continued to have significant coughing and chest pain. Chest pain is the same as it has been - localized to the left chest and tender to palpation. Patient feeling short of breath this morning.     VITALS  Vital Signs Last 24 Hrs  T(C): 36.4 (24 Jan 2020 05:14), Max: 37 (23 Jan 2020 10:22)  T(F): 97.5 (24 Jan 2020 05:14), Max: 98.6 (23 Jan 2020 10:22)  HR: 74 (24 Jan 2020 04:10) (74 - 92)  BP: 118/59 (24 Jan 2020 04:10) (118/59 - 139/73)  BP(mean): 83 (24 Jan 2020 04:10) (83 - 98)  RR: 18 (24 Jan 2020 04:10) (15 - 18)  SpO2: 97% (24 Jan 2020 04:10) (88% - 97%)    CAPILLARY BLOOD GLUCOSE  POCT Blood Glucose.: 117 mg/dL (24 Jan 2020 06:47)  POCT Blood Glucose.: 140 mg/dL (23 Jan 2020 23:23)  POCT Blood Glucose.: 192 mg/dL (23 Jan 2020 16:40)  POCT Blood Glucose.: 205 mg/dL (23 Jan 2020 11:55)    PHYSICAL EXAM  General appearance: patient lying in bed hugging pillow, at times coughing  HEENT: EOMI, sclera anicteric, dry MM  Respiratory: bibasilar crackles present, otherwise breath sounds clear to auscultation throughout, no accessory muscle use, no wheezing  Cardiovascular: regular rate and rhythm, normal S1/S2, no murmurs, rubs or gallops appreciated  Gastrointestinal: soft, non-tender, non-distended, normoactive bowel sounds  Extremities: WWP, no lower extremity pitting edema  Neurological: no obvious focal deficits    MEDICATIONS  (STANDING):  albuterol/ipratropium for Nebulization 3 milliLiter(s) Nebulizer every 4 hours  azithromycin   Tablet 500 milliGRAM(s) Oral every 24 hours  cefTRIAXone   IVPB 2000 milliGRAM(s) IV Intermittent every 24 hours  dextrose 5%. 1000 milliLiter(s) (50 mL/Hr) IV Continuous <Continuous>  dextrose 50% Injectable 12.5 Gram(s) IV Push once  dextrose 50% Injectable 25 Gram(s) IV Push once  dextrose 50% Injectable 25 Gram(s) IV Push once  enoxaparin Injectable 40 milliGRAM(s) SubCutaneous every 24 hours  insulin lispro (HumaLOG) corrective regimen sliding scale   SubCutaneous Before meals and at bedtime  lamoTRIgine 200 milliGRAM(s) Oral daily  polyethylene glycol 3350 17 Gram(s) Oral daily  predniSONE   Tablet 40 milliGRAM(s) Oral daily  senna 2 Tablet(s) Oral at bedtime  venlafaxine XR. 150 milliGRAM(s) Oral daily    MEDICATIONS  (PRN):  ALPRAZolam 1 milliGRAM(s) Oral three times a day PRN anxiety  benzocaine 15 mG/menthol 3.6 mG (Sugar-Free) Lozenge 1 Lozenge Oral every 2 hours PRN Sore Throat  dextrose 40% Gel 15 Gram(s) Oral once PRN Blood Glucose LESS THAN 70 milliGRAM(s)/deciliter  glucagon  Injectable 1 milliGRAM(s) IntraMuscular once PRN Glucose LESS THAN 70 milligrams/deciliter  guaiFENesin   Syrup  (Sugar-Free) 200 milliGRAM(s) Oral every 6 hours PRN Cough  hydrocodone/homatropine Syrup 5 milliLiter(s) Oral every 4 hours PRN Coughing  HYDROmorphone   Tablet 4 milliGRAM(s) Oral every 4 hours PRN Moderate Pain (4 - 6)  traZODone 100 milliGRAM(s) Oral at bedtime PRN insomnia      iodine (Anaphylaxis)  IV Contrast (Anaphylaxis)      LABS                        10.1   15.23 )-----------( 76       ( 24 Jan 2020 06:31 )             33.3     01-24    144  |  101  |  13  ----------------------------<  123<H>  4.0   |  31  |  0.52    Ca    9.0      24 Jan 2020 06:31  Phos  2.6     01-23  Mg     2.2     01-24                PROCEDURES/IMAGING: reviewed. Transfer [facility] to [facility]  The patient is a 69F with a history of COPD (on home O2 2L NC), DARRIN (on CPAP), ITP, and chronic knee pain who presented with tachypnea and hypoxia from Dr. Sun's office. The patient met severe sepsis criteria at the time of admission (tachycardia, leukocytosis, elevated lactate, RLL opacity on CXR). The ICU team was consulted and the patient was admitted to [facility] for further management. She was started on Ceftriaxone/Azithromycin for CAP coverage and Prednisone. CT chest showed multifocal pneumonia. Blood cultures and RVP were negative. She had an VENANCIO that resolved with fluids. On [facility], the patient has been on increased oxygen requirements from baseline - 4 to 6 liters nasal canula with good oxygen saturation. She has stable bibasilar crackles on exam. 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    O/N Events: patient declined CPAP, throat lozenges for sore throat    Subjective: Patient reporting that stronger cough medicine helped mildly overnight. Continued to have significant coughing and chest pain. Chest pain is the same as it has been - localized to the left chest and tender to palpation. Patient feeling short of breath this morning.     VITALS  Vital Signs Last 24 Hrs  T(C): 36.4 (24 Jan 2020 05:14), Max: 37 (23 Jan 2020 10:22)  T(F): 97.5 (24 Jan 2020 05:14), Max: 98.6 (23 Jan 2020 10:22)  HR: 74 (24 Jan 2020 04:10) (74 - 92)  BP: 118/59 (24 Jan 2020 04:10) (118/59 - 139/73)  BP(mean): 83 (24 Jan 2020 04:10) (83 - 98)  RR: 18 (24 Jan 2020 04:10) (15 - 18)  SpO2: 97% (24 Jan 2020 04:10) (88% - 97%)    CAPILLARY BLOOD GLUCOSE  POCT Blood Glucose.: 117 mg/dL (24 Jan 2020 06:47)  POCT Blood Glucose.: 140 mg/dL (23 Jan 2020 23:23)  POCT Blood Glucose.: 192 mg/dL (23 Jan 2020 16:40)  POCT Blood Glucose.: 205 mg/dL (23 Jan 2020 11:55)    PHYSICAL EXAM  General appearance: patient lying in bed hugging pillow, at times coughing  HEENT: EOMI, sclera anicteric, dry MM  Respiratory: bibasilar crackles present, otherwise breath sounds clear to auscultation throughout, no accessory muscle use, no wheezing  Cardiovascular: regular rate and rhythm, normal S1/S2, no murmurs, rubs or gallops appreciated  Gastrointestinal: soft, non-tender, non-distended, normoactive bowel sounds  Extremities: WWP, no lower extremity pitting edema  Neurological: no obvious focal deficits    MEDICATIONS  (STANDING):  albuterol/ipratropium for Nebulization 3 milliLiter(s) Nebulizer every 4 hours  azithromycin   Tablet 500 milliGRAM(s) Oral every 24 hours  cefTRIAXone   IVPB 2000 milliGRAM(s) IV Intermittent every 24 hours  dextrose 5%. 1000 milliLiter(s) (50 mL/Hr) IV Continuous <Continuous>  dextrose 50% Injectable 12.5 Gram(s) IV Push once  dextrose 50% Injectable 25 Gram(s) IV Push once  dextrose 50% Injectable 25 Gram(s) IV Push once  enoxaparin Injectable 40 milliGRAM(s) SubCutaneous every 24 hours  insulin lispro (HumaLOG) corrective regimen sliding scale   SubCutaneous Before meals and at bedtime  lamoTRIgine 200 milliGRAM(s) Oral daily  polyethylene glycol 3350 17 Gram(s) Oral daily  predniSONE   Tablet 40 milliGRAM(s) Oral daily  senna 2 Tablet(s) Oral at bedtime  venlafaxine XR. 150 milliGRAM(s) Oral daily    MEDICATIONS  (PRN):  ALPRAZolam 1 milliGRAM(s) Oral three times a day PRN anxiety  benzocaine 15 mG/menthol 3.6 mG (Sugar-Free) Lozenge 1 Lozenge Oral every 2 hours PRN Sore Throat  dextrose 40% Gel 15 Gram(s) Oral once PRN Blood Glucose LESS THAN 70 milliGRAM(s)/deciliter  glucagon  Injectable 1 milliGRAM(s) IntraMuscular once PRN Glucose LESS THAN 70 milligrams/deciliter  guaiFENesin   Syrup  (Sugar-Free) 200 milliGRAM(s) Oral every 6 hours PRN Cough  hydrocodone/homatropine Syrup 5 milliLiter(s) Oral every 4 hours PRN Coughing  HYDROmorphone   Tablet 4 milliGRAM(s) Oral every 4 hours PRN Moderate Pain (4 - 6)  traZODone 100 milliGRAM(s) Oral at bedtime PRN insomnia      iodine (Anaphylaxis)  IV Contrast (Anaphylaxis)      LABS                        10.1   15.23 )-----------( 76       ( 24 Jan 2020 06:31 )             33.3     01-24    144  |  101  |  13  ----------------------------<  123<H>  4.0   |  31  |  0.52    Ca    9.0      24 Jan 2020 06:31  Phos  2.6     01-23  Mg     2.2     01-24                PROCEDURES/IMAGING: reviewed.

## 2020-01-24 NOTE — PROGRESS NOTE ADULT - ASSESSMENT
70 yo F with history of COPD on home oxygen 2L NC, DARRIN on CPAP, thrombocytopenia, and multiple knee surgeries resulting in chronic pain who presented from Dr. Sun's office with tachypnea and hypoxia, admitted with severe sepsis likely 2/2 CAP vs acute lung injury 2/2 vaping, admitted to [facility] for further management.    INCOMPLETE PLAN 70 yo F with history of COPD on home oxygen 2L NC, DARRIN on CPAP, thrombocytopenia, and multiple knee surgeries resulting in chronic pain who presented from Dr. Sun's office with tachypnea and hypoxia, admitted with severe sepsis likely 2/2 CAP vs acute lung injury 2/2 vaping, admitted to [facility] for further management, now stable for stepdown.

## 2020-01-25 LAB
ANION GAP SERPL CALC-SCNC: 9 MMOL/L — SIGNIFICANT CHANGE UP (ref 5–17)
BASOPHILS # BLD AUTO: 0 K/UL — SIGNIFICANT CHANGE UP (ref 0–0.2)
BASOPHILS NFR BLD AUTO: 0 % — SIGNIFICANT CHANGE UP (ref 0–2)
BUN SERPL-MCNC: 14 MG/DL — SIGNIFICANT CHANGE UP (ref 7–23)
CALCIUM SERPL-MCNC: 8.9 MG/DL — SIGNIFICANT CHANGE UP (ref 8.4–10.5)
CHLORIDE SERPL-SCNC: 102 MMOL/L — SIGNIFICANT CHANGE UP (ref 96–108)
CO2 SERPL-SCNC: 31 MMOL/L — SIGNIFICANT CHANGE UP (ref 22–31)
CREAT SERPL-MCNC: 0.52 MG/DL — SIGNIFICANT CHANGE UP (ref 0.5–1.3)
EOSINOPHIL # BLD AUTO: 0.36 K/UL — SIGNIFICANT CHANGE UP (ref 0–0.5)
EOSINOPHIL NFR BLD AUTO: 2.7 % — SIGNIFICANT CHANGE UP (ref 0–6)
GLUCOSE BLDC GLUCOMTR-MCNC: 142 MG/DL — HIGH (ref 70–99)
GLUCOSE BLDC GLUCOMTR-MCNC: 147 MG/DL — HIGH (ref 70–99)
GLUCOSE BLDC GLUCOMTR-MCNC: 174 MG/DL — HIGH (ref 70–99)
GLUCOSE BLDC GLUCOMTR-MCNC: 228 MG/DL — HIGH (ref 70–99)
GLUCOSE SERPL-MCNC: 115 MG/DL — HIGH (ref 70–99)
HCT VFR BLD CALC: 33.8 % — LOW (ref 34.5–45)
HGB BLD-MCNC: 10.3 G/DL — LOW (ref 11.5–15.5)
LYMPHOCYTES # BLD AUTO: 19.6 % — SIGNIFICANT CHANGE UP (ref 13–44)
LYMPHOCYTES # BLD AUTO: 2.62 K/UL — SIGNIFICANT CHANGE UP (ref 1–3.3)
MAGNESIUM SERPL-MCNC: 2.1 MG/DL — SIGNIFICANT CHANGE UP (ref 1.6–2.6)
MCHC RBC-ENTMCNC: 29 PG — SIGNIFICANT CHANGE UP (ref 27–34)
MCHC RBC-ENTMCNC: 30.5 GM/DL — LOW (ref 32–36)
MCV RBC AUTO: 95.2 FL — SIGNIFICANT CHANGE UP (ref 80–100)
MONOCYTES # BLD AUTO: 1.19 K/UL — HIGH (ref 0–0.9)
MONOCYTES NFR BLD AUTO: 8.9 % — SIGNIFICANT CHANGE UP (ref 2–14)
NEUTROPHILS # BLD AUTO: 7.78 K/UL — HIGH (ref 1.8–7.4)
NEUTROPHILS NFR BLD AUTO: 54.5 % — SIGNIFICANT CHANGE UP (ref 43–77)
PLATELET # BLD AUTO: 87 K/UL — LOW (ref 150–400)
POTASSIUM SERPL-MCNC: 4.2 MMOL/L — SIGNIFICANT CHANGE UP (ref 3.5–5.3)
POTASSIUM SERPL-SCNC: 4.2 MMOL/L — SIGNIFICANT CHANGE UP (ref 3.5–5.3)
RBC # BLD: 3.55 M/UL — LOW (ref 3.8–5.2)
RBC # FLD: 13.9 % — SIGNIFICANT CHANGE UP (ref 10.3–14.5)
SODIUM SERPL-SCNC: 142 MMOL/L — SIGNIFICANT CHANGE UP (ref 135–145)
WBC # BLD: 13.39 K/UL — HIGH (ref 3.8–10.5)
WBC # FLD AUTO: 13.39 K/UL — HIGH (ref 3.8–10.5)

## 2020-01-25 PROCEDURE — 99232 SBSQ HOSP IP/OBS MODERATE 35: CPT

## 2020-01-25 RX ADMIN — Medication 3 MILLILITER(S): at 09:26

## 2020-01-25 RX ADMIN — Medication 150 MILLIGRAM(S): at 13:11

## 2020-01-25 RX ADMIN — HYDROMORPHONE HYDROCHLORIDE 4 MILLIGRAM(S): 2 INJECTION INTRAMUSCULAR; INTRAVENOUS; SUBCUTANEOUS at 10:30

## 2020-01-25 RX ADMIN — HYDROMORPHONE HYDROCHLORIDE 4 MILLIGRAM(S): 2 INJECTION INTRAMUSCULAR; INTRAVENOUS; SUBCUTANEOUS at 09:27

## 2020-01-25 RX ADMIN — Medication 1 MILLIGRAM(S): at 22:17

## 2020-01-25 RX ADMIN — LAMOTRIGINE 200 MILLIGRAM(S): 25 TABLET, ORALLY DISINTEGRATING ORAL at 13:11

## 2020-01-25 RX ADMIN — Medication 200 MILLIGRAM(S): at 22:17

## 2020-01-25 RX ADMIN — Medication 200 MILLIGRAM(S): at 05:19

## 2020-01-25 RX ADMIN — HYDROMORPHONE HYDROCHLORIDE 4 MILLIGRAM(S): 2 INJECTION INTRAMUSCULAR; INTRAVENOUS; SUBCUTANEOUS at 18:40

## 2020-01-25 RX ADMIN — Medication 3 MILLILITER(S): at 21:53

## 2020-01-25 RX ADMIN — HYDROMORPHONE HYDROCHLORIDE 4 MILLIGRAM(S): 2 INJECTION INTRAMUSCULAR; INTRAVENOUS; SUBCUTANEOUS at 22:17

## 2020-01-25 RX ADMIN — Medication 100 MILLIGRAM(S): at 17:37

## 2020-01-25 RX ADMIN — Medication 1 MILLIGRAM(S): at 05:13

## 2020-01-25 RX ADMIN — Medication 200 MILLIGRAM(S): at 13:12

## 2020-01-25 RX ADMIN — Medication 2: at 09:27

## 2020-01-25 RX ADMIN — HYDROMORPHONE HYDROCHLORIDE 4 MILLIGRAM(S): 2 INJECTION INTRAMUSCULAR; INTRAVENOUS; SUBCUTANEOUS at 14:45

## 2020-01-25 RX ADMIN — Medication 4: at 13:10

## 2020-01-25 RX ADMIN — Medication 3 MILLILITER(S): at 17:37

## 2020-01-25 RX ADMIN — Medication 40 MILLIGRAM(S): at 05:13

## 2020-01-25 RX ADMIN — HYDROMORPHONE HYDROCHLORIDE 4 MILLIGRAM(S): 2 INJECTION INTRAMUSCULAR; INTRAVENOUS; SUBCUTANEOUS at 13:31

## 2020-01-25 RX ADMIN — Medication 1 MILLIGRAM(S): at 13:32

## 2020-01-25 RX ADMIN — HYDROMORPHONE HYDROCHLORIDE 4 MILLIGRAM(S): 2 INJECTION INTRAMUSCULAR; INTRAVENOUS; SUBCUTANEOUS at 17:38

## 2020-01-25 RX ADMIN — AZITHROMYCIN 500 MILLIGRAM(S): 500 TABLET, FILM COATED ORAL at 17:37

## 2020-01-25 RX ADMIN — CEFTRIAXONE 100 MILLIGRAM(S): 500 INJECTION, POWDER, FOR SOLUTION INTRAMUSCULAR; INTRAVENOUS at 17:37

## 2020-01-25 RX ADMIN — SENNA PLUS 2 TABLET(S): 8.6 TABLET ORAL at 21:54

## 2020-01-25 RX ADMIN — HYDROMORPHONE HYDROCHLORIDE 4 MILLIGRAM(S): 2 INJECTION INTRAMUSCULAR; INTRAVENOUS; SUBCUTANEOUS at 23:46

## 2020-01-25 RX ADMIN — Medication 3 MILLILITER(S): at 13:12

## 2020-01-25 RX ADMIN — ENOXAPARIN SODIUM 40 MILLIGRAM(S): 100 INJECTION SUBCUTANEOUS at 21:54

## 2020-01-25 RX ADMIN — POLYETHYLENE GLYCOL 3350 17 GRAM(S): 17 POWDER, FOR SOLUTION ORAL at 13:11

## 2020-01-25 RX ADMIN — Medication 3 MILLILITER(S): at 05:13

## 2020-01-25 NOTE — PROGRESS NOTE ADULT - SUBJECTIVE AND OBJECTIVE BOX
Interval Events: Reviewed  Patient seen and examined at bedside.    Patient is a 69y old  Female who presents with a chief complaint of cough (24 Jan 2020 19:34)  Pt admits her breathing is better, no chest pain, no fever, n/v/d. still coughing.       PAST MEDICAL & SURGICAL HISTORY:  Sleep apnea  Anxiety  Pulmonary hypertension  PFO (patent foramen ovale)  Hypoxemia  Depression  Centrilobular emphysema  COPD (chronic obstructive pulmonary disease)  Pneumonia  Thrombocytopenia  Cellulitis  Bilateral cataracts  History of surgery: bladder lift  History of tonsillectomy  History of partial hysterectomy  S/P insertion of spinal cord stimulator  History of left knee replacement      MEDICATIONS:  Pulmonary:  albuterol/ipratropium for Nebulization 3 milliLiter(s) Nebulizer every 4 hours  benzonatate 100 milliGRAM(s) Oral three times a day PRN  guaiFENesin   Syrup  (Sugar-Free) 200 milliGRAM(s) Oral every 6 hours PRN  hydrocodone/homatropine Syrup 5 milliLiter(s) Oral every 4 hours PRN    Antimicrobials:  azithromycin   Tablet 500 milliGRAM(s) Oral every 24 hours  cefTRIAXone   IVPB 2000 milliGRAM(s) IV Intermittent every 24 hours    Anticoagulants:  enoxaparin Injectable 40 milliGRAM(s) SubCutaneous every 24 hours    Cardiac:      Allergies    iodine (Anaphylaxis)  IV Contrast (Anaphylaxis)    Intolerances        Vital Signs Last 24 Hrs  T(C): 36.7 (25 Jan 2020 05:28), Max: 36.9 (24 Jan 2020 23:26)  T(F): 98.1 (25 Jan 2020 05:28), Max: 98.4 (24 Jan 2020 23:26)  HR: 90 (25 Jan 2020 05:28) (62 - 108)  BP: 148/83 (25 Jan 2020 05:28) (110/54 - 148/83)  BP(mean): 85 (24 Jan 2020 20:45) (82 - 96)  RR: 18 (25 Jan 2020 05:28) (17 - 18)  SpO2: 94% (25 Jan 2020 05:28) (94% - 97%)        Review of Systems:   •	General: negative  •	Skin/Breast: negative  •	Ophthalmologic: negative  •	ENMT: negative  •	Respiratory and Thorax: negative  •	Cardiovascular: negative  •	Gastrointestinal: negative  •	Genitourinary: negative  •	Musculoskeletal: negative  •	Neurological: negative  •	Psychiatric: negative  •	Hematology/Lymphatics: negative  •	Endocrine: negative  •	Allergic/Immunologic: negative    Physical Exam:   • Constitutional:	Well-developed, well nourished  • Eyes:	EOMI; PERRL; no drainage or redness  • ENMT:	No oral lesions; no gross abnormalities  • Neck	no thyromegaly or nodules  • Breasts:	not examined  • Back:	No deformity or limitation of movement  • Respiratory:	bialteral bibasilar rales no accessory muscle use  • Cardiovascular:	Regular rate & rhythm, normal S1, S2; no murmurs, gallops or rubs; no S3, S4  • Gastrointestinal:	Soft, non-tender, no hepatosplenomegaly, normal bowel sounds  • Genitourinary:	not examined  • Rectal: not examined  • Extremities:	No cyanosis, clubbing or edema  • Vascular:	Equal and normal pulses ( dorsalis pedis)  • Neurologica:l	not examined  • Skin:	No lesions; no rash  • Lymph Nodes:	No lymphadedenopathy  • Musculoskeletal:	No joint pain, swelling or deformity; no limitation of movement        LABS:      CBC Full  -  ( 25 Jan 2020 06:50 )  WBC Count : 13.39 K/uL  RBC Count : 3.55 M/uL  Hemoglobin : 10.3 g/dL  Hematocrit : 33.8 %  Platelet Count - Automated : 87 K/uL  Mean Cell Volume : 95.2 fl  Mean Cell Hemoglobin : 29.0 pg  Mean Cell Hemoglobin Concentration : 30.5 gm/dL  Auto Neutrophil # : 7.78 K/uL  Auto Lymphocyte # : 2.62 K/uL  Auto Monocyte # : 1.19 K/uL  Auto Eosinophil # : 0.36 K/uL  Auto Basophil # : 0.00 K/uL  Auto Neutrophil % : 54.5 %  Auto Lymphocyte % : 19.6 %  Auto Monocyte % : 8.9 %  Auto Eosinophil % : 2.7 %  Auto Basophil % : 0.0 %    01-25    142  |  102  |  14  ----------------------------<  115<H>  4.2   |  31  |  0.52    Ca    8.9      25 Jan 2020 06:50  Mg     2.1     01-25                          RADIOLOGY & ADDITIONAL STUDIES (The following images were personally reviewed):  Martínez:                                     No  Urine output:                       adequate  DVT prophylaxis:                 Yes  Flattus:                                  Yes  Bowel movement:              No

## 2020-01-25 NOTE — PROGRESS NOTE ADULT - ASSESSMENT
68 yo F with history of COPD on home oxygen 2L NC, DARRIN on CPAP, thrombocytopenia, and multiple knee surgeries resulting in chronic pain who presented from Dr. Sun's office with tachypnea and hypoxia, admitted with severe sepsis likely 2/2 CAP vs acute lung injury 2/2 vaping, admitted to [facility] for further management, now stable for stepdown to [facility].

## 2020-01-26 LAB
CULTURE RESULTS: SIGNIFICANT CHANGE UP
CULTURE RESULTS: SIGNIFICANT CHANGE UP
GLUCOSE BLDC GLUCOMTR-MCNC: 150 MG/DL — HIGH (ref 70–99)
GLUCOSE BLDC GLUCOMTR-MCNC: 151 MG/DL — HIGH (ref 70–99)
GLUCOSE BLDC GLUCOMTR-MCNC: 172 MG/DL — HIGH (ref 70–99)
HCT VFR BLD CALC: 33.7 % — LOW (ref 34.5–45)
HGB BLD-MCNC: 10.5 G/DL — LOW (ref 11.5–15.5)
MAGNESIUM SERPL-MCNC: 2 MG/DL — SIGNIFICANT CHANGE UP (ref 1.6–2.6)
MCHC RBC-ENTMCNC: 29.2 PG — SIGNIFICANT CHANGE UP (ref 27–34)
MCHC RBC-ENTMCNC: 31.2 GM/DL — LOW (ref 32–36)
MCV RBC AUTO: 93.9 FL — SIGNIFICANT CHANGE UP (ref 80–100)
NRBC # BLD: 0 /100 WBCS — SIGNIFICANT CHANGE UP (ref 0–0)
PLATELET # BLD AUTO: 101 K/UL — LOW (ref 150–400)
RBC # BLD: 3.59 M/UL — LOW (ref 3.8–5.2)
RBC # FLD: 14.2 % — SIGNIFICANT CHANGE UP (ref 10.3–14.5)
SPECIMEN SOURCE: SIGNIFICANT CHANGE UP
SPECIMEN SOURCE: SIGNIFICANT CHANGE UP
WBC # BLD: 13.56 K/UL — HIGH (ref 3.8–10.5)
WBC # FLD AUTO: 13.56 K/UL — HIGH (ref 3.8–10.5)

## 2020-01-26 RX ORDER — OXYCODONE HYDROCHLORIDE 5 MG/1
10 TABLET ORAL EVERY 12 HOURS
Refills: 0 | Status: DISCONTINUED | OUTPATIENT
Start: 2020-01-26 | End: 2020-01-27

## 2020-01-26 RX ADMIN — Medication 3 MILLILITER(S): at 18:05

## 2020-01-26 RX ADMIN — ENOXAPARIN SODIUM 40 MILLIGRAM(S): 100 INJECTION SUBCUTANEOUS at 21:05

## 2020-01-26 RX ADMIN — OXYCODONE HYDROCHLORIDE 10 MILLIGRAM(S): 5 TABLET ORAL at 18:03

## 2020-01-26 RX ADMIN — OXYCODONE HYDROCHLORIDE 10 MILLIGRAM(S): 5 TABLET ORAL at 19:10

## 2020-01-26 RX ADMIN — LAMOTRIGINE 200 MILLIGRAM(S): 25 TABLET, ORALLY DISINTEGRATING ORAL at 12:13

## 2020-01-26 RX ADMIN — Medication 3 MILLILITER(S): at 06:12

## 2020-01-26 RX ADMIN — POLYETHYLENE GLYCOL 3350 17 GRAM(S): 17 POWDER, FOR SOLUTION ORAL at 12:13

## 2020-01-26 RX ADMIN — Medication 1 MILLIGRAM(S): at 12:17

## 2020-01-26 RX ADMIN — Medication 3 MILLILITER(S): at 21:04

## 2020-01-26 RX ADMIN — HYDROMORPHONE HYDROCHLORIDE 4 MILLIGRAM(S): 2 INJECTION INTRAMUSCULAR; INTRAVENOUS; SUBCUTANEOUS at 06:12

## 2020-01-26 RX ADMIN — Medication 1 MILLIGRAM(S): at 06:13

## 2020-01-26 RX ADMIN — Medication 2: at 22:35

## 2020-01-26 RX ADMIN — Medication 200 MILLIGRAM(S): at 18:03

## 2020-01-26 RX ADMIN — Medication 40 MILLIGRAM(S): at 06:12

## 2020-01-26 RX ADMIN — HYDROMORPHONE HYDROCHLORIDE 4 MILLIGRAM(S): 2 INJECTION INTRAMUSCULAR; INTRAVENOUS; SUBCUTANEOUS at 12:13

## 2020-01-26 RX ADMIN — HYDROMORPHONE HYDROCHLORIDE 4 MILLIGRAM(S): 2 INJECTION INTRAMUSCULAR; INTRAVENOUS; SUBCUTANEOUS at 13:10

## 2020-01-26 RX ADMIN — HYDROMORPHONE HYDROCHLORIDE 4 MILLIGRAM(S): 2 INJECTION INTRAMUSCULAR; INTRAVENOUS; SUBCUTANEOUS at 18:03

## 2020-01-26 RX ADMIN — Medication 150 MILLIGRAM(S): at 12:13

## 2020-01-26 RX ADMIN — Medication 200 MILLIGRAM(S): at 06:12

## 2020-01-26 RX ADMIN — Medication 3 MILLILITER(S): at 12:15

## 2020-01-26 RX ADMIN — Medication 2: at 12:13

## 2020-01-26 RX ADMIN — Medication 100 MILLIGRAM(S): at 12:17

## 2020-01-26 RX ADMIN — SENNA PLUS 2 TABLET(S): 8.6 TABLET ORAL at 21:05

## 2020-01-26 RX ADMIN — HYDROMORPHONE HYDROCHLORIDE 4 MILLIGRAM(S): 2 INJECTION INTRAMUSCULAR; INTRAVENOUS; SUBCUTANEOUS at 08:21

## 2020-01-26 RX ADMIN — HYDROMORPHONE HYDROCHLORIDE 4 MILLIGRAM(S): 2 INJECTION INTRAMUSCULAR; INTRAVENOUS; SUBCUTANEOUS at 19:10

## 2020-01-26 NOTE — PROGRESS NOTE ADULT - PROBLEM SELECTOR PLAN 2
Hx of COPD on 2L NC at home as needed, has been using less over the past few months. Declined CPAP overnight.  - prednisone 40 daily x5 days (1/21 - 1/25)  - Azithromycin 500 mg PO, last dose 1/25   - duonebs prn  - incentive spirometry  - OOBTC  - wean oxygen as tolerated goal O2 sat 88-92%  - Ambulate to assess for desaturation
RESOLVED. 1.38 on admission, was 0.54 in August, back to baseline. Likely pre-renal etiology given severe sepsis.  - monitor BMP  - avoid nephrotoxic agents  - holding home lasix  - encourage PO
RESOLVED. 1.38 on admission, was 0.54 in August, back to baseline. Likely pre-renal etiology given severe sepsis.  - monitor BMP  - avoid nephrotoxic agents  - holding home lasix  - encourage PO
1.38 on admission, was 0.54 in August. Likely pre-renal etiology given severe sepsis.  - Cr improved to 0.54 s/p 500cc bolus  - Continue to monitor renal function, avoid nephrotoxic agents   - Hold home lasix  - encourage PO
no

## 2020-01-26 NOTE — PROGRESS NOTE ADULT - SUBJECTIVE AND OBJECTIVE BOX
OVERNIGHT EVENTS: refused CPAP o/n    SUBJECTIVE / INTERVAL HPI: Patient seen and examined at bedside. Pt reports still that she is having significant cough and some sob that is causing her chest to feel very heavy. O2 to 2L satting 90%. Denies fevers/chills, abdominal pain.     VITAL SIGNS:  Vital Signs Last 24 Hrs  T(C): 36.8 (26 Jan 2020 05:12), Max: 37.2 (25 Jan 2020 13:40)  T(F): 98.3 (26 Jan 2020 05:12), Max: 98.9 (25 Jan 2020 13:40)  HR: 80 (26 Jan 2020 05:56) (75 - 86)  BP: 132/80 (26 Jan 2020 05:12) (109/68 - 132/80)  BP(mean): --  RR: 18 (26 Jan 2020 05:56) (16 - 18)  SpO2: 94% (26 Jan 2020 05:56) (87% - 94%)    PHYSICAL EXAM:    General appearance: patient lying in bed, coughing, on 2L NC  HEENT: EOMI, sclera anicteric, dry MM  Respiratory: bibasilar crackles present, egophony in the RLL, otherwise breath sounds clear to auscultation throughout, no accessory muscle use, no wheezing, on 2L NC  Cardiovascular: regular rate and rhythm, normal S1/S2, no murmurs, rubs or gallops appreciated  Gastrointestinal: soft, non-tender, non-distended, normoactive bowel sounds  Extremities: WWP, trace lower extremity pitting edema, Surgical deformity of left knee with vertical scar over the patella  Neurological: no obvious focal deficits  Skin: right buttock with pressure ulcer bandage    MEDICATIONS:  MEDICATIONS  (STANDING):  albuterol/ipratropium for Nebulization 3 milliLiter(s) Nebulizer every 4 hours  dextrose 5%. 1000 milliLiter(s) (50 mL/Hr) IV Continuous <Continuous>  dextrose 50% Injectable 12.5 Gram(s) IV Push once  dextrose 50% Injectable 25 Gram(s) IV Push once  dextrose 50% Injectable 25 Gram(s) IV Push once  enoxaparin Injectable 40 milliGRAM(s) SubCutaneous every 24 hours  insulin lispro (HumaLOG) corrective regimen sliding scale   SubCutaneous Before meals and at bedtime  lamoTRIgine 200 milliGRAM(s) Oral daily  oxyCODONE  ER Tablet 10 milliGRAM(s) Oral every 12 hours  polyethylene glycol 3350 17 Gram(s) Oral daily  predniSONE   Tablet 40 milliGRAM(s) Oral daily  senna 2 Tablet(s) Oral at bedtime  venlafaxine XR. 150 milliGRAM(s) Oral daily    MEDICATIONS  (PRN):  ALPRAZolam 1 milliGRAM(s) Oral three times a day PRN anxiety  benzocaine 15 mG/menthol 3.6 mG (Sugar-Free) Lozenge 1 Lozenge Oral every 2 hours PRN Sore Throat  benzonatate 100 milliGRAM(s) Oral three times a day PRN Cough  dextrose 40% Gel 15 Gram(s) Oral once PRN Blood Glucose LESS THAN 70 milliGRAM(s)/deciliter  glucagon  Injectable 1 milliGRAM(s) IntraMuscular once PRN Glucose LESS THAN 70 milligrams/deciliter  guaiFENesin   Syrup  (Sugar-Free) 200 milliGRAM(s) Oral every 6 hours PRN Cough  HYDROmorphone   Tablet 4 milliGRAM(s) Oral every 4 hours PRN Moderate Pain (4 - 6)  traZODone 100 milliGRAM(s) Oral at bedtime PRN insomnia      ALLERGIES:  Allergies    iodine (Anaphylaxis)  IV Contrast (Anaphylaxis)    Intolerances        LABS:                        10.5   13.56 )-----------( 101      ( 26 Jan 2020 07:35 )             33.7     01-25    142  |  102  |  14  ----------------------------<  115<H>  4.2   |  31  |  0.52    Ca    8.9      25 Jan 2020 06:50  Mg     2.0     01-26          CAPILLARY BLOOD GLUCOSE      POCT Blood Glucose.: 147 mg/dL (25 Jan 2020 22:37)      RADIOLOGY & ADDITIONAL TESTS: Reviewed.

## 2020-01-26 NOTE — PROGRESS NOTE ADULT - PROBLEM SELECTOR PLAN 3
HX of ITP. Has previously been on Nplate. Platelets 68 on admission - close to baseline. Continuing to improve, 101 1/26  - monitor CBC    #Anxiety  - continue alprazolam prn  - continue trazodone prn  - continue Effexor 150 mg daily    #Bipolar Disorder  History of bipolar disorder per chart review.  - continue Lamictal 200 mg PO daily
HX of ITP. Has previously been on Nplate. Platelets 68 on admission - close to baseline. Continuing to improve, 76 1/24  - monitor CBC    #Anxiety  - continue alprazolam prn  - continue trazodone prn  - continue Effexor 150 mg daily    #Bipolar Disorder  History of bipolar disorder per chart review.  - continue Lamictal 200 mg PO daily
HX of ITP. Has previously been on Nplate. Platelets 68 on admission - close to baseline. Continuing to improve, 76 1/24  - monitor CBC    #Anxiety  - continue alprazolam prn  - continue trazodone prn  - continue Effexor 150 mg daily    #Bipolar Disorder  History of bipolar disorder per chart review.  - continue Lamictal 200 mg PO daily
Hx of COPD on 2L NC at home as needed. Declined CPAP overnight.  - prednisone 40 daily x5 days (1/21 - 1/25)  - casi prn  - incentive spirometry  - OOBTC  - wean oxygen as tolerated
Hx of COPD on 2L NC at home as needed. On CPAP briefly overnight. Saturating well on 6L NC.   - prednisone 40 daily x5 days (1/21 - 1/25)  - casi prn  - incentive spirometry  - OOBTC
-prednisone 40 daily x5 days (1/21 -   -casi prn  - home stiolto

## 2020-01-26 NOTE — PROGRESS NOTE ADULT - PROBLEM SELECTOR PROBLEM 3
Thrombocytopenia
COPD (chronic obstructive pulmonary disease)

## 2020-01-26 NOTE — PROGRESS NOTE ADULT - PROBLEM SELECTOR PLAN 5
RESOLVED. 1.38 on admission, was 0.54 in August, back to baseline. Likely pre-renal etiology given severe sepsis.  - monitor BMP  - avoid nephrotoxic agents  - holding home lasix  - encourage PO
Chronic LLE swelling and chronic leg pain. LE dopplers negative for DVT.  - pain control  - continue hydromorphone 4mg q4h PRN
Chronic LLE swelling and chronic leg pain. LE dopplers negative.  - pain control - f/u Dr. Virgen recs  - hydromorphone 4mg q4h PRN
chronic LLE swelling, unlikely to be DVT/PE at this time but f/u LE dopplers    #Chronic leg pain  - hydromorphone 4mg q4h  - can titrate up with patietns other home pain meds as needed

## 2020-01-26 NOTE — PROGRESS NOTE ADULT - PROBLEM SELECTOR PLAN 7
1) PCP Contacted on Admission: (Y/N) --> Name & Phone #: Dr. Sun following as inpatient  2) Date of Contact with PCP:  3) PCP Contacted at Discharge: (Y/N, N/A)  4) Summary of Handoff Given to PCP:   5) Post-Discharge Appointment Date and Location:
1) PCP Contacted on Admission: (Y/N) --> Name & Phone #:  2) Date of Contact with PCP:  3) PCP Contacted at Discharge: (Y/N, N/A)  4) Summary of Handoff Given to PCP:   5) Post-Discharge Appointment Date and Location:

## 2020-01-26 NOTE — PROGRESS NOTE ADULT - PROBLEM SELECTOR PLAN 6
F: prn  E: replete K4 Mg2  N: Dash  DVT: lovenox 40  Dispo: [facility]  PT: recommends home with home PT
F: prn  E: replete K4 Mg2  N: Dash  DVT: lovenox 40  Dispo: [facility]  PT: recommends home with home PT
F: prn  E: replete K4 Mg2  N: Dash  DVT: lovenox 40  Dispo: [facility]  PT: recommends home with home PT
F: prn  E: replete K4 Mg2  N: Dash  DVT: lovenox 40  Dispo: [facility]  PT: recommends home with home PT
F: prn  E: replete K4 Mg2  N: Dash  ppx:  GI: none  DVT: lovenox 40  Dispo: [facility], PT to see
F: prn  E: replete K4 Mg2  N: Dash  ppx:  GI: none  DVT: lovenox 40  Dispo: [facility], PT to see

## 2020-01-26 NOTE — PROGRESS NOTE ADULT - PROBLEM SELECTOR PROBLEM 2
COPD (chronic obstructive pulmonary disease)
VENANCIO (acute kidney injury)

## 2020-01-26 NOTE — PROGRESS NOTE ADULT - ASSESSMENT
68 yo F with history of COPD on home oxygen 2L NC, DARRIN on CPAP, thrombocytopenia, and multiple knee surgeries resulting in chronic pain who presented from Dr. Sun's office with tachypnea and hypoxia, admitted with severe sepsis likely 2/2 CAP vs acute lung injury 2/2 vaping, admitted to [facility] for further management, now stable for stepdown to [facility].

## 2020-01-26 NOTE — PROGRESS NOTE ADULT - PROBLEM SELECTOR PROBLEM 5
VENANCIO (acute kidney injury)
Swelling

## 2020-01-26 NOTE — PROGRESS NOTE ADULT - PROBLEM SELECTOR PLAN 4
Chronic LLE swelling and chronic leg pain. LE dopplers negative for DVT.  - pain control  - continue hydromorphone 4mg q4h PRN
HX of ITP. Has previously been on Nplate. Platelets 68 on admission - close to baseline. Continuing to improve, 76 today.  - monitor CBC    #Anxiety  - continue alprazolam prn  - continue trazodone prn  - continue Effexor 150 mg daily    #Bipolar Disorder  History of bipolar disorder per chart review.  - continue Lamictal 200 mg PO daily
HX of ITP. Has previously been on Nplate. Platelets 68 on admission - close to baseline. Improving.  - monitor CBC    #Anxiety  - alprazolam prn  - trazodone prn  - continue Effexor 150 mg daily    #Bipolar Disorder  History of bipolar disorder per chart review.  - continue Lamictal 200 mg PO daily
HX of ITP; Continue to trend PLT, patient previously on Nplate. Platelets 68 on admission, close to baseline.    #Anxiety  -alprazolam prn  - trazadone prn

## 2020-01-27 ENCOUNTER — TRANSCRIPTION ENCOUNTER (OUTPATIENT)
Age: 70
End: 2020-01-27

## 2020-01-27 VITALS — RESPIRATION RATE: 17 BRPM | OXYGEN SATURATION: 96 % | TEMPERATURE: 98 F | HEART RATE: 74 BPM

## 2020-01-27 LAB
ANION GAP SERPL CALC-SCNC: 11 MMOL/L — SIGNIFICANT CHANGE UP (ref 5–17)
BUN SERPL-MCNC: 13 MG/DL — SIGNIFICANT CHANGE UP (ref 7–23)
CALCIUM SERPL-MCNC: 8.5 MG/DL — SIGNIFICANT CHANGE UP (ref 8.4–10.5)
CHLORIDE SERPL-SCNC: 102 MMOL/L — SIGNIFICANT CHANGE UP (ref 96–108)
CO2 SERPL-SCNC: 30 MMOL/L — SIGNIFICANT CHANGE UP (ref 22–31)
CREAT SERPL-MCNC: 0.48 MG/DL — LOW (ref 0.5–1.3)
GLUCOSE BLDC GLUCOMTR-MCNC: 106 MG/DL — HIGH (ref 70–99)
GLUCOSE SERPL-MCNC: 106 MG/DL — HIGH (ref 70–99)
HCT VFR BLD CALC: 33.7 % — LOW (ref 34.5–45)
HGB BLD-MCNC: 10.2 G/DL — LOW (ref 11.5–15.5)
MAGNESIUM SERPL-MCNC: 2.1 MG/DL — SIGNIFICANT CHANGE UP (ref 1.6–2.6)
MCHC RBC-ENTMCNC: 28.8 PG — SIGNIFICANT CHANGE UP (ref 27–34)
MCHC RBC-ENTMCNC: 30.3 GM/DL — LOW (ref 32–36)
MCV RBC AUTO: 95.2 FL — SIGNIFICANT CHANGE UP (ref 80–100)
NRBC # BLD: 0 /100 WBCS — SIGNIFICANT CHANGE UP (ref 0–0)
PLATELET # BLD AUTO: 101 K/UL — LOW (ref 150–400)
POTASSIUM SERPL-MCNC: 4.5 MMOL/L — SIGNIFICANT CHANGE UP (ref 3.5–5.3)
POTASSIUM SERPL-SCNC: 4.5 MMOL/L — SIGNIFICANT CHANGE UP (ref 3.5–5.3)
RBC # BLD: 3.54 M/UL — LOW (ref 3.8–5.2)
RBC # FLD: 14.1 % — SIGNIFICANT CHANGE UP (ref 10.3–14.5)
SODIUM SERPL-SCNC: 143 MMOL/L — SIGNIFICANT CHANGE UP (ref 135–145)
WBC # BLD: 12.12 K/UL — HIGH (ref 3.8–10.5)
WBC # FLD AUTO: 12.12 K/UL — HIGH (ref 3.8–10.5)

## 2020-01-27 PROCEDURE — 87486 CHLMYD PNEUM DNA AMP PROBE: CPT

## 2020-01-27 PROCEDURE — 71045 X-RAY EXAM CHEST 1 VIEW: CPT

## 2020-01-27 PROCEDURE — 71101 X-RAY EXAM UNILAT RIBS/CHEST: CPT

## 2020-01-27 PROCEDURE — 97162 PT EVAL MOD COMPLEX 30 MIN: CPT

## 2020-01-27 PROCEDURE — 82803 BLOOD GASES ANY COMBINATION: CPT

## 2020-01-27 PROCEDURE — 85379 FIBRIN DEGRADATION QUANT: CPT

## 2020-01-27 PROCEDURE — 87040 BLOOD CULTURE FOR BACTERIA: CPT

## 2020-01-27 PROCEDURE — 85610 PROTHROMBIN TIME: CPT

## 2020-01-27 PROCEDURE — 97161 PT EVAL LOW COMPLEX 20 MIN: CPT

## 2020-01-27 PROCEDURE — 83880 ASSAY OF NATRIURETIC PEPTIDE: CPT

## 2020-01-27 PROCEDURE — 93306 TTE W/DOPPLER COMPLETE: CPT

## 2020-01-27 PROCEDURE — 84132 ASSAY OF SERUM POTASSIUM: CPT

## 2020-01-27 PROCEDURE — 93005 ELECTROCARDIOGRAM TRACING: CPT

## 2020-01-27 PROCEDURE — 87633 RESP VIRUS 12-25 TARGETS: CPT

## 2020-01-27 PROCEDURE — 87798 DETECT AGENT NOS DNA AMP: CPT

## 2020-01-27 PROCEDURE — 94640 AIRWAY INHALATION TREATMENT: CPT

## 2020-01-27 PROCEDURE — 82550 ASSAY OF CK (CPK): CPT

## 2020-01-27 PROCEDURE — 83735 ASSAY OF MAGNESIUM: CPT

## 2020-01-27 PROCEDURE — 83605 ASSAY OF LACTIC ACID: CPT

## 2020-01-27 PROCEDURE — 93970 EXTREMITY STUDY: CPT

## 2020-01-27 PROCEDURE — 84295 ASSAY OF SERUM SODIUM: CPT

## 2020-01-27 PROCEDURE — 82553 CREATINE MB FRACTION: CPT

## 2020-01-27 PROCEDURE — 80048 BASIC METABOLIC PNL TOTAL CA: CPT

## 2020-01-27 PROCEDURE — 82330 ASSAY OF CALCIUM: CPT

## 2020-01-27 PROCEDURE — 36415 COLL VENOUS BLD VENIPUNCTURE: CPT

## 2020-01-27 PROCEDURE — 94660 CPAP INITIATION&MGMT: CPT

## 2020-01-27 PROCEDURE — 71250 CT THORAX DX C-: CPT

## 2020-01-27 PROCEDURE — 83036 HEMOGLOBIN GLYCOSYLATED A1C: CPT

## 2020-01-27 PROCEDURE — 84484 ASSAY OF TROPONIN QUANT: CPT

## 2020-01-27 PROCEDURE — 85025 COMPLETE CBC W/AUTO DIFF WBC: CPT

## 2020-01-27 PROCEDURE — 85730 THROMBOPLASTIN TIME PARTIAL: CPT

## 2020-01-27 PROCEDURE — 97116 GAIT TRAINING THERAPY: CPT

## 2020-01-27 PROCEDURE — 80053 COMPREHEN METABOLIC PANEL: CPT

## 2020-01-27 PROCEDURE — 85027 COMPLETE CBC AUTOMATED: CPT

## 2020-01-27 PROCEDURE — 87581 M.PNEUMON DNA AMP PROBE: CPT

## 2020-01-27 PROCEDURE — 84100 ASSAY OF PHOSPHORUS: CPT

## 2020-01-27 PROCEDURE — 82962 GLUCOSE BLOOD TEST: CPT

## 2020-01-27 PROCEDURE — 99285 EMERGENCY DEPT VISIT HI MDM: CPT | Mod: 25

## 2020-01-27 RX ADMIN — POLYETHYLENE GLYCOL 3350 17 GRAM(S): 17 POWDER, FOR SOLUTION ORAL at 11:57

## 2020-01-27 RX ADMIN — Medication 1 MILLIGRAM(S): at 06:16

## 2020-01-27 RX ADMIN — LAMOTRIGINE 200 MILLIGRAM(S): 25 TABLET, ORALLY DISINTEGRATING ORAL at 11:57

## 2020-01-27 RX ADMIN — OXYCODONE HYDROCHLORIDE 10 MILLIGRAM(S): 5 TABLET ORAL at 06:03

## 2020-01-27 RX ADMIN — Medication 3 MILLILITER(S): at 09:17

## 2020-01-27 RX ADMIN — Medication 150 MILLIGRAM(S): at 11:57

## 2020-01-27 RX ADMIN — Medication 3 MILLILITER(S): at 06:03

## 2020-01-27 RX ADMIN — OXYCODONE HYDROCHLORIDE 10 MILLIGRAM(S): 5 TABLET ORAL at 07:03

## 2020-01-27 NOTE — DISCHARGE NOTE PROVIDER - NSDCMRMEDTOKEN_GEN_ALL_CORE_FT
acetaminophen 325 mg oral tablet: 2 tab(s) orally every 6 hours, as needed, mild pain (1-3) or fever temperature 100.3 or higher.  Do not take more than 3,250mg in a day     ALPRAZolam 1 mg oral tablet: 1 tab(s) orally 3 times a day, As needed, Anxiety.  Take with caution if administering hydromorphone.  The combination can increase fall risks  bisacodyl 10 mg rectal suppository: 1 suppository(ies) rectal once a day, As needed, If no bowel movement by POD#2  CeleBREX 200 mg oral capsule: 1 cap(s) orally 2 times a day. Take after breakfast and dinner   docusate sodium 100 mg oral capsule: 1 cap(s) orally 3 times a day  Ecotrin Adult Low Strength 81 mg oral delayed release tablet: 1 tab(s) orally 2 times a day. Take after breakfast and dinner   furosemide 40 mg oral tablet: 1 tab(s) orally once a day  HYDROmorphone 4 mg oral tablet: 1 to 1.5mg tab(s) orally every 4 hours, as needed, pain MDD:6    lamoTRIgine 200 mg oral tablet: 1 tab(s) orally once a day  polyethylene glycol 3350 oral powder for reconstitution: 17 gram(s) orally once a day  Senokot 8.6 mg oral tablet: 2 tab(s) orally once a day (at bedtime)  Stiolto Respimat 10 ACT 2.5 mcg-2.5 mcg/inh inhalation aerosol: 2 puff(s) inhaled 2 times a day  traZODone 100 mg oral tablet: 1 tab(s) orally once a day (at bedtime)  venlafaxine 150 mg oral tablet, extended release: 1 tab(s) orally once a day

## 2020-01-27 NOTE — DISCHARGE NOTE PROVIDER - NSDCCPCAREPLAN_GEN_ALL_CORE_FT
PRINCIPAL DISCHARGE DIAGNOSIS  Diagnosis: Shortness of breath  Assessment and Plan of Treatment: On admission to the ER, you were found to be septic. You were admitted for shortness of breath--you were found to have pneumonia. You are susceptible to getting pneumonia because you have COPD. We treated you with the appropriate antibiotics and steroids and your symptoms greatly improved. You have finished your antibiotic course, so there are no extra medications that you need on discharge. Please follow up with Dr. Sun within the next 2 weeks for management of your pulmonary medical problems.      SECONDARY DISCHARGE DIAGNOSES  Diagnosis: COPD (chronic obstructive pulmonary disease)  Assessment and Plan of Treatment: You have COPD, which is being managed by Dr. Sun. Because you were having significant shortness of breath when you were admitted, you received a 5 day course of prednisone, which you have finished. Please continue all your other home medications for your COPD and follow up with Dr. Sun within the next 2 weeks.

## 2020-01-27 NOTE — DISCHARGE NOTE PROVIDER - CARE PROVIDER_API CALL
Dania Sun)  Critical Care Medicine; Pulmonary Disease  [address] [place] [address]  Phone: [phone]  Fax: [phone]  Follow Up Time:

## 2020-01-27 NOTE — DISCHARGE NOTE NURSING/CASE MANAGEMENT/SOCIAL WORK - PATIENT PORTAL LINK FT
You can access the FollowMyHealth Patient Portal offered by [facility] by registering at the following website: [email or web address]. By joining [employer]’s FollowMyHealth portal, you will also be able to view your health information using other applications (apps) compatible with our system.

## 2020-01-27 NOTE — DISCHARGE NOTE PROVIDER - NSDCCAREPROVSEEN_GEN_ALL_CORE_FT
Dania Sun Dania Sun seen and examined with house-staff during bedside rounds.  Resident note read, including vitals, physical findings, laboratory data, and radiological reports.   Revisions included below.  Direct personal management at bed side and extensive interpretation of the data.  Plan was outlined and discussed in details with the housestaff.  Decision making of high complexity  Action taken for acute disease activity to reflect the level of care provided:  - medication reconciliation  - review laboratory data  follow in office  continue NIV  continue taper steroids  continue oral antibiotic

## 2020-01-27 NOTE — DISCHARGE NOTE PROVIDER - HOSPITAL COURSE
Pt is a 69F with a history of COPD (on home O2 2L NC), DARRIN (on CPAP), ITP, and chronic knee pain who presented with tachypnea and hypoxia from Dr. Sun's office. The patient met severe sepsis criteria at the time of admission (tachycardia, leukocytosis, elevated lactate, RLL opacity on CXR). The ICU team was consulted and the patient was admitted to [facility] for further management. She was started on Ceftriaxone/Azithromycin for CAP coverage and Prednisone. CT chest showed multifocal pneumonia. Blood cultures and RVP were negative. She had an VENANCIO that resolved with fluids. On [facility], the patient has been on increased oxygen requirements from baseline - 4 to 6 liters nasal canula with good oxygen saturation. She has stable bibasilar crackles on exam. The patient has completed her antibiotic and prednisone course and is back to her baseline 2L O2 by NC.         Problem List/Main Diagnoses (system-based):     1. Severe sepsis.     -Met criteria for severe sepsis. CT chest showing multifocal pneumonia. RVP negative. BCx NGTD. LE dopplers negative for DVT.     - IV Ceftriaxone 2g q24H, IV Azithromycin 500 q24H for CAP coverage (1/21 - 1/25)    - ECHO w/ Pulmonary hypertension PASP 40.2, normal LV/RV size & function, mild TR, no interval change on echo 11/19/19.         #Cough    Continuing to complain of cough and chest pain on inspiration. On exam has point tenderness to palpation underneath the left breast. Rib series negative for fracture. Hycodan overnight minimally helpful.    - continue guaifenesin for cough    - continue benzocaine for sore throat    - continue hycodan for cough and can consider adding benzonatate for better symptom control    - pain control    - monitor symptoms.             2. COPD (chronic obstructive pulmonary disease).     Hx of COPD on 2L NC at home as needed, has been using less over the past few months.    - prednisone 40 daily x5 days (1/21 - 1/25)    - Azithromycin 500 mg PO, last dose 1/25     - duonebs prn    - incentive spirometry    - OOBTC    - wean oxygen as tolerated goal O2 sat 88-92%        3. Thrombocytopenia.     - HX of ITP. Has previously been on Nplate. Platelets 68 on admission - close to baseline. Continuing to improve, 76 1/24    - monitor CBC        #Anxiety    - continue alprazolam prn    - continue trazodone prn    - continue Effexor 150 mg daily        #Bipolar Disorder    History of bipolar disorder per chart review.    - continue Lamictal 200 mg PO daily.             New medications: none        Labs to be followed outpatient: none        Exam to be followed outpatient: none

## 2020-02-03 DIAGNOSIS — N17.9 ACUTE KIDNEY FAILURE, UNSPECIFIED: ICD-10-CM

## 2020-02-03 DIAGNOSIS — G89.29 OTHER CHRONIC PAIN: ICD-10-CM

## 2020-02-03 DIAGNOSIS — Z96.652 PRESENCE OF LEFT ARTIFICIAL KNEE JOINT: ICD-10-CM

## 2020-02-03 DIAGNOSIS — R65.20 SEVERE SEPSIS WITHOUT SEPTIC SHOCK: ICD-10-CM

## 2020-02-03 DIAGNOSIS — J18.9 PNEUMONIA, UNSPECIFIED ORGANISM: ICD-10-CM

## 2020-02-03 DIAGNOSIS — D69.3 IMMUNE THROMBOCYTOPENIC PURPURA: ICD-10-CM

## 2020-02-03 DIAGNOSIS — Z90.710 ACQUIRED ABSENCE OF BOTH CERVIX AND UTERUS: ICD-10-CM

## 2020-02-03 DIAGNOSIS — G47.33 OBSTRUCTIVE SLEEP APNEA (ADULT) (PEDIATRIC): ICD-10-CM

## 2020-02-03 DIAGNOSIS — R06.02 SHORTNESS OF BREATH: ICD-10-CM

## 2020-02-03 DIAGNOSIS — I27.20 PULMONARY HYPERTENSION, UNSPECIFIED: ICD-10-CM

## 2020-02-03 DIAGNOSIS — J44.0 CHRONIC OBSTRUCTIVE PULMONARY DISEASE WITH (ACUTE) LOWER RESPIRATORY INFECTION: ICD-10-CM

## 2020-02-03 DIAGNOSIS — F41.9 ANXIETY DISORDER, UNSPECIFIED: ICD-10-CM

## 2020-02-03 DIAGNOSIS — H26.9 UNSPECIFIED CATARACT: ICD-10-CM

## 2020-02-03 DIAGNOSIS — Z87.891 PERSONAL HISTORY OF NICOTINE DEPENDENCE: ICD-10-CM

## 2020-02-03 DIAGNOSIS — Z99.81 DEPENDENCE ON SUPPLEMENTAL OXYGEN: ICD-10-CM

## 2020-02-03 DIAGNOSIS — A41.9 SEPSIS, UNSPECIFIED ORGANISM: ICD-10-CM

## 2020-02-03 DIAGNOSIS — K59.00 CONSTIPATION, UNSPECIFIED: ICD-10-CM

## 2020-02-03 DIAGNOSIS — F31.9 BIPOLAR DISORDER, UNSPECIFIED: ICD-10-CM

## 2020-02-03 DIAGNOSIS — Z91.041 RADIOGRAPHIC DYE ALLERGY STATUS: ICD-10-CM

## 2020-02-06 ENCOUNTER — APPOINTMENT (OUTPATIENT)
Dept: PULMONOLOGY | Facility: CLINIC | Age: 70
End: 2020-02-06
Payer: MEDICARE

## 2020-02-06 VITALS
OXYGEN SATURATION: 90 % | HEIGHT: 66 IN | HEART RATE: 111 BPM | BODY MASS INDEX: 25.39 KG/M2 | WEIGHT: 158 LBS | DIASTOLIC BLOOD PRESSURE: 80 MMHG | TEMPERATURE: 98.1 F | SYSTOLIC BLOOD PRESSURE: 126 MMHG

## 2020-02-06 DIAGNOSIS — R09.02 HYPOXEMIA: ICD-10-CM

## 2020-02-06 PROCEDURE — 99215 OFFICE O/P EST HI 40 MIN: CPT

## 2020-02-06 NOTE — HISTORY OF PRESENT ILLNESS
[FreeTextEntry1] : se is doing better after her discharge [de-identified] : she is doing better since she left the hospital.  her pneumonia resolved .  She is finished with antibiotic.  She is not on streoids.  She did not get the Stiolto due to the co-pay.  She is using the oxygen at night.  She is getting PT at home.  She is sleeping well.  She is still coughing.  She is not using the cpap

## 2020-02-06 NOTE — REVIEW OF SYSTEMS
[Negative] : Heme/Lymph [Wheezing] : wheezing [Cough] : cough [Dyspnea on Exertion] : dyspnea on exertion

## 2020-02-06 NOTE — PHYSICAL EXAM
[No Acute Distress] : no acute distress [Well Nourished] : well nourished [Well Developed] : well developed [Well-Appearing] : well-appearing [Normal Sclera/Conjunctiva] : normal sclera/conjunctiva [PERRL] : pupils equal round and reactive to light [EOMI] : extraocular movements intact [Normal Outer Ear/Nose] : the outer ears and nose were normal in appearance [Normal Oropharynx] : the oropharynx was normal [No JVD] : no jugular venous distention [No Lymphadenopathy] : no lymphadenopathy [Supple] : supple [Thyroid Normal, No Nodules] : the thyroid was normal and there were no nodules present [No Respiratory Distress] : no respiratory distress  [Clear to Auscultation] : lungs were clear to auscultation bilaterally [No Accessory Muscle Use] : no accessory muscle use [Normal Rate] : normal rate  [Regular Rhythm] : with a regular rhythm [No Murmur] : no murmur heard [Normal S1, S2] : normal S1 and S2 [No Carotid Bruits] : no carotid bruits [No Abdominal Bruit] : a ~M bruit was not heard ~T in the abdomen [No Varicosities] : no varicosities [Pedal Pulses Present] : the pedal pulses are present [No Edema] : there was no peripheral edema [No Palpable Aorta] : no palpable aorta [No Extremity Clubbing/Cyanosis] : no extremity clubbing/cyanosis [Soft] : abdomen soft [Non Tender] : non-tender [Non-distended] : non-distended [No Masses] : no abdominal mass palpated [No HSM] : no HSM [Normal Bowel Sounds] : normal bowel sounds [Normal Posterior Cervical Nodes] : no posterior cervical lymphadenopathy [Normal Anterior Cervical Nodes] : no anterior cervical lymphadenopathy [No Spinal Tenderness] : no spinal tenderness [No CVA Tenderness] : no CVA  tenderness [No Joint Swelling] : no joint swelling [Grossly Normal Strength/Tone] : grossly normal strength/tone [No Rash] : no rash [Coordination Grossly Intact] : coordination grossly intact [No Focal Deficits] : no focal deficits [Deep Tendon Reflexes (DTR)] : deep tendon reflexes were 2+ and symmetric [Normal Gait] : normal gait [Normal Affect] : the affect was normal [Normal Insight/Judgement] : insight and judgment were intact

## 2020-02-13 LAB — GAS PNL BLDV: SIGNIFICANT CHANGE UP MMOL/L (ref 138–146)

## 2020-03-16 ENCOUNTER — APPOINTMENT (OUTPATIENT)
Dept: PULMONOLOGY | Facility: CLINIC | Age: 70
End: 2020-03-16

## 2021-01-07 NOTE — PHYSICAL THERAPY INITIAL EVALUATION ADULT - PATIENT/FAMILY AGREES WITH PLAN
[facility] Discharge Instructions    I will be responsible for sharing my medication information including changes as listed below with all my physicians.  I WILL NOT DRIVE OR OPERATE MACHINERY TODAY  I WILL NOT DRINK ALCOHOLIC BEVERAGES TODAY  I WILL AVOID MAKING ANY IMPORTANT DECISIONS TODAY    Please check the applicable procedure and the appropriate instructions.  _x_ Colonoscopy __EGD (Gastroscopy) __ Flexible Sigmoidoscopy   __Esophageal Dilatation __ ERCP    __Other: _________________________    You have received the following medications:  __ Demerol __ Versed _x_Propofol __Fentanyl __Valium __Other:________________    Diet:  _x_ You may eat or drink normally now. However please allow your system to readjust by eating light at your next meal. You may then resume your normal diet eating lightly unless otherwise noted by your physician  __ Do not eat or drink anything for 2 hours. After 2 hours you may take sips of water. In addition, if you feel the water as you swallow you may advance to your regular diet  __ Diet Change:___________________________________________    Post Procedure Instructions:  _x_ You may not remember the endoscopy or the doctor’s explanation of what your exam showed. This is a normal reaction to the medications you may have been given. Today you should plan to go home & rest.  _x_You may have soreness at the IV site. If this occurs, apply warm compress to the area. If the area becomes red, swollen, has drainage, or becomes painful, call your GI physician  __ If you have a sore throat, throat lozenges or gargles help to relieve discomfort  _x_ You may have some cramping or gas after the procedure. If you do, try mild activity to expel the air    Call physician’s office to:  _x_Obtain results of procedure/biopsy from your GI physician  _x_Polyps were removed. You may have a small amount of rectal bleeding. If greater  than a few drops of blood, call your 
physician  _x  Understanding Colon and Rectal Polyps    The colon (also called the large intestine) is a muscular tube that forms the last part of the digestive tract. It absorbs water and stores food waste. The colon is about 4 to 6 feet long. The rectum is the last 6 inches of the colon. The colon and rectum have a smooth lining composed of millions of cells. Changes in these cells can lead to growths in the colon that can become cancerous and should be removed. Multiple tests are available to screen for colon cancer, but the colonoscopy is the most recommended test. During colonoscopy, these polyps can be removed. How often you need this test depends on many things including your condition, your family history, symptoms, and what the findings were at the previous colonoscopy.   When the colon lining changes  Changes that happen in the cells that line the colon or rectum can lead to growths called polyps. Over a period of years, polyps can turn cancerous. Removing polyps early may prevent cancer from ever forming.  Polyps  Polyps are fleshy clumps of tissue that form on the lining of the colon or rectum. Small polyps are usually benign (not cancerous). However, over time, cells in a polyp can change and become cancerous. Certain types of polyps known as adenomatous polyps are premalignant. The risk for invasive cancer increases with the size of the polyp and certain cell and gene features. This means that they can become cancerous if they're not removed. Hyperplastic polyps are benign. They can grow quite large and not turn cancerous.   Cancer  Almost all colorectal cancers start when polyp cells begin growing abnormally. As a cancerous tumor grows, it may involve more and more of the colon or rectum. In time, cancer can also grow beyond the colon or rectum and spread to nearby organs or to glands called lymph nodes. The cells can also travel to other parts of the body. This is known as metastasis. The earlier a 
0 = independent
cancerous tumor is removed, the better the chance of preventing its spread.    Date Last Reviewed: 8/1/2016  © 1887-7559 [employer]. [address] [place] [address], [place] [address]. All rights reserved. This information is not intended as a substitute for professional medical care. Always follow your healthcare professional's instructions.      _ Report any signs of severe pain, fever, nausea, vomiting, diarrhea, difficulty in   swallowing, shortness of breath or persistent bloating to your GI physician.       If you experience an emergency situation and you are unable to reach your physician, go to the emergency department or call [phone] and ask for the emergency room.  
yes

## 2021-01-23 ENCOUNTER — RX RENEWAL (OUTPATIENT)
Age: 71
End: 2021-01-23

## 2021-02-24 NOTE — DISCHARGE NOTE NURSING/CASE MANAGEMENT/SOCIAL WORK - NSTRANSFERBELONGINGSRESP_GEN_A_NUR
yes Crescentic Advancement Flap Text: The defect edges were debeveled with a #15 scalpel blade.  Given the location of the defect and the proximity to free margins a crescentic advancement flap was deemed most appropriate.  Using a sterile surgical marker, the appropriate advancement flap was drawn incorporating the defect and placing the expected incisions within the relaxed skin tension lines where possible.    The area thus outlined was incised deep to adipose tissue with a #15 scalpel blade.  The skin margins were undermined to an appropriate distance in all directions utilizing iris scissors.

## 2021-03-22 ENCOUNTER — NON-APPOINTMENT (OUTPATIENT)
Age: 71
End: 2021-03-22

## 2021-03-22 ENCOUNTER — APPOINTMENT (OUTPATIENT)
Dept: PULMONOLOGY | Facility: CLINIC | Age: 71
End: 2021-03-22
Payer: MEDICARE

## 2021-03-22 VITALS
DIASTOLIC BLOOD PRESSURE: 80 MMHG | WEIGHT: 180 LBS | BODY MASS INDEX: 28.93 KG/M2 | HEIGHT: 66 IN | TEMPERATURE: 98.1 F | SYSTOLIC BLOOD PRESSURE: 141 MMHG | HEART RATE: 84 BPM | RESPIRATION RATE: 12 BRPM | OXYGEN SATURATION: 91 %

## 2021-03-22 DIAGNOSIS — Z00.00 ENCOUNTER FOR GENERAL ADULT MEDICAL EXAMINATION W/OUT ABNORMAL FINDINGS: ICD-10-CM

## 2021-03-22 DIAGNOSIS — M17.12 UNILATERAL PRIMARY OSTEOARTHRITIS, LEFT KNEE: ICD-10-CM

## 2021-03-22 DIAGNOSIS — Q21.1 ATRIAL SEPTAL DEFECT: ICD-10-CM

## 2021-03-22 PROCEDURE — 99214 OFFICE O/P EST MOD 30 MIN: CPT | Mod: 25

## 2021-03-22 PROCEDURE — 99072 ADDL SUPL MATRL&STAF TM PHE: CPT

## 2021-03-22 PROCEDURE — 36415 COLL VENOUS BLD VENIPUNCTURE: CPT

## 2021-03-22 NOTE — ASSESSMENT
[FreeTextEntry1] : Bacterial pneumonia\par \par The patient was admitted 1/2020 with acute on top of chronic respiratory failure secondary to bacterial pneumonia versus vaping pneumonitis. Patient was started on antibiotic. Patient responded and she was discharged on antibiotic. The patient finished a course of antibiotic and systemic steroids. No clinical evidence of underlying acute infectious process. I would repeat the CT scan in Aug 2020 stable pulmonary nodules and no signs of PNA.  Follow with repeat CT scan in one year ordered for Aug 2021.\par \par Obstructive sleep apnea\par \par I instructed the patient that she needed to be compliant with the CPAP mask. Patient has chronic respiratory failure and need to have the CPAP mask daily.\par \par COPD\par \par Patient is stable and compliant with inhalers. The patient is off systemic steroids.  Pt is rarely using rescue inhaler. \par \par Pulmonary hypertension\par \par Last echocardiogram done at the hospital revealed no change in the pulmonary pressures. No evidence of right heart failure.\par \par Thrombocytopenia\par \par Platelet count are stable.  Follow with lab work.\par \par Pulmonary nodule\par \par She is scheduled for repeat the CT scan of the chest Aug 2021\par \par Knee pain\par \par She is to follow with Dr Hampton\par \par \par \par health Maintenance\par \par - Refused mammogram or US follow up\par - Referred for colonoscopy\par - Discussed follow up with pap smear\par - Given script for DEX scan\par - up to date with vaccines\par - Follow on lab work

## 2021-03-22 NOTE — HISTORY OF PRESENT ILLNESS
[FreeTextEntry1] : She is here for follow up [de-identified] : She has gained weight and not walking much but walking her dog.  she is getting a lot of seamless and eating a lot of carbs.  She is concerned about her left knee and it burns.  She follow with Dr Hampton for the knee.  She goes to bed at 1 am and wakes up at 4 am, then goes back at 2 pm and sleeps till 7 pm.  She walks with a walker.  \par \par Denies chest pain, coughing, wheezing, fevers, SOB or edema.

## 2021-03-22 NOTE — HISTORY OF PRESENT ILLNESS
[FreeTextEntry1] : She is here for follow up [de-identified] : She has gained weight and not walking much but walking her dog.  she is getting a lot of seamless and eating a lot of carbs.  She is concerned about her left knee and it burns.  She follow with Dr Hampton for the knee.  She goes to bed at 1 am and wakes up at 4 am, then goes back at 2 pm and sleeps till 7 pm.  She walks with a walker.  \par \par Denies chest pain, coughing, wheezing, fevers, SOB or edema.

## 2021-03-22 NOTE — HEALTH RISK ASSESSMENT
[No falls in past year] : Patient reported no falls in the past year [0] : 2) Feeling down, depressed, or hopeless: Not at all (0) [No] : No [1 or 2 (0 pts)] : 1 or 2 (0 points) [Never (0 pts)] : Never (0 points) [] : No [Audit-CScore] : 0 [[id number]] : 0

## 2021-03-22 NOTE — HEALTH RISK ASSESSMENT
[No falls in past year] : Patient reported no falls in the past year [0] : 2) Feeling down, depressed, or hopeless: Not at all (0) [No] : No [1 or 2 (0 pts)] : 1 or 2 (0 points) [Never (0 pts)] : Never (0 points) [] : No [Audit-CScore] : 0 [[id number]] : 0

## 2021-03-26 LAB
25(OH)D3 SERPL-MCNC: 28.5 NG/ML
ALBUMIN SERPL ELPH-MCNC: 4.7 G/DL
ALP BLD-CCNC: 63 U/L
ALT SERPL-CCNC: 22 U/L
ANION GAP SERPL CALC-SCNC: 11 MMOL/L
APPEARANCE: CLEAR
AST SERPL-CCNC: 27 U/L
BASOPHILS # BLD AUTO: 0.07 K/UL
BASOPHILS NFR BLD AUTO: 0.9 %
BILIRUB SERPL-MCNC: 0.2 MG/DL
BILIRUBIN URINE: NEGATIVE
BLOOD URINE: NEGATIVE
BUN SERPL-MCNC: 12 MG/DL
CALCIUM SERPL-MCNC: 9.8 MG/DL
CHLORIDE SERPL-SCNC: 97 MMOL/L
CHOLEST SERPL-MCNC: 274 MG/DL
CO2 SERPL-SCNC: 30 MMOL/L
COLOR: YELLOW
CREAT SERPL-MCNC: 0.62 MG/DL
EOSINOPHIL # BLD AUTO: 0.16 K/UL
EOSINOPHIL NFR BLD AUTO: 2 %
ESTIMATED AVERAGE GLUCOSE: 126 MG/DL
GLUCOSE QUALITATIVE U: NEGATIVE
GLUCOSE SERPL-MCNC: 114 MG/DL
HBA1C MFR BLD HPLC: 6 %
HCT VFR BLD CALC: 43.1 %
HDLC SERPL-MCNC: 114 MG/DL
HGB BLD-MCNC: 13.7 G/DL
IMM GRANULOCYTES NFR BLD AUTO: 0.6 %
KETONES URINE: NEGATIVE
LDLC SERPL CALC-MCNC: 137 MG/DL
LEUKOCYTE ESTERASE URINE: NEGATIVE
LYMPHOCYTES # BLD AUTO: 2.55 K/UL
LYMPHOCYTES NFR BLD AUTO: 31.8 %
MAN DIFF?: NORMAL
MCHC RBC-ENTMCNC: 30 PG
MCHC RBC-ENTMCNC: 31.8 GM/DL
MCV RBC AUTO: 94.3 FL
MONOCYTES # BLD AUTO: 0.77 K/UL
MONOCYTES NFR BLD AUTO: 9.6 %
NEUTROPHILS # BLD AUTO: 4.42 K/UL
NEUTROPHILS NFR BLD AUTO: 55.1 %
NITRITE URINE: NEGATIVE
NONHDLC SERPL-MCNC: 160 MG/DL
PH URINE: 6
PLATELET # BLD AUTO: 100 K/UL
POTASSIUM SERPL-SCNC: 4.1 MMOL/L
PROT SERPL-MCNC: 7.1 G/DL
PROTEIN URINE: NEGATIVE
RBC # BLD: 4.57 M/UL
RBC # FLD: 12.6 %
SODIUM SERPL-SCNC: 137 MMOL/L
SPECIFIC GRAVITY URINE: 1.02
T3 SERPL-MCNC: 130 NG/DL
T4 SERPL-MCNC: 7.6 UG/DL
TRIGL SERPL-MCNC: 114 MG/DL
TSH SERPL-ACNC: 1.41 UIU/ML
UROBILINOGEN URINE: NORMAL
WBC # FLD AUTO: 8.02 K/UL

## 2021-04-05 ENCOUNTER — INPATIENT (INPATIENT)
Facility: HOSPITAL | Age: 71
LOS: 2 days | Discharge: HOME | DRG: 177 | End: 2021-04-08
Payer: MEDICARE

## 2021-04-05 VITALS
SYSTOLIC BLOOD PRESSURE: 135 MMHG | HEIGHT: 64 IN | WEIGHT: 182.1 LBS | RESPIRATION RATE: 20 BRPM | TEMPERATURE: 98 F | DIASTOLIC BLOOD PRESSURE: 84 MMHG | HEART RATE: 72 BPM | OXYGEN SATURATION: 97 %

## 2021-04-05 DIAGNOSIS — I27.20 PULMONARY HYPERTENSION, UNSPECIFIED: ICD-10-CM

## 2021-04-05 DIAGNOSIS — G47.33 OBSTRUCTIVE SLEEP APNEA (ADULT) (PEDIATRIC): ICD-10-CM

## 2021-04-05 DIAGNOSIS — Z98.890 OTHER SPECIFIED POSTPROCEDURAL STATES: Chronic | ICD-10-CM

## 2021-04-05 DIAGNOSIS — F41.9 ANXIETY DISORDER, UNSPECIFIED: ICD-10-CM

## 2021-04-05 DIAGNOSIS — H26.9 UNSPECIFIED CATARACT: Chronic | ICD-10-CM

## 2021-04-05 DIAGNOSIS — Z90.711 ACQUIRED ABSENCE OF UTERUS WITH REMAINING CERVICAL STUMP: Chronic | ICD-10-CM

## 2021-04-05 DIAGNOSIS — J44.9 CHRONIC OBSTRUCTIVE PULMONARY DISEASE, UNSPECIFIED: ICD-10-CM

## 2021-04-05 DIAGNOSIS — F31.9 BIPOLAR DISORDER, UNSPECIFIED: ICD-10-CM

## 2021-04-05 DIAGNOSIS — R63.8 OTHER SYMPTOMS AND SIGNS CONCERNING FOOD AND FLUID INTAKE: ICD-10-CM

## 2021-04-05 DIAGNOSIS — Z90.89 ACQUIRED ABSENCE OF OTHER ORGANS: Chronic | ICD-10-CM

## 2021-04-05 DIAGNOSIS — U07.1 COVID-19: ICD-10-CM

## 2021-04-05 DIAGNOSIS — D69.3 IMMUNE THROMBOCYTOPENIC PURPURA: ICD-10-CM

## 2021-04-05 DIAGNOSIS — M25.569 PAIN IN UNSPECIFIED KNEE: ICD-10-CM

## 2021-04-05 DIAGNOSIS — Z96.652 PRESENCE OF LEFT ARTIFICIAL KNEE JOINT: Chronic | ICD-10-CM

## 2021-04-05 LAB
ALBUMIN SERPL ELPH-MCNC: 4.2 G/DL — SIGNIFICANT CHANGE UP (ref 3.3–5)
ALP SERPL-CCNC: 62 U/L — SIGNIFICANT CHANGE UP (ref 40–120)
ALT FLD-CCNC: 22 U/L — SIGNIFICANT CHANGE UP (ref 10–45)
ANION GAP SERPL CALC-SCNC: 8 MMOL/L — SIGNIFICANT CHANGE UP (ref 5–17)
AST SERPL-CCNC: 26 U/L — SIGNIFICANT CHANGE UP (ref 10–40)
BASE EXCESS BLDV CALC-SCNC: 4.8 MMOL/L — SIGNIFICANT CHANGE UP
BASOPHILS # BLD AUTO: 0.05 K/UL — SIGNIFICANT CHANGE UP (ref 0–0.2)
BASOPHILS NFR BLD AUTO: 0.7 % — SIGNIFICANT CHANGE UP (ref 0–2)
BILIRUB SERPL-MCNC: 0.2 MG/DL — SIGNIFICANT CHANGE UP (ref 0.2–1.2)
BUN SERPL-MCNC: 14 MG/DL — SIGNIFICANT CHANGE UP (ref 7–23)
CALCIUM SERPL-MCNC: 9 MG/DL — SIGNIFICANT CHANGE UP (ref 8.4–10.5)
CHLORIDE SERPL-SCNC: 99 MMOL/L — SIGNIFICANT CHANGE UP (ref 96–108)
CO2 SERPL-SCNC: 31 MMOL/L — SIGNIFICANT CHANGE UP (ref 22–31)
CREAT SERPL-MCNC: 0.65 MG/DL — SIGNIFICANT CHANGE UP (ref 0.5–1.3)
CRP SERPL-MCNC: 3.8 MG/L — SIGNIFICANT CHANGE UP (ref 0–4)
D DIMER BLD IA.RAPID-MCNC: 351 NG/ML DDU — HIGH
EOSINOPHIL # BLD AUTO: 0.22 K/UL — SIGNIFICANT CHANGE UP (ref 0–0.5)
EOSINOPHIL NFR BLD AUTO: 3.2 % — SIGNIFICANT CHANGE UP (ref 0–6)
FERRITIN SERPL-MCNC: 38 NG/ML — SIGNIFICANT CHANGE UP (ref 15–150)
GLUCOSE BLDC GLUCOMTR-MCNC: 137 MG/DL — HIGH (ref 70–99)
GLUCOSE BLDC GLUCOMTR-MCNC: 149 MG/DL — HIGH (ref 70–99)
GLUCOSE SERPL-MCNC: 99 MG/DL — SIGNIFICANT CHANGE UP (ref 70–99)
HCO3 BLDV-SCNC: 32 MMOL/L — HIGH (ref 20–27)
HCT VFR BLD CALC: 39.9 % — SIGNIFICANT CHANGE UP (ref 34.5–45)
HGB BLD-MCNC: 12.6 G/DL — SIGNIFICANT CHANGE UP (ref 11.5–15.5)
IMM GRANULOCYTES NFR BLD AUTO: 0.7 % — SIGNIFICANT CHANGE UP (ref 0–1.5)
LACTATE SERPL-SCNC: 0.8 MMOL/L — SIGNIFICANT CHANGE UP (ref 0.5–2)
LYMPHOCYTES # BLD AUTO: 1.9 K/UL — SIGNIFICANT CHANGE UP (ref 1–3.3)
LYMPHOCYTES # BLD AUTO: 27.5 % — SIGNIFICANT CHANGE UP (ref 13–44)
MCHC RBC-ENTMCNC: 29.2 PG — SIGNIFICANT CHANGE UP (ref 27–34)
MCHC RBC-ENTMCNC: 31.6 GM/DL — LOW (ref 32–36)
MCV RBC AUTO: 92.6 FL — SIGNIFICANT CHANGE UP (ref 80–100)
MONOCYTES # BLD AUTO: 1.05 K/UL — HIGH (ref 0–0.9)
MONOCYTES NFR BLD AUTO: 15.2 % — HIGH (ref 2–14)
NEUTROPHILS # BLD AUTO: 3.64 K/UL — SIGNIFICANT CHANGE UP (ref 1.8–7.4)
NEUTROPHILS NFR BLD AUTO: 52.7 % — SIGNIFICANT CHANGE UP (ref 43–77)
NRBC # BLD: 0 /100 WBCS — SIGNIFICANT CHANGE UP (ref 0–0)
PCO2 BLDV: 62 MMHG — HIGH (ref 39–42)
PH BLDV: 7.34 — SIGNIFICANT CHANGE UP (ref 7.32–7.43)
PLATELET # BLD AUTO: 79 K/UL — LOW (ref 150–400)
PO2 BLDV: 90 MMHG — SIGNIFICANT CHANGE UP
POTASSIUM SERPL-MCNC: 4.2 MMOL/L — SIGNIFICANT CHANGE UP (ref 3.5–5.3)
POTASSIUM SERPL-SCNC: 4.2 MMOL/L — SIGNIFICANT CHANGE UP (ref 3.5–5.3)
PROCALCITONIN SERPL-MCNC: 0.11 NG/ML — HIGH (ref 0.02–0.1)
PROT SERPL-MCNC: 6.8 G/DL — SIGNIFICANT CHANGE UP (ref 6–8.3)
RBC # BLD: 4.31 M/UL — SIGNIFICANT CHANGE UP (ref 3.8–5.2)
RBC # FLD: 13.1 % — SIGNIFICANT CHANGE UP (ref 10.3–14.5)
SAO2 % BLDV: 96 % — SIGNIFICANT CHANGE UP
SARS-COV-2 RNA SPEC QL NAA+PROBE: DETECTED
SODIUM SERPL-SCNC: 138 MMOL/L — SIGNIFICANT CHANGE UP (ref 135–145)
WBC # BLD: 6.91 K/UL — SIGNIFICANT CHANGE UP (ref 3.8–10.5)
WBC # FLD AUTO: 6.91 K/UL — SIGNIFICANT CHANGE UP (ref 3.8–10.5)

## 2021-04-05 PROCEDURE — 71045 X-RAY EXAM CHEST 1 VIEW: CPT | Mod: 26

## 2021-04-05 PROCEDURE — 99223 1ST HOSP IP/OBS HIGH 75: CPT | Mod: GC

## 2021-04-05 PROCEDURE — 93010 ELECTROCARDIOGRAM REPORT: CPT

## 2021-04-05 PROCEDURE — 99285 EMERGENCY DEPT VISIT HI MDM: CPT

## 2021-04-05 RX ORDER — ENOXAPARIN SODIUM 100 MG/ML
40 INJECTION SUBCUTANEOUS EVERY 12 HOURS
Refills: 0 | Status: DISCONTINUED | OUTPATIENT
Start: 2021-04-05 | End: 2021-04-08

## 2021-04-05 RX ORDER — DEXTROSE 50 % IN WATER 50 %
25 SYRINGE (ML) INTRAVENOUS ONCE
Refills: 0 | Status: DISCONTINUED | OUTPATIENT
Start: 2021-04-05 | End: 2021-04-08

## 2021-04-05 RX ORDER — DEXTROSE 50 % IN WATER 50 %
12.5 SYRINGE (ML) INTRAVENOUS ONCE
Refills: 0 | Status: DISCONTINUED | OUTPATIENT
Start: 2021-04-05 | End: 2021-04-08

## 2021-04-05 RX ORDER — TIOTROPIUM BROMIDE AND OLODATEROL 3.124; 2.736 UG/1; UG/1
2 SPRAY, METERED RESPIRATORY (INHALATION)
Qty: 0 | Refills: 0 | DISCHARGE

## 2021-04-05 RX ORDER — SENNA PLUS 8.6 MG/1
1 TABLET ORAL AT BEDTIME
Refills: 0 | Status: DISCONTINUED | OUTPATIENT
Start: 2021-04-05 | End: 2021-04-08

## 2021-04-05 RX ORDER — REMDESIVIR 5 MG/ML
200 INJECTION INTRAVENOUS EVERY 24 HOURS
Refills: 0 | Status: COMPLETED | OUTPATIENT
Start: 2021-04-05 | End: 2021-04-05

## 2021-04-05 RX ORDER — INSULIN LISPRO 100/ML
VIAL (ML) SUBCUTANEOUS
Refills: 0 | Status: DISCONTINUED | OUTPATIENT
Start: 2021-04-05 | End: 2021-04-08

## 2021-04-05 RX ORDER — TRAZODONE HCL 50 MG
100 TABLET ORAL AT BEDTIME
Refills: 0 | Status: DISCONTINUED | OUTPATIENT
Start: 2021-04-05 | End: 2021-04-08

## 2021-04-05 RX ORDER — ENOXAPARIN SODIUM 100 MG/ML
40 INJECTION SUBCUTANEOUS EVERY 24 HOURS
Refills: 0 | Status: DISCONTINUED | OUTPATIENT
Start: 2021-04-05 | End: 2021-04-05

## 2021-04-05 RX ORDER — DEXTROSE 50 % IN WATER 50 %
15 SYRINGE (ML) INTRAVENOUS ONCE
Refills: 0 | Status: DISCONTINUED | OUTPATIENT
Start: 2021-04-05 | End: 2021-04-08

## 2021-04-05 RX ORDER — DEXAMETHASONE 0.5 MG/5ML
6 ELIXIR ORAL ONCE
Refills: 0 | Status: COMPLETED | OUTPATIENT
Start: 2021-04-05 | End: 2021-04-05

## 2021-04-05 RX ORDER — LAMOTRIGINE 25 MG/1
1 TABLET, ORALLY DISINTEGRATING ORAL
Qty: 0 | Refills: 0 | DISCHARGE

## 2021-04-05 RX ORDER — POLYETHYLENE GLYCOL 3350 17 G/17G
17 POWDER, FOR SOLUTION ORAL DAILY
Refills: 0 | Status: DISCONTINUED | OUTPATIENT
Start: 2021-04-05 | End: 2021-04-08

## 2021-04-05 RX ORDER — FUROSEMIDE 40 MG
40 TABLET ORAL DAILY
Refills: 0 | Status: DISCONTINUED | OUTPATIENT
Start: 2021-04-05 | End: 2021-04-06

## 2021-04-05 RX ORDER — LAMOTRIGINE 25 MG/1
100 TABLET, ORALLY DISINTEGRATING ORAL DAILY
Refills: 0 | Status: DISCONTINUED | OUTPATIENT
Start: 2021-04-05 | End: 2021-04-08

## 2021-04-05 RX ORDER — REMDESIVIR 5 MG/ML
INJECTION INTRAVENOUS
Refills: 0 | Status: DISCONTINUED | OUTPATIENT
Start: 2021-04-05 | End: 2021-04-08

## 2021-04-05 RX ORDER — PANTOPRAZOLE SODIUM 20 MG/1
40 TABLET, DELAYED RELEASE ORAL
Refills: 0 | Status: DISCONTINUED | OUTPATIENT
Start: 2021-04-05 | End: 2021-04-08

## 2021-04-05 RX ORDER — DEXAMETHASONE 0.5 MG/5ML
6 ELIXIR ORAL EVERY 24 HOURS
Refills: 0 | Status: DISCONTINUED | OUTPATIENT
Start: 2021-04-06 | End: 2021-04-08

## 2021-04-05 RX ORDER — SODIUM CHLORIDE 9 MG/ML
1000 INJECTION, SOLUTION INTRAVENOUS
Refills: 0 | Status: DISCONTINUED | OUTPATIENT
Start: 2021-04-05 | End: 2021-04-08

## 2021-04-05 RX ORDER — UMECLIDINIUM BROMIDE AND VILANTEROL TRIFENATATE 62.5; 25 UG/1; UG/1
1 POWDER RESPIRATORY (INHALATION)
Qty: 0 | Refills: 0 | DISCHARGE

## 2021-04-05 RX ORDER — VENLAFAXINE HCL 75 MG
150 CAPSULE, EXT RELEASE 24 HR ORAL DAILY
Refills: 0 | Status: DISCONTINUED | OUTPATIENT
Start: 2021-04-05 | End: 2021-04-08

## 2021-04-05 RX ORDER — GLUCAGON INJECTION, SOLUTION 0.5 MG/.1ML
1 INJECTION, SOLUTION SUBCUTANEOUS ONCE
Refills: 0 | Status: DISCONTINUED | OUTPATIENT
Start: 2021-04-05 | End: 2021-04-08

## 2021-04-05 RX ORDER — HYDROMORPHONE HYDROCHLORIDE 2 MG/ML
4 INJECTION INTRAMUSCULAR; INTRAVENOUS; SUBCUTANEOUS EVERY 4 HOURS
Refills: 0 | Status: DISCONTINUED | OUTPATIENT
Start: 2021-04-05 | End: 2021-04-08

## 2021-04-05 RX ORDER — ALPRAZOLAM 0.25 MG
2 TABLET ORAL THREE TIMES A DAY
Refills: 0 | Status: DISCONTINUED | OUTPATIENT
Start: 2021-04-05 | End: 2021-04-08

## 2021-04-05 RX ORDER — REMDESIVIR 5 MG/ML
100 INJECTION INTRAVENOUS EVERY 24 HOURS
Refills: 0 | Status: DISCONTINUED | OUTPATIENT
Start: 2021-04-06 | End: 2021-04-08

## 2021-04-05 RX ADMIN — SENNA PLUS 1 TABLET(S): 8.6 TABLET ORAL at 22:48

## 2021-04-05 RX ADMIN — Medication 100 MILLIGRAM(S): at 22:48

## 2021-04-05 RX ADMIN — HYDROMORPHONE HYDROCHLORIDE 4 MILLIGRAM(S): 2 INJECTION INTRAMUSCULAR; INTRAVENOUS; SUBCUTANEOUS at 22:48

## 2021-04-05 RX ADMIN — ENOXAPARIN SODIUM 40 MILLIGRAM(S): 100 INJECTION SUBCUTANEOUS at 17:17

## 2021-04-05 RX ADMIN — REMDESIVIR 200 MILLIGRAM(S): 5 INJECTION INTRAVENOUS at 20:23

## 2021-04-05 RX ADMIN — Medication 6 MILLIGRAM(S): at 13:41

## 2021-04-05 RX ADMIN — HYDROMORPHONE HYDROCHLORIDE 4 MILLIGRAM(S): 2 INJECTION INTRAMUSCULAR; INTRAVENOUS; SUBCUTANEOUS at 23:33

## 2021-04-05 NOTE — ED ADULT NURSE NOTE - INTERVENTIONS DEFINITIONS
Instruct patient to call for assistance/Physically safe environment: no spills, clutter or unnecessary equipment/Stretcher in lowest position, wheels locked, appropriate side rails in place/Provide visual cue, wrist band, yellow gown, etc./Monitor gait and stability/Monitor for mental status changes and reorient to person, place, and time

## 2021-04-05 NOTE — H&P ADULT - PROBLEM SELECTOR PLAN 7
History of bipolar disorder per chart review.  - continue Lamictal 200 mg PO daily. History of bipolar disorder per chart review.  - continue Lamictal 100 mg PO daily. #Anxiety  - continue alprazolam 2mg TID prn  - continue trazodone 100mg prn for insomnia   - continue Effexor 150 mg daily

## 2021-04-05 NOTE — ED PROVIDER NOTE - CLINICAL SUMMARY MEDICAL DECISION MAKING FREE TEXT BOX
patient with URI/viral syndrome symptoms clinically.  noted to be hypoxic on room air to 91%.  improved with supplemental oxygen 2L to 96%.  reports recent outpatient covid + test despite getting vaccine x2. confirmatory covid testing sent along with labs to eval pneumonia, inflammatory markers, sepsis.  cxr done showing no focal infiltrate.  started decadron.  tylenol prn for fever.  anticipate remdisivir by inpatient team. due to oxygen requirement, will admit for further management of acute hypoxic respiratory failure. discussed with Dr. Sun

## 2021-04-05 NOTE — H&P ADULT - ATTENDING COMMENTS
Patient seen and examined with house-staff during bedside rounds.  Resident note read, including vitals, physical findings, laboratory data, and radiological reports.   Revisions included below.  Direct personal management at bed side and extensive interpretation of the data.  Plan was outlined and discussed in details with the housestaff.  Decision making of high complexity  Action taken for acute disease activity to reflect the level of care provided:  - medication reconciliation  - review laboratory data  Admitted with acute hypoxic respiratory failure secondary Covid pneumonia.  There is no definite infiltrate on the chest x-ray but hold on CT scan of the chest.  Patient was started on remdesivir and Decadron.  Her pulmonary status is stable.  Continue bronchodilator.  Continue noninvasive ventilation oxygen supplementation.

## 2021-04-05 NOTE — H&P ADULT - PROBLEM SELECTOR PLAN 5
Last echo with PASP 40.20, normal LV and RV function, mild TR. Stable Last echo with PASP 40.20, normal LV and RV function, mild TR. Stable    #Bilateral LE edema - mild 1+ pitting edema   - c/w lasix 40mg qd Last echo with PASP 40.20, normal LV and RV function, mild TR. Stable    #Bilateral LE edema - mild 1+ pitting edema.   - c/w lasix 40mg qd

## 2021-04-05 NOTE — H&P ADULT - HISTORY OF PRESENT ILLNESS
70F with COPD on home O2 as needed, DARRIN (does not tolerate CPAP), ITP, depression/anxiety, bipolar disorder, chronic knee pain, who was BIBA from home with fevers, chills, progressively worsening shortness of breath and recent COVID 19 diagnosis at Urgent care. Patient reports her oxygen was ~88-90s at home which prompted her to come in. She states she started to feel unwell 4 days ago, with symptoms of myalgaias, fever, shortness of breath and non-productive cough. She reports receiving the 2nd dose of the [place]derna vaccine in the beginning of March. However she was tested at at an [facility] where the rapid test was negative but was later called about a positive PCR test. Last T max of 101 yesterday, worsening cough since yesterday associated with chest discomfort. ROS otherwise negative. Notes sick contacts with family members who tested positive for COVID. Of note patient was admitted last year from january 21-27th 2020 for severe sepsis 2/2 multifocal pneumonia and was discharged on antibiotics at the time.     In the ED: Initial vital signs: T: 98.5 F, HR: 72, BP: 135/84 mmHg, R: 20 SpO2: 92% on RA --> 97% on 4L/min  ED course: s/p Decadron 6mg IV x1   Labs: significant for plt 79, monocyte 15.2%, D-dimer 351, CMP unremarakble. VBG with pCO2 62, HCO3 32  COVID swab pending   Imaging: CXR: no obvious infiltrates seen  EKG: NSR HR 74 bpm, TWI in V1-V3,  no acute ST or T wave changes   Medications: see below   Consults: none  70F with COPD on home O2 as needed, DARRIN (does not tolerate CPAP), ITP, depression/anxiety, bipolar disorder, chronic knee pain, who was BIBA from home with fevers, chills, progressively worsening shortness of breath and recent COVID 19 diagnosis at Urgent care. Patient reports her oxygen was ~88-90s at home which prompted her to come in. She states she started to feel unwell 4 days ago, with symptoms of myalgaias, fever, shortness of breath and non-productive cough. She reports receiving the 2nd dose of the MOderna vaccine in the beginning of March. However she was tested at at an urgent care where the rapid test was negative but was later called about a positive PCR test. Last T max of 101 yesterday, worsening cough since yesterday associated with chest discomfort. ROS otherwise negative. Notes sick contacts with family members who tested positive for COVID. Of note patient was admitted last year from january 21-27th 2020 for severe sepsis 2/2 multifocal pneumonia and was discharged on antibiotics at the time.     In the ED: Initial vital signs: T: 98.5 F, HR: 72, BP: 135/84 mmHg, R: 20 SpO2: 92% on RA --> 97% on 4L/min  ED course: s/p Decadron 6mg IV x1   Labs: significant for plt 79, monocyte 15.2%, D-dimer 351, CMP unremarakble. VBG with pCO2 62, HCO3 32  COVID swab pending   Imaging: CXR: no obvious infiltrates seen  EKG: NSR HR 74 bpm, TWI in V1-V3,  no acute ST or T wave changes   Medications: see below   Consults: none

## 2021-04-05 NOTE — H&P ADULT - ASSESSMENT
70F with [facility]  70F with COPD on home O2 as needed, DARRIN (does not tolerate CPAP), ITP, depression/anxiety, bipolar disorder, chronic knee pain, who was BIBA from home with fevers, chills, progressively worsening shortness of breath and recent COVID 19 diagnosis, admitted for COVID-19.

## 2021-04-05 NOTE — H&P ADULT - PROBLEM SELECTOR PLAN 6
#Anxiety  - continue alprazolam prn  - continue trazodone prn  - continue Effexor 150 mg daily #Anxiety  - continue alprazolam 2mg TID prn  - continue trazodone 100mg prn for insomnia   - continue Effexor 150 mg daily Pt with chronic knee pain from complications from previous surgery  - c/w dilaudid 4mg q4h for mod/severe pain  - senna/miralax for bowel regimen

## 2021-04-05 NOTE — H&P ADULT - PROBLEM SELECTOR PLAN 1
Pt with recent symptoms of fevers, chills, myalgias, shorntess of breath, non-productive cough. CXR realtively clear and relatively stable inflammatory markers.   - trend D-dimer, ferritin, CRP  - f/u COVID swab  - c/w decadron 6mg IV x7 d  - ISS while on decadron   - start Remdesivir if COVID swab positive.

## 2021-04-05 NOTE — ED ADULT NURSE NOTE - OBJECTIVE STATEMENT
Patient is a 69yo female, recently dx with COVID, reporting chest pain with deep breathing and oxygen saturation to 88% on room air. Denies abdominal pain, n/v/d. Reports fevers yesterday. Speaking clearly in full sentences without difficulty.

## 2021-04-05 NOTE — ED ADULT TRIAGE NOTE - OTHER COMPLAINTS
biba from home c/o sob with cough, pt tested positive for covid yesterday, reports low spo2 90 at home.  Pt c/o back pain with cough.  Denies fever, cp, n/v.   Received second dose of Moderna in beginning of March

## 2021-04-05 NOTE — H&P ADULT - PROBLEM SELECTOR PLAN 8
F: encourage po   E: replete K<4 Mg<2  N: DASH/TLC   DVT: lovenox 40mg   Dispo: RMF History of bipolar disorder per chart review.  - continue Lamictal 100 mg PO daily.

## 2021-04-05 NOTE — ED PROVIDER NOTE - OBJECTIVE STATEMENT
history of COPD (only uses oxygen at home as needed), DARRIN (supposed to be on CPAP but doesn't tolerate), ITP, depression/anxiety, and chronic knee pain, here with body aches, fever, cough, shortness of breath. Reports she started to feel sick 4 days ago. Family members tested + for covid. Went to get tested and rapid test neg but called that her pcr was positive. Notes fever 101 yesterday and chills. Increased cough since last night. Some discomfort in chest with cough/deep breath. Notes she got 2 moderna covid vaccines, last 3/5/21.

## 2021-04-05 NOTE — H&P ADULT - NSHPLABSRESULTS_GEN_ALL_CORE
12.6   6.91  )-----------( 79       ( 05 Apr 2021 13:37 )             39.9       04-05    138  |  99  |  14  ----------------------------<  99  4.2   |  31  |  0.65    Ca    9.0      05 Apr 2021 13:37    TPro  6.8  /  Alb  4.2  /  TBili  0.2  /  DBili  x   /  AST  26  /  ALT  22  /  AlkPhos  62  04-05                      Lactate Trend  04-05 @ 13:37 Lactate:0.8             CAPILLARY BLOOD GLUCOSE 12.6   6.91  )-----------( 79       ( 05 Apr 2021 13:37 )             39.9       04-05    138  |  99  |  14  ----------------------------<  99  4.2   |  31  |  0.65    Ca    9.0      05 Apr 2021 13:37    TPro  6.8  /  Alb  4.2  /  TBili  0.2  /  DBili  x   /  AST  26  /  ALT  22  /  AlkPhos  62  04-05          Lactate Trend  04-05 @ 13:37 Lactate:0.8             CAPILLARY BLOOD GLUCOSE 12.6   6.91  )-----------( 79       ( 05 Apr 2021 13:37 )             39.9       04-05    138  |  99  |  14  ----------------------------<  99  4.2   |  31  |  0.65    Ca    9.0      05 Apr 2021 13:37    TPro  6.8  /  Alb  4.2  /  TBili  0.2  /  DBili  x   /  AST  26  /  ALT  22  /  AlkPhos  62  04-05          Lactate Trend  04-05 @ 13:37 Lactate:0.8

## 2021-04-05 NOTE — H&P ADULT - NSHPPHYSICALEXAM_GEN_ALL_CORE
VITAL SIGNS:  T(C): 36.9 (04-05-21 @ 12:45), Max: 36.9 (04-05-21 @ 12:45)  T(F): 98.5 (04-05-21 @ 12:45), Max: 98.5 (04-05-21 @ 12:45)  HR: 72 (04-05-21 @ 12:45) (72 - 72)  BP: 135/84 (04-05-21 @ 12:45) (135/84 - 135/84)  BP(mean): --  RR: 18 (04-05-21 @ 13:15) (18 - 20)  SpO2: 97% (04-05-21 @ 13:15) (92% - 97%)  Wt(kg): --    PHYSICAL EXAM:    Constitutional: WDWN, lying comfortably in bed, NAD  Head: Nc/At  Eyes: PERRL, EOMI, clear conjunctiva  ENT: no nasal discharge; uvula midline, no oropharyngeal erythema or exudates; MMM  Neck: supple; no JVD or thyromegaly  Respiratory: CTA b/l, no wheezes, rales, or rhonchi  Cardiac: +S1/S2, +RRR, no murmurs, rubs, or gallops  Gastrointestinal: soft, non-tender, non-distended, no rebound/guarding, no palpable masses, normoactive bowel sounds x4  Back: spine midline, no bony tenderness or step-offs; no CVAT B/L  Extremities: WWP, no clubbing or cyanosis, no peripheral edema  Musculoskeletal: NROM x4; no joint swelling, tenderness or erythema  Vascular: 2+ radial and DP pulses b/l  Dermatologic: skin warm, dry and intact, no rashes, wounds, or scars  Lymphatic: no submandibular or cervical LAD  Neurologic: AAOx3, CNII-XII grossly intact, no focal deficits  Psychiatric: affect and characteristics of appearance, verbalizations, behaviors are appropriate VITAL SIGNS:  T(C): 36.9 (04-05-21 @ 12:45), Max: 36.9 (04-05-21 @ 12:45)  T(F): 98.5 (04-05-21 @ 12:45), Max: 98.5 (04-05-21 @ 12:45)  HR: 72 (04-05-21 @ 12:45) (72 - 72)  BP: 135/84 (04-05-21 @ 12:45) (135/84 - 135/84)  BP(mean): --  RR: 18 (04-05-21 @ 13:15) (18 - 20)  SpO2: 97% (04-05-21 @ 13:15) (92% - 97%)  Wt(kg): --    PHYSICAL EXAM:    Constitutional: WDWN, lying comfortably in bed, NAD  Head: Nc/At  Eyes: PERRL, EOMI, clear conjunctiva  ENT: no nasal discharge; uvula midline, no oropharyngeal erythema or exudates; MMM  Neck: supple; no JVD or thyromegaly  Respiratory: CTA b/l, no wheezes, rales, or rhonchi  Cardiac: +S1/S2, +RRR, no murmurs, rubs, or gallops  Gastrointestinal: soft, non-tender, non-distended, no rebound/guarding, no palpable masses, normoactive bowel sounds x4  Back: spine midline, no bony tenderness or step-offs; no CVAT B/L  Extremities: WWP, no clubbing or cyanosis, 1+ pitting edema bilateral lower extremities   Musculoskeletal: NROM x4; no joint swelling, tenderness or erythema  Vascular: 2+ radial and DP pulses b/l  Dermatologic: skin warm, dry and intact, no rashes, wounds, or scars  Lymphatic: no submandibular or cervical LAD  Neurologic: AAOx3, CNII-XII grossly intact, no focal deficits  Psychiatric: affect and characteristics of appearance, verbalizations, behaviors are appropriate

## 2021-04-05 NOTE — ED ADULT NURSE NOTE - PRIMARY CARE PROVIDER
Maximize caloric intake for adequate growth.   Change to ad michelle feeds with minimum 35 ml Q 3 hrs.  Wean TPN/IL off.  AC sugars Dino

## 2021-04-05 NOTE — H&P ADULT - PROBLEM SELECTOR PLAN 3
- c/w CPAP at night Pt with h/o DARRIN but not on CPAP at home, states she is due to get additional training  - continue to monitor

## 2021-04-05 NOTE — H&P ADULT - PROBLEM SELECTOR PLAN 2
Hx of COPD on 2L NC at home as needed.   - duonebs prn  - incentive spirometry  - OOBTC  - wean oxygen as tolerated goal O2 sat 88-92%  - Ambulate to assess for desaturation. Hx of COPD on 2L NC at home as needed. Currently 97% on 2L NC   - duonebs prn   - incentive spirometry  - OOBTC  - wean oxygen as tolerated goal O2 sat 88-92%  - Ambulate to assess for desaturation.

## 2021-04-06 DIAGNOSIS — R60.0 LOCALIZED EDEMA: ICD-10-CM

## 2021-04-06 LAB
A1C WITH ESTIMATED AVERAGE GLUCOSE RESULT: 6.3 % — HIGH (ref 4–5.6)
ALBUMIN SERPL ELPH-MCNC: 4 G/DL — SIGNIFICANT CHANGE UP (ref 3.3–5)
ALP SERPL-CCNC: 60 U/L — SIGNIFICANT CHANGE UP (ref 40–120)
ALT FLD-CCNC: 24 U/L — SIGNIFICANT CHANGE UP (ref 10–45)
ANION GAP SERPL CALC-SCNC: 10 MMOL/L — SIGNIFICANT CHANGE UP (ref 5–17)
AST SERPL-CCNC: 25 U/L — SIGNIFICANT CHANGE UP (ref 10–40)
BASOPHILS # BLD AUTO: 0.05 K/UL — SIGNIFICANT CHANGE UP (ref 0–0.2)
BASOPHILS NFR BLD AUTO: 0.7 % — SIGNIFICANT CHANGE UP (ref 0–2)
BILIRUB SERPL-MCNC: 0.2 MG/DL — SIGNIFICANT CHANGE UP (ref 0.2–1.2)
BUN SERPL-MCNC: 16 MG/DL — SIGNIFICANT CHANGE UP (ref 7–23)
CALCIUM SERPL-MCNC: 9.5 MG/DL — SIGNIFICANT CHANGE UP (ref 8.4–10.5)
CHLORIDE SERPL-SCNC: 101 MMOL/L — SIGNIFICANT CHANGE UP (ref 96–108)
CO2 SERPL-SCNC: 28 MMOL/L — SIGNIFICANT CHANGE UP (ref 22–31)
CREAT SERPL-MCNC: 0.6 MG/DL — SIGNIFICANT CHANGE UP (ref 0.5–1.3)
D DIMER BLD IA.RAPID-MCNC: 273 NG/ML DDU — HIGH
EOSINOPHIL # BLD AUTO: 0.06 K/UL — SIGNIFICANT CHANGE UP (ref 0–0.5)
EOSINOPHIL NFR BLD AUTO: 0.8 % — SIGNIFICANT CHANGE UP (ref 0–6)
ESTIMATED AVERAGE GLUCOSE: 134 MG/DL — HIGH (ref 68–114)
GLUCOSE BLDC GLUCOMTR-MCNC: 100 MG/DL — HIGH (ref 70–99)
GLUCOSE BLDC GLUCOMTR-MCNC: 108 MG/DL — HIGH (ref 70–99)
GLUCOSE BLDC GLUCOMTR-MCNC: 128 MG/DL — HIGH (ref 70–99)
GLUCOSE BLDC GLUCOMTR-MCNC: 153 MG/DL — HIGH (ref 70–99)
GLUCOSE SERPL-MCNC: 124 MG/DL — HIGH (ref 70–99)
HCT VFR BLD CALC: 40.9 % — SIGNIFICANT CHANGE UP (ref 34.5–45)
HGB BLD-MCNC: 12.7 G/DL — SIGNIFICANT CHANGE UP (ref 11.5–15.5)
IMM GRANULOCYTES NFR BLD AUTO: 0.7 % — SIGNIFICANT CHANGE UP (ref 0–1.5)
LYMPHOCYTES # BLD AUTO: 1.9 K/UL — SIGNIFICANT CHANGE UP (ref 1–3.3)
LYMPHOCYTES # BLD AUTO: 26.3 % — SIGNIFICANT CHANGE UP (ref 13–44)
MAGNESIUM SERPL-MCNC: 2.1 MG/DL — SIGNIFICANT CHANGE UP (ref 1.6–2.6)
MCHC RBC-ENTMCNC: 29.3 PG — SIGNIFICANT CHANGE UP (ref 27–34)
MCHC RBC-ENTMCNC: 31.1 GM/DL — LOW (ref 32–36)
MCV RBC AUTO: 94.2 FL — SIGNIFICANT CHANGE UP (ref 80–100)
MONOCYTES # BLD AUTO: 0.86 K/UL — SIGNIFICANT CHANGE UP (ref 0–0.9)
MONOCYTES NFR BLD AUTO: 11.9 % — SIGNIFICANT CHANGE UP (ref 2–14)
NEUTROPHILS # BLD AUTO: 4.3 K/UL — SIGNIFICANT CHANGE UP (ref 1.8–7.4)
NEUTROPHILS NFR BLD AUTO: 59.6 % — SIGNIFICANT CHANGE UP (ref 43–77)
NRBC # BLD: 0 /100 WBCS — SIGNIFICANT CHANGE UP (ref 0–0)
PHOSPHATE SERPL-MCNC: 3.6 MG/DL — SIGNIFICANT CHANGE UP (ref 2.5–4.5)
PLATELET # BLD AUTO: 71 K/UL — LOW (ref 150–400)
POTASSIUM SERPL-MCNC: 4.5 MMOL/L — SIGNIFICANT CHANGE UP (ref 3.5–5.3)
POTASSIUM SERPL-SCNC: 4.5 MMOL/L — SIGNIFICANT CHANGE UP (ref 3.5–5.3)
PROT SERPL-MCNC: 6.8 G/DL — SIGNIFICANT CHANGE UP (ref 6–8.3)
RBC # BLD: 4.34 M/UL — SIGNIFICANT CHANGE UP (ref 3.8–5.2)
RBC # FLD: 13 % — SIGNIFICANT CHANGE UP (ref 10.3–14.5)
SODIUM SERPL-SCNC: 139 MMOL/L — SIGNIFICANT CHANGE UP (ref 135–145)
WBC # BLD: 7.22 K/UL — SIGNIFICANT CHANGE UP (ref 3.8–10.5)
WBC # FLD AUTO: 7.22 K/UL — SIGNIFICANT CHANGE UP (ref 3.8–10.5)

## 2021-04-06 PROCEDURE — 93971 EXTREMITY STUDY: CPT | Mod: 26,LT

## 2021-04-06 PROCEDURE — 99232 SBSQ HOSP IP/OBS MODERATE 35: CPT | Mod: GC

## 2021-04-06 RX ORDER — FUROSEMIDE 40 MG
40 TABLET ORAL DAILY
Refills: 0 | Status: DISCONTINUED | OUTPATIENT
Start: 2021-04-07 | End: 2021-04-08

## 2021-04-06 RX ADMIN — SENNA PLUS 1 TABLET(S): 8.6 TABLET ORAL at 21:39

## 2021-04-06 RX ADMIN — Medication 100 MILLIGRAM(S): at 21:39

## 2021-04-06 RX ADMIN — Medication 2 MILLIGRAM(S): at 13:43

## 2021-04-06 RX ADMIN — POLYETHYLENE GLYCOL 3350 17 GRAM(S): 17 POWDER, FOR SOLUTION ORAL at 11:54

## 2021-04-06 RX ADMIN — REMDESIVIR 500 MILLIGRAM(S): 5 INJECTION INTRAVENOUS at 20:11

## 2021-04-06 RX ADMIN — Medication 2: at 22:26

## 2021-04-06 RX ADMIN — PANTOPRAZOLE SODIUM 40 MILLIGRAM(S): 20 TABLET, DELAYED RELEASE ORAL at 06:41

## 2021-04-06 RX ADMIN — Medication 150 MILLIGRAM(S): at 11:54

## 2021-04-06 RX ADMIN — Medication 40 MILLIGRAM(S): at 05:19

## 2021-04-06 RX ADMIN — LAMOTRIGINE 100 MILLIGRAM(S): 25 TABLET, ORALLY DISINTEGRATING ORAL at 11:54

## 2021-04-06 RX ADMIN — ENOXAPARIN SODIUM 40 MILLIGRAM(S): 100 INJECTION SUBCUTANEOUS at 17:24

## 2021-04-06 RX ADMIN — ENOXAPARIN SODIUM 40 MILLIGRAM(S): 100 INJECTION SUBCUTANEOUS at 05:19

## 2021-04-06 RX ADMIN — Medication 6 MILLIGRAM(S): at 13:42

## 2021-04-06 NOTE — PROGRESS NOTE ADULT - PROBLEM SELECTOR PLAN 8
History of bipolar disorder per chart review.  - continue Lamictal 100 mg PO daily. #Anxiety  - continue alprazolam 2mg TID prn  - continue trazodone 100mg prn for insomnia   - continue Effexor 150 mg daily

## 2021-04-06 NOTE — PROGRESS NOTE ADULT - PROBLEM SELECTOR PLAN 7
#Anxiety  - continue alprazolam 2mg TID prn  - continue trazodone 100mg prn for insomnia   - continue Effexor 150 mg daily Pt with chronic knee pain from complications from previous surgery  - c/w dilaudid 4mg q4h for mod/severe pain  - senna/miralax for bowel regimen

## 2021-04-06 NOTE — PROGRESS NOTE ADULT - SUBJECTIVE AND OBJECTIVE BOX
**INCOMPLETE NOTE    OVERNIGHT EVENTS:    SUBJECTIVE:  Patient seen and examined at bedside.    Vital Signs Last 12 Hrs  T(F): 97.6 (04-06-21 @ 05:06), Max: 97.7 (04-05-21 @ 20:42)  HR: 61 (04-06-21 @ 05:06) (61 - 80)  BP: 98/60 (04-06-21 @ 05:06) (98/60 - 137/85)  BP(mean): --  RR: 18 (04-06-21 @ 05:06) (17 - 18)  SpO2: 95% (04-06-21 @ 05:06) (95% - 95%)  I&O's Summary      PHYSICAL EXAM:  Constitutional: NAD, comfortable in bed.  HEENT: NC/AT, PERRLA, EOMI, no conjunctival pallor or scleral icterus, MMM  Neck: Supple, no JVD  Respiratory: CTA B/L. No w/r/r.   Cardiovascular: RRR, normal S1 and S2, no m/r/g.   Gastrointestinal: +BS, soft NTND, no guarding or rebound tenderness, no palpable masses   Extremities: wwp; no cyanosis, clubbing or edema.   Vascular: Pulses equal and strong throughout.   Neurological: AAOx3, no CN deficits, strength and sensation intact throughout.   Skin: No gross skin abnormalities or rashes        LABS:                        12.6   6.91  )-----------( 79       ( 05 Apr 2021 13:37 )             39.9     04-05    138  |  99  |  14  ----------------------------<  99  4.2   |  31  |  0.65    Ca    9.0      05 Apr 2021 13:37    TPro  6.8  /  Alb  4.2  /  TBili  0.2  /  DBili  x   /  AST  26  /  ALT  22  /  AlkPhos  62  04-05    MEDICATIONS  (STANDING):  dexAMETHasone  Injectable 6 milliGRAM(s) IV Push every 24 hours  dextrose 40% Gel 15 Gram(s) Oral once  dextrose 5%. 1000 milliLiter(s) (50 mL/Hr) IV Continuous <Continuous>  dextrose 5%. 1000 milliLiter(s) (100 mL/Hr) IV Continuous <Continuous>  dextrose 50% Injectable 25 Gram(s) IV Push once  dextrose 50% Injectable 12.5 Gram(s) IV Push once  dextrose 50% Injectable 25 Gram(s) IV Push once  enoxaparin Injectable 40 milliGRAM(s) SubCutaneous every 12 hours  glucagon  Injectable 1 milliGRAM(s) IntraMuscular once  insulin lispro (ADMELOG) corrective regimen sliding scale   SubCutaneous Before meals and at bedtime  lamoTRIgine 100 milliGRAM(s) Oral daily  pantoprazole    Tablet 40 milliGRAM(s) Oral before breakfast  polyethylene glycol 3350 17 Gram(s) Oral daily  remdesivir  IVPB   IV Intermittent   remdesivir  IVPB 100 milliGRAM(s) IV Intermittent every 24 hours  senna 1 Tablet(s) Oral at bedtime  traZODone 100 milliGRAM(s) Oral at bedtime  venlafaxine XR. 150 milliGRAM(s) Oral daily    MEDICATIONS  (PRN):  ALPRAZolam 2 milliGRAM(s) Oral three times a day PRN anxiety  HYDROmorphone   Tablet 4 milliGRAM(s) Oral every 4 hours PRN Severe Pain (7 - 10)   OVERNIGHT EVENTS: YUNIOR    SUBJECTIVE:  Patient seen and examined at bedside. Patient has no complaints, had her COVID vaccine done on Feb/March, very anxious about the possible out come if not have gotten the vaccine. Patient has worsening of her LLE edema, more then usual. Patient denies h/n/v/d, fever, chills, cp, palpitations, sob, abd pain, rashes, dysuria, and changes in BM.     Vital Signs Last 12 Hrs  T(F): 97.6 (04-06-21 @ 05:06), Max: 97.7 (04-05-21 @ 20:42)  HR: 61 (04-06-21 @ 05:06) (61 - 80)  BP: 98/60 (04-06-21 @ 05:06) (98/60 - 137/85)  BP(mean): --  RR: 18 (04-06-21 @ 05:06) (17 - 18)  SpO2: 95% (04-06-21 @ 05:06) (95% - 95%)  I&O's Summary      PHYSICAL EXAM:  Constitutional: NAD, comfortable in bed.  HEENT: NC/AT, PERRLA, EOMI, no conjunctival pallor or scleral icterus, MMM  Neck: Supple, no JVD  Respiratory: CTA B/L. Bibasilar crackles   Cardiovascular: RRR, normal S1 and S2, no m/r/g.   Gastrointestinal: +BS, soft NTND, no guarding or rebound tenderness, no palpable masses   Extremities: wwp; no cyanosis, clubbing. LLE edemas  Vascular: Pulses equal and strong throughout.   Neurological: AAOx3, no CN deficits, strength and sensation intact throughout.   Skin: No gross skin abnormalities or rashes        LABS:                        12.6   6.91  )-----------( 79       ( 05 Apr 2021 13:37 )             39.9     04-05    138  |  99  |  14  ----------------------------<  99  4.2   |  31  |  0.65    Ca    9.0      05 Apr 2021 13:37    TPro  6.8  /  Alb  4.2  /  TBili  0.2  /  DBili  x   /  AST  26  /  ALT  22  /  AlkPhos  62  04-05    MEDICATIONS  (STANDING):  dexAMETHasone  Injectable 6 milliGRAM(s) IV Push every 24 hours  dextrose 40% Gel 15 Gram(s) Oral once  dextrose 5%. 1000 milliLiter(s) (50 mL/Hr) IV Continuous <Continuous>  dextrose 5%. 1000 milliLiter(s) (100 mL/Hr) IV Continuous <Continuous>  dextrose 50% Injectable 25 Gram(s) IV Push once  dextrose 50% Injectable 12.5 Gram(s) IV Push once  dextrose 50% Injectable 25 Gram(s) IV Push once  enoxaparin Injectable 40 milliGRAM(s) SubCutaneous every 12 hours  glucagon  Injectable 1 milliGRAM(s) IntraMuscular once  insulin lispro (ADMELOG) corrective regimen sliding scale   SubCutaneous Before meals and at bedtime  lamoTRIgine 100 milliGRAM(s) Oral daily  pantoprazole    Tablet 40 milliGRAM(s) Oral before breakfast  polyethylene glycol 3350 17 Gram(s) Oral daily  remdesivir  IVPB   IV Intermittent   remdesivir  IVPB 100 milliGRAM(s) IV Intermittent every 24 hours  senna 1 Tablet(s) Oral at bedtime  traZODone 100 milliGRAM(s) Oral at bedtime  venlafaxine XR. 150 milliGRAM(s) Oral daily    MEDICATIONS  (PRN):  ALPRAZolam 2 milliGRAM(s) Oral three times a day PRN anxiety  HYDROmorphone   Tablet 4 milliGRAM(s) Oral every 4 hours PRN Severe Pain (7 - 10)

## 2021-04-06 NOTE — PROGRESS NOTE ADULT - PROBLEM SELECTOR PLAN 6
Pt with chronic knee pain from complications from previous surgery  - c/w dilaudid 4mg q4h for mod/severe pain  - senna/miralax for bowel regimen Patient has chronic LLE, but today 4/6 is worsening, no associated pain.  - f/u LE doppler

## 2021-04-07 LAB
ALBUMIN SERPL ELPH-MCNC: 4.2 G/DL — SIGNIFICANT CHANGE UP (ref 3.3–5)
ALP SERPL-CCNC: 56 U/L — SIGNIFICANT CHANGE UP (ref 40–120)
ALT FLD-CCNC: 22 U/L — SIGNIFICANT CHANGE UP (ref 10–45)
ANION GAP SERPL CALC-SCNC: 13 MMOL/L — SIGNIFICANT CHANGE UP (ref 5–17)
AST SERPL-CCNC: 22 U/L — SIGNIFICANT CHANGE UP (ref 10–40)
BASOPHILS # BLD AUTO: 0.04 K/UL — SIGNIFICANT CHANGE UP (ref 0–0.2)
BASOPHILS NFR BLD AUTO: 0.6 % — SIGNIFICANT CHANGE UP (ref 0–2)
BILIRUB SERPL-MCNC: 0.3 MG/DL — SIGNIFICANT CHANGE UP (ref 0.2–1.2)
BUN SERPL-MCNC: 18 MG/DL — SIGNIFICANT CHANGE UP (ref 7–23)
CALCIUM SERPL-MCNC: 9.6 MG/DL — SIGNIFICANT CHANGE UP (ref 8.4–10.5)
CHLORIDE SERPL-SCNC: 96 MMOL/L — SIGNIFICANT CHANGE UP (ref 96–108)
CO2 SERPL-SCNC: 29 MMOL/L — SIGNIFICANT CHANGE UP (ref 22–31)
COVID-19 SPIKE DOMAIN AB INTERP: POSITIVE
COVID-19 SPIKE DOMAIN ANTIBODY RESULT: >250 U/ML — HIGH
CREAT SERPL-MCNC: 0.6 MG/DL — SIGNIFICANT CHANGE UP (ref 0.5–1.3)
CRP SERPL-MCNC: 2.6 MG/L — SIGNIFICANT CHANGE UP (ref 0–4)
D DIMER BLD IA.RAPID-MCNC: 215 NG/ML DDU — SIGNIFICANT CHANGE UP
EOSINOPHIL # BLD AUTO: 0.04 K/UL — SIGNIFICANT CHANGE UP (ref 0–0.5)
EOSINOPHIL NFR BLD AUTO: 0.6 % — SIGNIFICANT CHANGE UP (ref 0–6)
FERRITIN SERPL-MCNC: 51 NG/ML — SIGNIFICANT CHANGE UP (ref 15–150)
GLUCOSE BLDC GLUCOMTR-MCNC: 103 MG/DL — HIGH (ref 70–99)
GLUCOSE BLDC GLUCOMTR-MCNC: 138 MG/DL — HIGH (ref 70–99)
GLUCOSE BLDC GLUCOMTR-MCNC: 154 MG/DL — HIGH (ref 70–99)
GLUCOSE BLDC GLUCOMTR-MCNC: 91 MG/DL — SIGNIFICANT CHANGE UP (ref 70–99)
GLUCOSE SERPL-MCNC: 154 MG/DL — HIGH (ref 70–99)
HCT VFR BLD CALC: 42.9 % — SIGNIFICANT CHANGE UP (ref 34.5–45)
HGB BLD-MCNC: 13.5 G/DL — SIGNIFICANT CHANGE UP (ref 11.5–15.5)
IMM GRANULOCYTES NFR BLD AUTO: 0.8 % — SIGNIFICANT CHANGE UP (ref 0–1.5)
LYMPHOCYTES # BLD AUTO: 2.27 K/UL — SIGNIFICANT CHANGE UP (ref 1–3.3)
LYMPHOCYTES # BLD AUTO: 31.7 % — SIGNIFICANT CHANGE UP (ref 13–44)
MAGNESIUM SERPL-MCNC: 1.6 MG/DL — SIGNIFICANT CHANGE UP (ref 1.6–2.6)
MCHC RBC-ENTMCNC: 29.8 PG — SIGNIFICANT CHANGE UP (ref 27–34)
MCHC RBC-ENTMCNC: 31.5 GM/DL — LOW (ref 32–36)
MCV RBC AUTO: 94.7 FL — SIGNIFICANT CHANGE UP (ref 80–100)
MONOCYTES # BLD AUTO: 0.6 K/UL — SIGNIFICANT CHANGE UP (ref 0–0.9)
MONOCYTES NFR BLD AUTO: 8.4 % — SIGNIFICANT CHANGE UP (ref 2–14)
NEUTROPHILS # BLD AUTO: 4.14 K/UL — SIGNIFICANT CHANGE UP (ref 1.8–7.4)
NEUTROPHILS NFR BLD AUTO: 57.9 % — SIGNIFICANT CHANGE UP (ref 43–77)
NRBC # BLD: 0 /100 WBCS — SIGNIFICANT CHANGE UP (ref 0–0)
PHOSPHATE SERPL-MCNC: 2.8 MG/DL — SIGNIFICANT CHANGE UP (ref 2.5–4.5)
PLATELET # BLD AUTO: 65 K/UL — LOW (ref 150–400)
POTASSIUM SERPL-MCNC: 4.1 MMOL/L — SIGNIFICANT CHANGE UP (ref 3.5–5.3)
POTASSIUM SERPL-SCNC: 4.1 MMOL/L — SIGNIFICANT CHANGE UP (ref 3.5–5.3)
PROT SERPL-MCNC: 7.1 G/DL — SIGNIFICANT CHANGE UP (ref 6–8.3)
RBC # BLD: 4.53 M/UL — SIGNIFICANT CHANGE UP (ref 3.8–5.2)
RBC # FLD: 12.7 % — SIGNIFICANT CHANGE UP (ref 10.3–14.5)
SARS-COV-2 IGG+IGM SERPL QL IA: >250 U/ML — HIGH
SARS-COV-2 IGG+IGM SERPL QL IA: POSITIVE
SODIUM SERPL-SCNC: 138 MMOL/L — SIGNIFICANT CHANGE UP (ref 135–145)
WBC # BLD: 7.15 K/UL — SIGNIFICANT CHANGE UP (ref 3.8–10.5)
WBC # FLD AUTO: 7.15 K/UL — SIGNIFICANT CHANGE UP (ref 3.8–10.5)

## 2021-04-07 PROCEDURE — 99232 SBSQ HOSP IP/OBS MODERATE 35: CPT | Mod: GC

## 2021-04-07 RX ORDER — MAGNESIUM SULFATE 500 MG/ML
4 VIAL (ML) INJECTION ONCE
Refills: 0 | Status: COMPLETED | OUTPATIENT
Start: 2021-04-07 | End: 2021-04-07

## 2021-04-07 RX ADMIN — SENNA PLUS 1 TABLET(S): 8.6 TABLET ORAL at 22:25

## 2021-04-07 RX ADMIN — ENOXAPARIN SODIUM 40 MILLIGRAM(S): 100 INJECTION SUBCUTANEOUS at 05:40

## 2021-04-07 RX ADMIN — Medication 6 MILLIGRAM(S): at 14:14

## 2021-04-07 RX ADMIN — REMDESIVIR 500 MILLIGRAM(S): 5 INJECTION INTRAVENOUS at 20:16

## 2021-04-07 RX ADMIN — ENOXAPARIN SODIUM 40 MILLIGRAM(S): 100 INJECTION SUBCUTANEOUS at 18:03

## 2021-04-07 RX ADMIN — Medication 100 GRAM(S): at 18:02

## 2021-04-07 RX ADMIN — LAMOTRIGINE 100 MILLIGRAM(S): 25 TABLET, ORALLY DISINTEGRATING ORAL at 14:14

## 2021-04-07 RX ADMIN — Medication 150 MILLIGRAM(S): at 14:14

## 2021-04-07 RX ADMIN — HYDROMORPHONE HYDROCHLORIDE 4 MILLIGRAM(S): 2 INJECTION INTRAMUSCULAR; INTRAVENOUS; SUBCUTANEOUS at 09:26

## 2021-04-07 RX ADMIN — POLYETHYLENE GLYCOL 3350 17 GRAM(S): 17 POWDER, FOR SOLUTION ORAL at 14:14

## 2021-04-07 RX ADMIN — PANTOPRAZOLE SODIUM 40 MILLIGRAM(S): 20 TABLET, DELAYED RELEASE ORAL at 06:23

## 2021-04-07 RX ADMIN — Medication 40 MILLIGRAM(S): at 05:40

## 2021-04-07 RX ADMIN — Medication 100 MILLIGRAM(S): at 22:25

## 2021-04-07 RX ADMIN — Medication 2: at 16:59

## 2021-04-07 RX ADMIN — Medication 2 MILLIGRAM(S): at 09:25

## 2021-04-07 RX ADMIN — HYDROMORPHONE HYDROCHLORIDE 4 MILLIGRAM(S): 2 INJECTION INTRAMUSCULAR; INTRAVENOUS; SUBCUTANEOUS at 11:00

## 2021-04-07 NOTE — PROGRESS NOTE ADULT - PROBLEM SELECTOR PLAN 10
F: encourage po   E: replete K<4 Mg<2  N: DASH/TLC   DVT: lovenox 40mg   Dispo: RMF
F: encourage po   E: replete K<4 Mg<2  N: DASH/TLC   DVT: lovenox 40mg   Dispo: RMF

## 2021-04-07 NOTE — PROGRESS NOTE ADULT - PROBLEM SELECTOR PLAN 1
Pt with recent symptoms of fevers, chills, myalgias, shorntess of breath, non-productive cough. CXR realtively clear and relatively stable inflammatory markers.   - trend D-dimer, ferritin, CRP  - f/u COVID swab  - c/w decadron 6mg IV x7 d  - ISS while on decadron   - start Remdesivir if COVID swab positive.
Pt with recent symptoms of fevers, chills, myalgias, shorntess of breath, non-productive cough. CXR realtively clear and relatively stable inflammatory markers.   - trend D-dimer, ferritin, CRP  - f/u COVID swab  - c/w decadron 6mg IV x10 d  - ISS while on decadron   - c/w Remdesivir if COVID swab positive.

## 2021-04-07 NOTE — PROGRESS NOTE ADULT - PROBLEM SELECTOR PLAN 7
Pt with chronic knee pain from complications from previous surgery  - c/w dilaudid 4mg q4h for mod/severe pain  - senna/miralax for bowel regimen

## 2021-04-07 NOTE — PROGRESS NOTE ADULT - PROBLEM SELECTOR PLAN 4
plt 79 today, pt with h/o ITP, however plt may be lower than baseline given high suspicion for COVID-19 infection. No signs of bleeding  - trend CBC  - keep active type and screen
plt 79 today, pt with h/o ITP, however plt may be lower than baseline given high suspicion for COVID-19 infection. No signs of bleeding  - trend CBC  - keep active type and screen

## 2021-04-07 NOTE — DIETITIAN INITIAL EVALUATION ADULT. - PROBLEM SELECTOR PLAN 4
plt 79 today, pt with h/o ITP, however plt may be lower than baseline given high suspicion for COVID-19 infection. No signs of bleeding  - trend CBC  - keep active type and screen

## 2021-04-07 NOTE — PROGRESS NOTE ADULT - ASSESSMENT
70F with COPD on home O2 as needed, DARRIN (does not tolerate CPAP), ITP, depression/anxiety, bipolar disorder, chronic knee pain, who was BIBA from home with fevers, chills, progressively worsening shortness of breath and recent COVID 19 diagnosis, admitted for COVID-19. 
70F with COPD on home O2 as needed, DARRIN (does not tolerate CPAP), ITP, depression/anxiety, bipolar disorder, chronic knee pain, who was BIBA from home with fevers, chills, progressively worsening shortness of breath and recent COVID 19 diagnosis, admitted for COVID-19.

## 2021-04-07 NOTE — DIETITIAN INITIAL EVALUATION ADULT. - PROBLEM SELECTOR PLAN 3
Pt with h/o DARRIN but not on CPAP at home, states she is due to get additional training  - continue to monitor

## 2021-04-07 NOTE — PROGRESS NOTE ADULT - PROBLEM SELECTOR PLAN 8
#Anxiety  - continue alprazolam 2mg TID prn  - continue trazodone 100mg prn for insomnia   - continue Effexor 150 mg daily

## 2021-04-07 NOTE — PROGRESS NOTE ADULT - PROBLEM SELECTOR PLAN 2
Hx of COPD on 2L NC at home as needed. Currently 97% on 2L NC   - duonebs prn   - incentive spirometry  - OOBTC  - wean oxygen as tolerated goal O2 sat 88-92%  - Ambulate to assess for desaturation.
Hx of COPD on 2L NC at home as needed. Currently 97% on 2L NC   - duonebs prn   - incentive spirometry  - OOBTC  - wean oxygen as tolerated goal O2 sat 88-92%  - Ambulate to assess for desaturation.

## 2021-04-07 NOTE — PROGRESS NOTE ADULT - ATTENDING COMMENTS
Patient seen and examined with house-staff during bedside rounds.  Resident note read, including vitals, physical findings, laboratory data, and radiological reports.   Revisions included below.  Direct personal management at bed side and extensive interpretation of the data.  Plan was outlined and discussed in details with the housestaff.  Decision making of high complexity  Action taken for acute disease activity to reflect the level of care provided:  - medication reconciliation  - review laboratory dataThe patient is clinically stable.  The oxygen saturation 97% on 2 L.  She is to continue on remdesivir and Decadron.  Continue bronchodilator.  No DVT.  Possible discharge tomorrow and will evaluate for the need of home oxygen.  Ambulate the patient's without oxygen tomorrow
Patient seen and examined with house-staff during bedside rounds.  Resident note read, including vitals, physical findings, laboratory data, and radiological reports.   Revisions included below.  Direct personal management at bed side and extensive interpretation of the data.  Plan was outlined and discussed in details with the housestaff.  Decision making of high complexity  Action taken for acute disease activity to reflect the level of care provided:  - medication reconciliation  - review laboratory data    The patient is clinically stable.  Continue nasal oxygen supplementation.  Continue Decadron and remdesivir.  Continue current meds.  Continue bronchodilators.  No evidence of exacerbation of COPD.  Out of bed in chair.  Follow-up on the venous Doppler

## 2021-04-07 NOTE — DIETITIAN INITIAL EVALUATION ADULT. - OTHER CALCULATIONS
IBW used as pt exceeds 120% IBW (152%). Needs based on [facility] standards of care for older adults, adjusted for increased pro/ kcal needs 2/2 hypermetabolic state, fluids per team d/t COVID.

## 2021-04-07 NOTE — DIETITIAN INITIAL EVALUATION ADULT. - PROBLEM SELECTOR PLAN 2
Hx of COPD on 2L NC at home as needed. Currently 97% on 2L NC   - duonebs prn   - incentive spirometry  - OOBTC  - wean oxygen as tolerated goal O2 sat 88-92%  - Ambulate to assess for desaturation.

## 2021-04-07 NOTE — DIETITIAN INITIAL EVALUATION ADULT. - ADD RECOMMEND
1. Cont DASH TLC diet >>monitor need for CST CHO modification while pt on Decadron 2. [place] preferences as feasible 3. Monitor labs, weights, %PO intake, GI symptoms 4. RD to remain available for additional interventions PRN

## 2021-04-07 NOTE — PROGRESS NOTE ADULT - SUBJECTIVE AND OBJECTIVE BOX
OVERNIGHT EVENTS: YUNIOR    SUBJECTIVE:  Patient seen and examined at bedside. Patient has no complaints. Patient denies h/n/v/d, fever, chills, cp, palpitations, abd pain, leg swelling, rashes, dysuria, and changes in BM.     Vital Signs Last 12 Hrs  T(F): 97.5 (04-07-21 @ 09:30), Max: 97.5 (04-07-21 @ 09:30)  HR: 70 (04-07-21 @ 09:30) (67 - 70)  BP: 125/83 (04-07-21 @ 09:30) (125/83 - 126/74)  BP(mean): --  RR: 18 (04-07-21 @ 09:30) (16 - 18)  SpO2: 95% (04-07-21 @ 09:30) (95% - 98%)  I&O's Summary    06 Apr 2021 07:01  -  07 Apr 2021 07:00  --------------------------------------------------------  IN: 980 mL / OUT: 0 mL / NET: 980 mL    PHYSICAL EXAM:  Constitutional: NAD, comfortable in bed. On 2L NC  HEENT: NC/AT, PERRLA, EOMI, no conjunctival pallor or scleral icterus, MMM  Neck: Supple, no JVD  Respiratory: CTA B/L. No w/r/r.   Cardiovascular: RRR, normal S1 and S2, no m/r/g.   Gastrointestinal: +BS, soft NTND, no guarding or rebound tenderness, no palpable masses   Extremities: wwp; no cyanosis, clubbing, bl +1 pitting edema.   Vascular: Pulses equal and strong throughout.   Neurological: AAOx3, no CN deficits, strength and sensation intact throughout.   Skin: No gross skin abnormalities or rashes    LABS:                        13.5   7.15  )-----------( 65       ( 07 Apr 2021 10:13 )             42.9     04-07    138  |  96  |  18  ----------------------------<  154<H>  4.1   |  29  |  0.60    Ca    9.6      07 Apr 2021 10:13  Phos  2.8     04-07  Mg     1.6     04-07    TPro  7.1  /  Alb  4.2  /  TBili  0.3  /  DBili  x   /  AST  22  /  ALT  22  /  AlkPhos  56  04-07            RADIOLOGY & ADDITIONAL TESTS:    MEDICATIONS  (STANDING):  dexAMETHasone  Injectable 6 milliGRAM(s) IV Push every 24 hours  dextrose 40% Gel 15 Gram(s) Oral once  dextrose 5%. 1000 milliLiter(s) (50 mL/Hr) IV Continuous <Continuous>  dextrose 5%. 1000 milliLiter(s) (100 mL/Hr) IV Continuous <Continuous>  dextrose 50% Injectable 25 Gram(s) IV Push once  dextrose 50% Injectable 12.5 Gram(s) IV Push once  dextrose 50% Injectable 25 Gram(s) IV Push once  enoxaparin Injectable 40 milliGRAM(s) SubCutaneous every 12 hours  furosemide    Tablet 40 milliGRAM(s) Oral daily  glucagon  Injectable 1 milliGRAM(s) IntraMuscular once  insulin lispro (ADMELOG) corrective regimen sliding scale   SubCutaneous Before meals and at bedtime  lamoTRIgine 100 milliGRAM(s) Oral daily  magnesium sulfate  IVPB 4 Gram(s) IV Intermittent once  pantoprazole    Tablet 40 milliGRAM(s) Oral before breakfast  polyethylene glycol 3350 17 Gram(s) Oral daily  remdesivir  IVPB   IV Intermittent   remdesivir  IVPB 100 milliGRAM(s) IV Intermittent every 24 hours  senna 1 Tablet(s) Oral at bedtime  traZODone 100 milliGRAM(s) Oral at bedtime  venlafaxine XR. 150 milliGRAM(s) Oral daily    MEDICATIONS  (PRN):  ALPRAZolam 2 milliGRAM(s) Oral three times a day PRN anxiety  HYDROmorphone   Tablet 4 milliGRAM(s) Oral every 4 hours PRN Severe Pain (7 - 10)

## 2021-04-07 NOTE — DIETITIAN INITIAL EVALUATION ADULT. - OTHER INFO
82F BMI 30 PMHx RA, DM, HTN/HLD, CHF, AS s/p TAVR, Afib s/p ablation, Tachycardia-bradycardia syndrome s/p PPM, PAD/PVD, recent [facility] admisison for CHF exacerbation c/b COVID exposure BIBEMS for acute hypoxic respiratory failure. + COVID PCR  r/o CHF exacerbation, superimposed PNA.    On assessment, pt seen in room with some complaints about the . Stating that her tray is continuously missing items-  team made aware. Reports that her appetite is fine and even though she doesn't love the food she is eating it (>75% tray completion). States her genoveva/ intake were stable PTA. Endorses unintentional wt gain x 1 year PTA which she attributes to change in routine 2/2 COVID-19 pandemic. States she gained 18 lbs from her UBW of 157 lbs x 1 year. NKFA. Denies following any special restrictions PTA. Encouraged pt to cont with adequate intake 2/2 increased pro/ kcal needs r/t COVID dx; pt verbalized understanding. Pt with some questions about her diet rx (DASH TLC), RD explained indication (pt ordered for Lasix). Skin: intact pressure-wise. Pain: no complaints of pain during RD assessment GI: No current n/v/d/c +bm 4/5. See full recs below, RD to follow.

## 2021-04-07 NOTE — DIETITIAN INITIAL EVALUATION ADULT. - PROBLEM SELECTOR PLAN 5
Last echo with PASP 40.20, normal LV and RV function, mild TR. Stable    #Bilateral LE edema - mild 1+ pitting edema.   - c/w lasix 40mg qd

## 2021-04-07 NOTE — PROGRESS NOTE ADULT - PROBLEM SELECTOR PLAN 5
Last echo with PASP 40.20, normal LV and RV function, mild TR. Stable    #Bilateral LE edema - mild 1+ pitting edema.   - c/w lasix 40mg qd
Last echo with PASP 40.20, normal LV and RV function, mild TR. Stable    #Bilateral LE edema - mild 1+ pitting edema.   - c/w lasix 40mg qd

## 2021-04-08 ENCOUNTER — TRANSCRIPTION ENCOUNTER (OUTPATIENT)
Age: 71
End: 2021-04-08

## 2021-04-08 VITALS — TEMPERATURE: 98 F

## 2021-04-08 LAB
ALBUMIN SERPL ELPH-MCNC: 4 G/DL — SIGNIFICANT CHANGE UP (ref 3.3–5)
ALP SERPL-CCNC: 52 U/L — SIGNIFICANT CHANGE UP (ref 40–120)
ALT FLD-CCNC: 25 U/L — SIGNIFICANT CHANGE UP (ref 10–45)
ANION GAP SERPL CALC-SCNC: 8 MMOL/L — SIGNIFICANT CHANGE UP (ref 5–17)
AST SERPL-CCNC: 25 U/L — SIGNIFICANT CHANGE UP (ref 10–40)
BASOPHILS # BLD AUTO: 0.04 K/UL — SIGNIFICANT CHANGE UP (ref 0–0.2)
BASOPHILS NFR BLD AUTO: 0.5 % — SIGNIFICANT CHANGE UP (ref 0–2)
BILIRUB SERPL-MCNC: 0.2 MG/DL — SIGNIFICANT CHANGE UP (ref 0.2–1.2)
BUN SERPL-MCNC: 19 MG/DL — SIGNIFICANT CHANGE UP (ref 7–23)
CALCIUM SERPL-MCNC: 9.7 MG/DL — SIGNIFICANT CHANGE UP (ref 8.4–10.5)
CHLORIDE SERPL-SCNC: 100 MMOL/L — SIGNIFICANT CHANGE UP (ref 96–108)
CO2 SERPL-SCNC: 33 MMOL/L — HIGH (ref 22–31)
CREAT SERPL-MCNC: 0.57 MG/DL — SIGNIFICANT CHANGE UP (ref 0.5–1.3)
CRP SERPL-MCNC: 1.6 MG/L — SIGNIFICANT CHANGE UP (ref 0–4)
D DIMER BLD IA.RAPID-MCNC: <150 NG/ML DDU — SIGNIFICANT CHANGE UP
EOSINOPHIL # BLD AUTO: 0.05 K/UL — SIGNIFICANT CHANGE UP (ref 0–0.5)
EOSINOPHIL NFR BLD AUTO: 0.6 % — SIGNIFICANT CHANGE UP (ref 0–6)
FERRITIN SERPL-MCNC: 49 NG/ML — SIGNIFICANT CHANGE UP (ref 15–150)
GLUCOSE BLDC GLUCOMTR-MCNC: 149 MG/DL — HIGH (ref 70–99)
GLUCOSE BLDC GLUCOMTR-MCNC: 95 MG/DL — SIGNIFICANT CHANGE UP (ref 70–99)
GLUCOSE BLDC GLUCOMTR-MCNC: 97 MG/DL — SIGNIFICANT CHANGE UP (ref 70–99)
GLUCOSE SERPL-MCNC: 141 MG/DL — HIGH (ref 70–99)
HCT VFR BLD CALC: 42.1 % — SIGNIFICANT CHANGE UP (ref 34.5–45)
HGB BLD-MCNC: 13.2 G/DL — SIGNIFICANT CHANGE UP (ref 11.5–15.5)
IMM GRANULOCYTES NFR BLD AUTO: 1 % — SIGNIFICANT CHANGE UP (ref 0–1.5)
LYMPHOCYTES # BLD AUTO: 2.41 K/UL — SIGNIFICANT CHANGE UP (ref 1–3.3)
LYMPHOCYTES # BLD AUTO: 29.2 % — SIGNIFICANT CHANGE UP (ref 13–44)
MAGNESIUM SERPL-MCNC: 1.7 MG/DL — SIGNIFICANT CHANGE UP (ref 1.6–2.6)
MCHC RBC-ENTMCNC: 29.1 PG — SIGNIFICANT CHANGE UP (ref 27–34)
MCHC RBC-ENTMCNC: 31.4 GM/DL — LOW (ref 32–36)
MCV RBC AUTO: 92.7 FL — SIGNIFICANT CHANGE UP (ref 80–100)
MONOCYTES # BLD AUTO: 1.19 K/UL — HIGH (ref 0–0.9)
MONOCYTES NFR BLD AUTO: 14.4 % — HIGH (ref 2–14)
NEUTROPHILS # BLD AUTO: 4.49 K/UL — SIGNIFICANT CHANGE UP (ref 1.8–7.4)
NEUTROPHILS NFR BLD AUTO: 54.3 % — SIGNIFICANT CHANGE UP (ref 43–77)
NRBC # BLD: 0 /100 WBCS — SIGNIFICANT CHANGE UP (ref 0–0)
PHOSPHATE SERPL-MCNC: 2.4 MG/DL — LOW (ref 2.5–4.5)
PLATELET # BLD AUTO: 63 K/UL — LOW (ref 150–400)
POTASSIUM SERPL-MCNC: 3.4 MMOL/L — LOW (ref 3.5–5.3)
POTASSIUM SERPL-SCNC: 3.4 MMOL/L — LOW (ref 3.5–5.3)
PROT SERPL-MCNC: 6.9 G/DL — SIGNIFICANT CHANGE UP (ref 6–8.3)
RBC # BLD: 4.54 M/UL — SIGNIFICANT CHANGE UP (ref 3.8–5.2)
RBC # FLD: 12.8 % — SIGNIFICANT CHANGE UP (ref 10.3–14.5)
SODIUM SERPL-SCNC: 141 MMOL/L — SIGNIFICANT CHANGE UP (ref 135–145)
WBC # BLD: 8.26 K/UL — SIGNIFICANT CHANGE UP (ref 3.8–10.5)
WBC # FLD AUTO: 8.26 K/UL — SIGNIFICANT CHANGE UP (ref 3.8–10.5)

## 2021-04-08 PROCEDURE — 80053 COMPREHEN METABOLIC PANEL: CPT

## 2021-04-08 PROCEDURE — 85379 FIBRIN DEGRADATION QUANT: CPT

## 2021-04-08 PROCEDURE — U0003: CPT

## 2021-04-08 PROCEDURE — 87040 BLOOD CULTURE FOR BACTERIA: CPT

## 2021-04-08 PROCEDURE — U0005: CPT

## 2021-04-08 PROCEDURE — 82728 ASSAY OF FERRITIN: CPT

## 2021-04-08 PROCEDURE — 36415 COLL VENOUS BLD VENIPUNCTURE: CPT

## 2021-04-08 PROCEDURE — 99239 HOSP IP/OBS DSCHRG MGMT >30: CPT | Mod: GC

## 2021-04-08 PROCEDURE — 96374 THER/PROPH/DIAG INJ IV PUSH: CPT

## 2021-04-08 PROCEDURE — 84100 ASSAY OF PHOSPHORUS: CPT

## 2021-04-08 PROCEDURE — 82962 GLUCOSE BLOOD TEST: CPT

## 2021-04-08 PROCEDURE — 86140 C-REACTIVE PROTEIN: CPT

## 2021-04-08 PROCEDURE — 86769 SARS-COV-2 COVID-19 ANTIBODY: CPT

## 2021-04-08 PROCEDURE — 83605 ASSAY OF LACTIC ACID: CPT

## 2021-04-08 PROCEDURE — 82803 BLOOD GASES ANY COMBINATION: CPT

## 2021-04-08 PROCEDURE — 71045 X-RAY EXAM CHEST 1 VIEW: CPT

## 2021-04-08 PROCEDURE — 83735 ASSAY OF MAGNESIUM: CPT

## 2021-04-08 PROCEDURE — 99285 EMERGENCY DEPT VISIT HI MDM: CPT | Mod: 25

## 2021-04-08 PROCEDURE — 93971 EXTREMITY STUDY: CPT

## 2021-04-08 PROCEDURE — 85025 COMPLETE CBC W/AUTO DIFF WBC: CPT

## 2021-04-08 PROCEDURE — 83036 HEMOGLOBIN GLYCOSYLATED A1C: CPT

## 2021-04-08 PROCEDURE — 84145 PROCALCITONIN (PCT): CPT

## 2021-04-08 RX ORDER — POTASSIUM PHOSPHATE, MONOBASIC POTASSIUM PHOSPHATE, DIBASIC 236; 224 MG/ML; MG/ML
15 INJECTION, SOLUTION INTRAVENOUS ONCE
Refills: 0 | Status: DISCONTINUED | OUTPATIENT
Start: 2021-04-08 | End: 2021-04-08

## 2021-04-08 RX ORDER — DEXAMETHASONE 0.5 MG/5ML
1 ELIXIR ORAL
Qty: 7 | Refills: 0
Start: 2021-04-08 | End: 2021-04-14

## 2021-04-08 RX ORDER — APIXABAN 2.5 MG/1
1 TABLET, FILM COATED ORAL
Qty: 60 | Refills: 0
Start: 2021-04-08 | End: 2021-05-07

## 2021-04-08 RX ORDER — PANTOPRAZOLE SODIUM 20 MG/1
1 TABLET, DELAYED RELEASE ORAL
Qty: 7 | Refills: 0
Start: 2021-04-08 | End: 2021-04-14

## 2021-04-08 RX ORDER — POTASSIUM CHLORIDE 20 MEQ
40 PACKET (EA) ORAL ONCE
Refills: 0 | Status: COMPLETED | OUTPATIENT
Start: 2021-04-08 | End: 2021-04-08

## 2021-04-08 RX ORDER — MAGNESIUM SULFATE 500 MG/ML
2 VIAL (ML) INJECTION ONCE
Refills: 0 | Status: COMPLETED | OUTPATIENT
Start: 2021-04-08 | End: 2021-04-08

## 2021-04-08 RX ADMIN — REMDESIVIR 500 MILLIGRAM(S): 5 INJECTION INTRAVENOUS at 15:11

## 2021-04-08 RX ADMIN — ENOXAPARIN SODIUM 40 MILLIGRAM(S): 100 INJECTION SUBCUTANEOUS at 05:32

## 2021-04-08 RX ADMIN — PANTOPRAZOLE SODIUM 40 MILLIGRAM(S): 20 TABLET, DELAYED RELEASE ORAL at 06:07

## 2021-04-08 RX ADMIN — LAMOTRIGINE 100 MILLIGRAM(S): 25 TABLET, ORALLY DISINTEGRATING ORAL at 13:22

## 2021-04-08 RX ADMIN — Medication 50 GRAM(S): at 15:22

## 2021-04-08 RX ADMIN — Medication 40 MILLIEQUIVALENT(S): at 15:22

## 2021-04-08 RX ADMIN — POLYETHYLENE GLYCOL 3350 17 GRAM(S): 17 POWDER, FOR SOLUTION ORAL at 13:19

## 2021-04-08 RX ADMIN — Medication 40 MILLIGRAM(S): at 05:32

## 2021-04-08 RX ADMIN — Medication 150 MILLIGRAM(S): at 15:30

## 2021-04-08 RX ADMIN — Medication 6 MILLIGRAM(S): at 13:18

## 2021-04-08 NOTE — DISCHARGE NOTE NURSING/CASE MANAGEMENT/SOCIAL WORK - NSDCFUADDAPPT_GEN_ALL_CORE_FT
Please follow up with your PCP and Pulmonologist Dania Dunlap, you have a scheduled appointment on 4/12/2021 at 6PM. Please call to his office with any question [phone]

## 2021-04-08 NOTE — DISCHARGE NOTE PROVIDER - HOSPITAL COURSE
70F with COPD on home O2 as needed, DARRIN (does not tolerate CPAP), ITP, depression/anxiety, bipolar disorder, chronic knee pain, who was BIBA from home with fevers, chills, progressively worsening shortness of breath and recent COVID 19 diagnosis, admitted for COVID-19.     Problem List/Main Diagnoses (system-based):   #COVID-19  Pt with recent symptoms of fevers, chills, myalgias, shortness of breath, non-productive cough. CXR clear with relatively stable inflammatory markers. Patient with increase O2 demand, now upon discharge patient is Currently 97% on 2L NC.   - s/p decadron 6mg IV and Remdasivir for 3 days.  - c/w Decadron and PPI for 7 more days.  - ISS while on decadron   - Will start on Eliquis 2.5mg BID    #COPD   Plan: Hx of COPD on 2L NC at home as needed. Currently 97% on 2L NC   - duonebs prn   - patient is back to her baseline, patient is with home O2    #Obstructive sleep apnea syndrome  Pt with h/o DARRIN but not on CPAP at home, states she is due to get additional training    #Immune thrombocytopenia.  plt 65-79, pt with h/o ITP, however plt may be lower than baseline given COVID-19 infection. No signs of bleeding    #Pulmonary hypertension.    Last echo 1/2020 with PASP 40.20, normal LV and RV function, mild TR. Stable    #Bilateral LE edema - mild 1+ pitting edema.   - c/w lasix 40mg qd.   - LLE doppler 4/6 with no signs of DVT    #Chronic knee pain.  Pt with chronic knee pain from complications from previous surgery  - c/w dilaudid 4mg q4h for mod/severe pain    #Anxiety  - continue alprazolam 2mg TID prn  - continue trazodone 100mg prn for insomnia   - continue Effexor 150 mg daily.     #Bipolar disorder.   History of bipolar disorder per chart review.  - continue Lamictal 100 mg PO daily.     Inpatient treatment course: As above  New medications: Protonix and Decadron for 7 days, Eliquis 2.5mg BID for 30 days.  Labs to be followed outpatient: none  Exam to be followed outpatient: none

## 2021-04-08 NOTE — DISCHARGE NOTE PROVIDER - NSDCCAREPROVSEEN_GEN_ALL_CORE_FT
Dania Sun Dania Sun A  Patient seen and examined with house-staff during bedside rounds.  Resident note read, including vitals, physical findings, laboratory data, and radiological reports.   Revisions included below.  Direct personal management at bed side and extensive interpretation of the data.  Plan was outlined and discussed in details with the housestaff.  Decision making of high complexity  Action taken for acute disease activity to reflect the level of care provided:  - medication reconciliation  - review laboratory data    The patient is clinically stable.  Patient oxygen saturation is 95% on 2 L.  The patient will be discharged on oxygen.  The patient is to finish total of 10 days of Decadron.  Patient received her last dose of remdesivir.  Patient will be on apixaban twice a day for DVT prophylaxis

## 2021-04-08 NOTE — DISCHARGE NOTE PROVIDER - CARE PROVIDER_API CALL
Dania Sun)  Critical Care Medicine; Pulmonary Disease  [address] [place] [address]  Phone: [phone]  Fax: [phone]  Follow Up Time:

## 2021-04-08 NOTE — DISCHARGE NOTE NURSING/CASE MANAGEMENT/SOCIAL WORK - PATIENT PORTAL LINK FT
You can access the FollowMyHealth Patient Portal offered by [facility] by registering at the following website: [email or web address]. By joining [employer]’s FollowMyHealth portal, you will also be able to view your health information using other applications (apps) compatible with our system.

## 2021-04-08 NOTE — DISCHARGE NOTE PROVIDER - NSDCCPCAREPLAN_GEN_ALL_CORE_FT
PRINCIPAL DISCHARGE DIAGNOSIS  Diagnosis: COVID-19  Assessment and Plan of Treatment: You were diagnosed with COVID-19 pneumonia, an viral infection of the lungs during your admission to [facility]. This was noted based on your symptom profile, your COVID PCR test and findings on chest X-ray and chest CT. You were treated with Redmasivir and Decadrone (Steroid) throughout your hospital course. Please continue to take Decadron and Protonix as prescribed for 7 more days. Please keep taking Eliquis 2.5mg two times a day for 30 days to prevent DVT/PE. Please follow-up with your primary care physician when you leave the hospital. Should you experience symptoms such as but not limited to: worsening shortness of breath, wheezing, severe cough, coughing bloody sputum, unrelenting/high fever (>103 F or lasting >3 days), and chest pain, please return to the emergency department for interval evaluation.

## 2021-04-08 NOTE — DISCHARGE NOTE PROVIDER - NSDCFUADDAPPT_GEN_ALL_CORE_FT
Please follow up with your PCP and Pulmonologist Dania Dunlap, you have a scheduled appointment on 4/12/2021 at 6PM. Please call to his office with any question [phone]

## 2021-04-08 NOTE — DISCHARGE NOTE PROVIDER - NSDCMRMEDTOKEN_GEN_ALL_CORE_FT
ALPRAZolam 2 mg oral tablet: 1 tab(s) orally 3 times a day, As Needed  dexamethasone 6 mg oral tablet: 1 tab(s) orally once a day   Eliquis 2.5 mg oral tablet: 1 tab(s) orally 2 times a day   furosemide 40 mg oral tablet: 1 tab(s) orally once a day  HYDROmorphone 4 mg oral tablet: 1 to 1.5mg tab(s) orally every 4 hours, as needed, pain MDD:6    lamoTRIgine 100 mg oral tablet, extended release: 1 tab(s) orally once a day  polyethylene glycol 3350 oral powder for reconstitution: 17 gram(s) orally once a day  Protonix 40 mg oral delayed release tablet: 1 tab(s) orally once a day   Senokot 8.6 mg oral tablet: 2 tab(s) orally once a day (at bedtime)  traZODone 100 mg oral tablet: 1 tab(s) orally once a day (at bedtime)  Trelegy Ellipta 100 mcg-62.5 mcg-25 mcg/inh inhalation powder: 1 puff(s) inhaled once a day  venlafaxine 150 mg oral tablet, extended release: 1 tab(s) orally once a day

## 2021-04-09 ENCOUNTER — TRANSCRIPTION ENCOUNTER (OUTPATIENT)
Age: 71
End: 2021-04-09

## 2021-04-10 LAB
CULTURE RESULTS: SIGNIFICANT CHANGE UP
CULTURE RESULTS: SIGNIFICANT CHANGE UP
SPECIMEN SOURCE: SIGNIFICANT CHANGE UP
SPECIMEN SOURCE: SIGNIFICANT CHANGE UP

## 2021-04-12 ENCOUNTER — APPOINTMENT (OUTPATIENT)
Dept: PULMONOLOGY | Facility: CLINIC | Age: 71
End: 2021-04-12
Payer: MEDICARE

## 2021-04-12 DIAGNOSIS — U07.1 COVID-19: ICD-10-CM

## 2021-04-12 PROCEDURE — 99443: CPT

## 2021-04-13 ENCOUNTER — TRANSCRIPTION ENCOUNTER (OUTPATIENT)
Age: 71
End: 2021-04-13

## 2021-04-13 PROBLEM — U07.1 COVID-19 VIRUS INFECTION: Status: ACTIVE | Noted: 2021-04-13

## 2021-04-13 NOTE — ASSESSMENT
[FreeTextEntry1] : The patient was recently admitted to the hospital with Covid infection and acute hypoxic respiratory failure patient was treated with remdesivir and Decadron.  The patient was discharged after she administered remdesivir course.  The patient is to finish total of 10 days of Decadron.  The patient is on apixaban for DVT prophylaxis.  The patient pulmonary status improved and she is off oxygen at this point.  The patient does not desaturate with exertion.\par \par COPD\par \par The patient did not have any acute exacerbation of COPD.  Her pulmonary status is stable and she is to continue her current bronchodilator.\par \par Obstructive sleep apnea\par \par Patient is compliant with the CPAP mask

## 2021-04-14 DIAGNOSIS — R06.02 SHORTNESS OF BREATH: ICD-10-CM

## 2021-04-14 DIAGNOSIS — F32.9 MAJOR DEPRESSIVE DISORDER, SINGLE EPISODE, UNSPECIFIED: ICD-10-CM

## 2021-04-14 DIAGNOSIS — G47.33 OBSTRUCTIVE SLEEP APNEA (ADULT) (PEDIATRIC): ICD-10-CM

## 2021-04-14 DIAGNOSIS — Z79.891 LONG TERM (CURRENT) USE OF OPIATE ANALGESIC: ICD-10-CM

## 2021-04-14 DIAGNOSIS — Z96.652 PRESENCE OF LEFT ARTIFICIAL KNEE JOINT: ICD-10-CM

## 2021-04-14 DIAGNOSIS — G47.00 INSOMNIA, UNSPECIFIED: ICD-10-CM

## 2021-04-14 DIAGNOSIS — D69.3 IMMUNE THROMBOCYTOPENIC PURPURA: ICD-10-CM

## 2021-04-14 DIAGNOSIS — J96.01 ACUTE RESPIRATORY FAILURE WITH HYPOXIA: ICD-10-CM

## 2021-04-14 DIAGNOSIS — Z87.891 PERSONAL HISTORY OF NICOTINE DEPENDENCE: ICD-10-CM

## 2021-04-14 DIAGNOSIS — M25.569 PAIN IN UNSPECIFIED KNEE: ICD-10-CM

## 2021-04-14 DIAGNOSIS — U07.1 COVID-19: ICD-10-CM

## 2021-04-14 DIAGNOSIS — F41.9 ANXIETY DISORDER, UNSPECIFIED: ICD-10-CM

## 2021-04-14 DIAGNOSIS — Q21.1 ATRIAL SEPTAL DEFECT: ICD-10-CM

## 2021-04-14 DIAGNOSIS — Z91.041 RADIOGRAPHIC DYE ALLERGY STATUS: ICD-10-CM

## 2021-04-14 DIAGNOSIS — G89.29 OTHER CHRONIC PAIN: ICD-10-CM

## 2021-04-14 DIAGNOSIS — I27.20 PULMONARY HYPERTENSION, UNSPECIFIED: ICD-10-CM

## 2021-04-14 DIAGNOSIS — J12.82 PNEUMONIA DUE TO CORONAVIRUS DISEASE 2019: ICD-10-CM

## 2021-04-14 DIAGNOSIS — J44.0 CHRONIC OBSTRUCTIVE PULMONARY DISEASE WITH (ACUTE) LOWER RESPIRATORY INFECTION: ICD-10-CM

## 2021-06-07 ENCOUNTER — INPATIENT (INPATIENT)
Facility: HOSPITAL | Age: 71
LOS: 3 days | Discharge: HOME HEALTH | DRG: 602 | End: 2021-06-11
Payer: MEDICARE

## 2021-06-07 VITALS
HEIGHT: 64 IN | DIASTOLIC BLOOD PRESSURE: 79 MMHG | OXYGEN SATURATION: 95 % | HEART RATE: 81 BPM | WEIGHT: 179.9 LBS | RESPIRATION RATE: 20 BRPM | SYSTOLIC BLOOD PRESSURE: 112 MMHG | TEMPERATURE: 98 F

## 2021-06-07 DIAGNOSIS — Z90.711 ACQUIRED ABSENCE OF UTERUS WITH REMAINING CERVICAL STUMP: Chronic | ICD-10-CM

## 2021-06-07 DIAGNOSIS — Z90.89 ACQUIRED ABSENCE OF OTHER ORGANS: Chronic | ICD-10-CM

## 2021-06-07 DIAGNOSIS — Z98.890 OTHER SPECIFIED POSTPROCEDURAL STATES: Chronic | ICD-10-CM

## 2021-06-07 DIAGNOSIS — L03.90 CELLULITIS, UNSPECIFIED: ICD-10-CM

## 2021-06-07 DIAGNOSIS — J44.1 CHRONIC OBSTRUCTIVE PULMONARY DISEASE WITH (ACUTE) EXACERBATION: ICD-10-CM

## 2021-06-07 DIAGNOSIS — H26.9 UNSPECIFIED CATARACT: Chronic | ICD-10-CM

## 2021-06-07 DIAGNOSIS — Z96.652 PRESENCE OF LEFT ARTIFICIAL KNEE JOINT: Chronic | ICD-10-CM

## 2021-06-07 DIAGNOSIS — F32.9 MAJOR DEPRESSIVE DISORDER, SINGLE EPISODE, UNSPECIFIED: ICD-10-CM

## 2021-06-07 DIAGNOSIS — F41.9 ANXIETY DISORDER, UNSPECIFIED: ICD-10-CM

## 2021-06-07 DIAGNOSIS — I27.20 PULMONARY HYPERTENSION, UNSPECIFIED: ICD-10-CM

## 2021-06-07 DIAGNOSIS — G47.30 SLEEP APNEA, UNSPECIFIED: ICD-10-CM

## 2021-06-07 DIAGNOSIS — D69.6 THROMBOCYTOPENIA, UNSPECIFIED: ICD-10-CM

## 2021-06-07 DIAGNOSIS — Z29.9 ENCOUNTER FOR PROPHYLACTIC MEASURES, UNSPECIFIED: ICD-10-CM

## 2021-06-07 DIAGNOSIS — M25.569 PAIN IN UNSPECIFIED KNEE: ICD-10-CM

## 2021-06-07 LAB
ALBUMIN SERPL ELPH-MCNC: 4 G/DL — SIGNIFICANT CHANGE UP (ref 3.3–5)
ALP SERPL-CCNC: 72 U/L — SIGNIFICANT CHANGE UP (ref 40–120)
ALT FLD-CCNC: 15 U/L — SIGNIFICANT CHANGE UP (ref 10–45)
ANION GAP SERPL CALC-SCNC: 12 MMOL/L — SIGNIFICANT CHANGE UP (ref 5–17)
AST SERPL-CCNC: 22 U/L — SIGNIFICANT CHANGE UP (ref 10–40)
BASE EXCESS BLDV CALC-SCNC: 10.1 MMOL/L — HIGH (ref -2–3)
BASOPHILS # BLD AUTO: 0.06 K/UL — SIGNIFICANT CHANGE UP (ref 0–0.2)
BASOPHILS NFR BLD AUTO: 0.7 % — SIGNIFICANT CHANGE UP (ref 0–2)
BILIRUB SERPL-MCNC: 0.2 MG/DL — SIGNIFICANT CHANGE UP (ref 0.2–1.2)
BUN SERPL-MCNC: 11 MG/DL — SIGNIFICANT CHANGE UP (ref 7–23)
CA-I SERPL-SCNC: 1.19 MMOL/L — SIGNIFICANT CHANGE UP (ref 1.15–1.33)
CALCIUM SERPL-MCNC: 9.6 MG/DL — SIGNIFICANT CHANGE UP (ref 8.4–10.5)
CHLORIDE SERPL-SCNC: 97 MMOL/L — SIGNIFICANT CHANGE UP (ref 96–108)
CO2 BLDV-SCNC: 40.7 MMOL/L — HIGH (ref 22–26)
CO2 SERPL-SCNC: 32 MMOL/L — HIGH (ref 22–31)
CREAT SERPL-MCNC: 0.62 MG/DL — SIGNIFICANT CHANGE UP (ref 0.5–1.3)
CRP SERPL-MCNC: 15 MG/L — HIGH (ref 0–4)
EOSINOPHIL # BLD AUTO: 0.38 K/UL — SIGNIFICANT CHANGE UP (ref 0–0.5)
EOSINOPHIL NFR BLD AUTO: 4.4 % — SIGNIFICANT CHANGE UP (ref 0–6)
FERRITIN SERPL-MCNC: 27 NG/ML — SIGNIFICANT CHANGE UP (ref 15–150)
GAS PNL BLDV: 138 MMOL/L — SIGNIFICANT CHANGE UP (ref 136–145)
GAS PNL BLDV: SIGNIFICANT CHANGE UP
GLUCOSE SERPL-MCNC: 107 MG/DL — HIGH (ref 70–99)
HCO3 BLDV-SCNC: 39 MMOL/L — HIGH (ref 22–29)
HCT VFR BLD CALC: 46 % — HIGH (ref 34.5–45)
HGB BLD-MCNC: 13.9 G/DL — SIGNIFICANT CHANGE UP (ref 11.5–15.5)
IMM GRANULOCYTES NFR BLD AUTO: 0.5 % — SIGNIFICANT CHANGE UP (ref 0–1.5)
LACTATE SERPL-SCNC: 0.9 MMOL/L — SIGNIFICANT CHANGE UP (ref 0.5–2)
LYMPHOCYTES # BLD AUTO: 2.26 K/UL — SIGNIFICANT CHANGE UP (ref 1–3.3)
LYMPHOCYTES # BLD AUTO: 25.9 % — SIGNIFICANT CHANGE UP (ref 13–44)
MCHC RBC-ENTMCNC: 29.8 PG — SIGNIFICANT CHANGE UP (ref 27–34)
MCHC RBC-ENTMCNC: 30.2 GM/DL — LOW (ref 32–36)
MCV RBC AUTO: 98.7 FL — SIGNIFICANT CHANGE UP (ref 80–100)
MONOCYTES # BLD AUTO: 0.96 K/UL — HIGH (ref 0–0.9)
MONOCYTES NFR BLD AUTO: 11 % — SIGNIFICANT CHANGE UP (ref 2–14)
NEUTROPHILS # BLD AUTO: 5.02 K/UL — SIGNIFICANT CHANGE UP (ref 1.8–7.4)
NEUTROPHILS NFR BLD AUTO: 57.5 % — SIGNIFICANT CHANGE UP (ref 43–77)
NRBC # BLD: 0 /100 WBCS — SIGNIFICANT CHANGE UP (ref 0–0)
NT-PROBNP SERPL-SCNC: 1366 PG/ML — HIGH (ref 0–300)
PCO2 BLDV: 70 MMHG — HIGH (ref 39–42)
PH BLDV: 7.35 — SIGNIFICANT CHANGE UP (ref 7.32–7.43)
PLATELET # BLD AUTO: 134 K/UL — LOW (ref 150–400)
PO2 BLDV: 30 MMHG — SIGNIFICANT CHANGE UP
POTASSIUM BLDV-SCNC: 4.5 MMOL/L — SIGNIFICANT CHANGE UP (ref 3.5–5.1)
POTASSIUM SERPL-MCNC: 4.6 MMOL/L — SIGNIFICANT CHANGE UP (ref 3.5–5.3)
POTASSIUM SERPL-SCNC: 4.6 MMOL/L — SIGNIFICANT CHANGE UP (ref 3.5–5.3)
PROCALCITONIN SERPL-MCNC: 0.1 NG/ML — SIGNIFICANT CHANGE UP (ref 0.02–0.1)
PROT SERPL-MCNC: 7.3 G/DL — SIGNIFICANT CHANGE UP (ref 6–8.3)
RBC # BLD: 4.66 M/UL — SIGNIFICANT CHANGE UP (ref 3.8–5.2)
RBC # FLD: 13.5 % — SIGNIFICANT CHANGE UP (ref 10.3–14.5)
SAO2 % BLDV: 52.4 % — SIGNIFICANT CHANGE UP
SODIUM SERPL-SCNC: 141 MMOL/L — SIGNIFICANT CHANGE UP (ref 135–145)
TROPONIN T SERPL-MCNC: 0.01 NG/ML — SIGNIFICANT CHANGE UP (ref 0–0.01)
WBC # BLD: 8.72 K/UL — SIGNIFICANT CHANGE UP (ref 3.8–10.5)
WBC # FLD AUTO: 8.72 K/UL — SIGNIFICANT CHANGE UP (ref 3.8–10.5)

## 2021-06-07 PROCEDURE — 93010 ELECTROCARDIOGRAM REPORT: CPT

## 2021-06-07 PROCEDURE — 71045 X-RAY EXAM CHEST 1 VIEW: CPT | Mod: 26

## 2021-06-07 PROCEDURE — 99285 EMERGENCY DEPT VISIT HI MDM: CPT

## 2021-06-07 PROCEDURE — 99223 1ST HOSP IP/OBS HIGH 75: CPT | Mod: GC

## 2021-06-07 PROCEDURE — 73590 X-RAY EXAM OF LOWER LEG: CPT | Mod: 26,50

## 2021-06-07 PROCEDURE — 93970 EXTREMITY STUDY: CPT | Mod: 26

## 2021-06-07 RX ORDER — CEFAZOLIN SODIUM 1 G
2000 VIAL (EA) INJECTION EVERY 8 HOURS
Refills: 0 | Status: DISCONTINUED | OUTPATIENT
Start: 2021-06-07 | End: 2021-06-11

## 2021-06-07 RX ORDER — CEFAZOLIN SODIUM 1 G
VIAL (EA) INJECTION
Refills: 0 | Status: DISCONTINUED | OUTPATIENT
Start: 2021-06-07 | End: 2021-06-07

## 2021-06-07 RX ORDER — BUDESONIDE AND FORMOTEROL FUMARATE DIHYDRATE 160; 4.5 UG/1; UG/1
2 AEROSOL RESPIRATORY (INHALATION)
Refills: 0 | Status: DISCONTINUED | OUTPATIENT
Start: 2021-06-07 | End: 2021-06-11

## 2021-06-07 RX ORDER — CEFTRIAXONE 500 MG/1
1000 INJECTION, POWDER, FOR SOLUTION INTRAMUSCULAR; INTRAVENOUS ONCE
Refills: 0 | Status: COMPLETED | OUTPATIENT
Start: 2021-06-07 | End: 2021-06-07

## 2021-06-07 RX ORDER — HYDROMORPHONE HYDROCHLORIDE 2 MG/ML
4 INJECTION INTRAMUSCULAR; INTRAVENOUS; SUBCUTANEOUS EVERY 4 HOURS
Refills: 0 | Status: DISCONTINUED | OUTPATIENT
Start: 2021-06-07 | End: 2021-06-11

## 2021-06-07 RX ORDER — IPRATROPIUM/ALBUTEROL SULFATE 18-103MCG
3 AEROSOL WITH ADAPTER (GRAM) INHALATION
Refills: 0 | Status: COMPLETED | OUTPATIENT
Start: 2021-06-07 | End: 2021-06-07

## 2021-06-07 RX ORDER — IPRATROPIUM/ALBUTEROL SULFATE 18-103MCG
3 AEROSOL WITH ADAPTER (GRAM) INHALATION
Refills: 0 | Status: DISCONTINUED | OUTPATIENT
Start: 2021-06-07 | End: 2021-06-11

## 2021-06-07 RX ORDER — IPRATROPIUM/ALBUTEROL SULFATE 18-103MCG
3 AEROSOL WITH ADAPTER (GRAM) INHALATION EVERY 4 HOURS
Refills: 0 | Status: COMPLETED | OUTPATIENT
Start: 2021-06-07 | End: 2021-06-10

## 2021-06-07 RX ORDER — TIOTROPIUM BROMIDE 18 UG/1
1 CAPSULE ORAL; RESPIRATORY (INHALATION) DAILY
Refills: 0 | Status: DISCONTINUED | OUTPATIENT
Start: 2021-06-07 | End: 2021-06-11

## 2021-06-07 RX ADMIN — CEFTRIAXONE 100 MILLIGRAM(S): 500 INJECTION, POWDER, FOR SOLUTION INTRAMUSCULAR; INTRAVENOUS at 18:58

## 2021-06-07 RX ADMIN — Medication 3 MILLILITER(S): at 18:45

## 2021-06-07 RX ADMIN — Medication 125 MILLIGRAM(S): at 18:50

## 2021-06-07 RX ADMIN — Medication 3 MILLILITER(S): at 18:52

## 2021-06-07 NOTE — ED PROVIDER NOTE - PHYSICAL EXAMINATION
CONST: obese elderly female speaking in full sentences  HEAD: atraumatic  EYES: conjunctivae clear, PERRL, EOMI  ENT: mmm  NECK: supple/FROM, nttp, no jvd  CARD: rrr no murmurs  CHEST: +mild tachypnea, good air movement, +intermittent scattered wheezing, no rales/rhonchi/stridor/tripoding  ABD: soft, nd, nttp, no rebound/guarding  EXT: FROM, symmetric distal pulses intact  SKIN: warm, dry, 2+ pitting edema to mid shin w/ assoc L>RLE erythema/ttp, no crepitus/fluctuance, cap refill <2sec  NEURO: a+ox3, 5/5 strength x4, gross sensation intact x4, baseline gait

## 2021-06-07 NOTE — ED ADULT TRIAGE NOTE - CHIEF COMPLAINT QUOTE
69 y/o female BIBEMS for evaluation of SOB, hx of COPD/CHF, on lasix 80 mg at home, oxygen dependant 2L.

## 2021-06-07 NOTE — H&P ADULT - PROBLEM SELECTOR PLAN 3
Did not tolerate CPAP at home She had multiple knee surgeries in the past. As per chart - "multiple complication resulting in chronic pain syndrome". She is on opioids at home, and was on last admission - Dilaudid PO 4mg Q4H PRN, which we will continue here

## 2021-06-07 NOTE — H&P ADULT - ASSESSMENT
This is a 69 YO F with PMH of COPD (on 2L home oxygen, PRN), DARRIN (not tolerating CPAP), ITP, Depression/Anxiety, ?Bipolar disorder, chronic knee pain who presents with shortness of breath 2/2 COPD Exacerbation, and is also found to have cellulitis of LUE. She is also COVID+, which is likely still positive in context of infection in April 2021 with lower concern for COVID pneumonia now.  This is a 71 YO F with PMH of COPD (on 2L home oxygen, PRN), DARRIN (not tolerating CPAP), ITP, Depression/Anxiety, ?Bipolar disorder, chronic knee pain who presents with shortness of breath 2/2 COPD Exacerbation, and is also found to have cellulitis of LUE. She is also COVID+, which is likely still positive in context of infection in April 2021 with lower concern for COVID pneumonia now.         Of note, although she completed COVID vaccine in March, she was hospitalized in April 2021 for Covid pneumonia. She completed course of steroids, remdesivir, and was discharged on Eliquis 2.5mg BID x 30 days.       Knee pain. She had multiple knee surgeries in the past. As per chart - "multiple complication resuluting in chronic pain sydrome". She is on opioids at home, and was on last admisison - Dilaudid PO 4mg Q4H PRN, which we will continue here      Thrombocytopenia. Now in 130s, prior diagnosed with ITP, required Nplate in the past. She was first noted to have low platelets in Dec 2017. As per chart refractory steroids. Unclear if ever got IVIG/Rituximab.       Pulmonary hypertension  On recent echo PASP 40. Likely Group III - secondary to  DARRIN and COPD. S/P [facility] in 2018 - "Findings: pulmonary Hypertension was not severe and there was no evidence of cardiac shunt".    This is a 69 YO F with PMH of COPD (on 2L home oxygen, PRN), DARRIN (not tolerating CPAP), ITP, Depression/Anxiety, ?Bipolar disorder, chronic knee pain who presents with shortness of breath 2/2 COPD Exacerbation, and is also found to have cellulitis of LUE. She is also COVID+, which is likely still positive in context of infection in April 2021 with lower concern for COVID pneumonia now.         Of note, although she completed COVID vaccine in March, she was hospitalized in April 2021 for Covid pneumonia. She completed course of steroids, remdesivir, and was discharged on Eliquis 2.5mg BID x 30 days.

## 2021-06-07 NOTE — ED PROVIDER NOTE - OBJECTIVE STATEMENT
70F obese, copd on home o2 prn, payton does not tolerate cpap, ITP, prior covid19 infection (4/2021), completed moderna covid19 vaccination (3/2021), c/o 3d progressively worsening exertional now resting sob and <24h L>RLE rash/swelling/pain. last steroids 4/2021. no recent abx use. no fever/chills, no uri/cough, no cp, no palpitations, no abd pain/n/v, no dysuria, no etoh-dpt/ivdu.

## 2021-06-07 NOTE — H&P ADULT - PROBLEM SELECTOR PLAN 8
DVT PPX: Lovenox 40mg QD - Improve score only 1 but also acute infection, and recent COVID  GI Ppx: nill  Diet: PLT now in 130s, in 60s in April. Prior diagnosed with ITP, required Nplate in the past. She was first noted to have low platelets in Dec 2017. As per chart refractory steroids. Unclear if ever got IVIG/Rituximab.

## 2021-06-07 NOTE — H&P ADULT - PROBLEM SELECTOR PLAN 2
Possible cellulitis of lower extremity. No systemic symptoms, no WBC, however, will have in mind the proximity of hardware (s/p L knee arthroplasty)  - Cefazoline 2g Q8H likely for 5 days Possible cellulitis of lower extremity. No systemic symptoms, no WBC, however, will have in mind the proximity of hardware (s/p L knee arthroplasty)  - Cefazolin 2g Q8H likely for 5 days

## 2021-06-07 NOTE — H&P ADULT - NSHPLABSRESULTS_GEN_ALL_CORE
LABS:                         13.9   8.72  )-----------( 134      ( 07 Jun 2021 18:32 )             46.0     06-07    141  |  97  |  11  ----------------------------<  107<H>  4.6   |  32<H>  |  0.62    Ca    9.6      07 Jun 2021 18:32    TPro  7.3  /  Alb  4.0  /  TBili  0.2  /  DBili  x   /  AST  22  /  ALT  15  /  AlkPhos  72  06-07        CARDIAC MARKERS ( 07 Jun 2021 18:32 )  x     / 0.01 ng/mL / x     / x     / x          Serum Pro-Brain Natriuretic Peptide: 1366 pg/mL (06-07 @ 18:32)    Lactate, Blood: 0.9 mmol/L (06-07 @ 18:41)

## 2021-06-07 NOTE — H&P ADULT - PROBLEM SELECTOR PLAN 6
Reports increase in anxiety and lower mood since COVID pandemic. Had diagnosis of bipolar, used to follow with psych in [place], but does not have psych here. Psych meds are prescribed by PCP. Will continue with below, and also get psychiatry consult here. At home is apparently also on Xanax 2mg TID PRN - she gets regular scripts for 30 pills a month    - c/w Lamotrigine 100mg daily  - c/w Venlafaxin 100mg daily  - c/w Trazodone 150mg at bedtime  - will do Xanax 2mg QD PRN anxiety, but please clarify during day if she takes more

## 2021-06-07 NOTE — ED ADULT NURSE NOTE - CAS TRG GEN SKIN COLOR
Normal for race paranoid about situation and staff  at times paranoid about situation and staff  at times which is chronic

## 2021-06-07 NOTE — ED PROVIDER NOTE - PROGRESS NOTE DETAILS
dr mtz contacted. [facility] for admission to [facility] under his service. dr mtz aware of covid pcr+ in setting of recent infection (4/5/21) and has low suspicion for new/persistent superimposed infection.

## 2021-06-07 NOTE — H&P ADULT - PROBLEM SELECTOR PLAN 9
DVT PPX: Lovenox 40mg QD - Improve score only 1 but also acute infection, and recent COVID  GI Ppx: nill  Diet: DVT PPX: Lovenox 40mg QD - Improve score only 1 but also acute infection, and recent COVID  GI Ppx: nill  Diet: DASH/TLC  IVF: nill   Code: Full DVT PPX: Lovenox 40mg QD - Improve score only 1 but also acute infection, and recent COVID  GI Ppx: nill  Diet: DASH/TLC  IVF: nill   Bowel Regimen: senna + miralax i/s/o opioid use  Code: Full

## 2021-06-07 NOTE — H&P ADULT - TIME BILLING
Patient seen and examined with house-staff during bedside rounds.  Resident note read, including vitals, physical findings, laboratory data, and radiological reports.   Revisions included below.  Direct personal management at bed side and extensive interpretation of the data.  Plan was outlined and discussed in details with the housestaff.  Decision making of high complexity  Action taken for acute disease activity to reflect the level of care provided:  - medication reconciliation  - review laboratory data    The patient is stable.  My concern underlying cellulitis and thromboembolic disease.  The pulmonary status is stable.  The patient is compliant with the bronchodilators.  The patient desaturated when she ambulated.  The chest x-ray was unremarkable.  Follow-up on the venous Doppler.  Start on antibiotic.  Follow-up on cultures.I discussed the case with the ER attending, the resident, and the daughter

## 2021-06-07 NOTE — H&P ADULT - HISTORY OF PRESENT ILLNESS
Problem: Patient Care Overview  Goal: Plan of Care Review  Outcome: Ongoing (interventions implemented as appropriate)   05/02/19 0230   Coping/Psychosocial   Plan of Care Reviewed With patient   Plan of Care Review   Progress improving   OTHER   Outcome Summary pt restless at times. refusing heart monitor most of the night. unable to be redirected. wife at bedside. oreitned to person. will continue to monitor.      Goal: Individualization and Mutuality  Outcome: Ongoing (interventions implemented as appropriate)    Goal: Discharge Needs Assessment  Outcome: Ongoing (interventions implemented as appropriate)      Problem: Fall Risk (Adult)  Goal: Identify Related Risk Factors and Signs and Symptoms  Outcome: Outcome(s) achieved Date Met: 05/02/19    Goal: Absence of Fall  Outcome: Ongoing (interventions implemented as appropriate)      Problem: Skin Injury Risk (Adult)  Goal: Identify Related Risk Factors and Signs and Symptoms  Outcome: Outcome(s) achieved Date Met: 05/02/19    Goal: Skin Health and Integrity  Outcome: Ongoing (interventions implemented as appropriate)      Problem: Gastrointestinal Bleeding (Adult)  Goal: Signs and Symptoms of Listed Potential Problems Will be Absent, Minimized or Managed (Gastrointestinal Bleeding)  Outcome: Ongoing (interventions implemented as appropriate)         Incomplete note Incomplete note          Vitals: RR 20-24, O2 Sat 95% on RA at rest and desated to 75% ambulating. Placed on 4L NC now sating 94%. BP  112/79, Afebrile, HR 81     Labs: notable for WBC of 9.7 (neutr 5). VBG with Pco2 of 70 (62 last admission) with pH of 7.35. CO2 on chemistry 32.     Imaging: Xray (my wet read) no acute infiltrates.     Course: Given Solumedrol 125mg, 3 x Duoneb.  Incomplete note    This is a 69 YO F with PMH of COPD (on 2L home oxygen, PRN), DARRIN (not tolerating CPAP), ITP, Depression/Anxiety, ?Bipolar disorder, chronic knee pain who presents with shortness of breath.       Vitals: RR 20-24, O2 Sat 95% on RA at rest and desated to 75% ambulating. Placed on 4L NC now sating 94%. BP  112/79, Afebrile, HR 81     Labs: notable for WBC of 9.7 (neutr 5). VBG with Pco2 of 70 (62 last admission) with pH of 7.35. CO2 on chemistry 32.     Imaging: Xray (my wet read) no acute infiltrates.     Course: Given Solumedrol 125mg, 3 x Duoneb.  Incomplete note    This is a 71 YO F with PMH of COPD (on 2L home oxygen, PRN), DARRIN (not tolerating CPAP), ITP, Depression/Anxiety, ?Bipolar disorder, chronic knee pain who presents with shortness of breath.       Vitals: RR 20-24, O2 Sat 95% on RA at rest and desated to 75% ambulating. Placed on 4L NC now sating 94%. BP  112/79, Afebrile, HR 81   Labs: notable for WBC of 9.7 (neutr 5). VBG with Pco2 of 70 (62 last admission) with pH of 7.35. CO2 on chemistry 32. COVID + (COVID+ already in April)    Imaging: Xray (my wet read) no acute infiltrates.   Course: Given Solumedrol 125mg, 3 x Duoneb. Admitted for COPD exacerbation. This is a 69 YO F with PMH of COPD (on 2L home oxygen, PRN), DARRIN (not tolerating CPAP), ITP, Depression/Anxiety (on benzos at home), ?Bipolar disorder, chronic knee pain (on Dilaudid at home) who presents with shortness of breath and left leg swelling. Left leg swelling associated with pain and erythema started 3 days ago and has been progressively worse. No fevers, weakness, dizziness. Around the same time she also started becoming more short of breath, despite use of her home oxygen. Today her O2 sat at home "in the 80s" on 2L NC, therefore she decided to go to ED. She reports chronic cough, no recent change. No change in sputum production. She endorses noncompliance with her inhalers (Trelegy) for last 5 months, which she didn't tell her PCP (Dr Sun) because she felt ashamed of that.  She was hospitalized here in April for COVID-19 Pneumonia, for which she completed remdesivir, course of steroids, and was discharged on Eliquis for 30-day course, which she completed. She also endorses chronic L knee pain after multiple surgeries after a fall few years ago, which is also well documented on previous admissions. For pain she gets regular prescriptions for Dilaudid.     With regards to her depression/anxiety, she reports that since pandemic started she's becoming progressive more depressed. No suicidal ideation. She has troubles with sleeping. She was told in the past that she has "bipolar disease", but she doesn't agree with the diagnosis. No episodes of diego. She had psychiatrist in [place], but since she moved to [facility] she does not have one.     Upon my assessment, she endorses that after antibiotics in ED few hours ago, her leg feels significantly better    Vitals: RR 20-24, O2 Sat 95% on RA at rest and desated to 75% ambulating. Placed on 4L NC now sating 94%. BP  112/79, Afebrile, HR 81   Labs: notable for WBC of 9.7 (neutr 5). VBG with Pco2 of 70 (62 last admission) with pH of 7.35. CO2 on chemistry 32. COVID + (COVID+ already in April)  Imaging: Xray (my wet read) no acute infiltrates. US left lower extremity no DVT but limited exam.  Course: ED exam notable wheezes. Given Solumedrol 125mg, 3 x Duoneb. Admitted for COPD exacerbation and cellulitis    SH: Quit smoking 15 years ago. Before "smoking for many years". She started vaping 6 years ago, but quit 1 year ago. No illicit drugs. Used to work as a hospital  for many years at [employer]  Allergies: Contrast dye - swelling, SOB  PSH: L knee arthroplasty  FH: noncontributory     Physical exam:  Gen: obese pleasant very talkative female in NAD  Lungs: CTAB bilaterally, no wheezes, no signs of increased WOB. O2 sat 88% off oxygen, 92-95% on 2L  Ext: Left lower ext swelling+ erythema+ warmth+ mild tenderness+ - everything below L knee joint.   R lower extermity - + pitting edema, no swelling, no tenderness, no erythema  Extremities - warm well perfused  Heart: RRR, no rmg  Neuro: Alert and oriented x 3     Meds: As below, reviewed with patient and obtained also from recent PCP's note

## 2021-06-07 NOTE — H&P ADULT - PROBLEM SELECTOR PLAN 1
Presenting with SOB, found to be wheezing. SOB likely 2/2 COPD exacerbation. Positive Covid test but no fevers, low D-Dimers, no WBC, no infiltrates on X ray - PCR is likely still positive i/s/o infection in April 2021. Will treat as COPD exacerbation    Plan:  - Prednisone 40mg QD  - Duoneb Q4H standing + Q2 PRN Presenting with SOB, found to be wheezing. SOB likely 2/2 COPD exacerbation. Positive Covid test but no fevers, low D-Dimers, no WBC, no infiltrates on X ray - PCR is likely still positive i/s/o infection in April 2021. Will treat as COPD exacerbation.  VBG with pCO2 of 70, but it was already increased to 62 on last admission. CO2 of 32 on chemistry. It is most likely 2/2 chronic hypercapnic respiratory failure i/s/o COPD. There is no ABG available in the chart.     Plan:  - Prednisone 40mg QD  - Duoneb Q4H standing + Q2 PRN Presenting with SOB, found to be wheezing. SOB likely 2/2 COPD exacerbation. Positive Covid test but no fevers, low D-Dimers, no WBC, no infiltrates on X ray - PCR is likely still positive i/s/o infection in April 2021. Will treat as COPD exacerbation.  Of note, she is not compliant with chronic inhalers because of cost.  VBG with pCO2 of 70, but it was already increased to 62 on last admission. CO2 of 32 on chemistry. It is most likely 2/2 chronic hypercapnic respiratory failure i/s/o COPD. There is no ABG available in the chart.     Plan:  - Prednisone 40mg QD  - Duoneb Q4H standing + Q2 PRN  - at home   - there is no Trelegy on formulary, will do Symbicort 2/2 + Spiriva QD   - will likely need change in her home inhalers given cost (was on Trelegy)

## 2021-06-07 NOTE — ED PROVIDER NOTE - NS ED MD EM SELECTION
Chief Complaint   Patient presents with    mass on chest       HPI  Omar Pacheco is a 68 y.o. male with multiple medical diagnoses as listed in the medical history and problem list that presents for evaluation for a mass on his chest that has been present for approx 6 years but over the past few months has been tender. It has not grown in size.     PAST MEDICAL HISTORY:  Past Medical History:   Diagnosis Date    *Atrial fibrillation     Benign hypertrophy of prostate     Degenerative disc disease     GERD (gastroesophageal reflux disease)     Hx of colonic polyps     Hypertension     Pilonidal cyst 1971       PAST SURGICAL HISTORY:  Past Surgical History:   Procedure Laterality Date    COLONOSCOPY N/A 12/7/2016    Procedure: COLONOSCOPY;  Surgeon: Sumeet Lundy MD;  Location: [facility] (4TH FLR);  Service: Endoscopy;  Laterality: N/A;  OK to hold Pradaxa 2 days prior to procedure per Dr Jones/see note dated 11/15/16/svn    COLONOSCOPY N/A 12/7/2016    Performed by Sumeet Lundy MD at [facility] ENDO (4TH FLR)    COLONOSCOPY N/A 12/11/2013    Performed by Zeke López MD at [facility] ENDO    CYST REMOVAL  1971    coccyx    DESTRUCTION-PROSTATE-TRANSURETHRAL N/A 2/16/2018    Performed by Earnest Martinez Jr., MD at [facility] OR 1ST FLR    ESOPHAGOGASTRODUODENOSCOPY (EGD) N/A 3/24/2015    Performed by Seferino Warner MD at [facility] ENDO    KNEE SURGERY  1989    right       SOCIAL HISTORY:  Social History     Socioeconomic History    Marital status:      Spouse name: Not on file    Number of children: Not on file    Years of education: Not on file    Highest education level: Not on file   Social Needs    Financial resource strain: Not on file    Food insecurity - worry: Not on file    Food insecurity - inability: Not on file    Transportation needs - medical: Not on file    Transportation needs - non-medical: Not on file   Occupational History    Not on file   Tobacco Use    Smoking status:  "Former Smoker     Years: 2.00     Types: Cigarettes     Last attempt to quit: 1975     Years since quittin.2    Smokeless tobacco: Never Used   Substance and Sexual Activity    Alcohol use: Yes     Alcohol/week: 1.8 oz     Types: 3 Glasses of wine per week     Comment: weekly    Drug use: No    Sexual activity: Not Currently   Other Topics Concern    Not on file   Social History Narrative    Not on file       FAMILY HISTORY:  History reviewed. No pertinent family history.    ALLERGIES AND MEDICATIONS: updated and reviewed.  Review of patient's allergies indicates:  No Known Allergies  Current Outpatient Medications   Medication Sig Dispense Refill    ammonium lactate (LAC-HYDRIN) 12 % lotion Apply topically 2 (two) times daily. 225 g 5    ascorbic acid, vitamin C, (VITAMIN C) 100 MG tablet Take 100 mg by mouth once daily.      dabigatran etexilate (PRADAXA) 150 mg Cap Take 1 capsule (150 mg total) by mouth 2 (two) times daily. "Do NOT break, chew, or open capsules." 180 capsule 2    flecainide (TAMBOCOR) 50 MG Tab Take 1 tablet (50 mg total) by mouth every 12 (twelve) hours. 180 tablet 3    insulin syringe-needle U-100 (EASY COMFORT INSULIN SYRINGE) 1 mL 31 gauge x 5/16 Syrg Inject 1 time daily prn ED 90 each 0    methocarbamol (ROBAXIN) 500 MG Tab Take 2 tablets (1,000 mg total) by mouth nightly as needed. 60 tablet 5    metoprolol succinate (TOPROL-XL) 50 MG 24 hr tablet Take 1 tablet (50 mg total) by mouth once daily. 90 tablet 2    mirtazapine (REMERON) 30 MG tablet Take 30 mg by mouth every evening.      multivitamin (THERAGRAN) per tablet Take 1 tablet by mouth once daily.      omeprazole (PRILOSEC) 20 MG capsule Take 1 capsule (20 mg total) by mouth once daily. 90 capsule 3    papaverine 30 mg/mL injection ADD: Phentolamine 10mg/ml   ADD: PGE1 200 mcg   30-1-20 Mixture  Sig: Inject as directed into lateral aspect of penis 10 mL 11    sildenafil (VIAGRA) 100 MG tablet Take 1 tablet " "(100 mg total) by mouth daily as needed for Erectile Dysfunction. 30 tablet 1    tadalafil (CIALIS) 20 MG Tab Take 1 tablet (20 mg total) by mouth once daily. 30 tablet 0    terazosin (HYTRIN) 10 MG capsule Take 1 capsule (10 mg total) by mouth every evening. 30 capsule 11    VITAMIN B COMPLEX (B COMPLETE ORAL) Take by mouth once daily.       No current facility-administered medications for this visit.        ROS  Review of Systems   Constitutional: Negative for chills, fatigue, fever and unexpected weight change.   HENT: Negative for ear pain, postnasal drip, rhinorrhea, sinus pressure and sore throat.    Eyes: Negative for photophobia and visual disturbance.   Respiratory: Negative for apnea, cough, chest tightness, shortness of breath and wheezing.    Cardiovascular: Negative for chest pain and palpitations.   Gastrointestinal: Negative for abdominal pain, blood in stool, constipation, diarrhea, nausea and vomiting.   Genitourinary: Negative for difficulty urinating.   Musculoskeletal: Negative for arthralgias and joint swelling.   Skin: Negative for rash.   Neurological: Negative for facial asymmetry, speech difficulty, weakness, numbness and headaches.   Psychiatric/Behavioral: Negative for dysphoric mood.       Physical Exam  Vitals:    12/03/18 1110 12/03/18 1119   BP: (!) 158/86 (!) 152/80   Pulse: (!) 54    Resp: 18    Temp: 97.7 °F (36.5 °C)    TempSrc: Oral    SpO2: 96%    Weight: 91.2 kg (201 lb)    Height: 5' 9" (1.753 m)     Body mass index is 29.68 kg/m².  Weight: 91.2 kg (201 lb)   Height: 5' 9" (175.3 cm)     Physical Exam   Constitutional: He is oriented to person, place, and time. He appears well-developed and well-nourished.   HENT:   Head: Normocephalic and atraumatic.   Eyes: EOM are normal.   Cardiovascular: Normal rate, regular rhythm and normal heart sounds. Exam reveals no gallop and no friction rub.   No murmur heard.  Pulmonary/Chest: Effort normal and breath sounds normal. No " respiratory distress. He has no wheezes. He has no rales. He exhibits no tenderness.   No cervical, clavicular or axillary LAD         Lymphadenopathy:     He has no cervical adenopathy.   Neurological: He is alert and oriented to person, place, and time.   Skin: Skin is warm and dry.   Psychiatric: He has a normal mood and affect. His behavior is normal.   Nursing note and vitals reviewed.      Health Maintenance       Date Due Completion Date    TETANUS VACCINE 12/13/1966 ---    Zoster Vaccine 12/13/2008 ---    Pneumococcal Vaccine (65+ Low/Medium Risk) (2 of 2 - PPSV23) 02/13/2018 2/13/2017    Influenza Vaccine 08/01/2018 10/10/2017 (Done)    [facility] 10/10[facility]    Colonoscopy 12/07/2019 12/7/2016    Lipid Panel 02/13/2022 2/13/2017            ASSESSMENT     1. Breast mass in male    2. Dermatitis    3. Paroxysmal atrial fibrillation    4. History of gastroesophageal reflux (GERD)    5. Essential hypertension    6. Other male erectile dysfunction        PLAN:     Problem List Items Addressed This Visit        Cardiac/Vascular    Atrial fibrillation - paroxysmal (Chronic)  -continue current care    Relevant Medications    flecainide (TAMBOCOR) 50 MG Tab    Hypertension (Chronic)  -he would like to manage his pressure without medications and with lifestyle changes  -will enroll in digital medicine    Relevant Orders    Hypertension Digital Medicine (HDMP) Enrollment Order (Completed)    Hypertension Digital Medicine (HDMP) Enrollment Order (Completed)       Renal/    Other male erectile dysfunction  -continue current regimen    Relevant Medications    tadalafil (CIALIS) 20 MG Tab      Other Visit Diagnoses     Breast mass in male    -  Primary  -will get US, refer to breast surgery to determine if he needs a mammogram    Relevant Orders    US Breast Left Complete    Ambulatory Referral to Breast Surgery    Dermatitis      This is a chronic medical condition that is stable under the current  regimen. Continue to monitor and we will make medication adjustments as needed.       Relevant Medications    ammonium lactate (LAC-HYDRIN) 12 % lotion    History of gastroesophageal reflux (GERD)      -This is a chronic medical condition that is stable under the current regimen. Continue to monitor and we will make medication adjustments as needed.       Relevant Medications    omeprazole (PRILOSEC) 20 MG capsule            Donya Goode MD  12/03/2018 1:44 PM        Follow-up in about 6 months (around 6/3/2019) for Follow up.               [id number] Comprehensive

## 2021-06-07 NOTE — H&P ADULT - PROBLEM SELECTOR PLAN 5
Did not tolerate CPAP at home Did not tolerate CPAP at home because "her dog Helio would jump and try to bite the mask". She refuses to use CPAP now.

## 2021-06-07 NOTE — ED ADULT NURSE NOTE - CHIEF COMPLAINT QUOTE
71 y/o female BIBEMS for evaluation of SOB, hx of COPD/CHF, on lasix 80 mg at home, oxygen dependant 2L.

## 2021-06-07 NOTE — ED PROVIDER NOTE - CARE PLAN
Principal Discharge DX:	COPD exacerbation  Secondary Diagnosis:	Cellulitis   Principal Discharge DX:	COPD exacerbation  Secondary Diagnosis:	Cellulitis  Secondary Diagnosis:	COVID-19   Principal Discharge DX:	COPD exacerbation  Secondary Diagnosis:	Hypoxia  Secondary Diagnosis:	Cellulitis  Secondary Diagnosis:	COVID-19

## 2021-06-07 NOTE — H&P ADULT - PROBLEM SELECTOR PLAN 4
On recent echo PASP 40. Likely Group III - secondary to  DARRIN and COPD. S/P [facility] in 2018 - "Findings: pulmonary Hypertension was not severe and there was no evidence of cardiac shunt".

## 2021-06-07 NOTE — ED PROVIDER NOTE - CLINICAL SUMMARY MEDICAL DECISION MAKING FREE TEXT BOX
afebrile. hds. resting hypoxia mid-80%s on RA improved s/p 2L nc. exertional hypoxia mid-70%s on RA. found to have likely copd exacerbation. s/p duoneb/steroids. covid pcr+ in setting of recent covid19 infection. also found to have likely LLE cellulitis. us duplex w/o acute dvt. s/p abx. bcx pending. dr mtz contacted. [facility] for admission.

## 2021-06-08 ENCOUNTER — TRANSCRIPTION ENCOUNTER (OUTPATIENT)
Age: 71
End: 2021-06-08

## 2021-06-08 DIAGNOSIS — I50.33 ACUTE ON CHRONIC DIASTOLIC (CONGESTIVE) HEART FAILURE: ICD-10-CM

## 2021-06-08 LAB
ANION GAP SERPL CALC-SCNC: 10 MMOL/L — SIGNIFICANT CHANGE UP (ref 5–17)
BASOPHILS # BLD AUTO: 0.02 K/UL — SIGNIFICANT CHANGE UP (ref 0–0.2)
BASOPHILS NFR BLD AUTO: 0.3 % — SIGNIFICANT CHANGE UP (ref 0–2)
BUN SERPL-MCNC: 12 MG/DL — SIGNIFICANT CHANGE UP (ref 7–23)
CALCIUM SERPL-MCNC: 9 MG/DL — SIGNIFICANT CHANGE UP (ref 8.4–10.5)
CHLORIDE SERPL-SCNC: 97 MMOL/L — SIGNIFICANT CHANGE UP (ref 96–108)
CO2 SERPL-SCNC: 33 MMOL/L — HIGH (ref 22–31)
COVID-19 SPIKE DOMAIN AB INTERP: POSITIVE
COVID-19 SPIKE DOMAIN ANTIBODY RESULT: >250 U/ML — HIGH
CREAT SERPL-MCNC: 0.55 MG/DL — SIGNIFICANT CHANGE UP (ref 0.5–1.3)
EOSINOPHIL # BLD AUTO: 0 K/UL — SIGNIFICANT CHANGE UP (ref 0–0.5)
EOSINOPHIL NFR BLD AUTO: 0 % — SIGNIFICANT CHANGE UP (ref 0–6)
GLUCOSE SERPL-MCNC: 184 MG/DL — HIGH (ref 70–99)
HCT VFR BLD CALC: 40.6 % — SIGNIFICANT CHANGE UP (ref 34.5–45)
HGB BLD-MCNC: 12.4 G/DL — SIGNIFICANT CHANGE UP (ref 11.5–15.5)
IMM GRANULOCYTES NFR BLD AUTO: 0.7 % — SIGNIFICANT CHANGE UP (ref 0–1.5)
LYMPHOCYTES # BLD AUTO: 0.6 K/UL — LOW (ref 1–3.3)
LYMPHOCYTES # BLD AUTO: 10.4 % — LOW (ref 13–44)
MCHC RBC-ENTMCNC: 29.8 PG — SIGNIFICANT CHANGE UP (ref 27–34)
MCHC RBC-ENTMCNC: 30.5 GM/DL — LOW (ref 32–36)
MCV RBC AUTO: 97.6 FL — SIGNIFICANT CHANGE UP (ref 80–100)
MONOCYTES # BLD AUTO: 0.15 K/UL — SIGNIFICANT CHANGE UP (ref 0–0.9)
MONOCYTES NFR BLD AUTO: 2.6 % — SIGNIFICANT CHANGE UP (ref 2–14)
NEUTROPHILS # BLD AUTO: 4.97 K/UL — SIGNIFICANT CHANGE UP (ref 1.8–7.4)
NEUTROPHILS NFR BLD AUTO: 86 % — HIGH (ref 43–77)
NRBC # BLD: 0 /100 WBCS — SIGNIFICANT CHANGE UP (ref 0–0)
PLATELET # BLD AUTO: 117 K/UL — LOW (ref 150–400)
POTASSIUM SERPL-MCNC: 4.5 MMOL/L — SIGNIFICANT CHANGE UP (ref 3.5–5.3)
POTASSIUM SERPL-SCNC: 4.5 MMOL/L — SIGNIFICANT CHANGE UP (ref 3.5–5.3)
RBC # BLD: 4.16 M/UL — SIGNIFICANT CHANGE UP (ref 3.8–5.2)
RBC # FLD: 13 % — SIGNIFICANT CHANGE UP (ref 10.3–14.5)
SARS-COV-2 IGG+IGM SERPL QL IA: >250 U/ML — HIGH
SARS-COV-2 IGG+IGM SERPL QL IA: POSITIVE
SODIUM SERPL-SCNC: 140 MMOL/L — SIGNIFICANT CHANGE UP (ref 135–145)
WBC # BLD: 5.78 K/UL — SIGNIFICANT CHANGE UP (ref 3.8–10.5)
WBC # FLD AUTO: 5.78 K/UL — SIGNIFICANT CHANGE UP (ref 3.8–10.5)

## 2021-06-08 PROCEDURE — 99232 SBSQ HOSP IP/OBS MODERATE 35: CPT | Mod: GC

## 2021-06-08 PROCEDURE — 99223 1ST HOSP IP/OBS HIGH 75: CPT

## 2021-06-08 PROCEDURE — 71045 X-RAY EXAM CHEST 1 VIEW: CPT | Mod: 26

## 2021-06-08 RX ORDER — POLYETHYLENE GLYCOL 3350 17 G/17G
17 POWDER, FOR SOLUTION ORAL DAILY
Refills: 0 | Status: DISCONTINUED | OUTPATIENT
Start: 2021-06-08 | End: 2021-06-08

## 2021-06-08 RX ORDER — PANTOPRAZOLE SODIUM 20 MG/1
0 TABLET, DELAYED RELEASE ORAL
Qty: 0 | Refills: 0 | DISCHARGE

## 2021-06-08 RX ORDER — PANTOPRAZOLE SODIUM 20 MG/1
40 TABLET, DELAYED RELEASE ORAL
Refills: 0 | Status: DISCONTINUED | OUTPATIENT
Start: 2021-06-08 | End: 2021-06-11

## 2021-06-08 RX ORDER — TRAZODONE HCL 50 MG
150 TABLET ORAL AT BEDTIME
Refills: 0 | Status: DISCONTINUED | OUTPATIENT
Start: 2021-06-08 | End: 2021-06-11

## 2021-06-08 RX ORDER — GABAPENTIN 400 MG/1
0 CAPSULE ORAL
Qty: 0 | Refills: 11 | DISCHARGE

## 2021-06-08 RX ORDER — LAMOTRIGINE 25 MG/1
100 TABLET, ORALLY DISINTEGRATING ORAL DAILY
Refills: 0 | Status: DISCONTINUED | OUTPATIENT
Start: 2021-06-08 | End: 2021-06-11

## 2021-06-08 RX ORDER — FUROSEMIDE 40 MG
40 TABLET ORAL
Refills: 0 | Status: DISCONTINUED | OUTPATIENT
Start: 2021-06-08 | End: 2021-06-11

## 2021-06-08 RX ORDER — GABAPENTIN 400 MG/1
800 CAPSULE ORAL THREE TIMES A DAY
Refills: 0 | Status: DISCONTINUED | OUTPATIENT
Start: 2021-06-08 | End: 2021-06-11

## 2021-06-08 RX ORDER — ENOXAPARIN SODIUM 100 MG/ML
40 INJECTION SUBCUTANEOUS DAILY
Refills: 0 | Status: DISCONTINUED | OUTPATIENT
Start: 2021-06-08 | End: 2021-06-11

## 2021-06-08 RX ORDER — SENNA PLUS 8.6 MG/1
2 TABLET ORAL AT BEDTIME
Refills: 0 | Status: DISCONTINUED | OUTPATIENT
Start: 2021-06-08 | End: 2021-06-11

## 2021-06-08 RX ORDER — ALPRAZOLAM 0.25 MG
2 TABLET ORAL DAILY
Refills: 0 | Status: DISCONTINUED | OUTPATIENT
Start: 2021-06-08 | End: 2021-06-09

## 2021-06-08 RX ORDER — ATORVASTATIN CALCIUM 80 MG/1
10 TABLET, FILM COATED ORAL AT BEDTIME
Refills: 0 | Status: DISCONTINUED | OUTPATIENT
Start: 2021-06-08 | End: 2021-06-11

## 2021-06-08 RX ORDER — LAMOTRIGINE 25 MG/1
1 TABLET, ORALLY DISINTEGRATING ORAL
Qty: 0 | Refills: 0 | DISCHARGE

## 2021-06-08 RX ORDER — SENNA PLUS 8.6 MG/1
2 TABLET ORAL
Qty: 0 | Refills: 0 | DISCHARGE

## 2021-06-08 RX ORDER — ALPRAZOLAM 0.25 MG
0 TABLET ORAL
Qty: 0 | Refills: 0 | DISCHARGE

## 2021-06-08 RX ORDER — TRAZODONE HCL 50 MG
100 TABLET ORAL AT BEDTIME
Refills: 0 | Status: DISCONTINUED | OUTPATIENT
Start: 2021-06-08 | End: 2021-06-08

## 2021-06-08 RX ORDER — TRAZODONE HCL 50 MG
0 TABLET ORAL
Qty: 0 | Refills: 0 | DISCHARGE

## 2021-06-08 RX ORDER — POLYETHYLENE GLYCOL 3350 17 G/17G
17 POWDER, FOR SOLUTION ORAL
Refills: 0 | Status: DISCONTINUED | OUTPATIENT
Start: 2021-06-08 | End: 2021-06-11

## 2021-06-08 RX ORDER — ATORVASTATIN CALCIUM 80 MG/1
0 TABLET, FILM COATED ORAL
Qty: 0 | Refills: 0 | DISCHARGE

## 2021-06-08 RX ORDER — VENLAFAXINE HCL 75 MG
150 CAPSULE, EXT RELEASE 24 HR ORAL DAILY
Refills: 0 | Status: DISCONTINUED | OUTPATIENT
Start: 2021-06-08 | End: 2021-06-11

## 2021-06-08 RX ORDER — TRAZODONE HCL 50 MG
100 TABLET ORAL DAILY
Refills: 0 | Status: DISCONTINUED | OUTPATIENT
Start: 2021-06-08 | End: 2021-06-08

## 2021-06-08 RX ADMIN — GABAPENTIN 800 MILLIGRAM(S): 400 CAPSULE ORAL at 05:43

## 2021-06-08 RX ADMIN — Medication 2000 MILLIGRAM(S): at 18:40

## 2021-06-08 RX ADMIN — BUDESONIDE AND FORMOTEROL FUMARATE DIHYDRATE 2 PUFF(S): 160; 4.5 AEROSOL RESPIRATORY (INHALATION) at 13:37

## 2021-06-08 RX ADMIN — Medication 2000 MILLIGRAM(S): at 01:00

## 2021-06-08 RX ADMIN — GABAPENTIN 800 MILLIGRAM(S): 400 CAPSULE ORAL at 21:56

## 2021-06-08 RX ADMIN — Medication 40 MILLIGRAM(S): at 18:40

## 2021-06-08 RX ADMIN — Medication 3 MILLILITER(S): at 05:43

## 2021-06-08 RX ADMIN — HYDROMORPHONE HYDROCHLORIDE 4 MILLIGRAM(S): 2 INJECTION INTRAMUSCULAR; INTRAVENOUS; SUBCUTANEOUS at 19:47

## 2021-06-08 RX ADMIN — Medication 3 MILLILITER(S): at 18:48

## 2021-06-08 RX ADMIN — Medication 40 MILLIGRAM(S): at 12:11

## 2021-06-08 RX ADMIN — Medication 2000 MILLIGRAM(S): at 09:03

## 2021-06-08 RX ADMIN — Medication 3 MILLILITER(S): at 01:51

## 2021-06-08 RX ADMIN — ENOXAPARIN SODIUM 40 MILLIGRAM(S): 100 INJECTION SUBCUTANEOUS at 06:00

## 2021-06-08 RX ADMIN — POLYETHYLENE GLYCOL 3350 17 GRAM(S): 17 POWDER, FOR SOLUTION ORAL at 12:11

## 2021-06-08 RX ADMIN — Medication 2 MILLIGRAM(S): at 09:29

## 2021-06-08 RX ADMIN — HYDROMORPHONE HYDROCHLORIDE 4 MILLIGRAM(S): 2 INJECTION INTRAMUSCULAR; INTRAVENOUS; SUBCUTANEOUS at 09:39

## 2021-06-08 RX ADMIN — Medication 40 MILLIGRAM(S): at 02:34

## 2021-06-08 RX ADMIN — ATORVASTATIN CALCIUM 10 MILLIGRAM(S): 80 TABLET, FILM COATED ORAL at 21:56

## 2021-06-08 RX ADMIN — Medication 150 MILLIGRAM(S): at 12:11

## 2021-06-08 RX ADMIN — HYDROMORPHONE HYDROCHLORIDE 4 MILLIGRAM(S): 2 INJECTION INTRAMUSCULAR; INTRAVENOUS; SUBCUTANEOUS at 09:29

## 2021-06-08 RX ADMIN — PANTOPRAZOLE SODIUM 40 MILLIGRAM(S): 20 TABLET, DELAYED RELEASE ORAL at 09:00

## 2021-06-08 RX ADMIN — Medication 150 MILLIGRAM(S): at 21:56

## 2021-06-08 RX ADMIN — Medication 3 MILLILITER(S): at 12:11

## 2021-06-08 RX ADMIN — POLYETHYLENE GLYCOL 3350 17 GRAM(S): 17 POWDER, FOR SOLUTION ORAL at 23:30

## 2021-06-08 RX ADMIN — Medication 3 MILLILITER(S): at 09:03

## 2021-06-08 RX ADMIN — LAMOTRIGINE 100 MILLIGRAM(S): 25 TABLET, ORALLY DISINTEGRATING ORAL at 12:10

## 2021-06-08 RX ADMIN — GABAPENTIN 800 MILLIGRAM(S): 400 CAPSULE ORAL at 12:11

## 2021-06-08 RX ADMIN — ENOXAPARIN SODIUM 40 MILLIGRAM(S): 100 INJECTION SUBCUTANEOUS at 12:10

## 2021-06-08 RX ADMIN — Medication 3 MILLILITER(S): at 21:55

## 2021-06-08 RX ADMIN — SENNA PLUS 2 TABLET(S): 8.6 TABLET ORAL at 21:56

## 2021-06-08 RX ADMIN — BUDESONIDE AND FORMOTEROL FUMARATE DIHYDRATE 2 PUFF(S): 160; 4.5 AEROSOL RESPIRATORY (INHALATION) at 21:56

## 2021-06-08 RX ADMIN — TIOTROPIUM BROMIDE 1 CAPSULE(S): 18 CAPSULE ORAL; RESPIRATORY (INHALATION) at 12:16

## 2021-06-08 NOTE — PROGRESS NOTE ADULT - PROBLEM SELECTOR PLAN 6
- Reports increase in anxiety and lower mood since COVID pandemic  - Had diagnosis of bipolar, but does not have psych here. Psych meds are prescribed by PCP. Will continue with below, and also get psychiatry consult here. At home is apparently also on Xanax 2mg TID PRN - she gets regular scripts for 30 pills a month    - c/w Lamotrigine 100mg daily  - c/w Venlafaxin 100mg daily  - c/w Trazodone 150mg at bedtime  - will do Xanax 2mg QD PRN anxiety, but please clarify during day if she takes more  - f/u psych consult - refuses to use CPAP now, not compliant at home

## 2021-06-08 NOTE — DISCHARGE NOTE PROVIDER - NSDCFUADDAPPT_GEN_ALL_CORE_FT
Outpatient psych; please call to make appointment    [facility]  [email or web address]  [address]  [place], [place] [address] [phone] ([phone] for intakes)  Medicare, Medicaid, most private insurance (including United)    [facility]  [email or web address]  [address] [place] [address] [phone]  Medicare, Medicaid, Aetna, Affinity, Am[employer], BCBS, [facility], J[employer]iel, GHI, Healthfirst, HealthPlus, [facility], [facility]  [email or web address]  [address] [address] [phone]   Outpatient psych; please call to make appointment with any of the following.     [facility]  [email or web address]  [address]  [place], [place] [address]   [phone] ([phone] for intakes)  Medicare, Medicaid, most private insurance (including United)    [facility]  [email or web address]  [address] [place] [address] [phone]  Medicare, Medicaid, Aetna, Affinity, Amida Care, BCBS, [facility], Jasiel, GHI, Healthfirst, HealthPlus, [facility], [facility]  [email or web address]  [address] [address] [phone]
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
## 2021-06-08 NOTE — CHART NOTE - NSCHARTNOTEFT_GEN_A_CORE
INFECTIOUS DISEASES BRIEF NOTE    Called to comment on COVID-19 isolation clearance.    Patient initially tested positive of COVID-19 on 4/5/21.  Currently she was admitted for cellulitis.  She has h/o COPD on home O2 and currently on 2LNC as baseline.  She is s/p COVID vaccine, and COVID antibody positive.  Based on hospital guideline, no need to isolate the patient, no need to repeat testing.      Hospital guideline can be found at Intranet website --> Public Health emergency: COVID-19 (click here to visit our resource platform) --> Patient Screening "COVID 19 Testing and Retesting Guidelines"    If you have any questions, please feel free to contact me.  Case d/w primary team and hospital epidemiology.   *this is not a consult note*    Alessia Holbrook MD, MS  Infectious Disease attending  work cell [phone]

## 2021-06-08 NOTE — PROGRESS NOTE ADULT - PROBLEM SELECTOR PLAN 3
- She had multiple knee surgeries in the pas  - c/w home Dilaudid PO 4mg Q4H PRN - Cellulitis of lower extremity, improving  - Cefazolin 2g Q8H 5 day course

## 2021-06-08 NOTE — DISCHARGE NOTE PROVIDER - CARE PROVIDER_API CALL
Dania Sun)  Critical Care Medicine; Pulmonary Disease  [address] [place] [address]  Phone: [phone]  Fax: [phone]  Scheduled Appointment: 07/08/2021

## 2021-06-08 NOTE — PROGRESS NOTE ADULT - PROBLEM SELECTOR PLAN 8
PLT now in 130s, in 60s in April. Prior diagnosed with ITP, required Nplate in the past. She was first noted to have low platelets in Dec 2017. As per chart refractory steroids. Unclear if ever got IVIG/Rituximab. As above

## 2021-06-08 NOTE — BEHAVIORAL HEALTH ASSESSMENT NOTE - NSBHCHARTREVIEWLAB_PSY_A_CORE FT
13.9   8.72  )-----------( 134      ( 07 Jun 2021 18:32 )             46.0     06-07    141  |  97  |  11  ----------------------------<  107<H>  4.6   |  32<H>  |  0.62    Ca    9.6      07 Jun 2021 18:32    TPro  7.3  /  Alb  4.0  /  TBili  0.2  /  DBili  x   /  AST  22  /  ALT  15  /  AlkPhos  72  06-07    No utox seen in EMR

## 2021-06-08 NOTE — BEHAVIORAL HEALTH ASSESSMENT NOTE - NSBHCONSULTFOLLOWAFTERCARE_PSY_A_CORE FT
Please provide referral information for outpt psychiatric care:    •	[facility]  o	[email or web address]  o	[address]  [place], [place] [address]   [phone] ([phone] for intakes)  o	Medicare, Medicaid, most private insurance (including United)  •	[facility]  o	[email or web address]  o	[address] [id number] [phone]  o	Medicare, Medicaid, Aetna, Affinity, Amida Care, BCBS, [facility], Jasiel, GHI, Healthfirst, HealthPlus, [employer], Wellcare  •	[facility]  o	[email or web address]  o	[address] [place] [address] [phone]

## 2021-06-08 NOTE — PROGRESS NOTE ADULT - PROBLEM SELECTOR PLAN 5
Did not tolerate CPAP at home because "her dog Helio would jump and try to bite the mask". She refuses to use CPAP now. - On recent echo PASP 40  - S/P [facility] in 2018 - "Findings: pulmonary Hypertension was not severe and there was no evidence of cardiac shunt"  - c/w diuretics 40bid transition back to 40 lasix once more euvolemic

## 2021-06-08 NOTE — BEHAVIORAL HEALTH ASSESSMENT NOTE - NSBHCHARTREVIEWINVESTIGATE_PSY_A_CORE FT
EKG 4/8    Ventricular Rate 74 BPM    Atrial Rate 74 BPM    P-R Interval 128 ms    QRS Duration 88 ms    Q-T Interval 368 ms    QTC Calculation(Bazett) 408 ms    P Axis 61 degrees    R Axis 42 degrees    T Axis 37 degrees    Diagnosis Line Normal sinus rhythm  Nonspecific ST abnormality  Confirmed by Tim Anthony ([id number]) on 4/7/2021 2:54:09 PM

## 2021-06-08 NOTE — BEHAVIORAL HEALTH ASSESSMENT NOTE - SUICIDE RISK FACTORS
Alcohol/Substance abuse disorders/Chronic pain/History of abuse/trauma/Insomnia/Mood Disorder current/past

## 2021-06-08 NOTE — DISCHARGE NOTE PROVIDER - HOSPITAL COURSE
This is a 69 YO F with PMH of COPD (on 2L home oxygen, PRN), DARRIN (not tolerating CPAP), ITP, Depression/Anxiety (on benzos at home), Bipolar disorder (uncertain of diagnosis), chronic knee pain (on Dilaudid at home) who presents with shortness of breath and left leg swelling. Patient found to have cellulitis as LE doppler w/o evidence of DVT and erythema improving with ABx. Patient also with signs of fluid overload cxr and lung exam, currently being diuresed with 40 lasix BID for CHF exacerbation/diuresis of pHTN. Patient is pending TTE for evaluation of EF and psych consult. Plan to DC tomorrow with oral ABx and new (affordable) inhaler script    COPD: Prednisone 40mg QD; Duoneb Q4H standing + Q2 PRN  COVID: residual infection, pending epidemiology to remove isolation  Cellulitis: Cefazolin  Knee pain: Dilaudid PO 4mg Q4H PRN.   Pulmonary hypertension: On recent echo PASP 40. Likely Group III - secondary to  DARRIN and COPD  Sleep apnea: refuses to use CPAP now.   Anxiety: Psych consult; c/w Lamotrigine 100mg daily, Venlafaxin 100mg daily, Trazodone 150mg at bedtime, xanax 2mg  Depression.  Plan: As above.   Thrombocytopenia.  Plan: PLT now in 130s, in 60s in April. Prior diagnosed with ITP, required Nplate in the past    New Meds    Labs    Images     This is a 69 YO F with PMH of COPD (on 2L home oxygen, PRN), DARRIN (not tolerating CPAP), ITP, Depression/Anxiety (on benzos at home), Bipolar disorder (uncertain of diagnosis), chronic knee pain (on Dilaudid at home) who presents with shortness of breath and left leg swelling. Patient found to have cellulitis as LE doppler w/o evidence of DVT and erythema improving with ABx. Patient also with signs of fluid overload cxr and lung exam, currently being diuresed with 40 lasix BID for CHF exacerbation/diuresis of pHTN. Patient is pending TTE for evaluation of EF and psych consult. Plan to DC tomorrow with oral ABx and new (affordable) inhaler script    COPD: Prednisone 40mg QD; Duoneb Q4H standing + Q2 PRN  COVID: residual infection, pending epidemiology to remove isolation  Cellulitis: Cefazolin  Knee pain: Dilaudid PO 4mg Q4H PRN.   Pulmonary hypertension: On recent echo PASP 40. Likely Group III - secondary to  DARRIN and COPD  Sleep apnea: refuses to use CPAP now.   Anxiety: Psych consult; c/w Lamotrigine 100mg daily, Venlafaxin 100mg daily, Trazodone 150mg at bedtime, xanax 2mg  Depression.  Plan: As above.   Thrombocytopenia.  Plan: PLT now in 130s, in 60s in April. Prior diagnosed with ITP, required Nplate in the past    New Meds: (lasix dose and abx)    Labs: none    Images: none     This is a 71 YO F with PMH of COPD (on 2L home oxygen, PRN), DARRIN (not tolerating CPAP), ITP, Depression/Anxiety (on benzos at home), Bipolar disorder (uncertain of diagnosis), chronic knee pain (on Dilaudid at home) who presents with shortness of breath and left leg swelling. Patient found to have cellulitis as LE doppler w/o evidence of DVT and erythema improving with ABx. Patient also with signs of fluid overload cxr and lung exam, currently being diuresed with 40 lasix BID for CHF exacerbation/diuresis of pHTN. Patient is pending TTE for evaluation of EF and psych consult. Plan to DC tomorrow with oral ABx and new (affordable) inhaler script    COPD: Prednisone 40mg QD; Duoneb Q4H standing + Q2 PRN  COVID: residual infection, pending epidemiology to remove isolation  Cellulitis: Cephalexin 500 QID  Knee pain: Dilaudid PO 4mg Q4H PRN.   Pulmonary hypertension: On recent echo PASP 40. Likely Group III - secondary to  DARRIN and COPD  Sleep apnea: refuses to use CPAP now.   Anxiety: Psych consult; c/w Lamotrigine 100mg daily, Venlafaxin 100mg daily, Trazodone 150mg at bedtime, xanax 2mg  Depression.  Plan: As above.   Thrombocytopenia.  Plan: PLT now in 130s, in 60s in April. Prior diagnosed with ITP, required Nplate in the past    New Meds: Lasix 40 BID, Symbicort/Spiriva, Cephalexin 500mg QID.     Labs: none    Images: none

## 2021-06-08 NOTE — DISCHARGE NOTE PROVIDER - NSDCCAREPROVSEEN_GEN_ALL_CORE_FT
Camilo Mcqueen Camilo Mcqueen  Patient seen and examined with house-staff during bedside rounds.  Resident note read, including vitals, physical findings, laboratory data, and radiological reports.   Revisions included below.  Direct personal management at bed side and extensive interpretation of the data.  Plan was outlined and discussed in details with the housestaff.  Decision making of high complexity  Action taken for acute disease activity to reflect the level of care provided:  - medication reconciliation  - review laboratory data  Patient is clinically stable.  The CT scan did not reveal any pathology that would explain her hypoxemia.  The patient had a negative venous Doppler.  The echocardiogram did not reveal any evidence of right ventricular strain no increase in the PA pressure.  PE is unlikely at this point.  The patient will be discharged on oxygen.  Await the evaluation the right pulmonary nodule.  Patient be discharged on bronchodilator and diuretics.  Continue antibiotic for another few more days

## 2021-06-08 NOTE — DISCHARGE NOTE PROVIDER - NSDCMRMEDTOKEN_GEN_ALL_CORE_FT
ALPRAZOLAM 2 MG TABLET: take 1 tablet by mouth three times a day if needed  ATORVASTATIN CALCIUM  10 MG TABS:   furosemide 40 mg oral tablet: 1 tab(s) orally once a day  GABAPENTIN 800 MG TABLET: take 1 tablet by mouth three times a day  HYDROmorphone 4 mg oral tablet: 1 to 1.5mg tab(s) orally every 4 hours, as needed, pain MDD:6    lamoTRIgine 100 mg oral tablet, extended release: 1 tab(s) orally once a day  polyethylene glycol 3350 oral powder for reconstitution: 17 gram(s) orally once a day  Protonix 40 mg oral delayed release tablet: 1 tab(s) orally once a day   Senokot 8.6 mg oral tablet: 2 tab(s) orally once a day (at bedtime)  traZODone 100 mg oral tablet: 1 tab(s) orally once a day (at bedtime)  Trelegy Ellipta 100 mcg-62.5 mcg-25 mcg/inh inhalation powder: 1 puff(s) inhaled once a day  venlafaxine 150 mg oral tablet, extended release: 1 tab(s) orally once a day    ALPRAZOLAM 2 MG TABLET: take 1 tablet by mouth three times a day if needed  ATORVASTATIN CALCIUM  10 MG TABS:   budesonide-formoterol 80 mcg-4.5 mcg/inh inhalation aerosol: 2 puff(s) inhaled 2 times a day   furosemide 40 mg oral tablet: 1 tab(s) orally once a day  GABAPENTIN 800 MG TABLET: take 1 tablet by mouth three times a day  HYDROmorphone 4 mg oral tablet: 1 to 1.5mg tab(s) orally every 4 hours, as needed, pain MDD:6    lamoTRIgine 100 mg oral tablet, extended release: 1 tab(s) orally once a day  polyethylene glycol 3350 oral powder for reconstitution: 17 gram(s) orally once a day  Protonix 40 mg oral delayed release tablet: 1 tab(s) orally once a day   Senokot 8.6 mg oral tablet: 2 tab(s) orally once a day (at bedtime)  tiotropium 18 mcg inhalation capsule: 1 cap(s) inhaled once a day  traZODone 100 mg oral tablet: 1 tab(s) orally once a day (at bedtime)  venlafaxine 150 mg oral tablet, extended release: 1 tab(s) orally once a day    ALPRAZOLAM 2 MG TABLET: take 1 tablet by mouth three times a day if needed  ATORVASTATIN CALCIUM  10 MG TABS:   cephalexin 500 mg oral capsule: 1 cap(s) orally once a day (at bedtime)   furosemide 40 mg oral tablet: 1 tab(s) orally 2 times a day   GABAPENTIN 800 MG TABLET: take 1 tablet by mouth three times a day  HYDROmorphone 4 mg oral tablet: 1 to 1.5mg tab(s) orally every 4 hours, as needed, pain MDD:6    lamoTRIgine 100 mg oral tablet, extended release: 1 tab(s) orally once a day  polyethylene glycol 3350 oral powder for reconstitution: 17 gram(s) orally once a day  Protonix 40 mg oral delayed release tablet: 1 tab(s) orally once a day   Senokot 8.6 mg oral tablet: 2 tab(s) orally once a day (at bedtime)  traZODone 100 mg oral tablet: 1 tab(s) orally once a day (at bedtime)  venlafaxine 150 mg oral tablet, extended release: 1 tab(s) orally once a day    Advair Diskus 250 mcg-50 mcg inhalation powder: 1 puff(s) inhaled 2 times a day MDD:500 mcg  ALPRAZOLAM 2 MG TABLET: take 1 tablet by mouth three times a day if needed  ATORVASTATIN CALCIUM  10 MG TABS:   cephalexin 500 mg oral capsule: 1 cap(s) orally once a day (at bedtime)   furosemide 40 mg oral tablet: 1 tab(s) orally 2 times a day   GABAPENTIN 800 MG TABLET: take 1 tablet by mouth three times a day  HYDROmorphone 4 mg oral tablet: 1 to 1.5mg tab(s) orally every 4 hours, as needed, pain MDD:6    lamoTRIgine 100 mg oral tablet, extended release: 1 tab(s) orally once a day  polyethylene glycol 3350 oral powder for reconstitution: 17 gram(s) orally once a day  Protonix 40 mg oral delayed release tablet: 1 tab(s) orally once a day   Senokot 8.6 mg oral tablet: 2 tab(s) orally once a day (at bedtime)  traZODone 100 mg oral tablet: 1 tab(s) orally once a day (at bedtime)  venlafaxine 150 mg oral tablet, extended release: 1 tab(s) orally once a day    Advair Diskus 250 mcg-50 mcg inhalation powder: 1 puff(s) inhaled 2 times a day MDD:500 mcg  ALPRAZOLAM 2 MG TABLET: take 1 tablet by mouth three times a day if needed  ATORVASTATIN CALCIUM  10 MG TABS:   cephalexin 500 mg oral capsule: 1 cap(s) orally once a day (at bedtime)   cephalexin 500 mg oral capsule: 1 cap(s) orally 4 times a day   furosemide 40 mg oral tablet: 1 tab(s) orally 2 times a day   furosemide 40 mg oral tablet: 1 tab(s) orally 2 times a day   GABAPENTIN 800 MG TABLET: take 1 tablet by mouth three times a day  HYDROmorphone 4 mg oral tablet: 1 to 1.5mg tab(s) orally every 4 hours, as needed, pain MDD:6    lamoTRIgine 100 mg oral tablet, extended release: 1 tab(s) orally once a day  polyethylene glycol 3350 oral powder for reconstitution: 17 gram(s) orally once a day  Protonix 40 mg oral delayed release tablet: 1 tab(s) orally once a day   Senokot 8.6 mg oral tablet: 2 tab(s) orally once a day (at bedtime)  traZODone 100 mg oral tablet: 1 tab(s) orally once a day (at bedtime)  venlafaxine 150 mg oral tablet, extended release: 1 tab(s) orally once a day

## 2021-06-08 NOTE — PROGRESS NOTE ADULT - PROBLEM SELECTOR PLAN 2
- Cellulitis of lower extremity, improving with antibiotics so will continue course  - Cefazolin 2g Q8H likely for 5 days - Presenting with SOB, found to be wheezing with hypercapnic respritory failure though no wheezing after admission  - on home O2 1-2L  - c/w Prednisone 40mg QD  - c/w Duoneb Q4H standing + Q2 PRN; transition to MDI: Symbicort 2/2 + Spiriva QD   - will likely need change in her home inhalers given cost (was on Trelegy); patient not compliant at home due to cost

## 2021-06-08 NOTE — PROGRESS NOTE ADULT - PROBLEM SELECTOR PLAN 9
DVT PPX: Lovenox 40mg QD - Improve score only 1 but also acute infection, and recent COVID  GI Ppx: nill  Diet: DASH/TLC  IVF: nill   Bowel Regimen: senna + miralax i/s/o opioid use  Code: Full - PLT now in 130s, in 60s in April. Prior diagnosed with ITP, required Nplate in the past  - As per chart refractory steroids. Unclear if ever got IVIG/Rituximab  - c/w montoring
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
## 2021-06-08 NOTE — PROGRESS NOTE ADULT - PROBLEM SELECTOR PLAN 7
As above - Reports increase in anxiety and lower mood since COVID pandemic  - Had diagnosis of bipolar, but does not have psych here  - c/w Lamotrigine 100mg daily, Venlafaxin 100mg daily, Trazodone 100 midday  - will do Xanax 2mg QD PRN anxiety  - f/u psych consult

## 2021-06-08 NOTE — BEHAVIORAL HEALTH ASSESSMENT NOTE - RISK ASSESSMENT
Low Acute Suicide Risk Assessed at low acute suicide risk - no current or known past SI, future-oriented, tx seeking, engaged in outpt medical care with strong therapeutic rel'p with provider, strong social support from family, residential stability, engaged in substance tx through AA  At elevaetd chronic risk given h/o mood d/o, distant h/o suicide attempt, chronically impaired affect regulation, with h/o alcohol abuse and lack of engagement in outpt mental health care. Risk mitigated by tx of acute medical problems in hospital, provision of support, assessment for referral to outpt mentall health care

## 2021-06-08 NOTE — BEHAVIORAL HEALTH ASSESSMENT NOTE - DESCRIPTION (FIRST USE, LAST USE, QUANTITY, FREQUENCY, DURATION)
prescribed high doses of Xanax, unknown if any misuse reports history of heavy alcohol use, in remission x approx 35 years

## 2021-06-08 NOTE — PROGRESS NOTE ADULT - PROBLEM SELECTOR PLAN 1
- Presenting with SOB, found to be wheezing with hypercapnic respritory failure  - COVID+ but patient with past documented infection april '21  - c/w Prednisone 40mg QD  - c/w Duoneb Q4H standing + Q2 PRN  - there is no Trelegy on formulary, will do Symbicort 2/2 + Spiriva QD   - will likely need change in her home inhalers given cost (was on Trelegy); patient not compliant at home due to cost - Patient w/o formal history of CHF but takes lasix 40qd regularly  - cxr with pulmonary vascular congestion and bilateral lower lobe opacification; bilateral crackles and LE edema  - c/w lasix 40BID

## 2021-06-08 NOTE — DISCHARGE NOTE PROVIDER - NSDCCPCAREPLAN_GEN_ALL_CORE_FT
PRINCIPAL DISCHARGE DIAGNOSIS  Diagnosis: Cellulitis  Assessment and Plan of Treatment: You have cellulitis, this is a skin infection. Please continue to take ____ for ____ days      SECONDARY DISCHARGE DIAGNOSES  Diagnosis: COPD exacerbation  Assessment and Plan of Treatment: COPD is a lung disease that makes it hard to breathe. In people with COPD, the airways (the branching tubes that carry air within the lungs) become narrow and damaged. This makes people feel out of breath and tired. COPD can be a serious illness. It cannot be cured and it usually gets worse over time. But there are treatments that can help. The most common cause of COPD is smoking. Smoke can damage the lungs forever and cause COPD. Common symptoms include shortness of breath, wheezing, and worsening cough and mucus production. COPD can put you at an increased risk for lung infections, lung cancer and heart problems. If you smoke, the most important thing you can do for your COPD is to stop smoking. There are a lot of medicines to treat COPD. Most people use inhalers that help open up their airways or decrease swelling in the airways. Often people need more than one inhaler at a time. You might need to take a steroid medicine in a pill for a flare of COPD. If the disease gets worse, you might need to use oxygen. Pulmonary rehabilitation may be recommended and can teach you how to improve your symptoms through exercises and different ways to breathe. RARELY, people with severe COPD will have surgery to remove the most damaged parts of their lung. This surgery can reduce symptoms, but it does not always work. In order to prevent COPD exacerbations it is important that you take your prescribed medications and follow the recommendations of your primary care and pulmonary doctors. If your shortness of breath worsens or your experience an increase in or change of your sputum production you should seek evaluation by your primary care doctor, or, if severe, an emergency room.  please continue to take _____    Diagnosis: Pulmonary hypertension  Assessment and Plan of Treatment: You have a history of pulmonary hypertension, please continue to take your lasix _____    Diagnosis: Depression  Assessment and Plan of Treatment: You have a history of depression. Please continue to take your lamotrigine 100 once per day, venlaflaxine 150 once per day and trazadone 150 at bed time as prescribed. You should attempt to find an outpatient provider with the providers listen in the follow-up section.    Diagnosis: Sleep apnea  Assessment and Plan of Treatment: You have sleep apnea, please use your CPAP machine at night     PRINCIPAL DISCHARGE DIAGNOSIS  Diagnosis: Cellulitis  Assessment and Plan of Treatment: You have cellulitis, this is a skin infection. Please continue to take ____ for ____ days      SECONDARY DISCHARGE DIAGNOSES  Diagnosis: COPD exacerbation  Assessment and Plan of Treatment: COPD is a lung disease that makes it hard to breathe. In people with COPD, the airways (the branching tubes that carry air within the lungs) become narrow and damaged. This makes people feel out of breath and tired. COPD can be a serious illness. It cannot be cured and it usually gets worse over time. But there are treatments that can help. The most common cause of COPD is smoking. Smoke can damage the lungs forever and cause COPD. Common symptoms include shortness of breath, wheezing, and worsening cough and mucus production. COPD can put you at an increased risk for lung infections, lung cancer and heart problems. If you smoke, the most important thing you can do for your COPD is to stop smoking. There are a lot of medicines to treat COPD. Most people use inhalers that help open up their airways or decrease swelling in the airways. Often people need more than one inhaler at a time. You might need to take a steroid medicine in a pill for a flare of COPD. If the disease gets worse, you might need to use oxygen. Pulmonary rehabilitation may be recommended and can teach you how to improve your symptoms through exercises and different ways to breathe. RARELY, people with severe COPD will have surgery to remove the most damaged parts of their lung. This surgery can reduce symptoms, but it does not always work. In order to prevent COPD exacerbations it is important that you take your prescribed medications and follow the recommendations of your primary care and pulmonary doctors. If your shortness of breath worsens or your experience an increase in or change of your sputum production you should seek evaluation by your primary care doctor, or, if severe, an emergency room.  please continue to take _____    Diagnosis: Sleep apnea  Assessment and Plan of Treatment: You have sleep apnea, please use your CPAP machine at night    Diagnosis: Depression  Assessment and Plan of Treatment: You have a history of depression. Please continue to take your lamotrigine 100 once per day, venlaflaxine 150 once per day and trazadone 150 at bed time as prescribed. You should attempt to find an outpatient provider with the providers listen in the follow-up section.    Diagnosis: Pulmonary hypertension  Assessment and Plan of Treatment: You have a history of pulmonary hypertension, please continue to take your lasix 40mg BID.     PRINCIPAL DISCHARGE DIAGNOSIS  Diagnosis: Cellulitis  Assessment and Plan of Treatment: You have cellulitis, this is a skin infection. Please continue to take Keflex for 1 more day. Your infection is resolving very well.      SECONDARY DISCHARGE DIAGNOSES  Diagnosis: COPD exacerbation  Assessment and Plan of Treatment: COPD is a lung disease that makes it hard to breathe. In people with COPD, the airways (the branching tubes that carry air within the lungs) become narrow and damaged. This makes people feel out of breath and tired. COPD can be a serious illness. It cannot be cured and it usually gets worse over time. But there are treatments that can help. The most common cause of COPD is smoking. Smoke can damage the lungs forever and cause COPD. Common symptoms include shortness of breath, wheezing, and worsening cough and mucus production. COPD can put you at an increased risk for lung infections, lung cancer and heart problems. If you smoke, the most important thing you can do for your COPD is to stop smoking. There are a lot of medicines to treat COPD. Most people use inhalers that help open up their airways or decrease swelling in the airways. Often people need more than one inhaler at a time. You might need to take a steroid medicine in a pill for a flare of COPD. If the disease gets worse, you might need to use oxygen. Pulmonary rehabilitation may be recommended and can teach you how to improve your symptoms through exercises and different ways to breathe. RARELY, people with severe COPD will have surgery to remove the most damaged parts of their lung. This surgery can reduce symptoms, but it does not always work. In order to prevent COPD exacerbations it is important that you take your prescribed medications and follow the recommendations of your primary care and pulmonary doctors. If your shortness of breath worsens or your experience an increase in or change of your sputum production you should seek evaluation by your primary care doctor, or, if severe, an emergency room.  please continue to take your spiriva and symbicort    Diagnosis: Sleep apnea  Assessment and Plan of Treatment: You have sleep apnea, please use your CPAP machine at night    Diagnosis: Depression  Assessment and Plan of Treatment: You have a history of depression. Please continue to take your lamotrigine 100 once per day, venlaflaxine 150 once per day and trazadone 150 at bed time as prescribed. You should attempt to find an outpatient provider with the providers listen in the follow-up section.    Diagnosis: Pulmonary hypertension  Assessment and Plan of Treatment: You have a history of pulmonary hypertension, please continue to take your lasix 40mg BID.

## 2021-06-08 NOTE — PROGRESS NOTE ADULT - SUBJECTIVE AND OBJECTIVE BOX
INTERVAL HPI/OVERNIGHT EVENTS:    SUBJECTIVE:   Patient seen and examined at bedside.    OBJECTIVE:    VITAL SIGNS:  ICU Vital Signs Last 24 Hrs  T(C): 36.2 (08 Jun 2021 06:02), Max: 36.9 (07 Jun 2021 23:58)  T(F): 97.2 (08 Jun 2021 06:02), Max: 98.4 (07 Jun 2021 23:58)  HR: 86 (08 Jun 2021 06:02) (69 - 98)  BP: 112/72 (08 Jun 2021 06:02) (112/62 - 130/80)  BP(mean): --  ABP: --  ABP(mean): --  RR: 14 (08 Jun 2021 06:02) (14 - 24)  SpO2: 94% (07 Jun 2021 18:14) (75% - 95%)        06-07 @ 07:01  -  06-08 @ 07:00  --------------------------------------------------------  IN: 500 mL / OUT: 0 mL / NET: 500 mL      CAPILLARY BLOOD GLUCOSE          PHYSICAL EXAM:    General: NAD  HEENT: NC/AT; PERRL, clear conjunctiva  Neck: Supple.  Respiratory: CTA b/l. No wheezes, rhonchi, rales.  Cardiovascular: +S1/S2; RRR  Abdomen: soft, NT/ND; +BS x4  Extremities: WWP, 2+ peripheral pulses b/l; no LE edema  Skin: normal color and turgor; no rash  Neurological:     MEDICATIONS:  MEDICATIONS  (STANDING):  albuterol/ipratropium for Nebulization 3 milliLiter(s) Nebulizer every 4 hours  atorvastatin 10 milliGRAM(s) Oral at bedtime  budesonide  80 MICROgram(s)/formoterol 4.5 MICROgram(s) Inhaler 2 Puff(s) Inhalation two times a day  ceFAZolin  Injectable. 2000 milliGRAM(s) IV Push every 8 hours  enoxaparin Injectable 40 milliGRAM(s) SubCutaneous daily  gabapentin 800 milliGRAM(s) Oral three times a day  lamoTRIgine 100 milliGRAM(s) Oral daily  pantoprazole    Tablet 40 milliGRAM(s) Oral before breakfast  polyethylene glycol 3350 17 Gram(s) Oral daily  predniSONE   Tablet 40 milliGRAM(s) Oral daily  senna 2 Tablet(s) Oral at bedtime  tiotropium 18 MICROgram(s) Capsule 1 Capsule(s) Inhalation daily  traZODone 100 milliGRAM(s) Oral at bedtime  venlafaxine XR. 150 milliGRAM(s) Oral daily    MEDICATIONS  (PRN):  albuterol/ipratropium for Nebulization 3 milliLiter(s) Nebulizer every 2 hours PRN Shortness of Breath  ALPRAZolam 2 milliGRAM(s) Oral daily PRN anxiety  HYDROmorphone   Tablet 4 milliGRAM(s) Oral every 4 hours PRN Severe Pain (7 - 10)      ALLERGIES:  Allergies    iodine (Anaphylaxis)  IV Contrast (Anaphylaxis)    Intolerances        LABS:                        13.9   8.72  )-----------( 134      ( 07 Jun 2021 18:32 )             46.0     06-07    141  |  97  |  11  ----------------------------<  107<H>  4.6   |  32<H>  |  0.62    Ca    9.6      07 Jun 2021 18:32    TPro  7.3  /  Alb  4.0  /  TBili  0.2  /  DBili  x   /  AST  22  /  ALT  15  /  AlkPhos  72  06-07          RADIOLOGY & ADDITIONAL TESTS: Reviewed. This is a 69 YO F with PMH of COPD (on 2L home oxygen, PRN), DARRIN (not tolerating CPAP), ITP, Depression/Anxiety (on benzos at home), Bipolar disorder (uncertain of diagnosis), chronic knee pain (on Dilaudid at home) who presents with shortness of breath and left leg swelling. Patient found to have cellulitis as LE doppler w/o evidence of DVT and erythema improving with ABx. Patient also with signs of fluid overload cxr and lung exam, currently being diuresed with 40 lasix BID for CHF exacerbation/diuresis of pHTN. Patient is pending     INTERVAL HPI/OVERNIGHT EVENTS: No acute events    SUBJECTIVE:   Patient seen and examined at bedside.    OBJECTIVE:    VITAL SIGNS:  ICU Vital Signs Last 24 Hrs  T(C): 36.2 (08 Jun 2021 06:02), Max: 36.9 (07 Jun 2021 23:58)  T(F): 97.2 (08 Jun 2021 06:02), Max: 98.4 (07 Jun 2021 23:58)  HR: 86 (08 Jun 2021 06:02) (69 - 98)  BP: 112/72 (08 Jun 2021 06:02) (112/62 - 130/80)  BP(mean): --  ABP: --  ABP(mean): --  RR: 14 (08 Jun 2021 06:02) (14 - 24)  SpO2: 94% (07 Jun 2021 18:14) (75% - 95%)        06-07 @ 07:01  -  06-08 @ 07:00  --------------------------------------------------------  IN: 500 mL / OUT: 0 mL / NET: 500 mL      CAPILLARY BLOOD GLUCOSE          PHYSICAL EXAM:    General: NAD  HEENT: NC/AT; PERRL, clear conjunctiva  Neck: Supple.  Respiratory: CTA b/l. No wheezes, rhonchi, rales.  Cardiovascular: +S1/S2; RRR  Abdomen: soft, NT/ND; +BS x4  Extremities: WWP, 2+ peripheral pulses b/l; no LE edema  Skin: normal color and turgor; no rash  Neurological:     MEDICATIONS:  MEDICATIONS  (STANDING):  albuterol/ipratropium for Nebulization 3 milliLiter(s) Nebulizer every 4 hours  atorvastatin 10 milliGRAM(s) Oral at bedtime  budesonide  80 MICROgram(s)/formoterol 4.5 MICROgram(s) Inhaler 2 Puff(s) Inhalation two times a day  ceFAZolin  Injectable. 2000 milliGRAM(s) IV Push every 8 hours  enoxaparin Injectable 40 milliGRAM(s) SubCutaneous daily  gabapentin 800 milliGRAM(s) Oral three times a day  lamoTRIgine 100 milliGRAM(s) Oral daily  pantoprazole    Tablet 40 milliGRAM(s) Oral before breakfast  polyethylene glycol 3350 17 Gram(s) Oral daily  predniSONE   Tablet 40 milliGRAM(s) Oral daily  senna 2 Tablet(s) Oral at bedtime  tiotropium 18 MICROgram(s) Capsule 1 Capsule(s) Inhalation daily  traZODone 100 milliGRAM(s) Oral at bedtime  venlafaxine XR. 150 milliGRAM(s) Oral daily    MEDICATIONS  (PRN):  albuterol/ipratropium for Nebulization 3 milliLiter(s) Nebulizer every 2 hours PRN Shortness of Breath  ALPRAZolam 2 milliGRAM(s) Oral daily PRN anxiety  HYDROmorphone   Tablet 4 milliGRAM(s) Oral every 4 hours PRN Severe Pain (7 - 10)      ALLERGIES:  Allergies    iodine (Anaphylaxis)  IV Contrast (Anaphylaxis)    Intolerances        LABS:                        13.9   8.72  )-----------( 134      ( 07 Jun 2021 18:32 )             46.0     06-07    141  |  97  |  11  ----------------------------<  107<H>  4.6   |  32<H>  |  0.62    Ca    9.6      07 Jun 2021 18:32    TPro  7.3  /  Alb  4.0  /  TBili  0.2  /  DBili  x   /  AST  22  /  ALT  15  /  AlkPhos  72  06-07          RADIOLOGY & ADDITIONAL TESTS: Reviewed. This is a 69 YO F with PMH of COPD (on 2L home oxygen, PRN), DARRIN (not tolerating CPAP), ITP, Depression/Anxiety (on benzos at home), Bipolar disorder (uncertain of diagnosis), chronic knee pain (on Dilaudid at home) who presents with shortness of breath and left leg swelling. Patient found to have cellulitis as LE doppler w/o evidence of DVT and erythema improving with ABx. Patient also with signs of fluid overload cxr and lung exam, currently being diuresed with 40 lasix BID for CHF exacerbation/diuresis of pHTN.     INTERVAL HPI/OVERNIGHT EVENTS: No acute events    SUBJECTIVE:   Patient seen and examined at bedside.    OBJECTIVE:    VITAL SIGNS:  ICU Vital Signs Last 24 Hrs  T(C): 36.2 (08 Jun 2021 06:02), Max: 36.9 (07 Jun 2021 23:58)  T(F): 97.2 (08 Jun 2021 06:02), Max: 98.4 (07 Jun 2021 23:58)  HR: 86 (08 Jun 2021 06:02) (69 - 98)  BP: 112/72 (08 Jun 2021 06:02) (112/62 - 130/80)  BP(mean): --  ABP: --  ABP(mean): --  RR: 14 (08 Jun 2021 06:02) (14 - 24)  SpO2: 94% (07 Jun 2021 18:14) (75% - 95%)        06-07 @ 07:01  -  06-08 @ 07:00  --------------------------------------------------------  IN: 500 mL / OUT: 0 mL / NET: 500 mL      CAPILLARY BLOOD GLUCOSE          PHYSICAL EXAM:    General: NAD  HEENT: NC/AT; PERRL, clear conjunctiva  Neck: Supple.  Respiratory: CTA b/l. No wheezes, rhonchi, rales.  Cardiovascular: +S1/S2; RRR  Abdomen: soft, NT/ND; +BS x4  Extremities: WWP, 2+ peripheral pulses b/l; no LE edema  Skin: normal color and turgor; no rash  Neurological:     MEDICATIONS:  MEDICATIONS  (STANDING):  albuterol/ipratropium for Nebulization 3 milliLiter(s) Nebulizer every 4 hours  atorvastatin 10 milliGRAM(s) Oral at bedtime  budesonide  80 MICROgram(s)/formoterol 4.5 MICROgram(s) Inhaler 2 Puff(s) Inhalation two times a day  ceFAZolin  Injectable. 2000 milliGRAM(s) IV Push every 8 hours  enoxaparin Injectable 40 milliGRAM(s) SubCutaneous daily  gabapentin 800 milliGRAM(s) Oral three times a day  lamoTRIgine 100 milliGRAM(s) Oral daily  pantoprazole    Tablet 40 milliGRAM(s) Oral before breakfast  polyethylene glycol 3350 17 Gram(s) Oral daily  predniSONE   Tablet 40 milliGRAM(s) Oral daily  senna 2 Tablet(s) Oral at bedtime  tiotropium 18 MICROgram(s) Capsule 1 Capsule(s) Inhalation daily  traZODone 100 milliGRAM(s) Oral at bedtime  venlafaxine XR. 150 milliGRAM(s) Oral daily    MEDICATIONS  (PRN):  albuterol/ipratropium for Nebulization 3 milliLiter(s) Nebulizer every 2 hours PRN Shortness of Breath  ALPRAZolam 2 milliGRAM(s) Oral daily PRN anxiety  HYDROmorphone   Tablet 4 milliGRAM(s) Oral every 4 hours PRN Severe Pain (7 - 10)      ALLERGIES:  Allergies    iodine (Anaphylaxis)  IV Contrast (Anaphylaxis)    Intolerances        LABS:                        13.9   8.72  )-----------( 134      ( 07 Jun 2021 18:32 )             46.0     06-07    141  |  97  |  11  ----------------------------<  107<H>  4.6   |  32<H>  |  0.62    Ca    9.6      07 Jun 2021 18:32    TPro  7.3  /  Alb  4.0  /  TBili  0.2  /  DBili  x   /  AST  22  /  ALT  15  /  AlkPhos  72  06-07          RADIOLOGY & ADDITIONAL TESTS: Reviewed.

## 2021-06-08 NOTE — BEHAVIORAL HEALTH ASSESSMENT NOTE - DIFFERENTIAL
Unspecified mood d/o, possible chronic MDD v. bipolar II  possible chronic/complex PTSD  cluster B personality traits Unspecified mood d/o, possible chronic MDD v. bipolar II  possible chronic/complex PTSD  cluster B personality traits  alcohol use disorder in sustained remission by history

## 2021-06-09 LAB
ANION GAP SERPL CALC-SCNC: 9 MMOL/L — SIGNIFICANT CHANGE UP (ref 5–17)
BUN SERPL-MCNC: 16 MG/DL — SIGNIFICANT CHANGE UP (ref 7–23)
CALCIUM SERPL-MCNC: 9.3 MG/DL — SIGNIFICANT CHANGE UP (ref 8.4–10.5)
CHLORIDE SERPL-SCNC: 99 MMOL/L — SIGNIFICANT CHANGE UP (ref 96–108)
CO2 SERPL-SCNC: 33 MMOL/L — HIGH (ref 22–31)
CREAT SERPL-MCNC: 0.62 MG/DL — SIGNIFICANT CHANGE UP (ref 0.5–1.3)
GLUCOSE SERPL-MCNC: 130 MG/DL — HIGH (ref 70–99)
HCT VFR BLD CALC: 41.1 % — SIGNIFICANT CHANGE UP (ref 34.5–45)
HGB BLD-MCNC: 12.4 G/DL — SIGNIFICANT CHANGE UP (ref 11.5–15.5)
MAGNESIUM SERPL-MCNC: 2.3 MG/DL — SIGNIFICANT CHANGE UP (ref 1.6–2.6)
MCHC RBC-ENTMCNC: 29.2 PG — SIGNIFICANT CHANGE UP (ref 27–34)
MCHC RBC-ENTMCNC: 30.2 GM/DL — LOW (ref 32–36)
MCV RBC AUTO: 96.7 FL — SIGNIFICANT CHANGE UP (ref 80–100)
NRBC # BLD: 0 /100 WBCS — SIGNIFICANT CHANGE UP (ref 0–0)
PHOSPHATE SERPL-MCNC: 4.4 MG/DL — SIGNIFICANT CHANGE UP (ref 2.5–4.5)
PLATELET # BLD AUTO: 104 K/UL — LOW (ref 150–400)
POTASSIUM SERPL-MCNC: 4.5 MMOL/L — SIGNIFICANT CHANGE UP (ref 3.5–5.3)
POTASSIUM SERPL-SCNC: 4.5 MMOL/L — SIGNIFICANT CHANGE UP (ref 3.5–5.3)
RBC # BLD: 4.25 M/UL — SIGNIFICANT CHANGE UP (ref 3.8–5.2)
RBC # FLD: 13.3 % — SIGNIFICANT CHANGE UP (ref 10.3–14.5)
SODIUM SERPL-SCNC: 141 MMOL/L — SIGNIFICANT CHANGE UP (ref 135–145)
WBC # BLD: 8.59 K/UL — SIGNIFICANT CHANGE UP (ref 3.8–10.5)
WBC # FLD AUTO: 8.59 K/UL — SIGNIFICANT CHANGE UP (ref 3.8–10.5)

## 2021-06-09 PROCEDURE — 99232 SBSQ HOSP IP/OBS MODERATE 35: CPT | Mod: GC

## 2021-06-09 PROCEDURE — 93306 TTE W/DOPPLER COMPLETE: CPT | Mod: 26

## 2021-06-09 RX ORDER — ALPRAZOLAM 0.25 MG
2 TABLET ORAL EVERY 8 HOURS
Refills: 0 | Status: DISCONTINUED | OUTPATIENT
Start: 2021-06-09 | End: 2021-06-11

## 2021-06-09 RX ORDER — TIOTROPIUM BROMIDE 18 UG/1
1 CAPSULE ORAL; RESPIRATORY (INHALATION)
Qty: 1 | Refills: 2
Start: 2021-06-09 | End: 2021-09-06

## 2021-06-09 RX ORDER — BUDESONIDE AND FORMOTEROL FUMARATE DIHYDRATE 160; 4.5 UG/1; UG/1
2 AEROSOL RESPIRATORY (INHALATION)
Qty: 1 | Refills: 2
Start: 2021-06-09 | End: 2021-09-06

## 2021-06-09 RX ORDER — FLUTICASONE FUROATE, UMECLIDINIUM BROMIDE AND VILANTEROL TRIFENATATE 200; 62.5; 25 UG/1; UG/1; UG/1
1 POWDER RESPIRATORY (INHALATION)
Qty: 0 | Refills: 0 | DISCHARGE

## 2021-06-09 RX ADMIN — Medication 3 MILLILITER(S): at 06:05

## 2021-06-09 RX ADMIN — Medication 2000 MILLIGRAM(S): at 17:39

## 2021-06-09 RX ADMIN — Medication 3 MILLILITER(S): at 14:06

## 2021-06-09 RX ADMIN — Medication 2000 MILLIGRAM(S): at 01:07

## 2021-06-09 RX ADMIN — BUDESONIDE AND FORMOTEROL FUMARATE DIHYDRATE 2 PUFF(S): 160; 4.5 AEROSOL RESPIRATORY (INHALATION) at 22:29

## 2021-06-09 RX ADMIN — Medication 3 MILLILITER(S): at 17:40

## 2021-06-09 RX ADMIN — Medication 2 MILLIGRAM(S): at 22:43

## 2021-06-09 RX ADMIN — ENOXAPARIN SODIUM 40 MILLIGRAM(S): 100 INJECTION SUBCUTANEOUS at 14:05

## 2021-06-09 RX ADMIN — GABAPENTIN 800 MILLIGRAM(S): 400 CAPSULE ORAL at 22:29

## 2021-06-09 RX ADMIN — POLYETHYLENE GLYCOL 3350 17 GRAM(S): 17 POWDER, FOR SOLUTION ORAL at 06:05

## 2021-06-09 RX ADMIN — Medication 2 MILLIGRAM(S): at 07:12

## 2021-06-09 RX ADMIN — PANTOPRAZOLE SODIUM 40 MILLIGRAM(S): 20 TABLET, DELAYED RELEASE ORAL at 06:05

## 2021-06-09 RX ADMIN — Medication 150 MILLIGRAM(S): at 22:30

## 2021-06-09 RX ADMIN — SENNA PLUS 2 TABLET(S): 8.6 TABLET ORAL at 22:29

## 2021-06-09 RX ADMIN — Medication 40 MILLIGRAM(S): at 06:05

## 2021-06-09 RX ADMIN — Medication 40 MILLIGRAM(S): at 17:40

## 2021-06-09 RX ADMIN — Medication 150 MILLIGRAM(S): at 14:06

## 2021-06-09 RX ADMIN — GABAPENTIN 800 MILLIGRAM(S): 400 CAPSULE ORAL at 14:06

## 2021-06-09 RX ADMIN — Medication 3 MILLILITER(S): at 22:29

## 2021-06-09 RX ADMIN — Medication 2000 MILLIGRAM(S): at 09:27

## 2021-06-09 RX ADMIN — TIOTROPIUM BROMIDE 1 CAPSULE(S): 18 CAPSULE ORAL; RESPIRATORY (INHALATION) at 09:30

## 2021-06-09 RX ADMIN — ATORVASTATIN CALCIUM 10 MILLIGRAM(S): 80 TABLET, FILM COATED ORAL at 22:30

## 2021-06-09 RX ADMIN — Medication 2 MILLIGRAM(S): at 14:41

## 2021-06-09 RX ADMIN — LAMOTRIGINE 100 MILLIGRAM(S): 25 TABLET, ORALLY DISINTEGRATING ORAL at 14:06

## 2021-06-09 RX ADMIN — GABAPENTIN 800 MILLIGRAM(S): 400 CAPSULE ORAL at 06:05

## 2021-06-09 RX ADMIN — BUDESONIDE AND FORMOTEROL FUMARATE DIHYDRATE 2 PUFF(S): 160; 4.5 AEROSOL RESPIRATORY (INHALATION) at 09:30

## 2021-06-09 NOTE — PROGRESS NOTE ADULT - PROBLEM SELECTOR PLAN 5
- On recent echo PASP 40  - S/P [facility] in 2018 - "Findings: pulmonary Hypertension was not severe and there was no evidence of cardiac shunt"  - c/w diuretics 40bid transition back to 40 lasix once more euvolemic  - rpt ECHO

## 2021-06-09 NOTE — PROGRESS NOTE ADULT - PROBLEM SELECTOR PLAN 7
- Reports increase in anxiety and lower mood since COVID pandemic  - Had diagnosis of bipolar, but does not have psych here  - c/w Lamotrigine 100mg daily, Venlafaxin 100mg daily, Trazodone 100 midday as per psych consult   - will do Xanax 2mg QD PRN anxiety

## 2021-06-09 NOTE — PROGRESS NOTE ADULT - SUBJECTIVE AND OBJECTIVE BOX
KENNEY BROWN, 70y, Female  MRN-[id number]  Patient is a 70y old  Female who presents with a chief complaint of Cellulitis + COPDE (08 Jun 2021 12:09)      OVERNIGHT EVENTS:     SUBJECTIVE:    12 Point ROS Negative unless noted otherwise above.  -------------------------------------------------------------------------------  VITAL SIGNS:  Vital Signs Last 24 Hrs  T(C): 36.5 (09 Jun 2021 06:28), Max: 37.3 (08 Jun 2021 20:02)  T(F): 97.7 (09 Jun 2021 06:28), Max: 99.1 (08 Jun 2021 20:02)  HR: 91 (09 Jun 2021 06:28) (81 - 91)  BP: 151/88 (09 Jun 2021 06:28) (123/69 - 151/88)  BP(mean): --  RR: 18 (09 Jun 2021 06:28) (16 - 19)  SpO2: 90% (09 Jun 2021 06:28) (88% - 94%)  I&O's Summary      PHYSICAL EXAM:    General: NAD, well developed  HEENT: NC/AT; EOMI, PERRLA, anicteric sclera; moist mucosal membranes.  Neck: supple, trachea midline  Cardiovascular: RRR, +S1/S2; NO M/R/G  Respiratory: CTA B/L; no W/R/R  Gastrointestinal: soft, NT/ND; +BSx4  Extremities: WWP; no edema or cyanosis  Vascular: 2+ radial, DP/PT pulses B/L  Neurological: AAOx3; no focal deficits    ALLERGIES:  Allergies    iodine (Anaphylaxis)  IV Contrast (Anaphylaxis)    Intolerances        MEDICATIONS:  MEDICATIONS  (STANDING):  albuterol/ipratropium for Nebulization 3 milliLiter(s) Nebulizer every 4 hours  atorvastatin 10 milliGRAM(s) Oral at bedtime  budesonide  80 MICROgram(s)/formoterol 4.5 MICROgram(s) Inhaler 2 Puff(s) Inhalation two times a day  ceFAZolin  Injectable. 2000 milliGRAM(s) IV Push every 8 hours  enoxaparin Injectable 40 milliGRAM(s) SubCutaneous daily  furosemide    Tablet 40 milliGRAM(s) Oral two times a day  gabapentin 800 milliGRAM(s) Oral three times a day  lamoTRIgine 100 milliGRAM(s) Oral daily  pantoprazole    Tablet 40 milliGRAM(s) Oral before breakfast  polyethylene glycol 3350 17 Gram(s) Oral two times a day  predniSONE   Tablet 40 milliGRAM(s) Oral daily  senna 2 Tablet(s) Oral at bedtime  tiotropium 18 MICROgram(s) Capsule 1 Capsule(s) Inhalation daily  traZODone 150 milliGRAM(s) Oral at bedtime  venlafaxine XR. 150 milliGRAM(s) Oral daily    MEDICATIONS  (PRN):  albuterol/ipratropium for Nebulization 3 milliLiter(s) Nebulizer every 2 hours PRN Shortness of Breath  ALPRAZolam 2 milliGRAM(s) Oral daily PRN anxiety  HYDROmorphone   Tablet 4 milliGRAM(s) Oral every 4 hours PRN Severe Pain (7 - 10)      -------------------------------------------------------------------------------  LABS:                        12.4   8.59  )-----------( 104      ( 09 Jun 2021 06:21 )             41.1     06-09    141  |  99  |  16  ----------------------------<  130<H>  4.5   |  33<H>  |  0.62    Ca    9.3      09 Jun 2021 06:21  Phos  4.4     06-09  Mg     2.3     06-09    TPro  7.3  /  Alb  4.0  /  TBili  0.2  /  DBili  x   /  AST  22  /  ALT  15  /  AlkPhos  72  06-07    LIVER FUNCTIONS - ( 07 Jun 2021 18:32 )  Alb: 4.0 g/dL / Pro: 7.3 g/dL / ALK PHOS: 72 U/L / ALT: 15 U/L / AST: 22 U/L / GGT: x               CAPILLARY BLOOD GLUCOSE          Culture - Blood (collected 07 Jun 2021 21:14)  Source: .Blood Blood-Peripheral  Preliminary Report (08 Jun 2021 22:01):    No growth at 1 day.    Culture - Blood (collected 07 Jun 2021 21:14)  Source: .Blood Blood-Peripheral  Preliminary Report (08 Jun 2021 22:01):    No growth at 1 day.      COVID-19 PCR: Positive (07 Jun 2021 18:39)  COVID-19 PCR: Detected (05 Apr 2021 13:44)      RADIOLOGY & ADDITIONAL TESTS: Reviewed.       KENNEY BROWN, 70y, Female  MRN-[id number]  Patient is a 70y old  Female who presents with a chief complaint of Cellulitis + COPDE (08 Jun 2021 12:09)      OVERNIGHT EVENTS: CXR unchanged from previous. Weaned O2 down back to 2L NC.     SUBJECTIVE: Pt seen and examined at bedside this AM. Feels well however would not like to go home today. Would like to go home tomorrow.     12 Point ROS Negative unless noted otherwise above.  -------------------------------------------------------------------------------  VITAL SIGNS:  Vital Signs Last 24 Hrs  T(C): 36.5 (09 Jun 2021 06:28), Max: 37.3 (08 Jun 2021 20:02)  T(F): 97.7 (09 Jun 2021 06:28), Max: 99.1 (08 Jun 2021 20:02)  HR: 91 (09 Jun 2021 06:28) (81 - 91)  BP: 151/88 (09 Jun 2021 06:28) (123/69 - 151/88)  BP(mean): --  RR: 18 (09 Jun 2021 06:28) (16 - 19)  SpO2: 90% (09 Jun 2021 06:28) (88% - 94%)  I&O's Summary      PHYSICAL EXAM:    General: NAD  HEENT: NC/AT; PERRL, clear conjunctiva  Neck: Supple.  Respiratory: CTA b/l. No wheezes, rhonchi, rales.  Cardiovascular: +S1/S2; RRR  Abdomen: soft, NT/ND; +BS x4  Extremities: WWP, 2+ peripheral pulses b/l; no LE edema  Skin: normal color and turgor; no rash      ALLERGIES:  Allergies    iodine (Anaphylaxis)  IV Contrast (Anaphylaxis)    Intolerances        MEDICATIONS:  MEDICATIONS  (STANDING):  albuterol/ipratropium for Nebulization 3 milliLiter(s) Nebulizer every 4 hours  atorvastatin 10 milliGRAM(s) Oral at bedtime  budesonide  80 MICROgram(s)/formoterol 4.5 MICROgram(s) Inhaler 2 Puff(s) Inhalation two times a day  ceFAZolin  Injectable. 2000 milliGRAM(s) IV Push every 8 hours  enoxaparin Injectable 40 milliGRAM(s) SubCutaneous daily  furosemide    Tablet 40 milliGRAM(s) Oral two times a day  gabapentin 800 milliGRAM(s) Oral three times a day  lamoTRIgine 100 milliGRAM(s) Oral daily  pantoprazole    Tablet 40 milliGRAM(s) Oral before breakfast  polyethylene glycol 3350 17 Gram(s) Oral two times a day  predniSONE   Tablet 40 milliGRAM(s) Oral daily  senna 2 Tablet(s) Oral at bedtime  tiotropium 18 MICROgram(s) Capsule 1 Capsule(s) Inhalation daily  traZODone 150 milliGRAM(s) Oral at bedtime  venlafaxine XR. 150 milliGRAM(s) Oral daily    MEDICATIONS  (PRN):  albuterol/ipratropium for Nebulization 3 milliLiter(s) Nebulizer every 2 hours PRN Shortness of Breath  ALPRAZolam 2 milliGRAM(s) Oral daily PRN anxiety  HYDROmorphone   Tablet 4 milliGRAM(s) Oral every 4 hours PRN Severe Pain (7 - 10)      -------------------------------------------------------------------------------  LABS:                        12.4   8.59  )-----------( 104      ( 09 Jun 2021 06:21 )             41.1     06-09    141  |  99  |  16  ----------------------------<  130<H>  4.5   |  33<H>  |  0.62    Ca    9.3      09 Jun 2021 06:21  Phos  4.4     06-09  Mg     2.3     06-09    TPro  7.3  /  Alb  4.0  /  TBili  0.2  /  DBili  x   /  AST  22  /  ALT  15  /  AlkPhos  72  06-07    LIVER FUNCTIONS - ( 07 Jun 2021 18:32 )  Alb: 4.0 g/dL / Pro: 7.3 g/dL / ALK PHOS: 72 U/L / ALT: 15 U/L / AST: 22 U/L / GGT: x               CAPILLARY BLOOD GLUCOSE          Culture - Blood (collected 07 Jun 2021 21:14)  Source: .Blood Blood-Peripheral  Preliminary Report (08 Jun 2021 22:01):    No growth at 1 day.    Culture - Blood (collected 07 Jun 2021 21:14)  Source: .Blood Blood-Peripheral  Preliminary Report (08 Jun 2021 22:01):    No growth at 1 day.      COVID-19 PCR: Positive (07 Jun 2021 18:39)  COVID-19 PCR: Detected (05 Apr 2021 13:44)      RADIOLOGY & ADDITIONAL TESTS: Reviewed.

## 2021-06-09 NOTE — PROGRESS NOTE ADULT - PROBLEM SELECTOR PLAN 4
- She had multiple knee surgeries in the past and is chronic pain  - c/w home Dilaudid PO 4mg Q4H PRN

## 2021-06-09 NOTE — PROGRESS NOTE ADULT - PROBLEM SELECTOR PLAN 9
- PLT now in 130s, in 60s in April. Prior diagnosed with ITP, required Nplate in the past  - As per chart refractory steroids. Unclear if ever got IVIG/Rituximab  - c/w montoring

## 2021-06-09 NOTE — PROGRESS NOTE ADULT - PROBLEM SELECTOR PLAN 2
- Presenting with SOB, found to be wheezing with hypercapnic respiratory failure on admission though no wheezing after admission  - on home O2 1-2L  - c/w Prednisone 40mg QD  - c/w Duoneb Q4H standing + Q2 PRN; transition to MDI: Symbicort 2/2 + Spiriva QD   - will likely need change in her home inhalers given cost (was on Trelegy); patient not compliant at home due to cost

## 2021-06-09 NOTE — PROGRESS NOTE ADULT - PROBLEM SELECTOR PLAN 1
- Patient w/o formal history of CHF but takes lasix 40qd regularly  - cxr with pulmonary vascular congestion and bilateral lower lobe opacification; bilateral crackles and LE edema  - c/w lasix 40BID  - c/w 2L NC home O2.

## 2021-06-10 LAB
ANION GAP SERPL CALC-SCNC: 13 MMOL/L — SIGNIFICANT CHANGE UP (ref 5–17)
BUN SERPL-MCNC: 14 MG/DL — SIGNIFICANT CHANGE UP (ref 7–23)
CALCIUM SERPL-MCNC: 9.7 MG/DL — SIGNIFICANT CHANGE UP (ref 8.4–10.5)
CHLORIDE SERPL-SCNC: 93 MMOL/L — LOW (ref 96–108)
CO2 SERPL-SCNC: 34 MMOL/L — HIGH (ref 22–31)
CREAT SERPL-MCNC: 0.71 MG/DL — SIGNIFICANT CHANGE UP (ref 0.5–1.3)
GLUCOSE SERPL-MCNC: 137 MG/DL — HIGH (ref 70–99)
HCT VFR BLD CALC: 45.9 % — HIGH (ref 34.5–45)
HGB BLD-MCNC: 14 G/DL — SIGNIFICANT CHANGE UP (ref 11.5–15.5)
MAGNESIUM SERPL-MCNC: 2 MG/DL — SIGNIFICANT CHANGE UP (ref 1.6–2.6)
MCHC RBC-ENTMCNC: 29.7 PG — SIGNIFICANT CHANGE UP (ref 27–34)
MCHC RBC-ENTMCNC: 30.5 GM/DL — LOW (ref 32–36)
MCV RBC AUTO: 97.5 FL — SIGNIFICANT CHANGE UP (ref 80–100)
NRBC # BLD: 0 /100 WBCS — SIGNIFICANT CHANGE UP (ref 0–0)
PHOSPHATE SERPL-MCNC: 4.4 MG/DL — SIGNIFICANT CHANGE UP (ref 2.5–4.5)
PLATELET # BLD AUTO: 105 K/UL — LOW (ref 150–400)
POTASSIUM SERPL-MCNC: 4 MMOL/L — SIGNIFICANT CHANGE UP (ref 3.5–5.3)
POTASSIUM SERPL-SCNC: 4 MMOL/L — SIGNIFICANT CHANGE UP (ref 3.5–5.3)
RBC # BLD: 4.71 M/UL — SIGNIFICANT CHANGE UP (ref 3.8–5.2)
RBC # FLD: 13.2 % — SIGNIFICANT CHANGE UP (ref 10.3–14.5)
SODIUM SERPL-SCNC: 140 MMOL/L — SIGNIFICANT CHANGE UP (ref 135–145)
WBC # BLD: 10.05 K/UL — SIGNIFICANT CHANGE UP (ref 3.8–10.5)
WBC # FLD AUTO: 10.05 K/UL — SIGNIFICANT CHANGE UP (ref 3.8–10.5)

## 2021-06-10 PROCEDURE — 99232 SBSQ HOSP IP/OBS MODERATE 35: CPT | Mod: GC

## 2021-06-10 PROCEDURE — 71250 CT THORAX DX C-: CPT | Mod: 26

## 2021-06-10 RX ADMIN — Medication 150 MILLIGRAM(S): at 13:02

## 2021-06-10 RX ADMIN — POLYETHYLENE GLYCOL 3350 17 GRAM(S): 17 POWDER, FOR SOLUTION ORAL at 16:59

## 2021-06-10 RX ADMIN — GABAPENTIN 800 MILLIGRAM(S): 400 CAPSULE ORAL at 22:29

## 2021-06-10 RX ADMIN — Medication 40 MILLIGRAM(S): at 17:01

## 2021-06-10 RX ADMIN — Medication 2 MILLIGRAM(S): at 16:58

## 2021-06-10 RX ADMIN — Medication 1 ENEMA: at 10:50

## 2021-06-10 RX ADMIN — Medication 2 MILLIGRAM(S): at 08:14

## 2021-06-10 RX ADMIN — GABAPENTIN 800 MILLIGRAM(S): 400 CAPSULE ORAL at 05:43

## 2021-06-10 RX ADMIN — SENNA PLUS 2 TABLET(S): 8.6 TABLET ORAL at 22:29

## 2021-06-10 RX ADMIN — POLYETHYLENE GLYCOL 3350 17 GRAM(S): 17 POWDER, FOR SOLUTION ORAL at 05:44

## 2021-06-10 RX ADMIN — Medication 3 MILLILITER(S): at 05:43

## 2021-06-10 RX ADMIN — Medication 40 MILLIGRAM(S): at 05:43

## 2021-06-10 RX ADMIN — Medication 2000 MILLIGRAM(S): at 16:58

## 2021-06-10 RX ADMIN — LAMOTRIGINE 100 MILLIGRAM(S): 25 TABLET, ORALLY DISINTEGRATING ORAL at 13:01

## 2021-06-10 RX ADMIN — Medication 2000 MILLIGRAM(S): at 00:44

## 2021-06-10 RX ADMIN — Medication 150 MILLIGRAM(S): at 22:29

## 2021-06-10 RX ADMIN — PANTOPRAZOLE SODIUM 40 MILLIGRAM(S): 20 TABLET, DELAYED RELEASE ORAL at 05:44

## 2021-06-10 RX ADMIN — Medication 3 MILLILITER(S): at 16:57

## 2021-06-10 RX ADMIN — HYDROMORPHONE HYDROCHLORIDE 4 MILLIGRAM(S): 2 INJECTION INTRAMUSCULAR; INTRAVENOUS; SUBCUTANEOUS at 06:45

## 2021-06-10 RX ADMIN — Medication 3 MILLILITER(S): at 09:54

## 2021-06-10 RX ADMIN — Medication 3 MILLILITER(S): at 13:02

## 2021-06-10 RX ADMIN — BUDESONIDE AND FORMOTEROL FUMARATE DIHYDRATE 2 PUFF(S): 160; 4.5 AEROSOL RESPIRATORY (INHALATION) at 09:55

## 2021-06-10 RX ADMIN — ENOXAPARIN SODIUM 40 MILLIGRAM(S): 100 INJECTION SUBCUTANEOUS at 13:00

## 2021-06-10 RX ADMIN — BUDESONIDE AND FORMOTEROL FUMARATE DIHYDRATE 2 PUFF(S): 160; 4.5 AEROSOL RESPIRATORY (INHALATION) at 22:28

## 2021-06-10 RX ADMIN — Medication 2000 MILLIGRAM(S): at 09:55

## 2021-06-10 RX ADMIN — ATORVASTATIN CALCIUM 10 MILLIGRAM(S): 80 TABLET, FILM COATED ORAL at 22:29

## 2021-06-10 RX ADMIN — Medication 3 MILLILITER(S): at 22:28

## 2021-06-10 RX ADMIN — HYDROMORPHONE HYDROCHLORIDE 4 MILLIGRAM(S): 2 INJECTION INTRAMUSCULAR; INTRAVENOUS; SUBCUTANEOUS at 07:46

## 2021-06-10 RX ADMIN — GABAPENTIN 800 MILLIGRAM(S): 400 CAPSULE ORAL at 13:01

## 2021-06-10 RX ADMIN — TIOTROPIUM BROMIDE 1 CAPSULE(S): 18 CAPSULE ORAL; RESPIRATORY (INHALATION) at 09:56

## 2021-06-10 NOTE — PROGRESS NOTE ADULT - TIME BILLING
-- DO NOT REPLY / DO NOT REPLY ALL --  -- Message is from the [facility]--    Order Request  [employer]     Message / reason: [employer] medical examination     Insurance type: [employer] Community Medicaid   Payor: [facility] MEDICAID / Plan: [facility] MEDICAID HMO / Product Type: T19 HMO    Preferred Delivery Method   Call when ready for pickup - phone number to notify: [phone]     Caller Information       Type Contact Phone    09/12/2020 12:28 PM CDT Phone (Incoming) NOE ALTMAN (Father) [phone]          Alternative phone number: NA    Turnaround time given to caller:   \"This message will be sent to [state Provider's name]. The clinical team will fulfill your request as soon as they review your message when the office opens on Monday (or after the holiday).\"  
Patient seen and examined with house-staff during bedside rounds.  Resident note read, including vitals, physical findings, laboratory data, and radiological reports.   Revisions included below.  Direct personal management at bed side and extensive interpretation of the data.  Plan was outlined and discussed in details with the housestaff.  Decision making of high complexity  Action taken for acute disease activity to reflect the level of care provided:  - medication reconciliation  - review laboratory dataPatient cellulitis improving and can change to p.o. Keflex.  Patient improved on diuretics.  The echocardiogram was unchanged when the previous 1.  The patient became tachypneic with ambulation and oxygen saturation dropped to low 80s on oxygen supplementation.  I follow-up with CT scan of the chest.  The official report is still pending but the CT scan of the chest did not reveal any major abnormality.  Will evaluate the patient tomorrow first discharge
Patient seen and examined with house-staff during bedside rounds.  Resident note read, including vitals, physical findings, laboratory data, and radiological reports.   Revisions included below.  Direct personal management at bed side and extensive interpretation of the data.  Plan was outlined and discussed in details with the housestaff.  Decision making of high complexity  Action taken for acute disease activity to reflect the level of care provided:  - medication reconciliation  - review laboratory dataPatient clinically stable.  The cellulitis is improving.  In the a.m. changed to p.o. Keflex on discharge.  Continue diuretics.  Echocardiogram is unremarkable.  The PA pressures are essentially unchanged.  I discussed the case in details with the patient.
Patient seen and examined with house-staff during bedside rounds.  Resident note read, including vitals, physical findings, laboratory data, and radiological reports.   Revisions included below.  Direct personal management at bed side and extensive interpretation of the data.  Plan was outlined and discussed in details with the housestaff.  Decision making of high complexity  Action taken for acute disease activity to reflect the level of care provided:  - medication reconciliation  - review laboratory data    Cultures are negative.  Cellulitis improved with decrease edema and the erythema.  The patient is to continue on IV antibiotic.  We will switch to p.o. Keflex upon discharge.  Patient is on prednisone for total of 4 days.  Continue triple therapy.  Hemoglobin is stable.  Follow-up on echocardiogram.  The venous Doppler was negative.  Patient does not have active Covid infection

## 2021-06-10 NOTE — PROGRESS NOTE ADULT - SUBJECTIVE AND OBJECTIVE BOX
KENNEY BROWN, 70y, Female  MRN-[id number]  Patient is a 70y old  Female who presents with a chief complaint of Cellulitis + COPDE (09 Jun 2021 08:49)      OVERNIGHT EVENTS:     SUBJECTIVE:    12 Point ROS Negative unless noted otherwise above.  -------------------------------------------------------------------------------  VITAL SIGNS:  Vital Signs Last 24 Hrs  T(C): 37 (10 Ciaran 2021 06:21), Max: 37.4 (09 Jun 2021 21:17)  T(F): 98.6 (10 Ciaran 2021 06:21), Max: 99.4 (09 Jun 2021 21:17)  HR: 77 (10 Ciaran 2021 06:21) (76 - 86)  BP: 123/80 (10 Ciaran 2021 06:21) (117/71 - 142/82)  BP(mean): --  RR: 17 (10 Ciaran 2021 06:21) (16 - 17)  SpO2: 92% (10 Ciaran 2021 06:21) (90% - 93%)  I&O's Summary      PHYSICAL EXAM:    General: NAD, well developed  HEENT: NC/AT; EOMI, PERRLA, anicteric sclera; moist mucosal membranes.  Neck: supple, trachea midline  Cardiovascular: RRR, +S1/S2; NO M/R/G  Respiratory: CTA B/L; no W/R/R  Gastrointestinal: soft, NT/ND; +BSx4  Extremities: WWP; no edema or cyanosis  Vascular: 2+ radial, DP/PT pulses B/L  Neurological: AAOx3; no focal deficits    ALLERGIES:  Allergies    iodine (Anaphylaxis)  IV Contrast (Anaphylaxis)    Intolerances        MEDICATIONS:  MEDICATIONS  (STANDING):  albuterol/ipratropium for Nebulization 3 milliLiter(s) Nebulizer every 4 hours  atorvastatin 10 milliGRAM(s) Oral at bedtime  budesonide  80 MICROgram(s)/formoterol 4.5 MICROgram(s) Inhaler 2 Puff(s) Inhalation two times a day  ceFAZolin  Injectable. 2000 milliGRAM(s) IV Push every 8 hours  enoxaparin Injectable 40 milliGRAM(s) SubCutaneous daily  furosemide    Tablet 40 milliGRAM(s) Oral two times a day  gabapentin 800 milliGRAM(s) Oral three times a day  lamoTRIgine 100 milliGRAM(s) Oral daily  pantoprazole    Tablet 40 milliGRAM(s) Oral before breakfast  polyethylene glycol 3350 17 Gram(s) Oral two times a day  predniSONE   Tablet 40 milliGRAM(s) Oral daily  senna 2 Tablet(s) Oral at bedtime  tiotropium 18 MICROgram(s) Capsule 1 Capsule(s) Inhalation daily  traZODone 150 milliGRAM(s) Oral at bedtime  venlafaxine XR. 150 milliGRAM(s) Oral daily    MEDICATIONS  (PRN):  albuterol/ipratropium for Nebulization 3 milliLiter(s) Nebulizer every 2 hours PRN Shortness of Breath  ALPRAZolam 2 milliGRAM(s) Oral every 8 hours PRN Anxiety  HYDROmorphone   Tablet 4 milliGRAM(s) Oral every 4 hours PRN Severe Pain (7 - 10)      -------------------------------------------------------------------------------  LABS:                        14.0   10.05 )-----------( 105      ( 10 Ciaran 2021 08:28 )             45.9     06-10    140  |  93<L>  |  14  ----------------------------<  137<H>  4.0   |  34<H>  |  0.71    Ca    9.7      10 Ciaran 2021 08:28  Phos  4.4     06-10  Mg     2.0     06-10            CAPILLARY BLOOD GLUCOSE          Culture - Blood (collected 07 Jun 2021 21:14)  Source: .Blood Blood-Peripheral  Preliminary Report (09 Jun 2021 22:00):    No growth at 2 days.    Culture - Blood (collected 07 Jun 2021 21:14)  Source: .Blood Blood-Peripheral  Preliminary Report (09 Jun 2021 22:00):    No growth at 2 days.      COVID-19 PCR: Positive (07 Jun 2021 18:39)  COVID-19 PCR: Detected (05 Apr 2021 13:44)      RADIOLOGY & ADDITIONAL TESTS: Reviewed.       KENNEY BROWN, 70y, Female  MRN-[id number]  Patient is a 70y old  Female who presents with a chief complaint of Cellulitis + COPDE (09 Jun 2021 08:49)      OVERNIGHT EVENTS: YUNIOR.    SUBJECTIVE: Pt seen and examined at bedside this AM. Reports feeling improved. However on ambulation with O2 on desatted to 84% this morning with attending.     12 Point ROS Negative unless noted otherwise above.  -------------------------------------------------------------------------------  VITAL SIGNS:  Vital Signs Last 24 Hrs  T(C): 37 (10 Ciaran 2021 06:21), Max: 37.4 (09 Jun 2021 21:17)  T(F): 98.6 (10 Ciaran 2021 06:21), Max: 99.4 (09 Jun 2021 21:17)  HR: 77 (10 Ciaran 2021 06:21) (76 - 86)  BP: 123/80 (10 Ciaran 2021 06:21) (117/71 - 142/82)  BP(mean): --  RR: 17 (10 Ciaran 2021 06:21) (16 - 17)  SpO2: 92% (10 Ciaran 2021 06:21) (90% - 93%)  I&O's Summary      PHYSICAL EXAM:    General: NAD, obese sitting up in bed.  HEENT: NC/AT;anicteric sclera; moist mucosal membranes.  Neck: supple, trachea midline  Cardiovascular: RRR, +S1/S2; NO M/R/G  Respiratory: CTA B/L; no W/R/R, decreased breath sounds diffusely.   Gastrointestinal: soft, NT/ND; +BSx4  Extremities: WWP; 2+ edema, improving erythema.  Vascular: 2+ radial B/L  Neurological: AAOx3; no focal deficits    ALLERGIES:  Allergies    iodine (Anaphylaxis)  IV Contrast (Anaphylaxis)    Intolerances        MEDICATIONS:  MEDICATIONS  (STANDING):  albuterol/ipratropium for Nebulization 3 milliLiter(s) Nebulizer every 4 hours  atorvastatin 10 milliGRAM(s) Oral at bedtime  budesonide  80 MICROgram(s)/formoterol 4.5 MICROgram(s) Inhaler 2 Puff(s) Inhalation two times a day  ceFAZolin  Injectable. 2000 milliGRAM(s) IV Push every 8 hours  enoxaparin Injectable 40 milliGRAM(s) SubCutaneous daily  furosemide    Tablet 40 milliGRAM(s) Oral two times a day  gabapentin 800 milliGRAM(s) Oral three times a day  lamoTRIgine 100 milliGRAM(s) Oral daily  pantoprazole    Tablet 40 milliGRAM(s) Oral before breakfast  polyethylene glycol 3350 17 Gram(s) Oral two times a day  predniSONE   Tablet 40 milliGRAM(s) Oral daily  senna 2 Tablet(s) Oral at bedtime  tiotropium 18 MICROgram(s) Capsule 1 Capsule(s) Inhalation daily  traZODone 150 milliGRAM(s) Oral at bedtime  venlafaxine XR. 150 milliGRAM(s) Oral daily    MEDICATIONS  (PRN):  albuterol/ipratropium for Nebulization 3 milliLiter(s) Nebulizer every 2 hours PRN Shortness of Breath  ALPRAZolam 2 milliGRAM(s) Oral every 8 hours PRN Anxiety  HYDROmorphone   Tablet 4 milliGRAM(s) Oral every 4 hours PRN Severe Pain (7 - 10)      -------------------------------------------------------------------------------  LABS:                        14.0   10.05 )-----------( 105      ( 10 Ciaran 2021 08:28 )             45.9     06-10    140  |  93<L>  |  14  ----------------------------<  137<H>  4.0   |  34<H>  |  0.71    Ca    9.7      10 Ciaran 2021 08:28  Phos  4.4     06-10  Mg     2.0     06-10            CAPILLARY BLOOD GLUCOSE          Culture - Blood (collected 07 Jun 2021 21:14)  Source: .Blood Blood-Peripheral  Preliminary Report (09 Jun 2021 22:00):    No growth at 2 days.    Culture - Blood (collected 07 Jun 2021 21:14)  Source: .Blood Blood-Peripheral  Preliminary Report (09 Jun 2021 22:00):    No growth at 2 days.      COVID-19 PCR: Positive (07 Jun 2021 18:39)  COVID-19 PCR: Detected (05 Apr 2021 13:44)      RADIOLOGY & ADDITIONAL TESTS: Reviewed.

## 2021-06-10 NOTE — PROGRESS NOTE ADULT - PROBLEM SELECTOR PLAN 5
- On recent echo PASP 40  - S/P RHC in 2018 - "Findings: pulmonary Hypertension was not severe and there was no evidence of cardiac shunt"  - c/w diuretics 40bid transition back to 40 lasix once more euvolemic

## 2021-06-10 NOTE — PROGRESS NOTE ADULT - PROBLEM SELECTOR PLAN 10
DVT PPX: Lovenox 40mg QD - Improve score only 1 but also acute infection, and recent COVID  GI Ppx: nill  Diet: DASH/TLC  IVF: nill   Bowel Regimen: senna + miralax i/s/o opioid use  Code: Full

## 2021-06-10 NOTE — PROGRESS NOTE ADULT - ASSESSMENT
This is a 69 YO F with PMH of COPD (on 2L home oxygen, PRN), DARRIN (not tolerating CPAP), ITP, Depression/Anxiety, ?Bipolar disorder, chronic knee pain who presents with shortness of breath 2/2 COPD Exacerbation, and is also found to have cellulitis of LUE. She is also COVID+, which is likely still positive in context of infection in April 2021 with lower concern for COVID pneumonia now. 
This is a 71 YO F with PMH of COPD (on 2L home oxygen, PRN), DARRIN (not tolerating CPAP), ITP, Depression/Anxiety, ?Bipolar disorder, chronic knee pain who presents with shortness of breath 2/2 COPD Exacerbation, and is also found to have cellulitis of LUE. She is also COVID+, which is likely still positive in context of infection in April 2021 with lower concern for COVID pneumonia now. 
This is a 71 YO F with PMH of COPD (on 2L home oxygen, PRN), DARRIN (not tolerating CPAP), ITP, Depression/Anxiety, ?Bipolar disorder, chronic knee pain who presents with shortness of breath 2/2 COPD Exacerbation, and is also found to have cellulitis of LUE. She is also COVID+, which is likely still positive in context of infection in April 2021 with lower concern for COVID pneumonia now.

## 2021-06-10 NOTE — PROGRESS NOTE ADULT - PROBLEM SELECTOR PLAN 1
- Patient w/o formal history of CHF but takes lasix 40qd regularly  - cxr with pulmonary vascular congestion and bilateral lower lobe opacification; bilateral crackles and LE edema on admission but is improbing  - c/w lasix 40BID  - c/w 2L NC home O2.  - CT chest to evaluate hypoxia while ambulating with home O2 on today.

## 2021-06-11 ENCOUNTER — TRANSCRIPTION ENCOUNTER (OUTPATIENT)
Age: 71
End: 2021-06-11

## 2021-06-11 VITALS
TEMPERATURE: 98 F | DIASTOLIC BLOOD PRESSURE: 80 MMHG | OXYGEN SATURATION: 93 % | SYSTOLIC BLOOD PRESSURE: 127 MMHG | RESPIRATION RATE: 19 BRPM | HEART RATE: 93 BPM

## 2021-06-11 LAB
ANION GAP SERPL CALC-SCNC: 11 MMOL/L — SIGNIFICANT CHANGE UP (ref 5–17)
BUN SERPL-MCNC: 18 MG/DL — SIGNIFICANT CHANGE UP (ref 7–23)
CALCIUM SERPL-MCNC: 9.3 MG/DL — SIGNIFICANT CHANGE UP (ref 8.4–10.5)
CHLORIDE SERPL-SCNC: 90 MMOL/L — LOW (ref 96–108)
CO2 SERPL-SCNC: 34 MMOL/L — HIGH (ref 22–31)
CREAT SERPL-MCNC: 0.63 MG/DL — SIGNIFICANT CHANGE UP (ref 0.5–1.3)
GLUCOSE SERPL-MCNC: 125 MG/DL — HIGH (ref 70–99)
HCT VFR BLD CALC: 43.2 % — SIGNIFICANT CHANGE UP (ref 34.5–45)
HGB BLD-MCNC: 13.4 G/DL — SIGNIFICANT CHANGE UP (ref 11.5–15.5)
MAGNESIUM SERPL-MCNC: 2.1 MG/DL — SIGNIFICANT CHANGE UP (ref 1.6–2.6)
MCHC RBC-ENTMCNC: 30.2 PG — SIGNIFICANT CHANGE UP (ref 27–34)
MCHC RBC-ENTMCNC: 31 GM/DL — LOW (ref 32–36)
MCV RBC AUTO: 97.3 FL — SIGNIFICANT CHANGE UP (ref 80–100)
NRBC # BLD: 0 /100 WBCS — SIGNIFICANT CHANGE UP (ref 0–0)
PHOSPHATE SERPL-MCNC: 4.3 MG/DL — SIGNIFICANT CHANGE UP (ref 2.5–4.5)
PLATELET # BLD AUTO: 90 K/UL — LOW (ref 150–400)
POTASSIUM SERPL-MCNC: 4.1 MMOL/L — SIGNIFICANT CHANGE UP (ref 3.5–5.3)
POTASSIUM SERPL-SCNC: 4.1 MMOL/L — SIGNIFICANT CHANGE UP (ref 3.5–5.3)
RBC # BLD: 4.44 M/UL — SIGNIFICANT CHANGE UP (ref 3.8–5.2)
RBC # FLD: 13 % — SIGNIFICANT CHANGE UP (ref 10.3–14.5)
SODIUM SERPL-SCNC: 135 MMOL/L — SIGNIFICANT CHANGE UP (ref 135–145)
WBC # BLD: 8.55 K/UL — SIGNIFICANT CHANGE UP (ref 3.8–10.5)
WBC # FLD AUTO: 8.55 K/UL — SIGNIFICANT CHANGE UP (ref 3.8–10.5)

## 2021-06-11 PROCEDURE — 86140 C-REACTIVE PROTEIN: CPT

## 2021-06-11 PROCEDURE — 99239 HOSP IP/OBS DSCHRG MGMT >30: CPT | Mod: GC

## 2021-06-11 PROCEDURE — 71045 X-RAY EXAM CHEST 1 VIEW: CPT

## 2021-06-11 PROCEDURE — 83880 ASSAY OF NATRIURETIC PEPTIDE: CPT

## 2021-06-11 PROCEDURE — 80048 BASIC METABOLIC PNL TOTAL CA: CPT

## 2021-06-11 PROCEDURE — 85025 COMPLETE CBC W/AUTO DIFF WBC: CPT

## 2021-06-11 PROCEDURE — 84295 ASSAY OF SERUM SODIUM: CPT

## 2021-06-11 PROCEDURE — 86769 SARS-COV-2 COVID-19 ANTIBODY: CPT

## 2021-06-11 PROCEDURE — 83735 ASSAY OF MAGNESIUM: CPT

## 2021-06-11 PROCEDURE — 94640 AIRWAY INHALATION TREATMENT: CPT

## 2021-06-11 PROCEDURE — 73590 X-RAY EXAM OF LOWER LEG: CPT

## 2021-06-11 PROCEDURE — 82330 ASSAY OF CALCIUM: CPT

## 2021-06-11 PROCEDURE — 96374 THER/PROPH/DIAG INJ IV PUSH: CPT

## 2021-06-11 PROCEDURE — 93005 ELECTROCARDIOGRAM TRACING: CPT

## 2021-06-11 PROCEDURE — 99285 EMERGENCY DEPT VISIT HI MDM: CPT | Mod: 25

## 2021-06-11 PROCEDURE — 85027 COMPLETE CBC AUTOMATED: CPT

## 2021-06-11 PROCEDURE — 36415 COLL VENOUS BLD VENIPUNCTURE: CPT

## 2021-06-11 PROCEDURE — 87040 BLOOD CULTURE FOR BACTERIA: CPT

## 2021-06-11 PROCEDURE — 82803 BLOOD GASES ANY COMBINATION: CPT

## 2021-06-11 PROCEDURE — 84132 ASSAY OF SERUM POTASSIUM: CPT

## 2021-06-11 PROCEDURE — 84484 ASSAY OF TROPONIN QUANT: CPT

## 2021-06-11 PROCEDURE — 93306 TTE W/DOPPLER COMPLETE: CPT

## 2021-06-11 PROCEDURE — 80053 COMPREHEN METABOLIC PANEL: CPT

## 2021-06-11 PROCEDURE — 84100 ASSAY OF PHOSPHORUS: CPT

## 2021-06-11 PROCEDURE — 93970 EXTREMITY STUDY: CPT

## 2021-06-11 PROCEDURE — 82728 ASSAY OF FERRITIN: CPT

## 2021-06-11 PROCEDURE — 87635 SARS-COV-2 COVID-19 AMP PRB: CPT

## 2021-06-11 PROCEDURE — 96375 TX/PRO/DX INJ NEW DRUG ADDON: CPT

## 2021-06-11 PROCEDURE — 83605 ASSAY OF LACTIC ACID: CPT

## 2021-06-11 PROCEDURE — 71250 CT THORAX DX C-: CPT

## 2021-06-11 PROCEDURE — 84145 PROCALCITONIN (PCT): CPT

## 2021-06-11 RX ORDER — LACTULOSE 10 G/15ML
10 SOLUTION ORAL ONCE
Refills: 0 | Status: COMPLETED | OUTPATIENT
Start: 2021-06-11 | End: 2021-06-11

## 2021-06-11 RX ORDER — FUROSEMIDE 40 MG
1 TABLET ORAL
Qty: 60 | Refills: 0
Start: 2021-06-11 | End: 2021-07-10

## 2021-06-11 RX ORDER — FLUTICASONE PROPIONATE AND SALMETEROL 50; 250 UG/1; UG/1
1 POWDER ORAL; RESPIRATORY (INHALATION)
Qty: 60 | Refills: 0
Start: 2021-06-11 | End: 2021-07-10

## 2021-06-11 RX ORDER — TIOTROPIUM BROMIDE 18 UG/1
1 CAPSULE ORAL; RESPIRATORY (INHALATION)
Qty: 1 | Refills: 2
Start: 2021-06-11 | End: 2021-09-08

## 2021-06-11 RX ORDER — FUROSEMIDE 40 MG
1 TABLET ORAL
Qty: 60 | Refills: 2
Start: 2021-06-11 | End: 2021-09-08

## 2021-06-11 RX ORDER — BUDESONIDE AND FORMOTEROL FUMARATE DIHYDRATE 160; 4.5 UG/1; UG/1
2 AEROSOL RESPIRATORY (INHALATION)
Qty: 1 | Refills: 2
Start: 2021-06-11 | End: 2021-09-08

## 2021-06-11 RX ADMIN — BUDESONIDE AND FORMOTEROL FUMARATE DIHYDRATE 2 PUFF(S): 160; 4.5 AEROSOL RESPIRATORY (INHALATION) at 09:31

## 2021-06-11 RX ADMIN — POLYETHYLENE GLYCOL 3350 17 GRAM(S): 17 POWDER, FOR SOLUTION ORAL at 06:13

## 2021-06-11 RX ADMIN — Medication 40 MILLIGRAM(S): at 06:13

## 2021-06-11 RX ADMIN — TIOTROPIUM BROMIDE 1 CAPSULE(S): 18 CAPSULE ORAL; RESPIRATORY (INHALATION) at 09:32

## 2021-06-11 RX ADMIN — GABAPENTIN 800 MILLIGRAM(S): 400 CAPSULE ORAL at 14:24

## 2021-06-11 RX ADMIN — PANTOPRAZOLE SODIUM 40 MILLIGRAM(S): 20 TABLET, DELAYED RELEASE ORAL at 06:13

## 2021-06-11 RX ADMIN — LACTULOSE 10 GRAM(S): 10 SOLUTION ORAL at 12:33

## 2021-06-11 RX ADMIN — HYDROMORPHONE HYDROCHLORIDE 4 MILLIGRAM(S): 2 INJECTION INTRAMUSCULAR; INTRAVENOUS; SUBCUTANEOUS at 14:49

## 2021-06-11 RX ADMIN — ENOXAPARIN SODIUM 40 MILLIGRAM(S): 100 INJECTION SUBCUTANEOUS at 12:34

## 2021-06-11 RX ADMIN — Medication 150 MILLIGRAM(S): at 12:34

## 2021-06-11 RX ADMIN — HYDROMORPHONE HYDROCHLORIDE 4 MILLIGRAM(S): 2 INJECTION INTRAMUSCULAR; INTRAVENOUS; SUBCUTANEOUS at 15:26

## 2021-06-11 RX ADMIN — Medication 2 MILLIGRAM(S): at 06:13

## 2021-06-11 RX ADMIN — GABAPENTIN 800 MILLIGRAM(S): 400 CAPSULE ORAL at 06:13

## 2021-06-11 RX ADMIN — Medication 2000 MILLIGRAM(S): at 09:30

## 2021-06-11 RX ADMIN — LAMOTRIGINE 100 MILLIGRAM(S): 25 TABLET, ORALLY DISINTEGRATING ORAL at 12:34

## 2021-06-11 RX ADMIN — Medication 2000 MILLIGRAM(S): at 00:06

## 2021-06-11 NOTE — DISCHARGE NOTE NURSING/CASE MANAGEMENT/SOCIAL WORK - NSDCFUADDAPPT_GEN_ALL_CORE_FT
Outpatient psych; please call to make appointment with any of the following.     [facility]  [email or web address]  [address]  [place], [place] [address]   [phone] (x1 for intakes)  Medicare, Medicaid, most private insurance (including [employer])    [facility]  [email or web address]  [address] [place] [address] [phone]  Medicare, Medicaid, Aetna, [employer], [employer], BCBS, [facility], [employer], [employer]HI, Healthfirst, HealthPlus, [facility], [facility]  [email or web address]  [address], [place] [address] [phone]

## 2021-06-11 NOTE — DISCHARGE NOTE NURSING/CASE MANAGEMENT/SOCIAL WORK - PATIENT PORTAL LINK FT
You can access the FollowMyHealth Patient Portal offered by [facility] by registering at the following website: [email or web address]. By joining [employer]’s FollowMyHealth portal, you will also be able to view your health information using other applications (apps) compatible with our system.

## 2021-06-12 RX ORDER — CEPHALEXIN 500 MG
1 CAPSULE ORAL
Qty: 1 | Refills: 0
Start: 2021-06-12 | End: 2021-06-12

## 2021-06-12 RX ORDER — CEPHALEXIN 500 MG
1 CAPSULE ORAL
Qty: 4 | Refills: 0
Start: 2021-06-12 | End: 2021-06-12

## 2021-06-16 DIAGNOSIS — Z79.01 LONG TERM (CURRENT) USE OF ANTICOAGULANTS: ICD-10-CM

## 2021-06-16 DIAGNOSIS — I50.33 ACUTE ON CHRONIC DIASTOLIC (CONGESTIVE) HEART FAILURE: ICD-10-CM

## 2021-06-16 DIAGNOSIS — L03.116 CELLULITIS OF LEFT LOWER LIMB: ICD-10-CM

## 2021-06-16 DIAGNOSIS — Z86.16 PERSONAL HISTORY OF COVID-19: ICD-10-CM

## 2021-06-16 DIAGNOSIS — F41.9 ANXIETY DISORDER, UNSPECIFIED: ICD-10-CM

## 2021-06-16 DIAGNOSIS — F31.9 BIPOLAR DISORDER, UNSPECIFIED: ICD-10-CM

## 2021-06-16 DIAGNOSIS — J44.1 CHRONIC OBSTRUCTIVE PULMONARY DISEASE WITH (ACUTE) EXACERBATION: ICD-10-CM

## 2021-06-16 DIAGNOSIS — Z99.81 DEPENDENCE ON SUPPLEMENTAL OXYGEN: ICD-10-CM

## 2021-06-16 DIAGNOSIS — G47.33 OBSTRUCTIVE SLEEP APNEA (ADULT) (PEDIATRIC): ICD-10-CM

## 2021-06-16 DIAGNOSIS — I27.20 PULMONARY HYPERTENSION, UNSPECIFIED: ICD-10-CM

## 2021-06-16 DIAGNOSIS — F32.9 MAJOR DEPRESSIVE DISORDER, SINGLE EPISODE, UNSPECIFIED: ICD-10-CM

## 2021-07-05 NOTE — PROGRESS NOTE ADULT - PROBLEM SELECTOR PLAN 7
69-year-old male with a history of previous MI and stent placement presents to the emergency department with reports of 3 hours of lower epigastric discomfort. Patient states he woke from a nap with the symptoms. His last meal consisted of a chicken sandwich prior to sleep. Patient reports history of hiatal hernia with similar symptoms however family at bedside states the symptoms are similar to his previous MI as well. Patient reports nausea with 1 episode of vomiting prior to arrival.  Denies associated fever or chills, diaphoresis or significant shortness of breath. No cough, congestion. Reports otherwise feeling normally. Family does report loss of weight over the past 6 months. Patient is not regularly followed by a primary provider. Past Medical History:   Diagnosis Date    Colon cancer ([facility] [address] 01/2012    Hiatal hernia        History reviewed. No pertinent surgical history. History reviewed. No pertinent family history. Social History     Socioeconomic History    Marital status:      Spouse name: Not on file    Number of children: Not on file    Years of education: Not on file    Highest education level: Not on file   Occupational History    Not on file   Tobacco Use    Smoking status: Never Smoker    Smokeless tobacco: Never Used   Substance and Sexual Activity    Alcohol use: Never    Drug use: Never    Sexual activity: Not on file   Other Topics Concern    Not on file   Social History Narrative    Not on file     Social Determinants of Health     Financial Resource Strain:     Difficulty of Paying Living Expenses:    Food Insecurity:     Worried About Running Out of Food in the Last Year:     [address] in the Last Year:    Transportation Needs:     Lack of Transportation (Medical):      Lack of Transportation (Non-Medical):    Physical Activity:     Days of Exercise per Week:     Minutes of Exercise per Session:    Stress:     Feeling of Stress : Social Connections:     Frequency of Communication with Friends and Family:     Frequency of Social Gatherings with Friends and Family:     Attends [employer] Services:     Active Member of Clubs or Organizations:     Attends Club or Organization Meetings:     Marital Status:    Intimate Partner Violence:     Fear of Current or Ex-Partner:     Emotionally Abused:     Physically Abused:     Sexually Abused: ALLERGIES: Patient has no known allergies. Review of Systems   Constitutional: Negative for chills, diaphoresis and fever. HENT: Negative for congestion, sneezing and sore throat. Eyes: Negative for visual disturbance. Respiratory: Negative for cough, chest tightness, shortness of breath and wheezing. Cardiovascular: Positive for chest pain. Negative for leg swelling. Gastrointestinal: Positive for nausea and vomiting. Negative for abdominal pain, blood in stool and diarrhea. Endocrine: Negative for polyuria. Genitourinary: Negative for difficulty urinating, dysuria, flank pain, hematuria and urgency. Musculoskeletal: Negative for back pain, myalgias, neck pain and neck stiffness. Skin: Negative for color change and rash. Neurological: Negative for dizziness, syncope, speech difficulty, weakness, light-headedness, numbness and headaches. Psychiatric/Behavioral: Negative for behavioral problems. All other systems reviewed and are negative. Vitals:    07/05/21 1455   BP: 138/79   Pulse: 87   Resp: 20   Temp: 98.1 °F (36.7 °C)   SpO2: 96%   Weight: 62.6 kg (138 lb)   Height: 6' (1.829 m)            Physical Exam  Vitals and nursing note reviewed. Constitutional:       General: He is not in acute distress. Appearance: He is well-developed. He is not diaphoretic. Comments: Elderly though generally well-appearing male patient, alert and oriented to person place and time. No acute distress, speaks in clear, fluid sentences.    HENT:      Head: Normocephalic and atraumatic. Right Ear: External ear normal.      Left Ear: External ear normal.      Nose: Nose normal.   Eyes:      Pupils: Pupils are equal, round, and reactive to light. Cardiovascular:      Rate and Rhythm: Normal rate and regular rhythm. Heart sounds: Normal heart sounds. No murmur heard. No friction rub. No gallop. Pulmonary:      Effort: Pulmonary effort is normal. No respiratory distress. Breath sounds: Normal breath sounds. No stridor. No decreased breath sounds, wheezing, rhonchi or rales. Chest:      Chest wall: No tenderness. Abdominal:      General: There is no distension. Palpations: Abdomen is soft. There is no mass. Tenderness: There is abdominal tenderness in the epigastric area. There is no guarding or rebound. Hernia: No hernia is present. Comments: There are some mild though reproducible tenderness with deep palpation of the epigastric region. Remainder of abdominal exam is unremarkable. Musculoskeletal:         General: No tenderness or deformity. Normal range of motion. Cervical back: Normal range of motion. Skin:     General: Skin is warm and dry. Neurological:      Mental Status: He is alert and oriented to person, place, and time. Cranial Nerves: No cranial nerve deficit. MDM  Number of Diagnoses or Management Options  Diagnosis management comments: EKG interpretation: Sinus rhythm, rate of 90 beats a minute, normal axis, no acute ischemic change.        Amount and/or Complexity of Data Reviewed  Clinical lab tests: ordered and reviewed  Tests in the radiology section of CPT®: ordered and reviewed  Tests in the medicine section of CPT®: reviewed and ordered  Independent visualization of images, tracings, or specimens: yes    Risk of Complications, Morbidity, and/or Mortality  Presenting problems: moderate  Diagnostic procedures: low  Management options: moderate    Patient Progress  Patient progress: stable Procedures - Reports increase in anxiety and lower mood since COVID pandemic  - Had diagnosis of bipolar, but does not have psych here  - c/w Lamotrigine 100mg daily, Venlafaxin 100mg daily, Trazodone 100 midday as per psych consult   - will do Xanax 2mg QD PRN anxiety  - referrals for outpatient psychiatry will be given on discharge.

## 2021-07-26 ENCOUNTER — OUTPATIENT (OUTPATIENT)
Dept: OUTPATIENT SERVICES | Facility: HOSPITAL | Age: 71
LOS: 1 days | End: 2021-07-26
Payer: MEDICARE

## 2021-07-26 ENCOUNTER — APPOINTMENT (OUTPATIENT)
Dept: PULMONOLOGY | Facility: CLINIC | Age: 71
End: 2021-07-26
Payer: MEDICARE

## 2021-07-26 ENCOUNTER — RESULT REVIEW (OUTPATIENT)
Age: 71
End: 2021-07-26

## 2021-07-26 VITALS
TEMPERATURE: 97.1 F | SYSTOLIC BLOOD PRESSURE: 138 MMHG | HEART RATE: 94 BPM | HEIGHT: 66 IN | WEIGHT: 185 LBS | DIASTOLIC BLOOD PRESSURE: 86 MMHG | BODY MASS INDEX: 29.73 KG/M2 | OXYGEN SATURATION: 77 %

## 2021-07-26 DIAGNOSIS — H26.9 UNSPECIFIED CATARACT: Chronic | ICD-10-CM

## 2021-07-26 DIAGNOSIS — R53.1 WEAKNESS: ICD-10-CM

## 2021-07-26 DIAGNOSIS — Z96.652 PRESENCE OF LEFT ARTIFICIAL KNEE JOINT: Chronic | ICD-10-CM

## 2021-07-26 DIAGNOSIS — Z90.711 ACQUIRED ABSENCE OF UTERUS WITH REMAINING CERVICAL STUMP: Chronic | ICD-10-CM

## 2021-07-26 DIAGNOSIS — Z98.890 OTHER SPECIFIED POSTPROCEDURAL STATES: Chronic | ICD-10-CM

## 2021-07-26 DIAGNOSIS — Z90.89 ACQUIRED ABSENCE OF OTHER ORGANS: Chronic | ICD-10-CM

## 2021-07-26 DIAGNOSIS — J15.9 UNSPECIFIED BACTERIAL PNEUMONIA: ICD-10-CM

## 2021-07-26 PROCEDURE — 73630 X-RAY EXAM OF FOOT: CPT

## 2021-07-26 PROCEDURE — 99214 OFFICE O/P EST MOD 30 MIN: CPT | Mod: 25

## 2021-07-26 PROCEDURE — 73630 X-RAY EXAM OF FOOT: CPT | Mod: 26,RT

## 2021-07-26 PROCEDURE — 99072 ADDL SUPL MATRL&STAF TM PHE: CPT

## 2021-07-26 PROCEDURE — 36415 COLL VENOUS BLD VENIPUNCTURE: CPT

## 2021-07-26 RX ORDER — UMECLIDINIUM BROMIDE AND VILANTEROL TRIFENATATE 62.5; 25 UG/1; UG/1
62.5-25 POWDER RESPIRATORY (INHALATION)
Qty: 1 | Refills: 11 | Status: DISCONTINUED | COMMUNITY
Start: 2018-08-14 | End: 2021-07-26

## 2021-07-26 RX ORDER — FLUTICASONE FUROATE, UMECLIDINIUM BROMIDE AND VILANTEROL TRIFENATATE 100; 62.5; 25 UG/1; UG/1; UG/1
100-62.5-25 POWDER RESPIRATORY (INHALATION) DAILY
Qty: 1 | Refills: 11 | Status: DISCONTINUED | COMMUNITY
Start: 2020-02-06 | End: 2021-07-26

## 2021-07-26 NOTE — HISTORY OF PRESENT ILLNESS
[Former] : former [Never] : never [TextBox_4] : She is doing well breathing wise.  She is weak and tired all the time.  She is fell 1 time.  But she did not lose consciousness and did not hit her head.  Left the right foot is hurting but she wrapped it today.  She is using the oxygen on and off.  She she is not requiring any rescue dose bronchodilators and she is compliant with Spiriva and just finished the Symbicort since she was discharged from the hospital.  She is afraid she is still have Covid because she is weak and tired easily.  Family are concerned

## 2021-07-26 NOTE — ASSESSMENT
[FreeTextEntry1] : Obstructive sleep apnea\par \par The patient is compliant his CPAP and she is tolerated very well she use it all the time may be she will forget 1 night of the week.  She has no side effects from it\par \par COPD\par \par Stable at this point after her acute exacerbation and admission to the hospital which was related to the Covid.  Patient is stable at this point and she is off systemic steroids.  The patient is on Symbicort and Spiriva.  [facility].  She is to continue on the nebulizer treatment as needed.  You can score is 12.  The patient improved and she was discharged.Patient improved since she was discharged from the hospital.  The patient's might have side effects psychological from the steroids.  Patient is a candidate for escalation of therapy either Delaresp azithromycin.  We will discussed this with the patient detail\par \par Chronic respiratory failure with hypoxemia\par \par The sat was 77% and improved to 87%.  The patient is still requiring oxygen and I informed the patient that she is to continue on oxygen at home and to monitor oxygen saturation.Bacterial pneumonia\par \par Resolved\par \par Thrombocytopenia\par \par Follow-up on the labs\par \par Pulmonary hypertension is related to her COPD and there is no evidence of right heart failure.\par \par Weakness\par \par I discussed the case in details with the patient that her condition of fatigue and weakness could be related to long course of systemic steroids, Deconditioning, and muscle weakness related to Covid infection.  I assured the patient that she does not active Covid.  Patient will require physical therapy.\par \par foot x ray

## 2021-08-02 NOTE — PROGRESS NOTE ADULT - ATTENDING COMMENTS
Caller: KEVAN    Relationship: [facility]    Best call back number: [phone]    What orders are you requesting (i.e. lab or imaging): SKILLED NURSING, PHYSICAL THERAPY - HOME HEALTH    In what timeframe would the patient need to come in: ASAP    Where will you receive your lab/imaging services: IN HOME    Additional notes: PATIENT WAS DISCHARGED FROM [facility] 08/01/2021; KEVAN WOULD LIKE TO KNOW IF Robert Cintron APRN, WOULD SIGN ORDERS.            
Patient seen and examined with house-staff during bedside rounds.  Resident note read, including vitals, physical findings, laboratory data, and radiological reports.   Revisions included below.  Direct personal management at bed side and extensive interpretation of the data.  Plan was outlined and discussed in details with the housestaff.  Decision making of high complexity  Action taken for acute disease activity to reflect the level of care provided:  - medication reconciliation  - review laboratory data  improved from yesterday  continue inhalers steroids and antibiotics'  WBC decraesed  for venous doppler
Patient seen and examined with house-staff during bedside rounds.  Resident note read, including vitals, physical findings, laboratory data, and radiological reports.   Revisions included below.  Direct personal management at bed side and extensive interpretation of the data.  Plan was outlined and discussed in details with the housestaff.  Decision making of high complexity  Action taken for acute disease activity to reflect the level of care provided:  - medication reconciliation  - review laboratory data  The patient is clinically stable. There is increased air entry on exam. Venous Doppler was negative. Echocardiogram revealed pulmonary pressure and 40 with no change from the previous one no evidence for right ventricular failure. Continue bronchodilator. Continue prednisone. Continue oxygen supplementation. The patient's compliant with CPAP at night. Continue antibiotic for community-acquired pneumonia. Patient can be transferred to regular floor

## 2021-08-03 LAB
ALBUMIN SERPL ELPH-MCNC: 4.5 G/DL
ALP BLD-CCNC: 71 U/L
ALT SERPL-CCNC: 15 U/L
ANION GAP SERPL CALC-SCNC: 16 MMOL/L
AST SERPL-CCNC: 21 U/L
BASOPHILS # BLD AUTO: 0.07 K/UL
BASOPHILS NFR BLD AUTO: 0.9 %
BILIRUB SERPL-MCNC: 0.2 MG/DL
BUN SERPL-MCNC: 12 MG/DL
CALCIUM SERPL-MCNC: 9.7 MG/DL
CHLORIDE SERPL-SCNC: 98 MMOL/L
CHOLEST SERPL-MCNC: 200 MG/DL
CO2 SERPL-SCNC: 26 MMOL/L
CREAT SERPL-MCNC: 0.69 MG/DL
EOSINOPHIL # BLD AUTO: 0.29 K/UL
EOSINOPHIL NFR BLD AUTO: 3.7 %
ESTIMATED AVERAGE GLUCOSE: 126 MG/DL
GLUCOSE SERPL-MCNC: 108 MG/DL
HBA1C MFR BLD HPLC: 6 %
HCT VFR BLD CALC: 52.5 %
HDLC SERPL-MCNC: 102 MG/DL
HGB BLD-MCNC: 16 G/DL
IMM GRANULOCYTES NFR BLD AUTO: 0.5 %
LDLC SERPL CALC-MCNC: 77 MG/DL
LYMPHOCYTES # BLD AUTO: 2.33 K/UL
LYMPHOCYTES NFR BLD AUTO: 29.8 %
MAN DIFF?: NORMAL
MCHC RBC-ENTMCNC: 29.1 PG
MCHC RBC-ENTMCNC: 30.5 GM/DL
MCV RBC AUTO: 95.6 FL
MONOCYTES # BLD AUTO: 0.77 K/UL
MONOCYTES NFR BLD AUTO: 9.8 %
NEUTROPHILS # BLD AUTO: 4.33 K/UL
NEUTROPHILS NFR BLD AUTO: 55.3 %
NONHDLC SERPL-MCNC: 98 MG/DL
PLATELET # BLD AUTO: 86 K/UL
POTASSIUM SERPL-SCNC: 4.2 MMOL/L
PROT SERPL-MCNC: 7 G/DL
RBC # BLD: 5.49 M/UL
RBC # FLD: 14.1 %
SODIUM SERPL-SCNC: 140 MMOL/L
T3 SERPL-MCNC: 160 NG/DL
T4 SERPL-MCNC: 7.6 UG/DL
TRIGL SERPL-MCNC: 104 MG/DL
TSH SERPL-ACNC: 2.58 UIU/ML
WBC # FLD AUTO: 7.83 K/UL

## 2021-08-17 ENCOUNTER — RX RENEWAL (OUTPATIENT)
Age: 71
End: 2021-08-17

## 2021-09-09 ENCOUNTER — APPOINTMENT (OUTPATIENT)
Dept: PULMONOLOGY | Facility: CLINIC | Age: 71
End: 2021-09-09

## 2021-09-20 ENCOUNTER — APPOINTMENT (OUTPATIENT)
Dept: PULMONOLOGY | Facility: CLINIC | Age: 71
End: 2021-09-20
Payer: MEDICARE

## 2021-09-20 VITALS
SYSTOLIC BLOOD PRESSURE: 141 MMHG | TEMPERATURE: 98.2 F | OXYGEN SATURATION: 83 % | HEART RATE: 94 BPM | HEIGHT: 66 IN | BODY MASS INDEX: 29.73 KG/M2 | DIASTOLIC BLOOD PRESSURE: 93 MMHG | WEIGHT: 185 LBS

## 2021-09-20 DIAGNOSIS — J44.9 CHRONIC OBSTRUCTIVE PULMONARY DISEASE, UNSPECIFIED: ICD-10-CM

## 2021-09-20 PROCEDURE — G0008: CPT

## 2021-09-20 PROCEDURE — 99214 OFFICE O/P EST MOD 30 MIN: CPT | Mod: 25

## 2021-09-20 PROCEDURE — 90686 IIV4 VACC NO PRSV 0.5 ML IM: CPT

## 2021-09-20 RX ORDER — BUDESONIDE AND FORMOTEROL FUMARATE DIHYDRATE 160; 4.5 UG/1; UG/1
160-4.5 AEROSOL RESPIRATORY (INHALATION) TWICE DAILY
Qty: 1 | Refills: 11 | Status: DISCONTINUED | COMMUNITY
Start: 2021-07-26 | End: 2021-09-20

## 2021-09-20 RX ORDER — TIOTROPIUM BROMIDE AND OLODATEROL 3.124; 2.736 UG/1; UG/1
2.5-2.5 SPRAY, METERED RESPIRATORY (INHALATION) DAILY
Qty: 1 | Refills: 11 | Status: DISCONTINUED | OUTPATIENT
Start: 2019-07-30 | End: 2021-09-20

## 2021-09-20 RX ORDER — ROFLUMILAST 250 UG/1
250 TABLET ORAL DAILY
Qty: 30 | Refills: 0 | Status: DISCONTINUED | COMMUNITY
Start: 2021-07-26 | End: 2021-09-20

## 2021-09-20 NOTE — HISTORY OF PRESENT ILLNESS
[Former] : former [Never] : never [TextBox_4] : She is eating a lot and always hungry.  she fractured her right foot and has not been moving much.  She also has left knee pain.  She is using her CPAP and not using her oxygen at home.  She is doing well breathing wise.  She is weak and tired all the time.  She she is not requiring any rescue dose bronchodilators and she is compliant with Spiriva.  Unable to take Symbicort and stiolto due to insurance coverage.   She is afraid she is still have Covid because she is weak and tired easily.  Family are concerned she is depressed.

## 2021-09-20 NOTE — ASSESSMENT
[FreeTextEntry1] : Obstructive sleep apnea\par \par The patient is compliant his CPAP and she is tolerated very well she use it all the time may be she will forget 1 night of the week.  She has no side effects from it\par \par COPD\par \par Stable at this point after her acute exacerbation and admission to the hospital which was related to the Covid.  Patient is stable at this point and she is off systemic steroids.  The patient is on Spiriva.  Pt was given samples today.  She is to continue on the nebulizer treatment as needed.  The patient improved and she was discharged.Patient improved since she was discharged from the hospital.  Continue on current inhalers.\par \par Given flu vaccine whiteout any side effects. \par \par Chronic respiratory failure with hypoxemia\par \par The sat was 89%. The patient is using oxygen as needed and I informed the patient that she is to continue on oxygen at home and to monitor oxygen saturation\par \par \par Thrombocytopenia\par \par Follow-up on the labs next week\par \par Pulmonary hypertension is related to her COPD and there is no evidence of right heart failure.\par \par Weakness\par \par I discussed the case in details with the patient that her condition of fatigue and weakness could be related to long course of systemic steroids, Deconditioning, and muscle weakness related to Covid infection.  I assured the patient that she does not active Covid.  Patient will require physical therapy.\par \par Referred her to endocrine for weight loss.

## 2021-09-20 NOTE — END OF VISIT
[Time Spent: ___ minutes] : I have spent [unfilled] minutes of time on the encounter. [FreeTextEntry3] : Pt seen with RADHA Bourgeois and agree on the above plan of care.

## 2021-09-21 ENCOUNTER — APPOINTMENT (OUTPATIENT)
Dept: ORTHOPEDIC SURGERY | Facility: CLINIC | Age: 71
End: 2021-09-21

## 2021-10-11 ENCOUNTER — RESULT REVIEW (OUTPATIENT)
Age: 71
End: 2021-10-11

## 2021-10-11 ENCOUNTER — OUTPATIENT (OUTPATIENT)
Dept: OUTPATIENT SERVICES | Facility: HOSPITAL | Age: 71
LOS: 1 days | End: 2021-10-11
Payer: MEDICARE

## 2021-10-11 ENCOUNTER — APPOINTMENT (OUTPATIENT)
Dept: ORTHOPEDIC SURGERY | Facility: CLINIC | Age: 71
End: 2021-10-11
Payer: MEDICARE

## 2021-10-11 VITALS
HEART RATE: 80 BPM | BODY MASS INDEX: 29.89 KG/M2 | DIASTOLIC BLOOD PRESSURE: 80 MMHG | SYSTOLIC BLOOD PRESSURE: 120 MMHG | HEIGHT: 66 IN | WEIGHT: 186 LBS | OXYGEN SATURATION: 91 %

## 2021-10-11 DIAGNOSIS — Z90.89 ACQUIRED ABSENCE OF OTHER ORGANS: Chronic | ICD-10-CM

## 2021-10-11 DIAGNOSIS — G90.522 COMPLEX REGIONAL PAIN SYNDROME I OF LEFT LOWER LIMB: ICD-10-CM

## 2021-10-11 DIAGNOSIS — Z47.1 AFTERCARE FOLLOWING JOINT REPLACEMENT SURGERY: ICD-10-CM

## 2021-10-11 DIAGNOSIS — Z96.652 AFTERCARE FOLLOWING JOINT REPLACEMENT SURGERY: ICD-10-CM

## 2021-10-11 DIAGNOSIS — Z90.711 ACQUIRED ABSENCE OF UTERUS WITH REMAINING CERVICAL STUMP: Chronic | ICD-10-CM

## 2021-10-11 DIAGNOSIS — Z98.890 OTHER SPECIFIED POSTPROCEDURAL STATES: Chronic | ICD-10-CM

## 2021-10-11 DIAGNOSIS — Z96.652 PRESENCE OF LEFT ARTIFICIAL KNEE JOINT: Chronic | ICD-10-CM

## 2021-10-11 DIAGNOSIS — H26.9 UNSPECIFIED CATARACT: Chronic | ICD-10-CM

## 2021-10-11 DIAGNOSIS — M17.11 UNILATERAL PRIMARY OSTEOARTHRITIS, RIGHT KNEE: ICD-10-CM

## 2021-10-11 PROCEDURE — 72170 X-RAY EXAM OF PELVIS: CPT | Mod: 26

## 2021-10-11 PROCEDURE — 20610 DRAIN/INJ JOINT/BURSA W/O US: CPT

## 2021-10-11 PROCEDURE — 73564 X-RAY EXAM KNEE 4 OR MORE: CPT | Mod: 26,50

## 2021-10-11 PROCEDURE — 72170 X-RAY EXAM OF PELVIS: CPT

## 2021-10-11 PROCEDURE — 99214 OFFICE O/P EST MOD 30 MIN: CPT | Mod: 25

## 2021-10-11 PROCEDURE — 73564 X-RAY EXAM KNEE 4 OR MORE: CPT

## 2021-10-11 RX ORDER — HYDROMORPHONE HYDROCHLORIDE 2 MG/1
2 TABLET ORAL
Qty: 35 | Refills: 0 | Status: COMPLETED | COMMUNITY
Start: 2019-11-01 | End: 2021-10-11

## 2021-10-11 RX ORDER — HYDROCORTISONE 25 MG/G
2.5 CREAM TOPICAL
Qty: 30 | Refills: 0 | Status: ACTIVE | COMMUNITY
Start: 2021-08-10

## 2021-10-11 RX ORDER — IPRATROPIUM BROMIDE AND ALBUTEROL SULFATE 2.5; .5 MG/3ML; MG/3ML
0.5-2.5 (3) SOLUTION RESPIRATORY (INHALATION)
Qty: 90 | Refills: 5 | Status: COMPLETED | COMMUNITY
Start: 2020-02-06 | End: 2021-10-11

## 2021-10-11 RX ORDER — GABAPENTIN 800 MG/1
800 TABLET, COATED ORAL
Qty: 90 | Refills: 0 | Status: ACTIVE | COMMUNITY
Start: 2021-05-17

## 2021-10-11 RX ORDER — TIOTROPIUM BROMIDE 18 UG/1
18 CAPSULE ORAL; RESPIRATORY (INHALATION) DAILY
Qty: 30 | Refills: 11 | Status: COMPLETED | COMMUNITY
Start: 2021-07-26 | End: 2021-10-11

## 2021-10-11 RX ORDER — CICLOPIROX OLAMINE 7.7 MG/G
0.77 CREAM TOPICAL
Qty: 90 | Refills: 0 | Status: ACTIVE | COMMUNITY
Start: 2021-08-10

## 2021-10-11 RX ORDER — MELOXICAM 7.5 MG/1
7.5 TABLET ORAL DAILY
Qty: 30 | Refills: 2 | Status: ACTIVE | COMMUNITY
Start: 2021-10-11 | End: 1900-01-01

## 2021-10-11 RX ORDER — LAMOTRIGINE 200 MG/1
200 TABLET ORAL DAILY
Qty: 90 | Refills: 3 | Status: COMPLETED | OUTPATIENT
Start: 2019-04-24 | End: 2021-10-11

## 2021-10-11 RX ORDER — BACITRACIN 500 [USP'U]/G
500 OINTMENT OPHTHALMIC
Qty: 1 | Refills: 0 | Status: COMPLETED | COMMUNITY
Start: 2018-11-15 | End: 2021-10-11

## 2021-10-11 NOTE — HISTORY OF PRESENT ILLNESS
[___ wks] : [unfilled] week(s) ago [10] : a current pain level of 10/10 [Constant] : ~He/She~ states the symptoms seem to be constant [Acetaminophen] : relieved by acetaminophen [Ice] : relieved by ice [de-identified] : 10/11/21: 72y/o female presenting for evaluation of L > R knee pain. Complex history. Suffered a left tibial plateau fracture in Sept 2011 from a fall while on vacation. Initially addressed with ORIF followed by KATE at 7mo postop for persistent intolerable pain. Underwent a TKA at the end of 2012. Has had persistent severe pain ever since. Pain was diffuse throughout the anterior, medial, and lateral knee with some extension to the anterolateral leg. Underwent a series of diagnostic trigger point injections with Dr. Abraham followed by an open multiple neurectomy procedure on 8/8/19, with partial relief of symptoms. Still with pain that she describes as a red-hot poker being jabbed into her knee and leg all the time. Sees Dr. Virgen for pain management and receives oxycodone, though she takes less than the recommended dosing due to a history of alcoholism (32 years sober). The knee symptoms flared up again after a fall at home approximately 4 weeks ago. She suffered a right 5th metatarsal neck fracture at the same time; has been in a hard soled shoe for it as recommended by Dr. Zapien. She notes that she has been experiencing worsening right knee pain over several years.  [Walking] : worsened by walking [de-identified] : patient describes pain as radiating, burning, shooting and stabbing

## 2021-10-11 NOTE — REVIEW OF SYSTEMS
[Recent Weight Gain (___ Lbs)] : recent ~M [unfilled] lbs weight gain [Depression] : depression [Sleep Disturbances] : ~T sleep disturbances [Negative] : Heme/Lymph

## 2021-10-11 NOTE — DISCUSSION/SUMMARY
[de-identified] : 72y/o female with chronic pain affecting left knee following knee replacement and open neuroectomies, right knee osteoarthritis\par - Explained that I cannot find any mechanical problem that would benefit from surgical correction regarding the L TKA. Could conceivably consider an open synovectomy and poly exchange to address the flexion contracture, though I told her that I don't think that this a significant causative factor in her pain. I recommended against any surgical management for now given that I think that the benefits do not outweigh the risks.\par - Overall suspect a strong component of CRPS, chronic neuropathic pain with at least some non-organic components\par - Refer to Pain Management for further left knee geniculate nerve ablations\par - Cont current pain regimen as per Dr. Virgen, add meloxicam as needed\par - CSI administered to right knee today to address the osteoarthritis\par - Cont HEP\par - RTC PRN. She will be a candidate for R TKA should symptoms worsen despite nonsurgical management

## 2021-10-11 NOTE — PHYSICAL EXAM
[de-identified] : General appearance: well nourished and hydrated, pleasant, alert and oriented x 3, cooperative.  Tearful affect.\par HEENT: normocephalic, EOM intact, wearing mask, external auditory canal clear.  \par Cardiovascular: no lower leg edema, no varicosities, dorsalis pedis pulses palpable and symmetric.  \par Lymphatics: no palpable lymphadenopathy, no lymphedema.  \par Neurologic: sensation is normal, no muscle weakness in upper or lower extremities, patella tendon reflexes present and symmetric.  \par Dermatologic: skin moist, warm, no rash.  \par Spine: cervical spine with normal lordosis and painless range of motion, thoracic spine with normal kyphosis and painless range of motion, lumbosacral spine with normal lordosis and restricted range of motion.\par Gait: bilaterally antalgic.\par \par Left knee:\par - Soft tissue swelling: moderate\par - Ecchymosis: none\par - Erythema: none\par - Effusion: small\par - Wounds: healed midline, anteromedial, and anterolateral incisions\par - Alignment: normal\par - Tenderness: hyperesthetic to light touch diffusely about the knee and proximal tibia\par - ROM: 10-30 in my hands while supine; flexion improved to 90 degrees while seated upright\par - Collateral laxity: none\par - Cruciate laxity: guarding against exam\par - Popliteal angle (degrees): guarding against exam\par - Quad strength: 5/5\par \par Right knee:\par - Soft tissue swelling: minimal\par - Ecchymosis: small area over medial femoral condyle; she indicates from the fall\par - Erythema: none\par - Effusion: trace\par - Wounds: none\par - Alignment: normal\par - Tenderness: medial joint line\par - ROM: 0-130\par - Collateral laxity: none\par - Cruciate laxity: none\par - Popliteal angle (degrees): 45\par - Quad strength: 5/5 [de-identified] : AP pelvis and 4 views of the bilateral knees (weightbearing AP, weightbearing Beck, weightbearing lateral, and Sunrise) were obtained today, interpreted by me, and reviewed with the patient.\par \par Pelvic alignment: normal. There is an implanted device with leads coming toward the lumbar spine.\par \par Right hip --\par Alignment: normal\par Arthritis: none\par Deformity: none\par Osteonecrosis: none\par \par Left hip --\par Alignment: normal\par Arthritis: none\par Deformity: none\par Osteonecrosis: none\par \par Right knee --\par Alignment: mild varus\par Arthritis: tricompartmental worst medial, KL3-4\par Patellar height: borderline cale\par Patellar tracking: mild lateral subluxation\par \par Left knee -- TKA in position with primary PS femur and stemmed tibia. All components appear well fixed in good alignment. Patella sits slightly cale and tracks centrally.

## 2021-11-15 NOTE — ASU PREOP CHECKLIST - HEIGHT IN FEET
5 Pain Refusal Text: I offered to prescribe pain medication but the patient refused to take this medication.

## 2022-01-22 ENCOUNTER — RX RENEWAL (OUTPATIENT)
Age: 72
End: 2022-01-22

## 2022-02-16 ENCOUNTER — RX RENEWAL (OUTPATIENT)
Age: 72
End: 2022-02-16

## 2022-04-19 ENCOUNTER — RX RENEWAL (OUTPATIENT)
Age: 72
End: 2022-04-19

## 2022-04-19 RX ORDER — TRAZODONE HYDROCHLORIDE 100 MG/1
100 TABLET ORAL
Qty: 30 | Refills: 11 | Status: ACTIVE | COMMUNITY
Start: 2021-11-13 | End: 1900-01-01

## 2022-05-06 ENCOUNTER — LABORATORY RESULT (OUTPATIENT)
Age: 72
End: 2022-05-06

## 2022-05-10 ENCOUNTER — APPOINTMENT (OUTPATIENT)
Dept: PULMONOLOGY | Facility: CLINIC | Age: 72
End: 2022-05-10
Payer: MEDICARE

## 2022-05-10 VITALS
DIASTOLIC BLOOD PRESSURE: 90 MMHG | TEMPERATURE: 97.3 F | OXYGEN SATURATION: 92 % | BODY MASS INDEX: 28.28 KG/M2 | HEIGHT: 66 IN | WEIGHT: 176 LBS | HEART RATE: 90 BPM | SYSTOLIC BLOOD PRESSURE: 136 MMHG | RESPIRATION RATE: 12 BRPM

## 2022-05-10 DIAGNOSIS — R73.09 OTHER ABNORMAL GLUCOSE: ICD-10-CM

## 2022-05-10 LAB
25(OH)D3 SERPL-MCNC: 20.4 NG/ML
ALBUMIN SERPL ELPH-MCNC: 4.8 G/DL
ALP BLD-CCNC: 61 U/L
ALT SERPL-CCNC: 20 U/L
ANION GAP SERPL CALC-SCNC: 15 MMOL/L
APPEARANCE: CLEAR
AST SERPL-CCNC: 25 U/L
BASOPHILS # BLD AUTO: 0.05 K/UL
BASOPHILS NFR BLD AUTO: 0.8 %
BILIRUB SERPL-MCNC: 0.4 MG/DL
BILIRUBIN URINE: NEGATIVE
BLOOD URINE: NEGATIVE
BUN SERPL-MCNC: 21 MG/DL
CALCIUM SERPL-MCNC: 10 MG/DL
CHLORIDE SERPL-SCNC: 97 MMOL/L
CHOLEST SERPL-MCNC: 302 MG/DL
CO2 SERPL-SCNC: 28 MMOL/L
COLOR: YELLOW
CREAT SERPL-MCNC: 0.85 MG/DL
EGFR: 73 ML/MIN/1.73M2
EOSINOPHIL # BLD AUTO: 0.19 K/UL
EOSINOPHIL NFR BLD AUTO: 3 %
ESTIMATED AVERAGE GLUCOSE: 131 MG/DL
GLUCOSE QUALITATIVE U: NEGATIVE
GLUCOSE SERPL-MCNC: 95 MG/DL
HBA1C MFR BLD HPLC: 6.2 %
HCT VFR BLD CALC: 44.7 %
HDLC SERPL-MCNC: 111 MG/DL
HGB BLD-MCNC: 14.1 G/DL
IMM GRANULOCYTES NFR BLD AUTO: 0.3 %
KETONES URINE: NEGATIVE
LDLC SERPL CALC-MCNC: 173 MG/DL
LEUKOCYTE ESTERASE URINE: ABNORMAL
LYMPHOCYTES # BLD AUTO: 2.27 K/UL
LYMPHOCYTES NFR BLD AUTO: 35.7 %
MAN DIFF?: NORMAL
MCHC RBC-ENTMCNC: 30.1 PG
MCHC RBC-ENTMCNC: 31.5 GM/DL
MCV RBC AUTO: 95.3 FL
MONOCYTES # BLD AUTO: 0.65 K/UL
MONOCYTES NFR BLD AUTO: 10.2 %
NEUTROPHILS # BLD AUTO: 3.18 K/UL
NEUTROPHILS NFR BLD AUTO: 50 %
NITRITE URINE: NEGATIVE
NONHDLC SERPL-MCNC: 191 MG/DL
PH URINE: 6
PLATELET # BLD AUTO: 74 K/UL
POTASSIUM SERPL-SCNC: 4.6 MMOL/L
PROT SERPL-MCNC: 7 G/DL
PROTEIN URINE: NORMAL
RBC # BLD: 4.69 M/UL
RBC # FLD: 12.2 %
SODIUM SERPL-SCNC: 140 MMOL/L
SPECIFIC GRAVITY URINE: 1.02
T3 SERPL-MCNC: 133 NG/DL
T4 SERPL-MCNC: 8.3 UG/DL
TRIGL SERPL-MCNC: 92 MG/DL
TSH SERPL-ACNC: 1.95 UIU/ML
UROBILINOGEN URINE: NORMAL
WBC # FLD AUTO: 6.36 K/UL

## 2022-05-10 PROCEDURE — 99214 OFFICE O/P EST MOD 30 MIN: CPT

## 2022-05-10 NOTE — HISTORY OF PRESENT ILLNESS
[Former] : former [Never] : never [FreeTextEntry1] : Pt is here for follow up [de-identified] : She was in [place] for months and lost weight 10 lbs.  She has pain in the left knee.  She has been swimming.  She is going to see Dr. Rincon orthopedics and she is going for an MRI of the spine. She is very active in [place].  Walking with a rollator. \par \par She is using her CPAP intermittent and not using her oxygen at home.  She is doing well breathing wise.  Unable to take Symbicort and stiolto due to insurance coverage.  She has not had an exacerbation of her COPD.\par \par Clinically stable.

## 2022-05-10 NOTE — ASSESSMENT
[FreeTextEntry1] : Obstructive sleep apnea\par \par The patient is compliant his CPAP and she is tolerated very well, sometimes she forgets to use the machine. She has no side effects from it\par \par COPD\par \par Stable at this point after her acute exacerbation and admission to the hospital which was related to the Covid.  Patient is stable at this point and she is off systemic steroids. Pt was given samples today of trelegy.  She is to continue on the nebulizer treatment as needed.  No recent exacerbations and her lung exam unremarkable.\par \par Pt up to date with vaccines. \par \par Chronic respiratory failure with hypoxemia\par \par The sat was 92%. The patient is using oxygen as needed and I informed the patient that she is to continue on oxygen at home and to monitor oxygen saturation\par \par HLD\par \par Lipid panel is elevated and increased 20 mg of atorvastatin. \par \par Elevated HGA1C\par \par Pt to monitor her diet and increase her exercise.  \par \par Thrombocytopenia\par \par Reviewed her lab work \par \par Pulmonary hypertension is related to her COPD and there is no evidence of right heart failure.\par \par Knee pain\par \par She is following with ortho.\par \par Health Maintenance: \par \par - Refusing CA screenings (mammograms, colonoscopy or CT scans)

## 2022-07-17 NOTE — BEHAVIORAL HEALTH ASSESSMENT NOTE - VIOLENCE PROTECTIVE FACTORS:
100% of the time/able to follow single-step instructions [Consultation] : a consultation visit Residential stability/Sobriety

## 2022-08-08 ENCOUNTER — APPOINTMENT (OUTPATIENT)
Dept: PULMONOLOGY | Facility: CLINIC | Age: 72
End: 2022-08-08

## 2022-08-08 VITALS
OXYGEN SATURATION: 90 % | BODY MASS INDEX: 27.64 KG/M2 | SYSTOLIC BLOOD PRESSURE: 123 MMHG | HEART RATE: 83 BPM | WEIGHT: 172 LBS | TEMPERATURE: 97.2 F | DIASTOLIC BLOOD PRESSURE: 77 MMHG | HEIGHT: 66 IN

## 2022-08-08 DIAGNOSIS — J96.01 ACUTE RESPIRATORY FAILURE WITH HYPOXIA: ICD-10-CM

## 2022-08-08 DIAGNOSIS — G47.33 OBSTRUCTIVE SLEEP APNEA (ADULT) (PEDIATRIC): ICD-10-CM

## 2022-08-08 DIAGNOSIS — R91.1 SOLITARY PULMONARY NODULE: ICD-10-CM

## 2022-08-08 DIAGNOSIS — B36.9 SUPERFICIAL MYCOSIS, UNSPECIFIED: ICD-10-CM

## 2022-08-08 DIAGNOSIS — I27.20 PULMONARY HYPERTENSION, UNSPECIFIED: ICD-10-CM

## 2022-08-08 DIAGNOSIS — J43.2 CENTRILOBULAR EMPHYSEMA: ICD-10-CM

## 2022-08-08 DIAGNOSIS — D69.6 THROMBOCYTOPENIA, UNSPECIFIED: ICD-10-CM

## 2022-08-08 DIAGNOSIS — E78.5 HYPERLIPIDEMIA, UNSPECIFIED: ICD-10-CM

## 2022-08-08 PROCEDURE — 36415 COLL VENOUS BLD VENIPUNCTURE: CPT

## 2022-08-08 PROCEDURE — 99214 OFFICE O/P EST MOD 30 MIN: CPT | Mod: 25

## 2022-08-08 RX ORDER — BUDESONIDE AND FORMOTEROL FUMARATE DIHYDRATE 160; 4.5 UG/1; UG/1
160-4.5 AEROSOL RESPIRATORY (INHALATION) TWICE DAILY
Qty: 1 | Refills: 11 | Status: ACTIVE | COMMUNITY
Start: 2022-08-08 | End: 1900-01-01

## 2022-08-08 RX ORDER — CEPHALEXIN 500 MG/1
500 CAPSULE ORAL
Qty: 4 | Refills: 0 | Status: DISCONTINUED | COMMUNITY
Start: 2021-06-11 | End: 2022-08-08

## 2022-08-08 RX ORDER — CLOTRIMAZOLE 10 MG/G
1 CREAM TOPICAL 3 TIMES DAILY
Qty: 1 | Refills: 2 | Status: ACTIVE | COMMUNITY
Start: 2022-08-08 | End: 1900-01-01

## 2022-08-08 RX ORDER — AZITHROMYCIN 250 MG/1
250 TABLET, FILM COATED ORAL
Qty: 1 | Refills: 0 | Status: DISCONTINUED | COMMUNITY
Start: 2022-02-23 | End: 2022-08-08

## 2022-08-08 RX ORDER — FLUCONAZOLE 200 MG/1
200 TABLET ORAL
Qty: 1 | Refills: 0 | Status: DISCONTINUED | COMMUNITY
Start: 2021-08-10 | End: 2022-08-08

## 2022-08-08 NOTE — HEALTH RISK ASSESSMENT
[Former] : Former [No] : No [No falls in past year] : Patient reported no falls in the past year [0] : 2) Feeling down, depressed, or hopeless: Not at all (0)

## 2022-08-08 NOTE — HISTORY OF PRESENT ILLNESS
[FreeTextEntry1] : she is doing well [de-identified] : the inhaler is expensive.  She is doing well.  She is not using the oxygen for 9 month.  She is not sob.  No coughing, wheezing.  She is sleeping well.  Appetite is OK.

## 2022-08-08 NOTE — ASSESSMENT
[FreeTextEntry1] : Obstructive sleep apnea\par \par she is not using it as it is in storage and asymptomatic\par \par COPD\par \par she is stable and did not require systemic steroids nor urgent care visit.  Change to  Symbicort and will follow clinically\par Pt up to date with vaccines. \par \par Chronic respiratory failure with hypoxemia\par \par The sat was 92%. The patient is using oxygen as needed and I informed the patient that she is to continue on oxygen at home and to monitor oxygen saturation\par \par HLD\par \par Lipid panel was  elevated and increased 20 mg of atorvastatin. Follow on labs\par \par Elevated HGA1C\par \par Pt to monitor her diet and increase her exercise.  \par \par Fungal skin infection underneath both breast\par \par start lotrimin cream and keep area dry\par Thrombocytopenia\par \par Reviewed her lab work and follow on labs\par \par Pulmonary hypertension is related to her COPD and there is no evidence of right heart failure.\par \par Knee pain\par \par She is following with ortho.\par \par Health Maintenance: \par \par - Refusing CA screenings (mammograms, colonoscopy or CT scans)

## 2022-08-14 LAB
25(OH)D3 SERPL-MCNC: 24.8 NG/ML
ALBUMIN SERPL ELPH-MCNC: 4.9 G/DL
ALP BLD-CCNC: 62 U/L
ALT SERPL-CCNC: 21 U/L
ANION GAP SERPL CALC-SCNC: 14 MMOL/L
APPEARANCE: CLEAR
AST SERPL-CCNC: 20 U/L
BASOPHILS # BLD AUTO: 0.06 K/UL
BASOPHILS NFR BLD AUTO: 0.9 %
BILIRUB SERPL-MCNC: 0.2 MG/DL
BILIRUBIN URINE: NEGATIVE
BLOOD URINE: NEGATIVE
BUN SERPL-MCNC: 16 MG/DL
CALCIUM SERPL-MCNC: 10 MG/DL
CHLORIDE SERPL-SCNC: 101 MMOL/L
CHOLEST SERPL-MCNC: 223 MG/DL
CO2 SERPL-SCNC: 26 MMOL/L
COLOR: YELLOW
CREAT SERPL-MCNC: 0.74 MG/DL
EGFR: 86 ML/MIN/1.73M2
EOSINOPHIL # BLD AUTO: 0.27 K/UL
EOSINOPHIL NFR BLD AUTO: 3.8 %
ESTIMATED AVERAGE GLUCOSE: 128 MG/DL
GLUCOSE QUALITATIVE U: NEGATIVE
GLUCOSE SERPL-MCNC: 122 MG/DL
HBA1C MFR BLD HPLC: 6.1 %
HCT VFR BLD CALC: 41.4 %
HDLC SERPL-MCNC: 99 MG/DL
HGB BLD-MCNC: 13.4 G/DL
IMM GRANULOCYTES NFR BLD AUTO: 0.6 %
KETONES URINE: NEGATIVE
LDLC SERPL CALC-MCNC: 87 MG/DL
LEUKOCYTE ESTERASE URINE: NEGATIVE
LYMPHOCYTES # BLD AUTO: 1.59 K/UL
LYMPHOCYTES NFR BLD AUTO: 22.6 %
MAN DIFF?: NORMAL
MCHC RBC-ENTMCNC: 30.4 PG
MCHC RBC-ENTMCNC: 32.4 GM/DL
MCV RBC AUTO: 93.9 FL
MONOCYTES # BLD AUTO: 0.72 K/UL
MONOCYTES NFR BLD AUTO: 10.2 %
NEUTROPHILS # BLD AUTO: 4.36 K/UL
NEUTROPHILS NFR BLD AUTO: 61.9 %
NITRITE URINE: NEGATIVE
NONHDLC SERPL-MCNC: 123 MG/DL
PH URINE: 6.5
PLATELET # BLD AUTO: 108 K/UL
POTASSIUM SERPL-SCNC: 4.3 MMOL/L
PROT SERPL-MCNC: 6.7 G/DL
PROTEIN URINE: NEGATIVE
RBC # BLD: 4.41 M/UL
RBC # FLD: 12.5 %
SODIUM SERPL-SCNC: 140 MMOL/L
SPECIFIC GRAVITY URINE: 1.02
T3 SERPL-MCNC: 136 NG/DL
T4 SERPL-MCNC: 8.4 UG/DL
TRIGL SERPL-MCNC: 183 MG/DL
TSH SERPL-ACNC: 0.98 UIU/ML
UROBILINOGEN URINE: NORMAL
WBC # FLD AUTO: 7.04 K/UL

## 2022-08-15 ENCOUNTER — RX RENEWAL (OUTPATIENT)
Age: 72
End: 2022-08-15

## 2022-10-07 RX ORDER — VENLAFAXINE HYDROCHLORIDE 150 MG/1
150 CAPSULE, EXTENDED RELEASE ORAL
Qty: 90 | Refills: 0 | Status: ACTIVE | COMMUNITY
Start: 2019-04-24 | End: 1900-01-01

## 2022-11-01 DIAGNOSIS — L03.90 CELLULITIS, UNSPECIFIED: ICD-10-CM

## 2022-11-01 DIAGNOSIS — L03.311 CELLULITIS OF ABDOMINAL WALL: ICD-10-CM

## 2022-11-01 RX ORDER — DOXYCYCLINE HYCLATE 100 MG/1
100 CAPSULE ORAL
Qty: 14 | Refills: 0 | Status: ACTIVE | COMMUNITY
Start: 2022-11-01 | End: 1900-01-01

## 2022-11-23 DIAGNOSIS — B02.9 ZOSTER W/OUT COMPLICATIONS: ICD-10-CM

## 2022-11-23 RX ORDER — PREDNISONE 20 MG/1
20 TABLET ORAL
Qty: 10 | Refills: 0 | Status: ACTIVE | COMMUNITY
Start: 2022-11-23 | End: 1900-01-01

## 2022-11-23 RX ORDER — VALACYCLOVIR 1 G/1
1 TABLET, FILM COATED ORAL
Qty: 1 | Refills: 0 | Status: ACTIVE | COMMUNITY
Start: 2022-11-23 | End: 1900-01-01

## 2023-01-12 NOTE — ASSESSMENT
[FreeTextEntry1] : 68 year old female with severe neuromata of left knee after multiple surgeries including a total knee arthroplasty with multiple failed prior treatements\par \par -lidocaine/triamcinolone injection performed along medial and lateral periphery of hyperaesthetic area of left knee\par -pain almost completely resolved after injection. Pain score from 10/10 to 1/10. \par -Discussed that future visits will attempt to better localize the are of neuromata \par -will plan to re-inject at future visits with potential to surgically excise the nerves in the future\par -will follow in in 2 weeks for re-evaluation.... Spiral Flap Text: The defect edges were debeveled with a #15 scalpel blade.  Given the location of the defect, shape of the defect and the proximity to free margins a spiral flap was deemed most appropriate.  Using a sterile surgical marker, an appropriate rotation flap was drawn incorporating the defect and placing the expected incisions within the relaxed skin tension lines where possible. The area thus outlined was incised deep to adipose tissue with a #15 scalpel blade.  The skin margins were undermined to an appropriate distance in all directions utilizing iris scissors.

## 2023-03-13 RX ORDER — ONDANSETRON 4 MG/1
4 TABLET, ORALLY DISINTEGRATING ORAL EVERY 6 HOURS
Qty: 20 | Refills: 0 | Status: ACTIVE | COMMUNITY
Start: 2019-08-16 | End: 1900-01-01

## 2023-03-30 NOTE — ED PROVIDER NOTE - NS ED MD DISPO ADMITTING SERVICE
[NI] : Endocrine [Nl] : Hematologic/Lymphatic [Change in Activity] : no change in activity [Fever Above 102] : no fever [Malaise] : no malaise [Rash] : no rash [Murmur] : no murmur [Cough] : no cough MED

## 2023-04-13 RX ORDER — FUROSEMIDE 40 MG/1
40 TABLET ORAL
Qty: 90 | Refills: 3 | Status: ACTIVE | COMMUNITY
Start: 2018-07-09 | End: 1900-01-01

## 2023-05-30 ENCOUNTER — RX RENEWAL (OUTPATIENT)
Age: 73
End: 2023-05-30

## 2023-06-19 ENCOUNTER — RX RENEWAL (OUTPATIENT)
Age: 73
End: 2023-06-19

## 2023-06-28 ENCOUNTER — RX RENEWAL (OUTPATIENT)
Age: 73
End: 2023-06-28

## 2023-07-06 ENCOUNTER — RX RENEWAL (OUTPATIENT)
Age: 73
End: 2023-07-06

## 2023-07-12 RX ORDER — LAMOTRIGINE 100 MG/1
100 TABLET ORAL
Qty: 90 | Refills: 3 | Status: ACTIVE | COMMUNITY
Start: 2018-07-09 | End: 1900-01-01

## 2023-08-16 NOTE — DISCHARGE NOTE PROVIDER - EXTENDED VTE YES NO FOR MLM ENOXAPARIN
4:46 PM    This LCSW called this pt to ask if they have received their prescription from [facility] pharmacy yet and the pt placed his wife, Polina on the phone.  She stated that they have not and I let her know I would look into this and get back with her about it.  She thanked me for the phone call.     
,

## 2023-08-23 ENCOUNTER — RX RENEWAL (OUTPATIENT)
Age: 73
End: 2023-08-23

## 2023-09-20 ENCOUNTER — RX RENEWAL (OUTPATIENT)
Age: 73
End: 2023-09-20

## 2023-10-05 NOTE — PROVIDER CONTACT NOTE (OTHER) - DATE AND TIME:
Pt w/ known menorrhagia currently on OCP for management  Taking amethiax 3months for period suppression/management   Follows with Dr. Santana  Endorsing some improvement in bleeding  Will continue present management   25-Jun-2018 23:18

## 2023-10-14 ENCOUNTER — RX RENEWAL (OUTPATIENT)
Age: 73
End: 2023-10-14

## 2023-10-18 NOTE — PROGRESS NOTE ADULT - PROBLEM/PLAN-5
Patient instructed on:  NPO after 0500, clears OK until 0930.  Bring insurance card(s), photo ID.  No jewelery or body piercing's.  Wear comfortable, loose clothing appropriate for the procedure.  Advised to have someone home with her after the procedure.  Procedural prep instructions received and reviewed.  Aware of medication(s) to take DOS with sip of water and/or hold per anesthesia and surgeon guidelines.  To bring eye drops and blue bag  To bring a form of payment if requested to pay a portion of bill at registration.   can drop off and , but needs to provide phone number and be available at estimated discharge time.  Reading materials and beverages are not provided in the waiting areas. Ok to bring something in from home.    Patient advised to contact surgeon for any illness concerns    Patient verbalizes understanding.        
DISPLAY PLAN FREE TEXT
DISPLAY PLAN FREE TEXT

## 2023-10-23 ENCOUNTER — RX RENEWAL (OUTPATIENT)
Age: 73
End: 2023-10-23

## 2023-10-23 RX ORDER — VENLAFAXINE HYDROCHLORIDE 150 MG/1
150 CAPSULE, EXTENDED RELEASE ORAL DAILY
Qty: 90 | Refills: 3 | Status: ACTIVE | COMMUNITY
Start: 2018-07-09 | End: 1900-01-01

## 2023-11-20 ENCOUNTER — RX RENEWAL (OUTPATIENT)
Age: 73
End: 2023-11-20

## 2023-11-20 RX ORDER — ATORVASTATIN CALCIUM 20 MG/1
20 TABLET, FILM COATED ORAL
Qty: 90 | Refills: 3 | Status: ACTIVE | COMMUNITY
Start: 2021-03-26 | End: 1900-01-01

## 2023-12-18 ENCOUNTER — RX RENEWAL (OUTPATIENT)
Age: 73
End: 2023-12-18

## 2024-01-04 RX ORDER — ALPRAZOLAM 2 MG/1
2 TABLET ORAL
Qty: 90 | Refills: 0 | Status: ACTIVE | COMMUNITY
Start: 2018-07-09 | End: 1900-01-01

## 2024-05-04 ENCOUNTER — RX RENEWAL (OUTPATIENT)
Age: 74
End: 2024-05-04

## 2024-05-04 RX ORDER — TRAZODONE HYDROCHLORIDE 100 MG/1
100 TABLET ORAL
Qty: 30 | Refills: 0 | Status: ACTIVE | COMMUNITY
Start: 2019-01-24 | End: 1900-01-01

## 2024-10-24 NOTE — ED ADULT NURSE NOTE - NSIMPLEMENTINTERV_GEN_ALL_ED
CVP slowly increasing throughout shift to mid teens. Maintaining a 30 cc net loss on CVVH. Vaso 0.01-0.03. Updated Sherrie CVNP. To increase net loss to 45 as able.    Implemented All Fall with Harm Risk Interventions:  [place] to call system. Call bell, personal items and telephone within reach. Instruct patient to call for assistance. Room bathroom lighting operational. Non-slip footwear when patient is off stretcher. Physically safe environment: no spills, clutter or unnecessary equipment. Stretcher in lowest position, wheels locked, appropriate side rails in place. Provide visual cue, wrist band, yellow gown, etc. Monitor gait and stability. Monitor for mental status changes and reorient to person, place, and time. Review medications for side effects contributing to fall risk. Reinforce activity limits and safety measures with patient and family. Provide visual clues: red socks.

## 2025-01-03 NOTE — PROGRESS NOTE ADULT - PROBLEM SELECTOR PLAN 2
Today visit was scheduled as med check/acute      Should patient come back for her medicare wellness visit- or ok to make the visit today a medicare wellness visit?      - Presenting with SOB, found to be wheezing with hypercapnic respiratory failure on admission though no wheezing after admission  - on home O2 1-2L  - c/w Prednisone 40mg QD to finish 6/12  - c/w Duoneb Q4H standing + Q2 PRN; transition to MDI: Symbicort 2/2 + Spiriva QD   - will likely need change in her home inhalers given cost (was on Trelegy); patient not compliant at home due to cost.  - f/u CT chest
